# Patient Record
Sex: MALE | Race: BLACK OR AFRICAN AMERICAN | NOT HISPANIC OR LATINO | Employment: OTHER | URBAN - METROPOLITAN AREA
[De-identification: names, ages, dates, MRNs, and addresses within clinical notes are randomized per-mention and may not be internally consistent; named-entity substitution may affect disease eponyms.]

---

## 2017-01-13 DIAGNOSIS — I10 ESSENTIAL HYPERTENSION: ICD-10-CM

## 2017-01-13 RX ORDER — AMLODIPINE, VALSARTAN AND HYDROCHLOROTHIAZIDE 10; 160; 25 MG/1; MG/1; MG/1
TABLET ORAL
Qty: 90 TABLET | Refills: 0 | Status: SHIPPED | OUTPATIENT
Start: 2017-01-13 | End: 2017-04-10 | Stop reason: SDUPTHER

## 2017-01-17 ENCOUNTER — OFFICE VISIT (OUTPATIENT)
Dept: FAMILY MEDICINE | Facility: CLINIC | Age: 77
End: 2017-01-17
Payer: MEDICARE

## 2017-01-17 VITALS
TEMPERATURE: 98 F | RESPIRATION RATE: 17 BRPM | WEIGHT: 272.06 LBS | HEIGHT: 78 IN | OXYGEN SATURATION: 97 % | HEART RATE: 58 BPM | DIASTOLIC BLOOD PRESSURE: 72 MMHG | BODY MASS INDEX: 31.48 KG/M2 | SYSTOLIC BLOOD PRESSURE: 166 MMHG

## 2017-01-17 DIAGNOSIS — N52.1 ERECTILE DYSFUNCTION ASSOCIATED WITH TYPE 2 DIABETES MELLITUS: ICD-10-CM

## 2017-01-17 DIAGNOSIS — E11.69 ERECTILE DYSFUNCTION ASSOCIATED WITH TYPE 2 DIABETES MELLITUS: ICD-10-CM

## 2017-01-17 DIAGNOSIS — I10 ESSENTIAL HYPERTENSION: ICD-10-CM

## 2017-01-17 DIAGNOSIS — E11.22 TYPE 2 DIABETES MELLITUS WITH STAGE 3 CHRONIC KIDNEY DISEASE, WITHOUT LONG-TERM CURRENT USE OF INSULIN: Primary | ICD-10-CM

## 2017-01-17 DIAGNOSIS — N18.30 TYPE 2 DIABETES MELLITUS WITH STAGE 3 CHRONIC KIDNEY DISEASE, WITHOUT LONG-TERM CURRENT USE OF INSULIN: Primary | ICD-10-CM

## 2017-01-17 PROCEDURE — 99213 OFFICE O/P EST LOW 20 MIN: CPT | Mod: PBBFAC,PO | Performed by: FAMILY MEDICINE

## 2017-01-17 PROCEDURE — 99214 OFFICE O/P EST MOD 30 MIN: CPT | Mod: S$PBB,,, | Performed by: FAMILY MEDICINE

## 2017-01-17 PROCEDURE — 99999 PR PBB SHADOW E&M-EST. PATIENT-LVL III: CPT | Mod: PBBFAC,,, | Performed by: FAMILY MEDICINE

## 2017-01-17 RX ORDER — CHOLECALCIFEROL (VITAMIN D3) 25 MCG
2000 TABLET ORAL DAILY
COMMUNITY
End: 2020-11-02

## 2017-01-17 RX ORDER — CLONIDINE 0.1 MG/24H
1 PATCH, EXTENDED RELEASE TRANSDERMAL
Qty: 4 PATCH | Refills: 5 | Status: SHIPPED | OUTPATIENT
Start: 2017-01-17 | End: 2017-04-03 | Stop reason: SDUPTHER

## 2017-01-17 RX ORDER — CLONIDINE HYDROCHLORIDE 0.1 MG/1
TABLET ORAL
COMMUNITY
Start: 2016-10-08 | End: 2017-03-09 | Stop reason: SDUPTHER

## 2017-01-17 RX ORDER — DOCUSATE SODIUM 250 MG
250 CAPSULE ORAL DAILY
COMMUNITY
End: 2020-11-02

## 2017-01-17 RX ORDER — TADALAFIL 20 MG/1
20 TABLET ORAL
COMMUNITY
End: 2017-01-17

## 2017-01-17 RX ORDER — VARDENAFIL HYDROCHLORIDE 20 MG/1
20 TABLET ORAL DAILY PRN
Qty: 10 TABLET | Refills: 2 | Status: SHIPPED | OUTPATIENT
Start: 2017-01-17 | End: 2020-01-13

## 2017-01-17 NOTE — PROGRESS NOTES
Chief Complaint   Patient presents with    Hypertension     3 months follow up        Luther Irwin is a 76 y.o. male who presents per the Chief Complaint.  Pt is known to me and was last seen by me on 10/19/2016.  All known chronic medical issues have been documented.       Hypertension   This is a chronic problem. The current episode started more than 1 year ago. The problem is unchanged. The problem is uncontrolled. Pertinent negatives include no anxiety, blurred vision, chest pain, headaches, neck pain, orthopnea, palpitations, peripheral edema, PND, shortness of breath or sweats. There are no associated agents to hypertension. Risk factors for coronary artery disease include male gender and diabetes mellitus. Past treatments include angiotensin blockers, diuretics and calcium channel blockers. The current treatment provides moderate improvement. There are no compliance problems.  There is no history of angina, kidney disease, CAD/MI, CVA, heart failure, left ventricular hypertrophy, PVD, renovascular disease or retinopathy. There is no history of chronic renal disease, coarctation of the aorta or hyperparathyroidism.   Diabetes   He presents for his follow-up diabetic visit. He has type 2 diabetes mellitus. His disease course has been stable. Pertinent negatives for hypoglycemia include no confusion, dizziness, headaches, hunger, mood changes, nervousness/anxiousness, pallor, seizures, sleepiness, speech difficulty, sweats or tremors. Associated symptoms include foot paresthesias. Pertinent negatives for diabetes include no blurred vision, no chest pain, no fatigue, no foot ulcerations, no polydipsia, no polyphagia, no polyuria, no visual change, no weakness and no weight loss. There are no hypoglycemic complications. Symptoms are stable. Diabetic complications include peripheral neuropathy. Pertinent negatives for diabetic complications include no autonomic neuropathy, CVA, heart disease, impotence, nephropathy,  PVD or retinopathy. Risk factors for coronary artery disease include diabetes mellitus, hypertension and male sex. Current diabetic treatment includes oral agent (dual therapy). He is compliant with treatment most of the time. His weight is stable. He is following a generally healthy diet. When asked about meal planning, he reported none. He has not had a previous visit with a dietitian. He participates in exercise intermittently. There is no change in his home blood glucose trend. An ACE inhibitor/angiotensin II receptor blocker is being taken. He sees a podiatrist.Eye exam is current.        ROS  Review of Systems   Constitutional: Negative.  Negative for activity change, appetite change, chills, diaphoresis, fatigue, fever, unexpected weight change and weight loss.   HENT: Negative.  Negative for congestion, ear pain, hearing loss, nosebleeds, postnasal drip, rhinorrhea, sinus pressure, sneezing, sore throat and trouble swallowing.    Eyes: Negative for blurred vision, pain and visual disturbance.   Respiratory: Negative for cough, choking and shortness of breath.    Cardiovascular: Negative for chest pain, palpitations, orthopnea, leg swelling and PND.   Gastrointestinal: Negative for abdominal pain, constipation, diarrhea, nausea and vomiting.   Endocrine: Negative for polydipsia, polyphagia and polyuria.   Genitourinary: Negative for difficulty urinating, dysuria, frequency, impotence and urgency.   Musculoskeletal: Negative.  Negative for arthralgias, back pain, gait problem, joint swelling, myalgias and neck pain.   Skin: Negative.  Negative for pallor.   Allergic/Immunologic: Negative for environmental allergies and food allergies.   Neurological: Negative.  Negative for dizziness, tremors, seizures, syncope, speech difficulty, weakness, light-headedness and headaches.   Psychiatric/Behavioral: Negative.  Negative for confusion, decreased concentration, dysphoric mood and sleep disturbance. The patient is  "not nervous/anxious.        Physical Exam  Vitals:    01/17/17 1501   BP: (!) 166/72   Pulse: (!) 58   Resp: 17   Temp: 98.3 °F (36.8 °C)    Body mass index is 30.65 kg/(m^2).  Weight: 123.4 kg (272 lb 0.8 oz)   Height: 6' 7" (200.7 cm)     Physical Exam   Constitutional: He is oriented to person, place, and time. He appears well-developed and well-nourished. He is active and cooperative.  Non-toxic appearance. He does not have a sickly appearance. He does not appear ill. No distress.   HENT:   Head: Normocephalic and atraumatic.   Right Ear: Hearing, external ear and ear canal normal. No decreased hearing is noted.   Left Ear: Hearing and external ear normal. No decreased hearing is noted.   Nose: Nose normal. No rhinorrhea or nasal deformity.   Mouth/Throat: Uvula is midline and oropharynx is clear and moist. He does not have dentures. Normal dentition.   Eyes: Conjunctivae, EOM and lids are normal. Pupils are equal, round, and reactive to light. Right eye exhibits no chemosis, no discharge and no exudate. No foreign body present in the right eye. Left eye exhibits no chemosis, no discharge and no exudate. No foreign body present in the left eye. No scleral icterus.   Neck: Normal range of motion and full passive range of motion without pain. Neck supple. No thyromegaly present.   Cardiovascular: Normal rate, regular rhythm, S1 normal, S2 normal and normal heart sounds.  Exam reveals no gallop and no friction rub.    No murmur heard.  Pulmonary/Chest: Effort normal and breath sounds normal. No accessory muscle usage. No respiratory distress. He has no decreased breath sounds. He has no wheezes. He has no rhonchi. He has no rales.   Abdominal: Soft. Normal appearance. He exhibits no distension and no mass. There is no hepatosplenomegaly. There is no tenderness. There is no rigidity, no rebound and no guarding.   Musculoskeletal: Normal range of motion.   Neurological: He is alert and oriented to person, place, and " time. He has normal strength. No cranial nerve deficit or sensory deficit. He exhibits normal muscle tone. He displays no seizure activity. Coordination and gait normal.   Skin: Skin is warm, dry and intact. No rash noted. He is not diaphoretic. No pallor.   Psychiatric: He has a normal mood and affect. His speech is normal and behavior is normal. Judgment and thought content normal. Cognition and memory are normal. He is attentive.       Assessment & Plan    1. Type 2 diabetes mellitus with stage 3 chronic kidney disease, without long-term current use of insulin  Patient is encouraged to continue a diet low in carbohydrates and simple sugars.  Advised to begin or continue a weight loss program which focuses on good food choices and increased physical activity and encouraged to adhere to medication regimen and check glucose as recommended daily and report any changes in usual trends.  Screening blood test is due at this time.   - Hemoglobin A1c; Future  - Basic metabolic panel; Future    2. Essential hypertension  Patient was counseled and encouraged to maintain a low sodium diet, as well as increasing physical activity.  Recommend random BP checks at home on a regular basis.  Will continue medication at this time, and follow up as recommended, or sooner if blood pressure is not well controlled.   - cloNIDine 0.1 mg/24 hr td ptwk (CATAPRES) 0.1 mg/24 hr; Place 1 patch onto the skin every 7 days.  Dispense: 4 patch; Refill: 5    3. Erectile dysfunction associated with type 2 diabetes mellitus  Discussed different causes of ED, including anatomical disorder or nerve damage, vascular disorders, or psychological issues.  We agreed to try medication, and patient was given a prescription and advised to use with caution.  He was reminded that if he has an erection that last longer than 4 hours, he should report to the ER immediately.    - vardenafil (LEVITRA) 20 MG tablet; Take 1 tablet (20 mg total) by mouth daily as  needed for Erectile Dysfunction.  Dispense: 10 tablet; Refill: 2      Follow up documented    ACTIVE MEDICAL ISSUES:  Documented in Problem List    PAST MEDICAL HISTORY  Documented    PAST SURGICAL HISTORY:  Documented    SOCIAL HISTORY:  Documented    FAMILY HISTORY:  Documented    ALLERGIES AND MEDICATIONS: updated and reviewed.  Documented    Health Maintenance       Date Due Completion Date    TETANUS VACCINE 6/3/1958 ---    Pneumococcal (65+) (2 of 2 - PCV13) 4/23/2016 4/23/2015    Eye Exam 6/10/2016 6/10/2015 (Declined)    Override on 6/10/2015: Declined (had an appoint last week )    Hemoglobin A1c 4/19/2017 10/19/2016    Foot Exam 7/20/2017 7/20/2016    Override on 6/10/2015: Declined (hadan appoint with podiatry 05/21/15 Dr.Meka roth)    Lipid Panel 10/19/2017 10/19/2016

## 2017-01-18 ENCOUNTER — LAB VISIT (OUTPATIENT)
Dept: LAB | Facility: HOSPITAL | Age: 77
End: 2017-01-18
Attending: FAMILY MEDICINE
Payer: MEDICARE

## 2017-01-18 DIAGNOSIS — N18.30 TYPE 2 DIABETES MELLITUS WITH STAGE 3 CHRONIC KIDNEY DISEASE, WITHOUT LONG-TERM CURRENT USE OF INSULIN: ICD-10-CM

## 2017-01-18 DIAGNOSIS — E11.22 TYPE 2 DIABETES MELLITUS WITH STAGE 3 CHRONIC KIDNEY DISEASE, WITHOUT LONG-TERM CURRENT USE OF INSULIN: ICD-10-CM

## 2017-01-18 LAB
ANION GAP SERPL CALC-SCNC: 7 MMOL/L
BUN SERPL-MCNC: 25 MG/DL
CALCIUM SERPL-MCNC: 9 MG/DL
CHLORIDE SERPL-SCNC: 103 MMOL/L
CO2 SERPL-SCNC: 29 MMOL/L
CREAT SERPL-MCNC: 1.6 MG/DL
EST. GFR  (AFRICAN AMERICAN): 47.7 ML/MIN/1.73 M^2
EST. GFR  (NON AFRICAN AMERICAN): 41.2 ML/MIN/1.73 M^2
GLUCOSE SERPL-MCNC: 135 MG/DL
POTASSIUM SERPL-SCNC: 3.9 MMOL/L
SODIUM SERPL-SCNC: 139 MMOL/L

## 2017-01-18 PROCEDURE — 36415 COLL VENOUS BLD VENIPUNCTURE: CPT | Mod: PO

## 2017-01-18 PROCEDURE — 83036 HEMOGLOBIN GLYCOSYLATED A1C: CPT

## 2017-01-18 PROCEDURE — 80048 BASIC METABOLIC PNL TOTAL CA: CPT

## 2017-01-19 LAB
ESTIMATED AVG GLUCOSE: 140 MG/DL
HBA1C MFR BLD HPLC: 6.5 %

## 2017-01-23 ENCOUNTER — TELEPHONE (OUTPATIENT)
Dept: FAMILY MEDICINE | Facility: CLINIC | Age: 77
End: 2017-01-23

## 2017-01-23 NOTE — TELEPHONE ENCOUNTER
----- Message from Azikiwe K. Lombard, MD sent at 1/22/2017  9:22 AM CST -----  Please notify patient of improved A1c results by phone, and schedule follow up  within 3 months..

## 2017-02-03 RX ORDER — METOPROLOL SUCCINATE 50 MG/1
TABLET, EXTENDED RELEASE ORAL
Qty: 90 TABLET | Refills: 0 | Status: SHIPPED | OUTPATIENT
Start: 2017-02-03 | End: 2017-05-21 | Stop reason: SDUPTHER

## 2017-02-07 ENCOUNTER — OFFICE VISIT (OUTPATIENT)
Dept: FAMILY MEDICINE | Facility: CLINIC | Age: 77
End: 2017-02-07
Payer: MEDICARE

## 2017-02-07 VITALS
OXYGEN SATURATION: 97 % | RESPIRATION RATE: 16 BRPM | TEMPERATURE: 98 F | DIASTOLIC BLOOD PRESSURE: 80 MMHG | SYSTOLIC BLOOD PRESSURE: 144 MMHG | HEART RATE: 52 BPM | BODY MASS INDEX: 31.33 KG/M2 | WEIGHT: 270.75 LBS | HEIGHT: 78 IN

## 2017-02-07 DIAGNOSIS — N18.30 TYPE 2 DIABETES MELLITUS WITH STAGE 3 CHRONIC KIDNEY DISEASE, WITHOUT LONG-TERM CURRENT USE OF INSULIN: Primary | ICD-10-CM

## 2017-02-07 DIAGNOSIS — E11.22 TYPE 2 DIABETES MELLITUS WITH STAGE 3 CHRONIC KIDNEY DISEASE, WITHOUT LONG-TERM CURRENT USE OF INSULIN: Primary | ICD-10-CM

## 2017-02-07 DIAGNOSIS — I10 ESSENTIAL HYPERTENSION: ICD-10-CM

## 2017-02-07 PROCEDURE — 99213 OFFICE O/P EST LOW 20 MIN: CPT | Mod: PBBFAC,PO | Performed by: FAMILY MEDICINE

## 2017-02-07 PROCEDURE — 99214 OFFICE O/P EST MOD 30 MIN: CPT | Mod: S$PBB,,, | Performed by: FAMILY MEDICINE

## 2017-02-07 PROCEDURE — 99999 PR PBB SHADOW E&M-EST. PATIENT-LVL III: CPT | Mod: PBBFAC,,, | Performed by: FAMILY MEDICINE

## 2017-02-07 NOTE — PROGRESS NOTES
Chief Complaint   Patient presents with    Hypertension     need to follow up , Dr Poe opthalmology do have an appoint 03/07/17       Luther Irwin Jr. is a 76 y.o. male who presents per the Chief Complaint.  Pt is known to me and was last seen by me on 1/17/2017.  All known chronic medical issues have been documented.       Hypertension   This is a chronic problem. The current episode started more than 1 year ago. The problem is unchanged. The problem is uncontrolled. Pertinent negatives include no anxiety, blurred vision, chest pain, headaches, neck pain, orthopnea, palpitations, peripheral edema, PND, shortness of breath or sweats. There are no associated agents to hypertension. Risk factors for coronary artery disease include male gender and diabetes mellitus. Past treatments include angiotensin blockers, diuretics, calcium channel blockers and direct vasodilators. The current treatment provides moderate improvement. There are no compliance problems.  There is no history of angina, kidney disease, CAD/MI, CVA, heart failure, left ventricular hypertrophy, PVD, renovascular disease, retinopathy or a thyroid problem. There is no history of chronic renal disease, coarctation of the aorta, hyperaldosteronism, hypercortisolism, hyperparathyroidism, a hypertension causing med, pheochromocytoma or sleep apnea.   Diabetes   He presents for his follow-up diabetic visit. He has type 2 diabetes mellitus. His disease course has been stable. Pertinent negatives for hypoglycemia include no confusion, dizziness, headaches, hunger, mood changes, nervousness/anxiousness, pallor, seizures, sleepiness, speech difficulty, sweats or tremors. Associated symptoms include foot paresthesias. Pertinent negatives for diabetes include no blurred vision, no chest pain, no fatigue, no foot ulcerations, no polydipsia, no polyphagia, no polyuria, no visual change, no weakness and no weight loss. There are no hypoglycemic complications.  "Symptoms are stable. Diabetic complications include peripheral neuropathy. Pertinent negatives for diabetic complications include no autonomic neuropathy, CVA, heart disease, impotence, nephropathy, PVD or retinopathy. Risk factors for coronary artery disease include diabetes mellitus, hypertension and male sex. Current diabetic treatment includes oral agent (dual therapy). He is compliant with treatment most of the time. His weight is stable. He is following a generally healthy diet. When asked about meal planning, he reported none. He has not had a previous visit with a dietitian. He participates in exercise intermittently. There is no change in his home blood glucose trend. An ACE inhibitor/angiotensin II receptor blocker is being taken. He sees a podiatrist.Eye exam is current.        ROS  Review of Systems   Constitutional: Negative for fatigue and weight loss.   Eyes: Negative for blurred vision.   Respiratory: Negative for shortness of breath.    Cardiovascular: Negative for chest pain, palpitations, orthopnea and PND.   Endocrine: Negative for polydipsia, polyphagia and polyuria.   Genitourinary: Negative for impotence.   Musculoskeletal: Negative for neck pain.   Skin: Negative for pallor.   Neurological: Negative for dizziness, tremors, seizures, speech difficulty, weakness and headaches.   Psychiatric/Behavioral: Negative for confusion. The patient is not nervous/anxious.        Physical Exam  Vitals:    02/07/17 1407   BP: (!) 144/80   Pulse: (!) 52   Resp: 16   Temp: 98 °F (36.7 °C)    Body mass index is 30.5 kg/(m^2).  Weight: 122.8 kg (270 lb 11.6 oz)   Height: 6' 7" (200.7 cm)     Physical Exam   Constitutional: He is oriented to person, place, and time. He appears well-developed and well-nourished. He is active and cooperative.  Non-toxic appearance. He does not have a sickly appearance. He does not appear ill. No distress.   HENT:   Head: Normocephalic and atraumatic.   Right Ear: Hearing, external " ear and ear canal normal. No decreased hearing is noted.   Left Ear: Hearing and external ear normal. No decreased hearing is noted.   Nose: Nose normal. No rhinorrhea or nasal deformity.   Mouth/Throat: Uvula is midline and oropharynx is clear and moist. He does not have dentures. Normal dentition.   Eyes: Conjunctivae, EOM and lids are normal. Pupils are equal, round, and reactive to light. Right eye exhibits no chemosis, no discharge and no exudate. No foreign body present in the right eye. Left eye exhibits no chemosis, no discharge and no exudate. No foreign body present in the left eye. No scleral icterus.   Neck: Normal range of motion and full passive range of motion without pain. Neck supple. No thyromegaly present.   Cardiovascular: Normal rate, regular rhythm, S1 normal, S2 normal and normal heart sounds.  Exam reveals no gallop and no friction rub.    No murmur heard.  Pulmonary/Chest: Effort normal and breath sounds normal. No accessory muscle usage. No respiratory distress. He has no decreased breath sounds. He has no wheezes. He has no rhonchi. He has no rales.   Abdominal: Soft. Normal appearance. He exhibits no distension and no mass. There is no hepatosplenomegaly. There is no tenderness. There is no rigidity, no rebound and no guarding.   Musculoskeletal: Normal range of motion.   Neurological: He is alert and oriented to person, place, and time. He has normal strength. No cranial nerve deficit or sensory deficit. He exhibits normal muscle tone. He displays no seizure activity. Coordination and gait normal.   Skin: Skin is warm, dry and intact. No rash noted. He is not diaphoretic. No pallor.   Psychiatric: He has a normal mood and affect. His speech is normal and behavior is normal. Judgment and thought content normal. Cognition and memory are normal. He is attentive.       Assessment & Plan    1. Type 2 diabetes mellitus with stage 3 chronic kidney disease, without long-term current use of  insulin  Patient is encouraged to continue a diet low in carbohydrates and simple sugars.  Advised to begin or continue a weight loss program which focuses on good food choices and increased physical activity and encouraged to adhere to medication regimen and check glucose as recommended daily and report any changes in usual trends.  Screening blood test is not due at this time.  Slightly improved A1c; encouraged continued diet and lifestyle modifications.      2. Essential hypertension  Patient was counseled and encouraged to maintain a low sodium diet, as well as increasing physical activity.  Recommend random BP checks at home on a regular basis.  Will continue medication at this time, and follow up as recommended, or sooner if blood pressure is not well controlled.  Will start clonidine patches once he finishes current pills; follow up in 3 months.    Follow up documented    ACTIVE MEDICAL ISSUES:  Documented in Problem List    PAST MEDICAL HISTORY  Documented    PAST SURGICAL HISTORY:  Documented    SOCIAL HISTORY:  Documented    FAMILY HISTORY:  Documented    ALLERGIES AND MEDICATIONS: updated and reviewed.  Documented    Health Maintenance       Date Due Completion Date    TETANUS VACCINE 6/3/1958 ---    Pneumococcal (65+) (2 of 2 - PCV13) 4/23/2016 4/23/2015    Eye Exam 6/10/2016 6/10/2015 (Declined)    Override on 6/10/2015: Declined (had an appoint last week )    Hemoglobin A1c 7/18/2017 1/18/2017    Foot Exam 7/20/2017 7/20/2016    Override on 6/10/2015: Declined (hadan appoint with podiatry 05/21/15 Dr.Meka roth)    Lipid Panel 10/19/2017 10/19/2016

## 2017-03-09 ENCOUNTER — OFFICE VISIT (OUTPATIENT)
Dept: PODIATRY | Facility: CLINIC | Age: 77
End: 2017-03-09
Payer: MEDICARE

## 2017-03-09 VITALS
BODY MASS INDEX: 31.24 KG/M2 | SYSTOLIC BLOOD PRESSURE: 152 MMHG | HEIGHT: 78 IN | DIASTOLIC BLOOD PRESSURE: 70 MMHG | WEIGHT: 270 LBS

## 2017-03-09 DIAGNOSIS — M20.42 HAMMER TOES OF BOTH FEET: ICD-10-CM

## 2017-03-09 DIAGNOSIS — M20.41 HAMMER TOES OF BOTH FEET: ICD-10-CM

## 2017-03-09 DIAGNOSIS — B35.1 ONYCHOMYCOSIS DUE TO DERMATOPHYTE: ICD-10-CM

## 2017-03-09 DIAGNOSIS — E11.49 TYPE II DIABETES MELLITUS WITH NEUROLOGICAL MANIFESTATIONS: Primary | ICD-10-CM

## 2017-03-09 DIAGNOSIS — M20.10 HALLUX ABDUCTO VALGUS, UNSPECIFIED LATERALITY: ICD-10-CM

## 2017-03-09 PROCEDURE — 11721 DEBRIDE NAIL 6 OR MORE: CPT | Mod: Q9,PBBFAC,PO | Performed by: PODIATRIST

## 2017-03-09 PROCEDURE — 99213 OFFICE O/P EST LOW 20 MIN: CPT | Mod: PBBFAC,PO,25 | Performed by: PODIATRIST

## 2017-03-09 PROCEDURE — 99999 PR PBB SHADOW E&M-EST. PATIENT-LVL III: CPT | Mod: PBBFAC,,, | Performed by: PODIATRIST

## 2017-03-09 PROCEDURE — 99499 UNLISTED E&M SERVICE: CPT | Mod: S$PBB,,, | Performed by: PODIATRIST

## 2017-03-09 PROCEDURE — 11721 DEBRIDE NAIL 6 OR MORE: CPT | Mod: Q9,S$PBB,, | Performed by: PODIATRIST

## 2017-03-09 NOTE — PATIENT INSTRUCTIONS
Recommend lotions: eucerin, aquaphor, A&D ointment, gold bond for diabetics      Shoe recommendations: (try 6pm.com, zappos.com , [email or web address].[email or web address], or shoes.com for discounted prices) you can visit [employer] shoes in [place] as well    Asics (GT 1000 or gel foundations), new balance, saucony (stabil c3),  Narayan (transcend), vionic, propet (tennis shoe)    soft brand, clarks, crocs, aerosoles, naturalizers, SAS, ecco, harsh, kamran cuevas, rockports (dress shoes)    Vionic, volitiles, burkenstocks, fitflops, propet (sandals)    Nike comfort thong sandals, crocs (house shoes)    Nail Home remedy:  Vicks Vapor rub to cuticles  Listerine and apple cider vinegar in a spray bottle to nails       occasional soaks for 15-20 mins in luke warm water with 1 cup of listerine and 1 cup of apple cider vinegar is ok          Diabetes: Inspecting Your Feet    Diabetes increases your chances of developing foot problems. So inspect your feet every day. This helps you find small skin irritations before they become serious ulcers or infections. If you have trouble seeing the bottoms of your feet, use a mirror or ask a family member or friend to help.  How to check your feet  Below are tips to help you look for foot problems. Try to check your feet at the same time each day, such as when you get out of bed in the morning:  · Check the top of each foot. The tops of toes, back of the heel, and outer edge of the foot can get a lot of rubbing from poor-fitting shoes.  · Check the bottom of each foot. Daily wear and tear often leads to problems at pressure spots.  · Check the toes and nails. Fungal infections often occur between toes. Toenail problems can also be a sign of fungal infections or lead to breaks in the skin.  · Check your shoes, too. Loose objects inside a shoe can injure the foot. Use your hand to feel inside your shoes for things like zackery, loose stitching, or rough areas that could irritate your skin.  Warning signs  Look for any  color changes in the foot. Redness with streaks can signal a severe infection, which needs immediate medical attention. Tell your healthcare provider right away if you have any of these problems:  · Swelling, sometimes with color changes, may be a sign of poor blood flow or infection. Symptoms include tenderness and an increase in the size of your foot.  · Warm or hot areas on your feet may be signs of infection. A foot that is cold may not be getting enough blood.  · Sensations such as burning, tingling, or pins and needles can be signs of a problem. Also check for areas that may be numb.  · Hot spots are caused by friction or pressure. Look for hot spots in areas that get a lot of rubbing. Hot spots can turn into blisters, calluses, or sores.  · Cracks and sores are caused by dry or irritated skin. They are a sign that the skin is breaking down, which can lead to infection.  · Toenail problems to watch for include nails growing into the skin (ingrown toenail) and causing redness or pain. Thick, yellow, or discolored nails can signal a fungal infection.  · Drainage and odor can develop from untreated sores and ulcers. Call your healthcare provider right away if you notice white or yellow drainage, bleeding, or unpleasant odor.   Date Last Reviewed: 6/1/2016 © 2000-2016 [employer]. [address] [place] [address], [place] [address]. All rights reserved. This information is not intended as a substitute for professional medical care. Always follow your healthcare professional's instructions.

## 2017-03-09 NOTE — MR AVS SNAPSHOT
[facility]  [address]  [place] [address]  Phone: [phone]                  Luther Irwin Jr.   3/9/2017 3:45 PM   Office Visit    Description:  Male : 1940   Provider:  Darcy Mckeon DPM   Department:  [facility]           Reason for Visit     Diabetes Mellitus     Diabetic Foot Exam     Nail Care                To Do List           Goals (5 Years of Data)     None      [facility]     [facility] On Call Nurse Care Line -  Assistance  Registered nurses in the [facility] On Call Center provide clinical advisement, health education, appointment booking, and other advisory services.  Call for this free service at [phone].             Medications           Message regarding Medications     Verify the changes and/or additions to your medication regime listed below are the same as discussed with your clinician today.  If any of these changes or additions are incorrect, please notify your healthcare provider.        STOP taking these medications     cloNIDine (CATAPRES) 0.1 MG tablet TAKE 1 TABLET BY MOUTH EVERY MORNING AND 2 EVERY EVENING    cloNIDine (CATAPRES) 0.1 MG tablet TAKE 1 TABLET BY MOUTH EVERY MORNING AND 2 EVERY EVENING           Verify that the below list of medications is an accurate representation of the medications you are currently taking.  If none reported, the list may be blank. If incorrect, please contact your healthcare provider. Carry this list with you in case of emergency.           Current Medications     amlodipine-valsartan-hcthiazid -25 mg Tab TAKE 1 TABLET EVERY DAY    aspirin 81 MG Chew Take 81 mg by mouth once daily.    blood sugar diagnostic Strp Use 1 Free Style Lite test strip to test blood sugar 2 times daily.    blood sugar diagnostic Strp Use 1 Free Style Lite test strip to test blood sugar 2 times daily.    cloNIDine 0.1 mg/24 hr td ptwk (CATAPRES) 0.1 mg/24 hr Place 1 patch onto the skin every 7 days.    cyanocobalamin (VITAMIN  "B-12) 500 MCG tablet Take 500 mcg by mouth once daily.    CYANOCOBALAMIN, VITAMIN B-12, ORAL Take by mouth.    docusate sodium (COL-RITE) 250 MG capsule Take 250 mg by mouth.    glipiZIDE (GLUCOTROL) 5 MG TR24 Take 1 tablet (5 mg total) by mouth every morning.    metformin (GLUCOPHAGE) 500 MG tablet Take 1 tablet (500 mg total) by mouth 2 (two) times daily with meals.    metoprolol succinate (TOPROL-XL) 50 MG 24 hr tablet TAKE 1 TABLET (50 MG TOTAL) BY MOUTH ONCE DAILY.    tadalafil (CIALIS) 20 MG Tab Take 1 tablet (20 mg total) by mouth every 72 hours as needed.    tamsulosin (FLOMAX) 0.4 mg Cp24 TAKE 1 CAPSULE BY MOUTH ONCE DAILY.    vardenafil (LEVITRA) 20 MG tablet Take 1 tablet (20 mg total) by mouth daily as needed for Erectile Dysfunction.    vitamin D 1000 units Tab Take 2,000 mg by mouth once daily.     blood-glucose meter kit One Touch Ultra II Kit, use as directed to check blood sugar 2 times daily           Clinical Reference Information           Your Vitals Were     BP Height Weight BMI       152/70 6' 7" (2.007 m) 122.5 kg (270 lb) 30.42 kg/m2       Blood Pressure          Most Recent Value    BP  (!)  152/70      Allergies as of 3/9/2017     No Known Allergies      Immunizations Administered on Date of Encounter - 3/9/2017     None      [facility] Sign-Up     Activating your [facility] account is as easy as 1-2-3!     1) Visit [email or web address], select Sign Up Now, enter this activation code and your date of birth, then select Next.  [email or web address]  Expires: 4/23/2017  4:31 PM      2) Create a username and password to use when you visit [facility] in the future and select a security question in case you lose your password and select Next.    3) Enter your e-mail address and click Sign Up!    Additional Information  If you have questions, please e-mail [email or web address] or call [phone] to talk to our [facility] staff. Remember, [facility] is NOT to be used for urgent needs. For medical " emergencies, dial 911.         Instructions    Recommend lotions: eucerin, aquaphor, A&D ointment, gold bond for diabetics      Shoe recommendations: (try 6pm.com, zappos.com , nordstromrack.com, or shoes.com for discounted prices) you can visit DSW shoes in [place] as well    Asics (GT 1000 or gel foundations), new balance, saucony (stabil c3),  Narayan (transcend), vionic, propet (tennis shoe)    soft brand, clarks, crocs, aerosoles, naturalizers, SAS, ecco, harsh, kamran cuevas, rockports (dress shoes)    Vionic, volitiles, burkenstocks, fitflops, propet (sandals)    Nike comfort thong sandals, crocs (house shoes)    Nail Home remedy:  Vicks Vapor rub to cuticles  Listerine and apple cider vinegar in a spray bottle to nails       occasional soaks for 15-20 mins in luke warm water with 1 cup of listerine and 1 cup of apple cider vinegar is ok          Diabetes: Inspecting Your Feet    Diabetes increases your chances of developing foot problems. So inspect your feet every day. This helps you find small skin irritations before they become serious ulcers or infections. If you have trouble seeing the bottoms of your feet, use a mirror or ask a family member or friend to help.  How to check your feet  Below are tips to help you look for foot problems. Try to check your feet at the same time each day, such as when you get out of bed in the morning:  · Check the top of each foot. The tops of toes, back of the heel, and outer edge of the foot can get a lot of rubbing from poor-fitting shoes.  · Check the bottom of each foot. Daily wear and tear often leads to problems at pressure spots.  · Check the toes and nails. Fungal infections often occur between toes. Toenail problems can also be a sign of fungal infections or lead to breaks in the skin.  · Check your shoes, too. Loose objects inside a shoe can injure the foot. Use your hand to feel inside your shoes for things like zackery, loose stitching, or rough areas that could  irritate your skin.  Warning signs  Look for any color changes in the foot. Redness with streaks can signal a severe infection, which needs immediate medical attention. Tell your healthcare provider right away if you have any of these problems:  · Swelling, sometimes with color changes, may be a sign of poor blood flow or infection. Symptoms include tenderness and an increase in the size of your foot.  · Warm or hot areas on your feet may be signs of infection. A foot that is cold may not be getting enough blood.  · Sensations such as burning, tingling, or pins and needles can be signs of a problem. Also check for areas that may be numb.  · Hot spots are caused by friction or pressure. Look for hot spots in areas that get a lot of rubbing. Hot spots can turn into blisters, calluses, or sores.  · Cracks and sores are caused by dry or irritated skin. They are a sign that the skin is breaking down, which can lead to infection.  · Toenail problems to watch for include nails growing into the skin (ingrown toenail) and causing redness or pain. Thick, yellow, or discolored nails can signal a fungal infection.  · Drainage and odor can develop from untreated sores and ulcers. Call your healthcare provider right away if you notice white or yellow drainage, bleeding, or unpleasant odor.   Date Last Reviewed: 6/1/2016 © 2000-2016 [employer]. [address] [place] [address]. All rights reserved. This information is not intended as a substitute for professional medical care. Always follow your healthcare professional's instructions.             Language Assistance Services     ATTENTION: Language assistance services are available, free of charge. Please call [phone].      ATENCIÓN: Si habla elyssa, tiene a cuba disposición servicios gratuitos de asistencia lingüística. Llame al [phone].     RESHMA Ý: N?u b?n nói Ti?ng Vi?t, có các d?ch v? h? tr? ngôn ng? mi?n phí dành cho b?n. G?i s?  [phone].         [facility] complies with applicable Federal civil rights laws and does not discriminate on the basis of race, color, national origin, age, disability, or sex.

## 2017-03-10 NOTE — PROGRESS NOTES
Subjective:      Patient ID: Luther Irwin Jr. is a 76 y.o. male.    Chief Complaint: Diabetes Mellitus (pcp Dr. Lombard 2-7-17); Diabetic Foot Exam; and Nail Care    Luther is a 76 y.o. male who presents to the clinic for evaluation and treatment of high risk feet. Luther has a past medical history of Cancer; Diabetes mellitus, type 2; and Hypertension. The patient's chief complaint is long, thick toenails. This patient has documented high risk feet requiring routine maintenance secondary to diabetes mellitis and those secondary complications of diabetes, as mentioned..    PCP: Azikiwe K Lombard, MD    Date Last Seen by PCP:   Chief Complaint   Patient presents with    Diabetes Mellitus     pcp Dr. Lombard 2-7-17    Diabetic Foot Exam    Nail Care     Current shoe gear:  casual shoes     Hemoglobin A1C   Date Value Ref Range Status   01/18/2017 6.5 (H) 4.5 - 6.2 % Final     Comment:     According to ADA guidelines, hemoglobin A1C <7.0% represents  optimal control in non-pregnant diabetic patients.  Different  metrics may apply to specific populations.   Standards of Medical Care in Diabetes - 2016.  For the purpose of screening for the presence of diabetes:  <5.7%     Consistent with the absence of diabetes  5.7-6.4%  Consistent with increasing risk for diabetes   (prediabetes)  >or=6.5%  Consistent with diabetes  Currently no consensus exists for use of hemoglobin A1C  for diagnosis of diabetes for children.     10/19/2016 6.6 (H) 4.5 - 6.2 % Final     Comment:     According to ADA guidelines, hemoglobin A1C <7.0% represents  optimal control in non-pregnant diabetic patients.  Different  metrics may apply to specific populations.   Standards of Medical Care in Diabetes - 2016.  For the purpose of screening for the presence of diabetes:  <5.7%     Consistent with the absence of diabetes  5.7-6.4%  Consistent with increasing risk for diabetes   (prediabetes)  >or=6.5%  Consistent with diabetes  Currently no  consensus exists for use of hemoglobin A1C  for diagnosis of diabetes for children.     06/10/2016 6.6 (H) 4.5 - 6.2 % Final     Patient Active Problem List   Diagnosis    Essential hypertension    BPH (benign prostatic hyperplasia)    Type 2 diabetes mellitus with stage 3 chronic kidney disease, without long-term current use of insulin    Diabetes with proteinuria    Hyperchylomicronemia     Current Outpatient Prescriptions on File Prior to Visit   Medication Sig Dispense Refill    amlodipine-valsartan-hcthiazid -25 mg Tab TAKE 1 TABLET EVERY DAY 90 tablet 0    aspirin 81 MG Chew Take 81 mg by mouth once daily.      blood sugar diagnostic Strp Use 1 Free Style Lite test strip to test blood sugar 2 times daily. 100 strip 11    blood sugar diagnostic Strp Use 1 Free Style Lite test strip to test blood sugar 2 times daily.      cloNIDine 0.1 mg/24 hr td ptwk (CATAPRES) 0.1 mg/24 hr Place 1 patch onto the skin every 7 days. 4 patch 5    cyanocobalamin (VITAMIN B-12) 500 MCG tablet Take 500 mcg by mouth once daily.      CYANOCOBALAMIN, VITAMIN B-12, ORAL Take by mouth.      docusate sodium (COL-RITE) 250 MG capsule Take 250 mg by mouth.      glipiZIDE (GLUCOTROL) 5 MG TR24 Take 1 tablet (5 mg total) by mouth every morning. 90 tablet 1    metformin (GLUCOPHAGE) 500 MG tablet Take 1 tablet (500 mg total) by mouth 2 (two) times daily with meals. 180 tablet 0    metoprolol succinate (TOPROL-XL) 50 MG 24 hr tablet TAKE 1 TABLET (50 MG TOTAL) BY MOUTH ONCE DAILY. 90 tablet 0    tadalafil (CIALIS) 20 MG Tab Take 1 tablet (20 mg total) by mouth every 72 hours as needed. 10 tablet 5    tamsulosin (FLOMAX) 0.4 mg Cp24 TAKE 1 CAPSULE BY MOUTH ONCE DAILY. 90 capsule 2    vardenafil (LEVITRA) 20 MG tablet Take 1 tablet (20 mg total) by mouth daily as needed for Erectile Dysfunction. 10 tablet 2    vitamin D 1000 units Tab Take 2,000 mg by mouth once daily.       blood-glucose meter kit One Touch Ultra II  "Kit, use as directed to check blood sugar 2 times daily 1 each 0    [DISCONTINUED] cloNIDine (CATAPRES) 0.1 MG tablet TAKE 1 TABLET BY MOUTH EVERY MORNING AND 2 EVERY EVENING 270 tablet 1    [DISCONTINUED] cloNIDine (CATAPRES) 0.1 MG tablet TAKE 1 TABLET BY MOUTH EVERY MORNING AND 2 EVERY EVENING       No current facility-administered medications on file prior to visit.      Review of patient's allergies indicates:  No Known Allergies  Past Surgical History:   Procedure Laterality Date    FINGER SURGERY  3/2014    HERNIA REPAIR  07/1967    KNEE SURGERY      SPINE SURGERY  10/2007    VASECTOMY  1986     Family History   Problem Relation Age of Onset    Hypertension Mother     Hyperlipidemia Mother     Hypertension Father     Hyperlipidemia Father      Social History     Social History    Marital status: Single     Spouse name: N/A    Number of children: N/A    Years of education: N/A     Occupational History    Not on file.     Social History Main Topics    Smoking status: Former Smoker    Smokeless tobacco: Never Used    Alcohol use Yes      Comment: special occacion    Drug use: No    Sexual activity: Not on file     Other Topics Concern    Not on file     Social History Narrative       Review of Systems   Constitution: Negative for chills, fever and weakness.   Cardiovascular: Negative for chest pain, claudication and leg swelling.   Respiratory: Negative for cough and shortness of breath.    Skin: Positive for dry skin and nail changes. Negative for itching and rash.   Musculoskeletal: Negative for arthritis, falls, joint pain, joint swelling and muscle weakness.   Gastrointestinal: Negative for diarrhea, nausea and vomiting.   Neurological: Positive for paresthesias. Negative for numbness and tremors.   Psychiatric/Behavioral: Negative for altered mental status and hallucinations.         Objective:       Vitals:    03/09/17 1534   BP: (!) 152/70   Weight: 122.5 kg (270 lb)   Height: 6' 7" " (2.007 m)   PainSc: 0-No pain       Physical Exam   Constitutional:   General: Pt. is well-developed, well-nourished, appears stated age, in no acute distress, alert and oriented x 3. No evidence of depression, anxiety, or agitation. Calm, cooperative, and communicative. Appropriate interactions and affect.       Cardiovascular:   Pulses:       Dorsalis pedis pulses are 2+ on the right side, and 2+ on the left side.        Posterior tibial pulses are 1+ on the right side, and 1+ on the left side.   There is decreased digital hair. Skin is atrophic, hyperpigmented, and mildly edematous       Musculoskeletal:        Right ankle: He exhibits normal range of motion and no swelling. No tenderness. Achilles tendon exhibits no pain and no defect.        Left ankle: He exhibits normal range of motion and no swelling. No tenderness. Achilles tendon exhibits no pain and no defect.        Right foot: There is normal range of motion and no tenderness.        Left foot: There is normal range of motion and no tenderness.   Adequate joint range of motion without pain, limitation, nor crepitation Bilateral feet and ankle joints. Muscle strength is 5/5 in all groups bilaterally.    Decreased stride, station of gait.  apropulsive toe off.  Increased angle and base of gait.    Patient has hammertoes of digits 2-5 bilateral partially reducible without symptom today.    Visible and palpable bunion without pain at dorsomedial 1st metatarsal head right and left.  Hallux abducted right and left partially reducible, tracks laterally without being track bound.  No ecchymosis, erythema, edema, or cardinal signs infection or signs of trauma same foot.    Visible and palpable tailors bunion without pain at dorsolateral 5th metatarsal head right and left.  5th digit is varus right and left partially reducible. No ecchymosis, erythema, edema, or cardinal signs infection.       Neurological: A sensory deficit is present.   Decreased/absent  vibratory sensation bilateral feet to 128Hz tuning fork.    Waddell-Deanne 5.07 monofilamant testing is diminished laterally Javad feet. Sharp/dull sensation diminished Bilaterally. Light touch diminished Bilaterally.       Skin: Skin is warm, dry and intact. No abrasion, no ecchymosis, no lesion and no rash noted. He is not diaphoretic. No cyanosis or erythema. No pallor. Nails show no clubbing.   Toenails 1-5 bilaterally are elongated by 2-3 mm, thickened by 2-3 mm, discolored/yellowed, dystrophic, brittle with subungual debris.     Xerosis to calcaneal rim, bilaterally     Interdigital Spaces clean, dry and without evidence of break in skin integrity   Psychiatric: He has a normal mood and affect. His speech is normal.   Nursing note and vitals reviewed.        Assessment:       Encounter Diagnoses   Name Primary?    Type II diabetes mellitus with neurological manifestations Yes    Hallux abducto valgus, unspecified laterality     Hammer toes of both feet     Onychomycosis due to dermatophyte          Plan:       Luther was seen today for diabetes mellitus, diabetic foot exam and nail care.    Diagnoses and all orders for this visit:    Type II diabetes mellitus with neurological manifestations    Hallux abducto valgus, unspecified laterality    Hammer toes of both feet    Onychomycosis due to dermatophyte    I counseled the patient on his conditions, their implications and medical management.    - Shoe inspection. Diabetic Foot Education. Patient reminded of the importance of good nutrition and blood sugar control to help prevent podiatric complications of diabetes. Patient instructed on proper foot hygeine. We discussed wearing proper shoe gear, daily foot inspections, never walking without protective shoe gear.     - With patient's permission, nails were aggressively reduced and debrided x 10 to their soft tissue attachment mechanically and with electric , removing all offending nail and debris.  Patient relates relief following the procedure.  He will continue to monitor the areas daily, inspect his feet, wear protective shoe gear when ambulatory, moisturizer to maintain skin integrity and follow in this office in approximately 6-9 months, sooner p.r.n.

## 2017-03-28 ENCOUNTER — OFFICE VISIT (OUTPATIENT)
Dept: FAMILY MEDICINE | Facility: CLINIC | Age: 77
End: 2017-03-28
Payer: MEDICARE

## 2017-03-28 VITALS
BODY MASS INDEX: 30.31 KG/M2 | TEMPERATURE: 98 F | RESPIRATION RATE: 17 BRPM | DIASTOLIC BLOOD PRESSURE: 66 MMHG | WEIGHT: 261.94 LBS | HEART RATE: 60 BPM | SYSTOLIC BLOOD PRESSURE: 140 MMHG | OXYGEN SATURATION: 98 % | HEIGHT: 78 IN

## 2017-03-28 DIAGNOSIS — I10 ESSENTIAL HYPERTENSION: ICD-10-CM

## 2017-03-28 DIAGNOSIS — E11.22 TYPE 2 DIABETES MELLITUS WITH STAGE 3 CHRONIC KIDNEY DISEASE, WITHOUT LONG-TERM CURRENT USE OF INSULIN: Primary | ICD-10-CM

## 2017-03-28 DIAGNOSIS — N18.30 TYPE 2 DIABETES MELLITUS WITH STAGE 3 CHRONIC KIDNEY DISEASE, WITHOUT LONG-TERM CURRENT USE OF INSULIN: Primary | ICD-10-CM

## 2017-03-28 DIAGNOSIS — I95.1 ORTHOSTATIC HYPOTENSION: ICD-10-CM

## 2017-03-28 DIAGNOSIS — Z23 NEED FOR PNEUMOCOCCAL VACCINATION: ICD-10-CM

## 2017-03-28 PROCEDURE — 99999 PR PBB SHADOW E&M-EST. PATIENT-LVL III: CPT | Mod: PBBFAC,,, | Performed by: FAMILY MEDICINE

## 2017-03-28 PROCEDURE — 99214 OFFICE O/P EST MOD 30 MIN: CPT | Mod: S$PBB,,, | Performed by: FAMILY MEDICINE

## 2017-03-28 PROCEDURE — 99213 OFFICE O/P EST LOW 20 MIN: CPT | Mod: PBBFAC,PO | Performed by: FAMILY MEDICINE

## 2017-03-28 NOTE — PROGRESS NOTES
Chief Complaint   Patient presents with    dizzy spells     went to urgent care 1 week ago [facility] , Dr Escobar eye doctor saw the Dr. past 3 weeks ago     light headaches       Luther Irwin Jr. is a 76 y.o. male who presents per the Chief Complaint.  Pt is known to me and was last seen by me on 2/7/2017.  All known chronic medical issues have been documented.  He mentions recent onset of dizziness with standing that began last week.  He stopped clonidine patches because they would not stay on so he restarted     HPI     ROS  Review of Systems   Constitutional: Negative.  Negative for activity change, appetite change, chills, diaphoresis, fatigue, fever and unexpected weight change.   HENT: Negative.  Negative for congestion, ear pain, hearing loss, nosebleeds, postnasal drip, rhinorrhea, sinus pressure, sneezing, sore throat and trouble swallowing.    Eyes: Negative for pain and visual disturbance.   Respiratory: Negative for cough, choking and shortness of breath.    Cardiovascular: Negative for chest pain, palpitations and leg swelling.   Gastrointestinal: Negative for abdominal pain, constipation, diarrhea, nausea and vomiting.   Endocrine: Negative for polydipsia, polyphagia and polyuria.   Genitourinary: Negative for difficulty urinating, dysuria, frequency and urgency.   Musculoskeletal: Negative.  Negative for arthralgias, back pain, gait problem, joint swelling, myalgias and neck pain.   Skin: Negative.  Negative for pallor.   Allergic/Immunologic: Negative for environmental allergies and food allergies.   Neurological: Positive for dizziness. Negative for tremors, seizures, syncope, speech difficulty, weakness, light-headedness and headaches.   Psychiatric/Behavioral: Negative.  Negative for confusion, decreased concentration, dysphoric mood and sleep disturbance. The patient is not nervous/anxious.        Physical Exam  Vitals:    03/28/17 1045   BP: (!) 140/66   Pulse: 60   Resp: 17   Temp: 97.9 °F  "(36.6 °C)    Body mass index is 29.5 kg/(m^2).  Weight: 118.8 kg (261 lb 14.5 oz)   Height: 6' 7" (200.7 cm)     Physical Exam   Constitutional: He is oriented to person, place, and time. He appears well-developed and well-nourished. He is active and cooperative.  Non-toxic appearance. He does not have a sickly appearance. He does not appear ill. No distress.   HENT:   Head: Normocephalic and atraumatic.   Right Ear: Hearing and external ear normal. No decreased hearing is noted.   Left Ear: Hearing and external ear normal. No decreased hearing is noted.   Nose: Nose normal. No rhinorrhea or nasal deformity.   Mouth/Throat: Uvula is midline and oropharynx is clear and moist. He does not have dentures. Normal dentition.   Eyes: Conjunctivae, EOM and lids are normal. Pupils are equal, round, and reactive to light. Right eye exhibits no chemosis, no discharge and no exudate. No foreign body present in the right eye. Left eye exhibits no chemosis, no discharge and no exudate. No foreign body present in the left eye. No scleral icterus.   Neck: Normal range of motion and full passive range of motion without pain. Neck supple.   Cardiovascular: Normal rate, regular rhythm, S1 normal, S2 normal and normal heart sounds.  Exam reveals no gallop and no friction rub.    No murmur heard.  Pulmonary/Chest: Effort normal and breath sounds normal. No accessory muscle usage. No respiratory distress. He has no decreased breath sounds. He has no wheezes. He has no rhonchi. He has no rales.   Abdominal: Soft. Normal appearance. He exhibits no distension. There is no hepatosplenomegaly. There is no tenderness. There is no rigidity, no rebound and no guarding.   Musculoskeletal: Normal range of motion.   Neurological: He is alert and oriented to person, place, and time. He has normal strength. No cranial nerve deficit or sensory deficit. He exhibits normal muscle tone. He displays no seizure activity. Coordination and gait normal. "   Skin: Skin is warm, dry and intact. No rash noted. He is not diaphoretic.   Psychiatric: He has a normal mood and affect. His speech is normal and behavior is normal. Judgment and thought content normal. Cognition and memory are normal. He is attentive.       Assessment & Plan    1. Type 2 diabetes mellitus with stage 3 chronic kidney disease, without long-term current use of insulin  Patient is encouraged to continue a diet low in carbohydrates and simple sugars.  Advised to begin or continue a weight loss program which focuses on good food choices and increased physical activity and encouraged to adhere to medication regimen and check glucose as recommended daily and report any changes in usual trends.  Screening blood test is not due at this time.     2. Essential hypertension  Patient was counseled and encouraged to maintain a low sodium diet, as well as increasing physical activity.  Recommend random BP checks at home on a regular basis.  Will continue medication at this time, and follow up as recommended, or sooner if blood pressure is not well controlled.  Recommended restarting topical patch as symptoms began once he started oral therapy.  Advised he apply OTC tegaderm patch or similar over patch to ensure adhesion.    3. Need for pneumococcal vaccination  Refused at this visit    4. Orthostatic hypotension  Discussed possible orthostatic hypotension related to rapid onset of clonidine tablets; advised he stand with caution and wait a few seconds before moving to avoid fall or injury.      Follow up documented    ACTIVE MEDICAL ISSUES:  Documented in Problem List    PAST MEDICAL HISTORY  Documented    PAST SURGICAL HISTORY:  Documented    SOCIAL HISTORY:  Documented    FAMILY HISTORY:  Documented    ALLERGIES AND MEDICATIONS: updated and reviewed.  Documented    Health Maintenance       Date Due Completion Date    TETANUS VACCINE 6/3/1958 ---    Pneumococcal (65+) (2 of 2 - PCV13) 4/23/2016 4/23/2015     Eye Exam 6/10/2016 6/10/2015 (Declined)    Override on 6/10/2015: Declined (had an appoint last week )    Hemoglobin A1c 7/18/2017 1/18/2017    Foot Exam 7/20/2017 7/20/2016    Override on 6/10/2015: Declined (hadan appoint with podiatry 05/21/15 Dr.Meka roth)    Lipid Panel 10/19/2017 10/19/2016

## 2017-04-03 DIAGNOSIS — I10 ESSENTIAL HYPERTENSION: ICD-10-CM

## 2017-04-03 RX ORDER — CLONIDINE 0.1 MG/24H
1 PATCH, EXTENDED RELEASE TRANSDERMAL
Qty: 12 PATCH | Refills: 3 | Status: SHIPPED | OUTPATIENT
Start: 2017-04-03 | End: 2017-09-15

## 2017-04-03 NOTE — TELEPHONE ENCOUNTER
Received request from [facility] pharmacy for 90 days supplies , last refill was 01/17/2017 plus 5 refill , last OV was 03/28/2017.

## 2017-04-10 DIAGNOSIS — I10 ESSENTIAL HYPERTENSION: ICD-10-CM

## 2017-04-11 RX ORDER — AMLODIPINE, VALSARTAN AND HYDROCHLOROTHIAZIDE 10; 160; 25 MG/1; MG/1; MG/1
TABLET ORAL
Qty: 90 TABLET | Refills: 0 | Status: SHIPPED | OUTPATIENT
Start: 2017-04-11 | End: 2017-09-21 | Stop reason: SDUPTHER

## 2017-04-23 DIAGNOSIS — N40.0 BPH (BENIGN PROSTATIC HYPERPLASIA): ICD-10-CM

## 2017-04-25 RX ORDER — TAMSULOSIN HYDROCHLORIDE 0.4 MG/1
CAPSULE ORAL
Qty: 90 CAPSULE | Refills: 2 | Status: SHIPPED | OUTPATIENT
Start: 2017-04-25 | End: 2018-01-14 | Stop reason: SDUPTHER

## 2017-05-22 RX ORDER — METOPROLOL SUCCINATE 50 MG/1
TABLET, EXTENDED RELEASE ORAL
Qty: 90 TABLET | Refills: 0 | Status: SHIPPED | OUTPATIENT
Start: 2017-05-22 | End: 2017-08-16 | Stop reason: SDUPTHER

## 2017-06-20 RX ORDER — METFORMIN HYDROCHLORIDE 500 MG/1
TABLET ORAL
Qty: 270 TABLET | Refills: 1 | Status: SHIPPED | OUTPATIENT
Start: 2017-06-20 | End: 2017-12-12 | Stop reason: SDUPTHER

## 2017-07-06 RX ORDER — CLONIDINE HYDROCHLORIDE 0.1 MG/1
TABLET ORAL
Qty: 270 TABLET | Refills: 1 | OUTPATIENT
Start: 2017-07-06

## 2017-07-13 ENCOUNTER — OFFICE VISIT (OUTPATIENT)
Dept: PODIATRY | Facility: CLINIC | Age: 77
End: 2017-07-13
Payer: MEDICARE

## 2017-07-13 VITALS
HEIGHT: 78 IN | BODY MASS INDEX: 30.31 KG/M2 | WEIGHT: 261.94 LBS | SYSTOLIC BLOOD PRESSURE: 152 MMHG | DIASTOLIC BLOOD PRESSURE: 61 MMHG | HEART RATE: 59 BPM

## 2017-07-13 DIAGNOSIS — M20.10 HALLUX ABDUCTO VALGUS, UNSPECIFIED LATERALITY: ICD-10-CM

## 2017-07-13 DIAGNOSIS — L84 CORN OR CALLUS: ICD-10-CM

## 2017-07-13 DIAGNOSIS — B35.1 ONYCHOMYCOSIS DUE TO DERMATOPHYTE: ICD-10-CM

## 2017-07-13 DIAGNOSIS — M20.42 HAMMER TOES OF BOTH FEET: ICD-10-CM

## 2017-07-13 DIAGNOSIS — M20.41 HAMMER TOES OF BOTH FEET: ICD-10-CM

## 2017-07-13 DIAGNOSIS — E11.49 TYPE II DIABETES MELLITUS WITH NEUROLOGICAL MANIFESTATIONS: Primary | ICD-10-CM

## 2017-07-13 DIAGNOSIS — M21.629 TAILOR'S BUNION: ICD-10-CM

## 2017-07-13 PROCEDURE — 99999 PR PBB SHADOW E&M-EST. PATIENT-LVL III: CPT | Mod: PBBFAC,,, | Performed by: PODIATRIST

## 2017-07-13 PROCEDURE — 99499 UNLISTED E&M SERVICE: CPT | Mod: S$PBB,,, | Performed by: PODIATRIST

## 2017-07-13 PROCEDURE — 11721 DEBRIDE NAIL 6 OR MORE: CPT | Mod: 59,Q9,S$PBB, | Performed by: PODIATRIST

## 2017-07-13 PROCEDURE — 11056 PARNG/CUTG B9 HYPRKR LES 2-4: CPT | Mod: Q9,PBBFAC,PO | Performed by: PODIATRIST

## 2017-07-13 PROCEDURE — 11721 DEBRIDE NAIL 6 OR MORE: CPT | Mod: Q9,PBBFAC,PO,59 | Performed by: PODIATRIST

## 2017-07-13 PROCEDURE — 99213 OFFICE O/P EST LOW 20 MIN: CPT | Mod: PBBFAC,PO | Performed by: PODIATRIST

## 2017-07-13 PROCEDURE — 11056 PARNG/CUTG B9 HYPRKR LES 2-4: CPT | Mod: Q9,S$PBB,, | Performed by: PODIATRIST

## 2017-07-13 NOTE — PATIENT INSTRUCTIONS
Recommend lotions: eucerin, aquaphor, A&D ointment, gold bond for diabetics, sween    Shoe recommendations: (try 6pm.com, zappos.com , nordstromrack.com, or shoes.com for discounted prices) you can visit DSW shoes in [place] as well    Asics (GT 1000 or gel foundations), new balance, saucony (stabil c3),  Narayan (transcend), vionic, propet (tennis shoe)    soft brand, clarks, crocs, aerosoles, naturalizers, SAS, ecco, harsh, kamran cuevas, rockports (dress shoes)    Vionic, volitiles, burkenstocks, fitflops, propet (sandals)    Nike comfort thong sandals, crocs (house shoes)    Nail Home remedy:  Vicks Vapor rub OR Listerine and apple cider vinegar in a spray bottle to nails    Occasional soaks for 15-20 mins in luke warm water with 1 cup of listerine and 1 cup of apple cider vinegar are ok You may add several drops of oil of oregano or tea tree oil as well      Diabetes: Inspecting Your Feet  Diabetes increases your chances of developing foot problems. So inspect your feet every day. This helps you find small skin irritations before they become serious infections. If you have trouble seeing the bottoms of your feet, use a mirror or ask a family member or friend to help.     Pressure spots on the bottom of the foot are common areas where problems develop.   How to check your feet  Below are tips to help you look for foot problems. Try to check your feet at the same time each day, such as when you get out of bed in the morning:  · Check the top of each foot. The tops of toes, back of the heel, and outer edge of the foot can get a lot of rubbing from poor-fitting shoes.  · Check the bottom of each foot. Daily wear and tear often leads to problems at pressure spots.  · Check the toes and nails. Fungal infections often occur between toes. Toenail problems can also be a sign of fungal infections or lead to breaks in the skin.  · Check your shoes, too. Loose objects inside a shoe can injure the foot. Use your hand to feel  inside your shoes for things like zackery, loose stitching, or rough areas that could irritate your skin.  Warning signs  Look for any color changes in the foot. Redness with streaks can signal a severe infection, which needs immediate medical attention. Tell your doctor right away if you have any of these problems:  · Swelling, sometimes with color changes, may be a sign of poor blood flow or infection. Symptoms include tenderness and an increase in the size of your foot.  · Warm or hot areas on your feet may be signs of infection. A foot that is cold may not be getting enough blood.  · Sensations such as burning, tingling, or pins and needles can be signs of a problem. Also check for areas that may be numb.  · Hot spots are caused by friction or pressure. Look for hot spots in areas that get a lot of rubbing. Hot spots can turn into blisters, calluses, or sores.  · Cracks and sores are caused by dry or irritated skin. They are a sign that the skin is breaking down, which can lead to infection.  · Toenail problems to watch for include nails growing into the skin (ingrown toenail) and causing redness or pain. Thick, yellow, or discolored nails can signal a fungal infection.  · Drainage and odor can develop from untreated sores and ulcers. Call your doctor right away if you notice white or yellow drainage, bleeding, or unpleasant odor.   © 2417-1119 [employer]. [address] [place] [id number]. All rights reserved. This information is not intended as a substitute for professional medical care. Always follow your healthcare professional's instructions.          Diabetes: Inspecting Your Feet    Diabetes increases your chances of developing foot problems. So inspect your feet every day. This helps you find small skin irritations before they become serious ulcers or infections. If you have trouble seeing the bottoms of your feet, use a mirror or ask a family member or friend to help.  How to  check your feet  Below are tips to help you look for foot problems. Try to check your feet at the same time each day, such as when you get out of bed in the morning:  · Check the top of each foot. The tops of toes, back of the heel, and outer edge of the foot can get a lot of rubbing from poor-fitting shoes.  · Check the bottom of each foot. Daily wear and tear often leads to problems at pressure spots.  · Check the toes and nails. Fungal infections often occur between toes. Toenail problems can also be a sign of fungal infections or lead to breaks in the skin.  · Check your shoes, too. Loose objects inside a shoe can injure the foot. Use your hand to feel inside your shoes for things like zackery, loose stitching, or rough areas that could irritate your skin.  Warning signs  Look for any color changes in the foot. Redness with streaks can signal a severe infection, which needs immediate medical attention. Tell your healthcare provider right away if you have any of these problems:  · Swelling, sometimes with color changes, may be a sign of poor blood flow or infection. Symptoms include tenderness and an increase in the size of your foot.  · Warm or hot areas on your feet may be signs of infection. A foot that is cold may not be getting enough blood.  · Sensations such as burning, tingling, or pins and needles can be signs of a problem. Also check for areas that may be numb.  · Hot spots are caused by friction or pressure. Look for hot spots in areas that get a lot of rubbing. Hot spots can turn into blisters, calluses, or sores.  · Cracks and sores are caused by dry or irritated skin. They are a sign that the skin is breaking down, which can lead to infection.  · Toenail problems to watch for include nails growing into the skin (ingrown toenail) and causing redness or pain. Thick, yellow, or discolored nails can signal a fungal infection.  · Drainage and odor can develop from untreated sores and ulcers. Call your  healthcare provider right away if you notice white or yellow drainage, bleeding, or unpleasant odor.   Date Last Reviewed: 6/1/2016  © 8577-0728 [employer]. [address] [place] [address], [place] [address]. All rights reserved. This information is not intended as a substitute for professional medical care. Always follow your healthcare professional's instructions.

## 2017-07-13 NOTE — PROGRESS NOTES
Subjective:      Patient ID: Luther Irwin Jr. is a 77 y.o. male.    Chief Complaint: Diabetes Mellitus (pcp [place] 3-); Diabetic Foot Exam; and Nail Care    Luther is a 77 y.o. male who presents to the clinic for evaluation and treatment of high risk feet. Luther has a past medical history of Cancer; Diabetes mellitus, type 2; and Hypertension. The patient's chief complaint is long, thick toenails. This patient has documented high risk feet requiring routine maintenance secondary to diabetes mellitis and those secondary complications of diabetes, as mentioned..    PCP: Azikiwe K [place], MD    Date Last Seen by PCP:   Chief Complaint   Patient presents with    Diabetes Mellitus     pcp [place] 3-    Diabetic Foot Exam    Nail Care     Current shoe gear:  casual shoes     Hemoglobin A1C   Date Value Ref Range Status   01/18/2017 6.5 (H) 4.5 - 6.2 % Final     Comment:     According to ADA guidelines, hemoglobin A1C <7.0% represents  optimal control in non-pregnant diabetic patients.  Different  metrics may apply to specific populations.   Standards of Medical Care in Diabetes - 2016.  For the purpose of screening for the presence of diabetes:  <5.7%     Consistent with the absence of diabetes  5.7-6.4%  Consistent with increasing risk for diabetes   (prediabetes)  >or=6.5%  Consistent with diabetes  Currently no consensus exists for use of hemoglobin A1C  for diagnosis of diabetes for children.     10/19/2016 6.6 (H) 4.5 - 6.2 % Final     Comment:     According to ADA guidelines, hemoglobin A1C <7.0% represents  optimal control in non-pregnant diabetic patients.  Different  metrics may apply to specific populations.   Standards of Medical Care in Diabetes - 2016.  For the purpose of screening for the presence of diabetes:  <5.7%     Consistent with the absence of diabetes  5.7-6.4%  Consistent with increasing risk for diabetes   (prediabetes)  >or=6.5%  Consistent with diabetes  Currently no consensus  exists for use of hemoglobin A1C  for diagnosis of diabetes for children.     06/10/2016 6.6 (H) 4.5 - 6.2 % Final     Patient Active Problem List   Diagnosis    Essential hypertension    BPH (benign prostatic hyperplasia)    Type 2 diabetes mellitus with stage 3 chronic kidney disease, without long-term current use of insulin    Diabetes with proteinuria    Hyperchylomicronemia     Current Outpatient Prescriptions on File Prior to Visit   Medication Sig Dispense Refill    amlodipine-valsartan-hcthiazid -25 mg Tab TAKE 1 TABLET EVERY DAY 90 tablet 0    aspirin 81 MG Chew Take 81 mg by mouth once daily.      blood sugar diagnostic Strp Use 1 Free Style Lite test strip to test blood sugar 2 times daily. 100 strip 11    blood sugar diagnostic Strp Use 1 Free Style Lite test strip to test blood sugar 2 times daily.      blood-glucose meter kit One Touch Ultra II Kit, use as directed to check blood sugar 2 times daily 1 each 0    cloNIDine 0.1 mg/24 hr td ptwk (CATAPRES) 0.1 mg/24 hr Place 1 patch onto the skin every 7 days. 12 patch 3    cyanocobalamin (VITAMIN B-12) 500 MCG tablet Take 500 mcg by mouth once daily.      CYANOCOBALAMIN, VITAMIN B-12, ORAL Take by mouth.      docusate sodium (COL-RITE) 250 MG capsule Take 250 mg by mouth.      glipiZIDE (GLUCOTROL) 5 MG TR24 Take 1 tablet (5 mg total) by mouth every morning. 90 tablet 1    metformin (GLUCOPHAGE) 500 MG tablet Take 1 tablet (500 mg total) by mouth 2 (two) times daily with meals. 180 tablet 0    metformin (GLUCOPHAGE) 500 MG tablet TAKE 1 TABLET BY MOUTH 3 TIMES A  tablet 1    metoprolol succinate (TOPROL-XL) 50 MG 24 hr tablet TAKE 1 TABLET BY MOUTH ONCE DAILY 90 tablet 0    tadalafil (CIALIS) 20 MG Tab Take 1 tablet (20 mg total) by mouth every 72 hours as needed. 10 tablet 5    tamsulosin (FLOMAX) 0.4 mg Cp24 TAKE 1 CAPSULE BY MOUTH ONCE DAILY. 90 capsule 2    vardenafil (LEVITRA) 20 MG tablet Take 1 tablet (20 mg total)  "by mouth daily as needed for Erectile Dysfunction. 10 tablet 2    vitamin D 1000 units Tab Take 2,000 mg by mouth once daily.        No current facility-administered medications on file prior to visit.      Review of patient's allergies indicates:  No Known Allergies  Past Surgical History:   Procedure Laterality Date    FINGER SURGERY  3/2014    HERNIA REPAIR  07/1967    KNEE SURGERY      SPINE SURGERY  10/2007    VASECTOMY  1986     Family History   Problem Relation Age of Onset    Hypertension Mother     Hyperlipidemia Mother     Hypertension Father     Hyperlipidemia Father      Social History     Social History    Marital status: Single     Spouse name: N/A    Number of children: N/A    Years of education: N/A     Occupational History    Not on file.     Social History Main Topics    Smoking status: Former Smoker    Smokeless tobacco: Never Used    Alcohol use Yes      Comment: special occacion    Drug use: No    Sexual activity: Not on file     Other Topics Concern    Not on file     Social History Narrative    No narrative on file       Review of Systems   Constitution: Negative for chills, fever and weakness.   Cardiovascular: Negative for chest pain, claudication and leg swelling.   Respiratory: Negative for cough and shortness of breath.    Skin: Positive for dry skin and nail changes. Negative for itching and rash.   Musculoskeletal: Negative for arthritis, falls, joint pain, joint swelling and muscle weakness.   Gastrointestinal: Negative for diarrhea, nausea and vomiting.   Neurological: Positive for paresthesias. Negative for numbness and tremors.   Psychiatric/Behavioral: Negative for altered mental status and hallucinations.         Objective:       Vitals:    07/13/17 1534   BP: (!) 152/61   Pulse: (!) 59   Weight: 118.8 kg (261 lb 14.5 oz)   Height: 6' 7" (2.007 m)   PainSc: 0-No pain       Physical Exam   Constitutional:   General: Pt. is well-developed, well-nourished, " appears stated age, in no acute distress, alert and oriented x 3. No evidence of depression, anxiety, or agitation. Calm, cooperative, and communicative. Appropriate interactions and affect.       Cardiovascular:   Pulses:       Dorsalis pedis pulses are 2+ on the right side, and 2+ on the left side.        Posterior tibial pulses are 1+ on the right side, and 1+ on the left side.   There is decreased digital hair. Skin is atrophic, hyperpigmented, and mildly edematous       Musculoskeletal:        Right ankle: He exhibits normal range of motion and no swelling. No tenderness. Achilles tendon exhibits no pain and no defect.        Left ankle: He exhibits normal range of motion and no swelling. No tenderness. Achilles tendon exhibits no pain and no defect.        Right foot: There is normal range of motion and no tenderness.        Left foot: There is normal range of motion and no tenderness.   Adequate joint range of motion without pain, limitation, nor crepitation Bilateral feet and ankle joints. Muscle strength is 5/5 in all groups bilaterally.    Decreased stride, station of gait.  apropulsive toe off.  Increased angle and base of gait.    Patient has hammertoes of digits 2-5 bilateral partially reducible without symptom today.    Visible and palpable bunion without pain at dorsomedial 1st metatarsal head right and left.  Hallux abducted right and left partially reducible, tracks laterally without being track bound.  No ecchymosis, erythema, edema, or cardinal signs infection or signs of trauma same foot.    Visible and palpable tailors bunion without pain at dorsolateral 5th metatarsal head right and left.  5th digit is varus right and left partially reducible. No ecchymosis, erythema, edema, or cardinal signs infection.       Neurological: A sensory deficit is present.   Decreased/absent vibratory sensation bilateral feet to 128Hz tuning fork.    Polebridge-Deanne 5.07 monofilamant testing is diminished  laterally Javad feet. Sharp/dull sensation diminished Bilaterally. Light touch diminished Bilaterally.       Skin: Skin is warm, dry and intact. No abrasion, no ecchymosis, no lesion and no rash noted. He is not diaphoretic. No cyanosis or erythema. No pallor. Nails show no clubbing.   Toenails 1-5 bilaterally are elongated by 2-3 mm, thickened by 2-3 mm, discolored/yellowed, dystrophic, brittle with subungual debris.     Focal hyperkeratotic lesion consisting entirely of hyperkeratotic tissue without open skin, drainage, pus, fluctuance, malodor, or signs of infection calcaneal rim javad     Interdigital Spaces clean, dry and without evidence of break in skin integrity   Psychiatric: He has a normal mood and affect. His speech is normal.   Nursing note and vitals reviewed.        Assessment:       Encounter Diagnoses   Name Primary?    Type II diabetes mellitus with neurological manifestations Yes    Hallux abducto valgus, unspecified laterality     Hammer toes of both feet     Tailor's bunion     Onychomycosis due to dermatophyte     Corn or callus          Plan:       Luther was seen today for diabetes mellitus, diabetic foot exam and nail care.    Diagnoses and all orders for this visit:    Type II diabetes mellitus with neurological manifestations  -     DIABETIC SHOES FOR HOME USE    Hallux abducto valgus, unspecified laterality  -     DIABETIC SHOES FOR HOME USE    Hammer toes of both feet  -     DIABETIC SHOES FOR HOME USE    Tailor's bunion  -     DIABETIC SHOES FOR HOME USE    Onychomycosis due to dermatophyte    Corn or callus  -     DIABETIC SHOES FOR HOME USE      I counseled the patient on his conditions, their implications and medical management.    - Shoe inspection. Diabetic Foot Education. Patient reminded of the importance of good nutrition and blood sugar control to help prevent podiatric complications of diabetes. Patient instructed on proper foot hygeine. We discussed wearing proper shoe  gear, daily foot inspections, never walking without protective shoe gear.     Rx diabetic shoes for protection and support    - With patient's permission, nails were aggressively reduced and debrided x 10 to their soft tissue attachment mechanically and with electric , removing all offending nail and debris. Patient relates relief following the procedure. After cleansing the  area w/ alcohol prep pad the above mentioned hyperkeratosis was trimmed utilizing No 15 scapel, to a smooth base with out incident. Patient tolerated this  well and reported comfort to the area of calcaneal rim justus.  He will continue to monitor the areas daily, inspect his feet, wear protective shoe gear when ambulatory, moisturizer to maintain skin integrity and follow in this office in approximately 6-9 months, sooner p.r.n.

## 2017-07-26 DIAGNOSIS — N18.30 TYPE 2 DIABETES MELLITUS WITH STAGE 3 CHRONIC KIDNEY DISEASE, WITHOUT LONG-TERM CURRENT USE OF INSULIN: ICD-10-CM

## 2017-07-26 DIAGNOSIS — E11.22 TYPE 2 DIABETES MELLITUS WITH STAGE 3 CHRONIC KIDNEY DISEASE, WITHOUT LONG-TERM CURRENT USE OF INSULIN: ICD-10-CM

## 2017-07-27 RX ORDER — GLIPIZIDE 5 MG/1
5 TABLET, FILM COATED, EXTENDED RELEASE ORAL EVERY MORNING
Qty: 90 TABLET | Refills: 0 | Status: SHIPPED | OUTPATIENT
Start: 2017-07-27 | End: 2017-09-21 | Stop reason: SDUPTHER

## 2017-07-31 DIAGNOSIS — E11.9 TYPE 2 DIABETES MELLITUS WITHOUT COMPLICATION: ICD-10-CM

## 2017-08-17 RX ORDER — METOPROLOL SUCCINATE 50 MG/1
TABLET, EXTENDED RELEASE ORAL
Qty: 90 TABLET | Refills: 0 | Status: SHIPPED | OUTPATIENT
Start: 2017-08-17 | End: 2017-11-13 | Stop reason: SDUPTHER

## 2017-09-15 RX ORDER — CLONIDINE HYDROCHLORIDE 0.1 MG/1
TABLET ORAL
Qty: 270 TABLET | Refills: 0 | Status: SHIPPED | OUTPATIENT
Start: 2017-09-15 | End: 2017-12-12 | Stop reason: SDUPTHER

## 2017-09-15 NOTE — LETTER
September 15, 2017    Lutherburak Irwin Jr.  [address] [facility] [place] [address]             [facility]  [address] [place] [address]  Phone: [phone]  Fax: [phone] Dear Mr. Irwin:    Greetings, our records indicate that you are due for an office visit. Please call [phone] to schedule.       If you have any questions or concerns, please don't hesitate to call.    Sincerely,        Zahida Constantino LPN

## 2017-09-21 ENCOUNTER — OFFICE VISIT (OUTPATIENT)
Dept: FAMILY MEDICINE | Facility: CLINIC | Age: 77
End: 2017-09-21
Payer: MEDICARE

## 2017-09-21 ENCOUNTER — LAB VISIT (OUTPATIENT)
Dept: LAB | Facility: HOSPITAL | Age: 77
End: 2017-09-21
Attending: FAMILY MEDICINE
Payer: MEDICARE

## 2017-09-21 VITALS
DIASTOLIC BLOOD PRESSURE: 72 MMHG | SYSTOLIC BLOOD PRESSURE: 138 MMHG | TEMPERATURE: 98 F | BODY MASS INDEX: 30.23 KG/M2 | WEIGHT: 261.25 LBS | RESPIRATION RATE: 17 BRPM | OXYGEN SATURATION: 97 % | HEART RATE: 66 BPM | HEIGHT: 78 IN

## 2017-09-21 DIAGNOSIS — E78.3 HYPERCHYLOMICRONEMIA: ICD-10-CM

## 2017-09-21 DIAGNOSIS — N40.0 BENIGN PROSTATIC HYPERPLASIA WITHOUT LOWER URINARY TRACT SYMPTOMS: ICD-10-CM

## 2017-09-21 DIAGNOSIS — N18.30 TYPE 2 DIABETES MELLITUS WITH STAGE 3 CHRONIC KIDNEY DISEASE, WITHOUT LONG-TERM CURRENT USE OF INSULIN: Primary | ICD-10-CM

## 2017-09-21 DIAGNOSIS — E11.22 TYPE 2 DIABETES MELLITUS WITH STAGE 3 CHRONIC KIDNEY DISEASE, WITHOUT LONG-TERM CURRENT USE OF INSULIN: ICD-10-CM

## 2017-09-21 DIAGNOSIS — Z23 NEED FOR PNEUMOCOCCAL VACCINATION: ICD-10-CM

## 2017-09-21 DIAGNOSIS — I10 ESSENTIAL HYPERTENSION: ICD-10-CM

## 2017-09-21 DIAGNOSIS — E11.22 TYPE 2 DIABETES MELLITUS WITH STAGE 3 CHRONIC KIDNEY DISEASE, WITHOUT LONG-TERM CURRENT USE OF INSULIN: Primary | ICD-10-CM

## 2017-09-21 DIAGNOSIS — N18.30 TYPE 2 DIABETES MELLITUS WITH STAGE 3 CHRONIC KIDNEY DISEASE, WITHOUT LONG-TERM CURRENT USE OF INSULIN: ICD-10-CM

## 2017-09-21 DIAGNOSIS — Z13.5 SCREENING FOR EYE CONDITION: ICD-10-CM

## 2017-09-21 LAB
ALBUMIN SERPL BCP-MCNC: 3.8 G/DL
ALP SERPL-CCNC: 117 U/L
ALT SERPL W/O P-5'-P-CCNC: 12 U/L
ANION GAP SERPL CALC-SCNC: 10 MMOL/L
AST SERPL-CCNC: 18 U/L
BASOPHILS # BLD AUTO: 0.05 K/UL
BASOPHILS NFR BLD: 0.5 %
BILIRUB SERPL-MCNC: 0.4 MG/DL
BUN SERPL-MCNC: 26 MG/DL
CALCIUM SERPL-MCNC: 9.4 MG/DL
CHLORIDE SERPL-SCNC: 104 MMOL/L
CHOLEST SERPL-MCNC: 159 MG/DL
CHOLEST/HDLC SERPL: 3.9 {RATIO}
CO2 SERPL-SCNC: 26 MMOL/L
CREAT SERPL-MCNC: 1.9 MG/DL
DIFFERENTIAL METHOD: ABNORMAL
EOSINOPHIL # BLD AUTO: 0.2 K/UL
EOSINOPHIL NFR BLD: 1.9 %
ERYTHROCYTE [DISTWIDTH] IN BLOOD BY AUTOMATED COUNT: 13 %
EST. GFR  (AFRICAN AMERICAN): 38.5 ML/MIN/1.73 M^2
EST. GFR  (NON AFRICAN AMERICAN): 33.3 ML/MIN/1.73 M^2
GLUCOSE SERPL-MCNC: 82 MG/DL
HCT VFR BLD AUTO: 36.3 %
HDLC SERPL-MCNC: 41 MG/DL
HDLC SERPL: 25.8 %
HGB BLD-MCNC: 12.6 G/DL
LDLC SERPL CALC-MCNC: 95.4 MG/DL
LYMPHOCYTES # BLD AUTO: 2.6 K/UL
LYMPHOCYTES NFR BLD: 28.4 %
MCH RBC QN AUTO: 27.4 PG
MCHC RBC AUTO-ENTMCNC: 34.7 G/DL
MCV RBC AUTO: 79 FL
MONOCYTES # BLD AUTO: 0.7 K/UL
MONOCYTES NFR BLD: 7.9 %
NEUTROPHILS # BLD AUTO: 5.6 K/UL
NEUTROPHILS NFR BLD: 61.1 %
NONHDLC SERPL-MCNC: 118 MG/DL
PLATELET # BLD AUTO: 230 K/UL
PMV BLD AUTO: 12.6 FL
POTASSIUM SERPL-SCNC: 4.2 MMOL/L
PROT SERPL-MCNC: 7.5 G/DL
RBC # BLD AUTO: 4.6 M/UL
SODIUM SERPL-SCNC: 140 MMOL/L
TRIGL SERPL-MCNC: 113 MG/DL
WBC # BLD AUTO: 9.14 K/UL

## 2017-09-21 PROCEDURE — 99999 PR PBB SHADOW E&M-EST. PATIENT-LVL III: CPT | Mod: PBBFAC,,, | Performed by: FAMILY MEDICINE

## 2017-09-21 PROCEDURE — 83036 HEMOGLOBIN GLYCOSYLATED A1C: CPT

## 2017-09-21 PROCEDURE — 1126F AMNT PAIN NOTED NONE PRSNT: CPT | Mod: ,,, | Performed by: FAMILY MEDICINE

## 2017-09-21 PROCEDURE — 80053 COMPREHEN METABOLIC PANEL: CPT

## 2017-09-21 PROCEDURE — 36415 COLL VENOUS BLD VENIPUNCTURE: CPT | Mod: PO

## 2017-09-21 PROCEDURE — G0009 ADMIN PNEUMOCOCCAL VACCINE: HCPCS | Mod: PBBFAC,PO

## 2017-09-21 PROCEDURE — 3075F SYST BP GE 130 - 139MM HG: CPT | Mod: ,,, | Performed by: FAMILY MEDICINE

## 2017-09-21 PROCEDURE — 99214 OFFICE O/P EST MOD 30 MIN: CPT | Mod: S$PBB,,, | Performed by: FAMILY MEDICINE

## 2017-09-21 PROCEDURE — 1159F MED LIST DOCD IN RCRD: CPT | Mod: ,,, | Performed by: FAMILY MEDICINE

## 2017-09-21 PROCEDURE — 99213 OFFICE O/P EST LOW 20 MIN: CPT | Mod: PBBFAC,PO | Performed by: FAMILY MEDICINE

## 2017-09-21 PROCEDURE — 85025 COMPLETE CBC W/AUTO DIFF WBC: CPT

## 2017-09-21 PROCEDURE — 80061 LIPID PANEL: CPT

## 2017-09-21 PROCEDURE — 3078F DIAST BP <80 MM HG: CPT | Mod: ,,, | Performed by: FAMILY MEDICINE

## 2017-09-21 PROCEDURE — 84153 ASSAY OF PSA TOTAL: CPT

## 2017-09-21 PROCEDURE — 90670 PCV13 VACCINE IM: CPT | Mod: PBBFAC,PO

## 2017-09-21 RX ORDER — GLIPIZIDE 5 MG/1
5 TABLET, FILM COATED, EXTENDED RELEASE ORAL EVERY MORNING
Qty: 90 TABLET | Refills: 1 | Status: SHIPPED | OUTPATIENT
Start: 2017-09-21 | End: 2018-04-29 | Stop reason: SDUPTHER

## 2017-09-21 RX ORDER — AMLODIPINE, VALSARTAN AND HYDROCHLOROTHIAZIDE 10; 160; 25 MG/1; MG/1; MG/1
1 TABLET ORAL DAILY
Qty: 90 TABLET | Refills: 1 | Status: SHIPPED | OUTPATIENT
Start: 2017-09-21 | End: 2018-04-14 | Stop reason: SDUPTHER

## 2017-09-21 NOTE — PROGRESS NOTES
Chief Complaint   Patient presents with    Hypertension       Luther Irwin Jr. is a 77 y.o. male who presents per the Chief Complaint.  Pt is known to me and was last seen by me on 3/28/2017.  All known chronic medical issues have been documented.       Hypertension   This is a chronic problem. The current episode started more than 1 year ago. The problem is unchanged. The problem is uncontrolled. Pertinent negatives include no anxiety, blurred vision, chest pain, headaches, neck pain, orthopnea, palpitations, peripheral edema, PND, shortness of breath or sweats. There are no associated agents to hypertension. Risk factors for coronary artery disease include male gender and diabetes mellitus. Past treatments include angiotensin blockers, diuretics and calcium channel blockers. The current treatment provides moderate improvement. There are no compliance problems.  There is no history of angina, kidney disease, CAD/MI, CVA, heart failure, left ventricular hypertrophy, PVD, renovascular disease or retinopathy. There is no history of chronic renal disease, coarctation of the aorta or hyperparathyroidism.   Diabetes   He presents for his follow-up diabetic visit. He has type 2 diabetes mellitus. His disease course has been stable. Pertinent negatives for hypoglycemia include no confusion, dizziness, headaches, hunger, mood changes, nervousness/anxiousness, pallor, seizures, sleepiness, speech difficulty, sweats or tremors. Associated symptoms include foot paresthesias. Pertinent negatives for diabetes include no blurred vision, no chest pain, no fatigue, no foot ulcerations, no polydipsia, no polyphagia, no polyuria, no visual change, no weakness and no weight loss. There are no hypoglycemic complications. Symptoms are stable. Diabetic complications include peripheral neuropathy. Pertinent negatives for diabetic complications include no autonomic neuropathy, CVA, heart disease, impotence, nephropathy, PVD or retinopathy.  Risk factors for coronary artery disease include diabetes mellitus, hypertension and male sex. Current diabetic treatment includes oral agent (dual therapy). He is compliant with treatment most of the time. His weight is stable. He is following a generally healthy diet. When asked about meal planning, he reported none. He has not had a previous visit with a dietitian. He participates in exercise intermittently. There is no change in his home blood glucose trend. An ACE inhibitor/angiotensin II receptor blocker is being taken. He sees a podiatrist.Eye exam is current.        ROS  Review of Systems   Constitutional: Negative for activity change, appetite change, chills, fatigue, fever and weight loss.   HENT: Negative for congestion, ear pain, postnasal drip, rhinorrhea, sinus pressure, sore throat and trouble swallowing.    Eyes: Negative for blurred vision, pain and visual disturbance.   Respiratory: Negative for cough, chest tightness and shortness of breath.    Cardiovascular: Negative for chest pain, palpitations, orthopnea and PND.   Gastrointestinal: Negative for abdominal pain, constipation, diarrhea, nausea and vomiting.   Endocrine: Negative for polydipsia, polyphagia and polyuria.   Genitourinary: Negative for difficulty urinating, dysuria, flank pain, frequency, impotence, penile pain, penile swelling, scrotal swelling, testicular pain and urgency.   Musculoskeletal: Negative.  Negative for arthralgias, back pain, joint swelling, myalgias, neck pain and neck stiffness.   Skin: Negative.  Negative for pallor.   Allergic/Immunologic: Negative for environmental allergies, food allergies and immunocompromised state.   Neurological: Negative for dizziness, tremors, seizures, speech difficulty, weakness, light-headedness and headaches.   Psychiatric/Behavioral: Negative.  Negative for confusion. The patient is not nervous/anxious.        Physical Exam  Vitals:    09/21/17 1344   BP: 138/72   Pulse: 66   Resp: 17  "  Temp: 98.3 °F (36.8 °C)    Body mass index is 29.43 kg/m².  Weight: 118.5 kg (261 lb 3.9 oz)   Height: 6' 7" (200.7 cm)     Physical Exam   Constitutional: He is oriented to person, place, and time. He appears well-developed and well-nourished. He is active and cooperative.  Non-toxic appearance. He does not have a sickly appearance. He does not appear ill. No distress.   HENT:   Head: Normocephalic and atraumatic.   Right Ear: Hearing and external ear normal. No decreased hearing is noted.   Left Ear: Hearing and external ear normal. No decreased hearing is noted.   Nose: Nose normal. No rhinorrhea or nasal deformity.   Mouth/Throat: Uvula is midline and oropharynx is clear and moist. He does not have dentures. Normal dentition.   Eyes: Conjunctivae, EOM and lids are normal. Pupils are equal, round, and reactive to light. Right eye exhibits no chemosis, no discharge and no exudate. No foreign body present in the right eye. Left eye exhibits no chemosis, no discharge and no exudate. No foreign body present in the left eye. No scleral icterus.   Neck: Normal range of motion and full passive range of motion without pain. Neck supple.   Cardiovascular: Normal rate, regular rhythm, S1 normal, S2 normal and normal heart sounds.  Exam reveals no gallop and no friction rub.    No murmur heard.  Pulmonary/Chest: Effort normal and breath sounds normal. No accessory muscle usage. No respiratory distress. He has no decreased breath sounds. He has no wheezes. He has no rhonchi. He has no rales.   Abdominal: Soft. Normal appearance. He exhibits no distension. There is no hepatosplenomegaly. There is no tenderness. There is no rigidity, no rebound and no guarding.   Musculoskeletal: Normal range of motion.   Neurological: He is alert and oriented to person, place, and time. He has normal strength. No cranial nerve deficit or sensory deficit. He exhibits normal muscle tone. He displays no seizure activity. Coordination and gait " normal.   Skin: Skin is warm, dry and intact. No rash noted. He is not diaphoretic.   Psychiatric: He has a normal mood and affect. His speech is normal and behavior is normal. Judgment and thought content normal. Cognition and memory are normal. He is attentive.       Assessment & Plan    1. Type 2 diabetes mellitus with stage 3 chronic kidney disease, without long-term current use of insulin  Patient is encouraged to continue a diet low in carbohydrates and simple sugars.  Advised to begin or continue a weight loss program which focuses on good food choices and increased physical activity and encouraged to adhere to medication regimen and check glucose as recommended daily and report any changes in usual trends.  Screening blood test is due at this time.  Foot exam was not done at this visit.   - glipiZIDE (GLUCOTROL) 5 MG TR24; Take 1 tablet (5 mg total) by mouth every morning.  Dispense: 90 tablet; Refill: 1  - Hemoglobin A1c; Future    2. Essential hypertension  Patient was counseled and encouraged to maintain a low sodium diet, as well as increasing physical activity.  Recommend random BP checks at home on a regular basis.  Repeat BP at end of visit was not necessary. Will continue medication at this time, and follow up in 3 months, or sooner if blood pressure is not well controlled.     - amlodipine-valsartan-hcthiazid -25 mg Tab; Take 1 tablet by mouth once daily.  Dispense: 90 tablet; Refill: 1  - CBC auto differential; Future  - Comprehensive metabolic panel; Future    3. Hyperchylomicronemia  We discussed ways to manage cholesterol levels, including increasing whole grain foods and decreasing fried and fatty foods.  I also recommended OTC Omega 3 and Omega 6 supplements to improve overall cholesterol levels.  Will continue current therapy at this visit; patient is due for screening blood test at this time.   - Lipid panel; Future    4. Need for pneumococcal vaccination  Patient due for pneumonia  vaccine; Patient agreed and vaccine was administered in clinic today without complications.   - Pneumococcal Conjugate Vaccine (13 Valent) (IM)    5. Screening for eye condition  Refer for evaluation and management.     6. Benign prostatic hyperplasia without lower urinary tract symptoms  Screening test will be ordered and once results available patient will be notified of results and managed accordingly.    - PSA, total and free; Future      Follow up documented    ACTIVE MEDICAL ISSUES:  Documented in Problem List    PAST MEDICAL HISTORY  Documented    PAST SURGICAL HISTORY:  Documented    SOCIAL HISTORY:  Documented    FAMILY HISTORY:  Documented    ALLERGIES AND MEDICATIONS: updated and reviewed.  Documented    Health Maintenance       Date Due Completion Date    TETANUS VACCINE 06/03/1958 ---    Pneumococcal (65+) (2 of 2 - PCV13) 04/23/2016 4/23/2015    Eye Exam 11/30/2016 11/30/2015    Override on 6/10/2015: Declined (had an appoint last week )    Hemoglobin A1c 07/18/2017 1/18/2017    Influenza Vaccine 08/01/2017 10/19/2016    Override on 10/29/2015: Done (today)    Lipid Panel 10/19/2017 10/19/2016    Foot Exam 07/13/2018 7/13/2017    Override on 6/10/2015: Declined (hadan appoint with podiatry 05/21/15 Dr.Meka roth)

## 2017-09-22 LAB
ESTIMATED AVG GLUCOSE: 126 MG/DL
HBA1C MFR BLD HPLC: 6 %
PROSTATE SPECIFIC ANTIGEN, TOTAL: 2.7 NG/ML
PSA FREE MFR SERPL: 42.22 %
PSA FREE SERPL-MCNC: 1.14 NG/ML

## 2017-11-14 RX ORDER — METOPROLOL SUCCINATE 50 MG/1
TABLET, EXTENDED RELEASE ORAL
Qty: 90 TABLET | Refills: 0 | Status: SHIPPED | OUTPATIENT
Start: 2017-11-14 | End: 2018-10-01

## 2017-12-12 RX ORDER — CLONIDINE HYDROCHLORIDE 0.1 MG/1
TABLET ORAL
Qty: 270 TABLET | Refills: 0 | Status: SHIPPED | OUTPATIENT
Start: 2017-12-12 | End: 2018-03-09 | Stop reason: SDUPTHER

## 2017-12-12 RX ORDER — METFORMIN HYDROCHLORIDE 500 MG/1
TABLET ORAL
Qty: 270 TABLET | Refills: 1 | Status: SHIPPED | OUTPATIENT
Start: 2017-12-12 | End: 2018-06-20 | Stop reason: SDUPTHER

## 2018-01-14 DIAGNOSIS — N40.0 BPH (BENIGN PROSTATIC HYPERPLASIA): ICD-10-CM

## 2018-01-15 RX ORDER — TAMSULOSIN HYDROCHLORIDE 0.4 MG/1
CAPSULE ORAL
Qty: 90 CAPSULE | Refills: 2 | Status: SHIPPED | OUTPATIENT
Start: 2018-01-15 | End: 2018-02-01

## 2018-02-01 DIAGNOSIS — N40.0 BPH (BENIGN PROSTATIC HYPERPLASIA): ICD-10-CM

## 2018-02-01 RX ORDER — TAMSULOSIN HYDROCHLORIDE 0.4 MG/1
CAPSULE ORAL
Qty: 90 CAPSULE | Refills: 2 | Status: SHIPPED | OUTPATIENT
Start: 2018-02-01 | End: 2018-10-01 | Stop reason: SDUPTHER

## 2018-02-12 RX ORDER — METOPROLOL SUCCINATE 50 MG/1
TABLET, EXTENDED RELEASE ORAL
Qty: 90 TABLET | Refills: 1 | Status: SHIPPED | OUTPATIENT
Start: 2018-02-12 | End: 2018-08-08 | Stop reason: SDUPTHER

## 2018-03-12 RX ORDER — CLONIDINE HYDROCHLORIDE 0.1 MG/1
TABLET ORAL
Qty: 270 TABLET | Refills: 0 | Status: SHIPPED | OUTPATIENT
Start: 2018-03-12 | End: 2018-06-22 | Stop reason: SDUPTHER

## 2018-04-14 DIAGNOSIS — I10 ESSENTIAL HYPERTENSION: ICD-10-CM

## 2018-04-17 RX ORDER — AMLODIPINE, VALSARTAN AND HYDROCHLOROTHIAZIDE 10; 160; 25 MG/1; MG/1; MG/1
1 TABLET ORAL DAILY
Qty: 90 TABLET | Refills: 1 | Status: SHIPPED | OUTPATIENT
Start: 2018-04-17 | End: 2018-10-01 | Stop reason: SDUPTHER

## 2018-04-29 DIAGNOSIS — E11.22 TYPE 2 DIABETES MELLITUS WITH STAGE 3 CHRONIC KIDNEY DISEASE, WITHOUT LONG-TERM CURRENT USE OF INSULIN: ICD-10-CM

## 2018-04-29 DIAGNOSIS — N18.30 TYPE 2 DIABETES MELLITUS WITH STAGE 3 CHRONIC KIDNEY DISEASE, WITHOUT LONG-TERM CURRENT USE OF INSULIN: ICD-10-CM

## 2018-04-30 RX ORDER — GLIPIZIDE 5 MG/1
5 TABLET, FILM COATED, EXTENDED RELEASE ORAL EVERY MORNING
Qty: 90 TABLET | Refills: 1 | Status: SHIPPED | OUTPATIENT
Start: 2018-04-30 | End: 2018-05-14 | Stop reason: SDUPTHER

## 2018-05-10 DIAGNOSIS — E11.22 TYPE 2 DIABETES MELLITUS WITH STAGE 3 CHRONIC KIDNEY DISEASE, WITHOUT LONG-TERM CURRENT USE OF INSULIN: ICD-10-CM

## 2018-05-10 DIAGNOSIS — N18.30 TYPE 2 DIABETES MELLITUS WITH STAGE 3 CHRONIC KIDNEY DISEASE, WITHOUT LONG-TERM CURRENT USE OF INSULIN: ICD-10-CM

## 2018-05-14 DIAGNOSIS — E11.22 TYPE 2 DIABETES MELLITUS WITH STAGE 3 CHRONIC KIDNEY DISEASE, WITHOUT LONG-TERM CURRENT USE OF INSULIN: ICD-10-CM

## 2018-05-14 DIAGNOSIS — N18.30 TYPE 2 DIABETES MELLITUS WITH STAGE 3 CHRONIC KIDNEY DISEASE, WITHOUT LONG-TERM CURRENT USE OF INSULIN: ICD-10-CM

## 2018-05-14 RX ORDER — GLIPIZIDE 5 MG/1
5 TABLET, FILM COATED, EXTENDED RELEASE ORAL EVERY MORNING
Qty: 90 TABLET | Refills: 0 | Status: SHIPPED | OUTPATIENT
Start: 2018-05-14 | End: 2018-08-07 | Stop reason: SDUPTHER

## 2018-05-15 NOTE — TELEPHONE ENCOUNTER
Informed pt of refill below. Pt verbally understood. Pt scheduled lab appointment for 5/16/2018 @ 1:30pm. Pt stated that he got eye exam done with .

## 2018-05-16 ENCOUNTER — LAB VISIT (OUTPATIENT)
Dept: LAB | Facility: HOSPITAL | Age: 78
End: 2018-05-16
Attending: FAMILY MEDICINE
Payer: MEDICARE

## 2018-05-16 DIAGNOSIS — N18.30 TYPE 2 DIABETES MELLITUS WITH STAGE 3 CHRONIC KIDNEY DISEASE, WITHOUT LONG-TERM CURRENT USE OF INSULIN: ICD-10-CM

## 2018-05-16 DIAGNOSIS — E11.22 TYPE 2 DIABETES MELLITUS WITH STAGE 3 CHRONIC KIDNEY DISEASE, WITHOUT LONG-TERM CURRENT USE OF INSULIN: ICD-10-CM

## 2018-05-16 LAB
ESTIMATED AVG GLUCOSE: 126 MG/DL
HBA1C MFR BLD HPLC: 6 %

## 2018-05-16 PROCEDURE — 36415 COLL VENOUS BLD VENIPUNCTURE: CPT | Mod: PO

## 2018-05-16 PROCEDURE — 83036 HEMOGLOBIN GLYCOSYLATED A1C: CPT

## 2018-05-17 RX ORDER — GLIPIZIDE 5 MG/1
5 TABLET, FILM COATED, EXTENDED RELEASE ORAL EVERY MORNING
Qty: 90 TABLET | Refills: 1 | OUTPATIENT
Start: 2018-05-17

## 2018-06-20 RX ORDER — METFORMIN HYDROCHLORIDE 500 MG/1
TABLET ORAL
Qty: 270 TABLET | Refills: 1 | Status: SHIPPED | OUTPATIENT
Start: 2018-06-20 | End: 2018-10-01

## 2018-06-22 RX ORDER — CLONIDINE HYDROCHLORIDE 0.1 MG/1
TABLET ORAL
Qty: 270 TABLET | Refills: 0 | Status: SHIPPED | OUTPATIENT
Start: 2018-06-22 | End: 2018-09-24 | Stop reason: SDUPTHER

## 2018-08-07 DIAGNOSIS — N18.30 TYPE 2 DIABETES MELLITUS WITH STAGE 3 CHRONIC KIDNEY DISEASE, WITHOUT LONG-TERM CURRENT USE OF INSULIN: ICD-10-CM

## 2018-08-07 DIAGNOSIS — E11.22 TYPE 2 DIABETES MELLITUS WITH STAGE 3 CHRONIC KIDNEY DISEASE, WITHOUT LONG-TERM CURRENT USE OF INSULIN: ICD-10-CM

## 2018-08-07 RX ORDER — GLIPIZIDE 5 MG/1
5 TABLET, FILM COATED, EXTENDED RELEASE ORAL EVERY MORNING
Qty: 90 TABLET | Refills: 0 | Status: SHIPPED | OUTPATIENT
Start: 2018-08-07 | End: 2018-10-01 | Stop reason: SDUPTHER

## 2018-08-08 RX ORDER — METOPROLOL SUCCINATE 50 MG/1
TABLET, EXTENDED RELEASE ORAL
Qty: 90 TABLET | Refills: 0 | Status: SHIPPED | OUTPATIENT
Start: 2018-08-08 | End: 2018-10-01

## 2018-08-17 ENCOUNTER — TELEPHONE (OUTPATIENT)
Dept: FAMILY MEDICINE | Facility: CLINIC | Age: 78
End: 2018-08-17

## 2018-08-17 NOTE — TELEPHONE ENCOUNTER
----- Message from Vee Rodrigues sent at 8/17/2018 11:24 AM CDT -----  Contact: Self   Pt returning call. [phone]

## 2018-09-21 DIAGNOSIS — N18.9 CHRONIC KIDNEY DISEASE, UNSPECIFIED CKD STAGE: ICD-10-CM

## 2018-09-24 RX ORDER — CLONIDINE HYDROCHLORIDE 0.1 MG/1
TABLET ORAL
Qty: 270 TABLET | Refills: 0 | OUTPATIENT
Start: 2018-09-24

## 2018-09-24 NOTE — TELEPHONE ENCOUNTER
----- Message from Tracey Perkins sent at 9/24/2018 10:38 AM CDT -----  Contact: Self   Patient called to check the status of his refill request. Please call patient at [phone].    cloNIDine (CATAPRES) 0.1 MG tablet      [facility]/PHARMACY #[id number] - [facility] [address]

## 2018-09-24 NOTE — TELEPHONE ENCOUNTER
LOV 09/21/2017. Patient advised that it has been a year since his last OV. Appt schedule 10/01/2018. Patient is requesting a week refill until appt. Please advise.

## 2018-09-26 RX ORDER — CLONIDINE HYDROCHLORIDE 0.1 MG/1
TABLET ORAL
Qty: 270 TABLET | Refills: 0 | Status: SHIPPED | OUTPATIENT
Start: 2018-09-26 | End: 2018-10-01 | Stop reason: SDUPTHER

## 2018-09-26 NOTE — TELEPHONE ENCOUNTER
Returned patient call was told that medication still pending as soon provider approve medication we will let patient know , patient verbalized understand .

## 2018-09-26 NOTE — TELEPHONE ENCOUNTER
----- Message from Thelma Navarro sent at 9/26/2018  2:20 PM CDT -----  Contact: self - [phone]  Pt calling to follow up on supply of medication to hold him over until appt on Monday. Pt states he is completley out and declined to see another provider. Please contact back at earliest convenience.

## 2018-10-01 ENCOUNTER — OFFICE VISIT (OUTPATIENT)
Dept: FAMILY MEDICINE | Facility: CLINIC | Age: 78
End: 2018-10-01
Payer: MEDICARE

## 2018-10-01 ENCOUNTER — LAB VISIT (OUTPATIENT)
Dept: LAB | Facility: HOSPITAL | Age: 78
End: 2018-10-01
Attending: FAMILY MEDICINE
Payer: MEDICARE

## 2018-10-01 VITALS
BODY MASS INDEX: 31.71 KG/M2 | TEMPERATURE: 98 F | WEIGHT: 274.06 LBS | DIASTOLIC BLOOD PRESSURE: 78 MMHG | OXYGEN SATURATION: 97 % | RESPIRATION RATE: 17 BRPM | HEART RATE: 56 BPM | HEIGHT: 78 IN | SYSTOLIC BLOOD PRESSURE: 138 MMHG

## 2018-10-01 DIAGNOSIS — E11.22 TYPE 2 DIABETES MELLITUS WITH STAGE 3 CHRONIC KIDNEY DISEASE, WITHOUT LONG-TERM CURRENT USE OF INSULIN: ICD-10-CM

## 2018-10-01 DIAGNOSIS — N18.30 TYPE 2 DIABETES MELLITUS WITH STAGE 3 CHRONIC KIDNEY DISEASE, WITHOUT LONG-TERM CURRENT USE OF INSULIN: ICD-10-CM

## 2018-10-01 DIAGNOSIS — N18.30 TYPE 2 DIABETES MELLITUS WITH STAGE 3 CHRONIC KIDNEY DISEASE, WITHOUT LONG-TERM CURRENT USE OF INSULIN: Primary | ICD-10-CM

## 2018-10-01 DIAGNOSIS — Z23 NEED FOR INFLUENZA VACCINATION: ICD-10-CM

## 2018-10-01 DIAGNOSIS — E78.3 HYPERCHYLOMICRONEMIA: ICD-10-CM

## 2018-10-01 DIAGNOSIS — E11.22 TYPE 2 DIABETES MELLITUS WITH STAGE 3 CHRONIC KIDNEY DISEASE, WITHOUT LONG-TERM CURRENT USE OF INSULIN: Primary | ICD-10-CM

## 2018-10-01 DIAGNOSIS — N40.0 BENIGN PROSTATIC HYPERPLASIA WITHOUT LOWER URINARY TRACT SYMPTOMS: ICD-10-CM

## 2018-10-01 DIAGNOSIS — I10 ESSENTIAL HYPERTENSION: ICD-10-CM

## 2018-10-01 DIAGNOSIS — N40.0 BPH (BENIGN PROSTATIC HYPERPLASIA): ICD-10-CM

## 2018-10-01 LAB
ALBUMIN SERPL BCP-MCNC: 3.9 G/DL
ALBUMIN/CREAT UR: 250.8 UG/MG
ALP SERPL-CCNC: 110 U/L
ALT SERPL W/O P-5'-P-CCNC: 18 U/L
ANION GAP SERPL CALC-SCNC: 10 MMOL/L
AST SERPL-CCNC: 21 U/L
BASOPHILS # BLD AUTO: 0.12 K/UL
BASOPHILS NFR BLD: 1.4 %
BILIRUB SERPL-MCNC: 0.5 MG/DL
BUN SERPL-MCNC: 21 MG/DL
CALCIUM SERPL-MCNC: 9.6 MG/DL
CHLORIDE SERPL-SCNC: 103 MMOL/L
CHOLEST SERPL-MCNC: 162 MG/DL
CHOLEST/HDLC SERPL: 3.6 {RATIO}
CO2 SERPL-SCNC: 27 MMOL/L
CREAT SERPL-MCNC: 1.6 MG/DL
CREAT UR-MCNC: 181 MG/DL
DIFFERENTIAL METHOD: ABNORMAL
EOSINOPHIL # BLD AUTO: 0.4 K/UL
EOSINOPHIL NFR BLD: 4.8 %
ERYTHROCYTE [DISTWIDTH] IN BLOOD BY AUTOMATED COUNT: 13.1 %
EST. GFR  (AFRICAN AMERICAN): 47 ML/MIN/1.73 M^2
EST. GFR  (NON AFRICAN AMERICAN): 40.7 ML/MIN/1.73 M^2
ESTIMATED AVG GLUCOSE: 137 MG/DL
GLUCOSE SERPL-MCNC: 125 MG/DL
HBA1C MFR BLD HPLC: 6.4 %
HCT VFR BLD AUTO: 38.6 %
HDLC SERPL-MCNC: 45 MG/DL
HDLC SERPL: 27.8 %
HGB BLD-MCNC: 12.7 G/DL
IMM GRANULOCYTES # BLD AUTO: 0.03 K/UL
IMM GRANULOCYTES NFR BLD AUTO: 0.4 %
LDLC SERPL CALC-MCNC: 98.4 MG/DL
LYMPHOCYTES # BLD AUTO: 2.1 K/UL
LYMPHOCYTES NFR BLD: 25.4 %
MCH RBC QN AUTO: 27.6 PG
MCHC RBC AUTO-ENTMCNC: 32.9 G/DL
MCV RBC AUTO: 84 FL
MICROALBUMIN UR DL<=1MG/L-MCNC: 454 UG/ML
MONOCYTES # BLD AUTO: 0.8 K/UL
MONOCYTES NFR BLD: 9.1 %
NEUTROPHILS # BLD AUTO: 4.9 K/UL
NEUTROPHILS NFR BLD: 58.9 %
NONHDLC SERPL-MCNC: 117 MG/DL
NRBC BLD-RTO: 0 /100 WBC
PLATELET # BLD AUTO: 272 K/UL
PMV BLD AUTO: 12.6 FL
POTASSIUM SERPL-SCNC: 4.1 MMOL/L
PROSTATE SPECIFIC ANTIGEN, TOTAL: 1.7 NG/ML
PROT SERPL-MCNC: 7.3 G/DL
PSA FREE MFR SERPL: 50.59 %
PSA FREE SERPL-MCNC: 0.86 NG/ML
RBC # BLD AUTO: 4.6 M/UL
SODIUM SERPL-SCNC: 140 MMOL/L
TRIGL SERPL-MCNC: 93 MG/DL
WBC # BLD AUTO: 8.28 K/UL

## 2018-10-01 PROCEDURE — 80061 LIPID PANEL: CPT

## 2018-10-01 PROCEDURE — 99215 OFFICE O/P EST HI 40 MIN: CPT | Mod: S$PBB,,, | Performed by: FAMILY MEDICINE

## 2018-10-01 PROCEDURE — 80053 COMPREHEN METABOLIC PANEL: CPT

## 2018-10-01 PROCEDURE — 90662 IIV NO PRSV INCREASED AG IM: CPT | Mod: PBBFAC,PO

## 2018-10-01 PROCEDURE — 82043 UR ALBUMIN QUANTITATIVE: CPT

## 2018-10-01 PROCEDURE — 99999 PR PBB SHADOW E&M-EST. PATIENT-LVL IV: CPT | Mod: PBBFAC,,, | Performed by: FAMILY MEDICINE

## 2018-10-01 PROCEDURE — 99214 OFFICE O/P EST MOD 30 MIN: CPT | Mod: PBBFAC,PO,25 | Performed by: FAMILY MEDICINE

## 2018-10-01 PROCEDURE — 85025 COMPLETE CBC W/AUTO DIFF WBC: CPT

## 2018-10-01 PROCEDURE — 36415 COLL VENOUS BLD VENIPUNCTURE: CPT | Mod: PO

## 2018-10-01 PROCEDURE — 83036 HEMOGLOBIN GLYCOSYLATED A1C: CPT

## 2018-10-01 PROCEDURE — 84154 ASSAY OF PSA FREE: CPT

## 2018-10-01 RX ORDER — GLIPIZIDE 5 MG/1
5 TABLET, FILM COATED, EXTENDED RELEASE ORAL
COMMUNITY
Start: 2016-10-19 | End: 2018-10-01

## 2018-10-01 RX ORDER — TAMSULOSIN HYDROCHLORIDE 0.4 MG/1
0.4 CAPSULE ORAL
COMMUNITY
End: 2018-10-01

## 2018-10-01 RX ORDER — AMLODIPINE, VALSARTAN AND HYDROCHLOROTHIAZIDE 10; 160; 25 MG/1; MG/1; MG/1
1 TABLET ORAL DAILY
Qty: 90 TABLET | Refills: 1 | Status: SHIPPED | OUTPATIENT
Start: 2018-10-01 | End: 2019-01-02 | Stop reason: SDUPTHER

## 2018-10-01 RX ORDER — TAMSULOSIN HYDROCHLORIDE 0.4 MG/1
1 CAPSULE ORAL DAILY
Qty: 90 CAPSULE | Refills: 1 | Status: SHIPPED | OUTPATIENT
Start: 2018-10-01 | End: 2019-04-26 | Stop reason: SDUPTHER

## 2018-10-01 RX ORDER — GLIPIZIDE 10 MG/1
10 TABLET, FILM COATED, EXTENDED RELEASE ORAL EVERY MORNING
Qty: 90 TABLET | Refills: 0 | Status: SHIPPED | OUTPATIENT
Start: 2018-10-01 | End: 2018-10-16 | Stop reason: SDUPTHER

## 2018-10-01 RX ORDER — TADALAFIL 20 MG/1
20 TABLET ORAL
COMMUNITY
End: 2018-10-24 | Stop reason: SDUPTHER

## 2018-10-01 RX ORDER — CHOLECALCIFEROL (VITAMIN D3) 25 MCG
185 TABLET ORAL
COMMUNITY
End: 2018-10-01

## 2018-10-01 RX ORDER — METFORMIN HYDROCHLORIDE 500 MG/1
500 TABLET ORAL
COMMUNITY
Start: 2016-10-19 | End: 2018-10-01

## 2018-10-01 RX ORDER — NAPROXEN SODIUM 220 MG/1
81 TABLET, FILM COATED ORAL
COMMUNITY
End: 2018-10-01

## 2018-10-01 RX ORDER — CLONIDINE HYDROCHLORIDE 0.1 MG/1
TABLET ORAL
Qty: 270 TABLET | Refills: 1 | Status: SHIPPED | OUTPATIENT
Start: 2018-10-01 | End: 2019-06-06 | Stop reason: SDUPTHER

## 2018-10-01 NOTE — PROGRESS NOTES
Chief Complaint   Patient presents with    Annual Exam       Luther Irwin Jr. is a 78 y.o. male who presents per the Chief Complaint.  Pt is known to me and was last seen by me on 9/21/2017.  All known chronic medical issues have been documented.       Hypertension   This is a chronic problem. The current episode started more than 1 year ago. The problem is unchanged. The problem is uncontrolled. Pertinent negatives include no anxiety, blurred vision, chest pain, headaches, neck pain, orthopnea, palpitations, peripheral edema, PND, shortness of breath or sweats. There are no associated agents to hypertension. Risk factors for coronary artery disease include male gender and diabetes mellitus. Past treatments include angiotensin blockers, diuretics and calcium channel blockers. The current treatment provides moderate improvement. There are no compliance problems.  There is no history of angina, kidney disease, CAD/MI, CVA, heart failure, left ventricular hypertrophy, PVD or retinopathy. There is no history of chronic renal disease, coarctation of the aorta, hyperparathyroidism or renovascular disease.   Diabetes   He presents for his follow-up diabetic visit. He has type 2 diabetes mellitus. His disease course has been stable. Pertinent negatives for hypoglycemia include no confusion, dizziness, headaches, hunger, mood changes, nervousness/anxiousness, pallor, seizures, sleepiness, speech difficulty, sweats or tremors. Associated symptoms include foot paresthesias. Pertinent negatives for diabetes include no blurred vision, no chest pain, no fatigue, no foot ulcerations, no polydipsia, no polyphagia, no polyuria, no visual change, no weakness and no weight loss. There are no hypoglycemic complications. Symptoms are stable. Diabetic complications include peripheral neuropathy. Pertinent negatives for diabetic complications include no autonomic neuropathy, CVA, heart disease, impotence, nephropathy, PVD or retinopathy.  Risk factors for coronary artery disease include diabetes mellitus, hypertension and male sex. Current diabetic treatment includes oral agent (dual therapy). He is compliant with treatment most of the time. His weight is stable. He is following a generally healthy diet. When asked about meal planning, he reported none. He has not had a previous visit with a dietitian. He participates in exercise intermittently. There is no change in his home blood glucose trend. An ACE inhibitor/angiotensin II receptor blocker is being taken. He sees a podiatrist.Eye exam is current.        ROS  Review of Systems   Constitutional: Negative for activity change, appetite change, chills, fatigue, fever and weight loss.   HENT: Negative for congestion, ear pain, postnasal drip, rhinorrhea, sinus pressure, sore throat and trouble swallowing.    Eyes: Negative for blurred vision, pain and visual disturbance.   Respiratory: Negative for cough, chest tightness and shortness of breath.    Cardiovascular: Negative for chest pain, palpitations, orthopnea and PND.   Gastrointestinal: Negative for abdominal pain, constipation, diarrhea, nausea and vomiting.   Endocrine: Negative for polydipsia, polyphagia and polyuria.   Genitourinary: Negative for difficulty urinating, dysuria, flank pain, frequency, impotence, penile pain, penile swelling, scrotal swelling, testicular pain and urgency.   Musculoskeletal: Negative.  Negative for arthralgias, back pain, joint swelling, myalgias, neck pain and neck stiffness.   Skin: Negative.  Negative for pallor.   Allergic/Immunologic: Negative for environmental allergies, food allergies and immunocompromised state.   Neurological: Negative for dizziness, tremors, seizures, speech difficulty, weakness, light-headedness and headaches.   Psychiatric/Behavioral: Negative.  Negative for confusion. The patient is not nervous/anxious.        Physical Exam  Vitals:    10/01/18 0946   BP: 138/78   Pulse:    Resp:   "  Temp:     Body mass index is 30.87 kg/m².  Weight: 124.3 kg (274 lb 0.5 oz)   Height: 6' 7" (200.7 cm)     Physical Exam   Constitutional: He is oriented to person, place, and time. He appears well-developed and well-nourished. He is active and cooperative.  Non-toxic appearance. He does not have a sickly appearance. He does not appear ill. No distress.   HENT:   Head: Normocephalic and atraumatic.   Right Ear: Hearing, tympanic membrane, external ear and ear canal normal. No tenderness. No foreign bodies. No mastoid tenderness. Tympanic membrane is not injected, not scarred, not perforated, not erythematous, not retracted and not bulging. No hemotympanum. No decreased hearing is noted.   Left Ear: Hearing, tympanic membrane, external ear and ear canal normal. No tenderness. No foreign bodies. No mastoid tenderness. Tympanic membrane is not injected, not scarred, not perforated, not erythematous, not retracted and not bulging. No hemotympanum. No decreased hearing is noted.   Nose: Nose normal. No sinus tenderness, nasal deformity or septal deviation. No epistaxis.  No foreign bodies.   Mouth/Throat: Uvula is midline, oropharynx is clear and moist and mucous membranes are normal. He does not have dentures. Normal dentition. No uvula swelling or dental caries. No oropharyngeal exudate, posterior oropharyngeal edema, posterior oropharyngeal erythema or tonsillar abscesses.   Eyes: Conjunctivae, EOM and lids are normal. Pupils are equal, round, and reactive to light. Right eye exhibits no chemosis, no discharge, no exudate and no hordeolum. No foreign body present in the right eye. Left eye exhibits no chemosis, no discharge, no exudate and no hordeolum. No foreign body present in the left eye. No scleral icterus.   Neck: Normal range of motion and full passive range of motion without pain. Neck supple. No thyroid mass and no thyromegaly present.   Cardiovascular: Normal rate, regular rhythm, S1 normal, S2 normal and " normal heart sounds. Exam reveals no gallop and no friction rub.   No murmur heard.  Pulmonary/Chest: Effort normal and breath sounds normal. He has no decreased breath sounds. He has no wheezes. He has no rhonchi. He has no rales.   Abdominal: Soft. Normal appearance and bowel sounds are normal. He exhibits no distension, no ascites and no mass. There is no hepatosplenomegaly. There is no tenderness. There is no rigidity, no rebound and no guarding. No hernia.   Musculoskeletal: Normal range of motion.   Lymphadenopathy:        Head (right side): No submental, no submandibular, no preauricular and no posterior auricular adenopathy present.        Head (left side): No submental, no submandibular, no preauricular and no posterior auricular adenopathy present.     He has no cervical adenopathy.   Neurological: He is alert and oriented to person, place, and time. He has normal strength. He displays no atrophy and no tremor. No cranial nerve deficit or sensory deficit. He exhibits normal muscle tone. He displays no seizure activity.   Skin: Skin is warm, dry and intact. No rash noted. He is not diaphoretic. No pallor.   Psychiatric: He has a normal mood and affect. His speech is normal and behavior is normal. Judgment and thought content normal. Cognition and memory are normal. He is attentive.   Vitals reviewed.      Assessment & Plan    1. Type 2 diabetes mellitus with stage 3 chronic kidney disease, without long-term current use of insulin  Patient is encouraged to follow a diet low in carbohydrates and simple sugars.  Discussed simple vs. complex carbohydrates as well as eating times of certain meals. Advised to focus on good food choices and increased physical activity and encouraged to adhere to medication regimen and lifestyle adjustments, and to check glucose level as recommended.  Contact office if glucose levels are not improving over time.  Screening blood test is due at this time.  Will stop metformin due to  CKD and increase glipizide dosage.   - Microalbumin/creatinine urine ratio  - glipiZIDE (GLUCOTROL) 10 MG TR24; Take 1 tablet (10 mg total) by mouth every morning.  Dispense: 90 tablet; Refill: 0  - CBC auto differential; Future  - Hemoglobin A1c; Future  - Comprehensive metabolic panel; Future    2. Essential hypertension  Patient was counseled and encouraged to maintain a low sodium diet, as well as increasing physical activity.  Recommend random BP checks at home on a regular basis.  Repeat BP at end of visit was improved. Will continue medication at this time, and follow up in 4-6 weeks, or sooner if blood pressure does not improve over time.  Patient advised to return for nurse BP check in one week.  Will stop BB   - amLODIPine-valsartan-hcthiazid -25 mg Tab; Take 1 tablet by mouth once daily.  Dispense: 90 tablet; Refill: 1  - cloNIDine (CATAPRES) 0.1 MG tablet; TAKE 1 TABLET BY MOUTH EVERY MORNING AND TAKE 2 TABLETS BY MOUTH EVERY EVENING  Dispense: 270 tablet; Refill: 1    3. Hyperchylomicronemia  Screening test will be ordered and once results available patient will be notified of results and managed accordingly.    - Lipid panel; Future    4. BPH (benign prostatic hyperplasia)  The current medical regimen is effective at this time and no changes to present plan or medications will be made at this visit.  Screening test will be ordered and once results available patient will be notified of results and managed accordingly.    - tamsulosin (FLOMAX) 0.4 mg Cap; Take 1 capsule (0.4 mg total) by mouth once daily.  Dispense: 90 capsule; Refill: 1  - PSA, total and free; Future    5. Need for influenza vaccination  Patient requested Flu vaccine, and was consented and vaccine given in office without complication.   - Influenza - High Dose (65+) (PF) (IM)      Follow up documented    ACTIVE MEDICAL ISSUES:  Documented in Problem List    PAST MEDICAL HISTORY  Documented    PAST SURGICAL  HISTORY:  Documented    SOCIAL HISTORY:  Documented    FAMILY HISTORY:  Documented    ALLERGIES AND MEDICATIONS: updated and reviewed.  Documented    Health Maintenance       Date Due Completion Date    TETANUS VACCINE 06/[birth year]/1958 ---    Urine Microalbumin 04/23/2016 4/23/2015    Foot Exam 07/13/2018 7/13/2017    Override on 6/10/2015: Declined (hadan appoint with podiatry 05/21/15 Dr.Meka roth)    Influenza Vaccine 08/01/2018 10/19/2016    Override on 10/29/2015: Done (today)    Lipid Panel 09/21/2018 9/21/2017    Hemoglobin A1c 11/16/2018 5/16/2018    Eye Exam 06/21/2019 6/21/2018    Override on 6/10/2015: Declined (had an appoint last week )

## 2018-10-16 DIAGNOSIS — N18.30 TYPE 2 DIABETES MELLITUS WITH STAGE 3 CHRONIC KIDNEY DISEASE, WITHOUT LONG-TERM CURRENT USE OF INSULIN: ICD-10-CM

## 2018-10-16 DIAGNOSIS — E11.22 TYPE 2 DIABETES MELLITUS WITH STAGE 3 CHRONIC KIDNEY DISEASE, WITHOUT LONG-TERM CURRENT USE OF INSULIN: ICD-10-CM

## 2018-10-16 RX ORDER — GLIPIZIDE 10 MG/1
10 TABLET, FILM COATED, EXTENDED RELEASE ORAL EVERY MORNING
Qty: 90 TABLET | Refills: 0 | Status: SHIPPED | OUTPATIENT
Start: 2018-10-16 | End: 2018-11-14 | Stop reason: SDUPTHER

## 2018-10-24 ENCOUNTER — TELEPHONE (OUTPATIENT)
Dept: FAMILY MEDICINE | Facility: CLINIC | Age: 78
End: 2018-10-24

## 2018-10-24 RX ORDER — TADALAFIL 20 MG/1
20 TABLET ORAL DAILY
Qty: 10 TABLET | Refills: 0 | Status: SHIPPED | OUTPATIENT
Start: 2018-10-24 | End: 2020-12-15 | Stop reason: SDUPTHER

## 2018-10-24 NOTE — TELEPHONE ENCOUNTER
----- Message from Thelma Ramon sent at 10/24/2018 11:20 AM CDT -----  Contact: self - [phone]  Pt is asking for a partial refill on Viagra:    ..  [facility]/pharmacy-  [address] - [address]   [phone]

## 2018-10-24 NOTE — TELEPHONE ENCOUNTER
----- Message from Thelma Ramon sent at 10/24/2018 11:20 AM CDT -----  Contact: self - [phone]  Pt is asking for a partial refill on Viagra:    ..  [facility]/pharmacy-  [address] - [address]   [phone]

## 2018-11-04 DIAGNOSIS — E11.22 TYPE 2 DIABETES MELLITUS WITH STAGE 3 CHRONIC KIDNEY DISEASE, WITHOUT LONG-TERM CURRENT USE OF INSULIN: ICD-10-CM

## 2018-11-04 DIAGNOSIS — N18.30 TYPE 2 DIABETES MELLITUS WITH STAGE 3 CHRONIC KIDNEY DISEASE, WITHOUT LONG-TERM CURRENT USE OF INSULIN: ICD-10-CM

## 2018-11-05 RX ORDER — METOPROLOL SUCCINATE 50 MG/1
TABLET, EXTENDED RELEASE ORAL
Qty: 90 TABLET | Refills: 0 | Status: SHIPPED | OUTPATIENT
Start: 2018-11-05 | End: 2018-12-10

## 2018-11-05 RX ORDER — GLIPIZIDE 5 MG/1
5 TABLET, FILM COATED, EXTENDED RELEASE ORAL EVERY MORNING
Qty: 90 TABLET | Refills: 1 | OUTPATIENT
Start: 2018-11-05

## 2018-11-14 DIAGNOSIS — E11.22 TYPE 2 DIABETES MELLITUS WITH STAGE 3 CHRONIC KIDNEY DISEASE, WITHOUT LONG-TERM CURRENT USE OF INSULIN: ICD-10-CM

## 2018-11-14 DIAGNOSIS — N18.30 TYPE 2 DIABETES MELLITUS WITH STAGE 3 CHRONIC KIDNEY DISEASE, WITHOUT LONG-TERM CURRENT USE OF INSULIN: ICD-10-CM

## 2018-11-14 RX ORDER — GLIPIZIDE 10 MG/1
10 TABLET, FILM COATED, EXTENDED RELEASE ORAL EVERY MORNING
Qty: 90 TABLET | Refills: 0 | Status: SHIPPED | OUTPATIENT
Start: 2018-11-14 | End: 2018-11-20 | Stop reason: SDUPTHER

## 2018-11-19 ENCOUNTER — TELEPHONE (OUTPATIENT)
Dept: FAMILY MEDICINE | Facility: CLINIC | Age: 78
End: 2018-11-19

## 2018-11-19 DIAGNOSIS — N18.30 TYPE 2 DIABETES MELLITUS WITH STAGE 3 CHRONIC KIDNEY DISEASE, WITHOUT LONG-TERM CURRENT USE OF INSULIN: ICD-10-CM

## 2018-11-19 DIAGNOSIS — E11.22 TYPE 2 DIABETES MELLITUS WITH STAGE 3 CHRONIC KIDNEY DISEASE, WITHOUT LONG-TERM CURRENT USE OF INSULIN: ICD-10-CM

## 2018-11-19 NOTE — TELEPHONE ENCOUNTER
----- Message from Kimberly Fernández sent at 11/19/2018 12:45 PM CST -----  Contact: pt  Pt came in to speak to nurse, please call [phone]

## 2018-11-19 NOTE — TELEPHONE ENCOUNTER
Patient notified medication approved and sent to pharmacy 11/14/18. Patient verbalized understanding

## 2018-11-19 NOTE — TELEPHONE ENCOUNTER
----- Message from Mariana Saunders sent at 11/19/2018 10:23 AM CST -----  Contact: Self/[phone]  Refill:  glipiZIDE (GLUCOTROL) 10 MG TR24  [facility]/PHARMACY #[id number] - [place] - [address] [facility]  Thank you

## 2018-11-19 NOTE — TELEPHONE ENCOUNTER
Spoke to patient. States that he was told by Dr.Lombard to take 2 Glipizide 5 mg to equal 10 mg. He requested a refill on Glipizide 10 mg and he states the pharmacy has put the medication on hold. They explained to patient that he was given enough for 3 months on 10/01/2018. I inquired patient how many pills does he take a day. Patient states he has been taking 2 pills a day. Asked if dosage was 5 mg or 10 mg. Patient checked the bottle and did not realize the dosage was changed to 10 mg. He has been taking 20 mg of Glipizide a day instead of 10 mg. Explained to patient that is the reason the pharmacy put medication on hold. Verbalized understanding. Please advise what should patient do at this time.

## 2018-11-20 RX ORDER — GLIPIZIDE 5 MG/1
10 TABLET, FILM COATED, EXTENDED RELEASE ORAL EVERY MORNING
Qty: 60 TABLET | Refills: 0 | Status: SHIPPED | OUTPATIENT
Start: 2018-11-20 | End: 2018-12-10 | Stop reason: SDUPTHER

## 2018-11-20 NOTE — TELEPHONE ENCOUNTER
Advise patient that a 30 day supply of 5 mg glipizide was ordered and to take 2 pills twice a day.  Will reorder 10 mg tablets BID once completed.

## 2018-12-10 ENCOUNTER — OFFICE VISIT (OUTPATIENT)
Dept: FAMILY MEDICINE | Facility: CLINIC | Age: 78
End: 2018-12-10
Payer: MEDICARE

## 2018-12-10 VITALS
SYSTOLIC BLOOD PRESSURE: 146 MMHG | BODY MASS INDEX: 31.86 KG/M2 | TEMPERATURE: 98 F | HEART RATE: 88 BPM | RESPIRATION RATE: 17 BRPM | HEIGHT: 78 IN | OXYGEN SATURATION: 98 % | WEIGHT: 275.38 LBS | DIASTOLIC BLOOD PRESSURE: 88 MMHG

## 2018-12-10 DIAGNOSIS — N18.30 TYPE 2 DIABETES MELLITUS WITH STAGE 3 CHRONIC KIDNEY DISEASE, WITHOUT LONG-TERM CURRENT USE OF INSULIN: Primary | ICD-10-CM

## 2018-12-10 DIAGNOSIS — I10 ESSENTIAL HYPERTENSION: ICD-10-CM

## 2018-12-10 DIAGNOSIS — E11.22 TYPE 2 DIABETES MELLITUS WITH STAGE 3 CHRONIC KIDNEY DISEASE, WITHOUT LONG-TERM CURRENT USE OF INSULIN: Primary | ICD-10-CM

## 2018-12-10 PROCEDURE — 99214 OFFICE O/P EST MOD 30 MIN: CPT | Mod: S$PBB,,, | Performed by: FAMILY MEDICINE

## 2018-12-10 PROCEDURE — 99213 OFFICE O/P EST LOW 20 MIN: CPT | Mod: PBBFAC,PO | Performed by: FAMILY MEDICINE

## 2018-12-10 PROCEDURE — 99999 PR PBB SHADOW E&M-EST. PATIENT-LVL III: CPT | Mod: PBBFAC,,, | Performed by: FAMILY MEDICINE

## 2018-12-10 RX ORDER — GLIPIZIDE 10 MG/1
10 TABLET, FILM COATED, EXTENDED RELEASE ORAL EVERY MORNING
Qty: 90 TABLET | Refills: 1 | Status: SHIPPED | OUTPATIENT
Start: 2018-12-10 | End: 2019-03-12 | Stop reason: SDUPTHER

## 2018-12-10 NOTE — PROGRESS NOTES
Chief Complaint   Patient presents with    Hypertension       Luther Irwin Jr. is a 78 y.o. male who presents per the Chief Complaint.  Pt is known to me and was last seen by me on 10/1/2018.  All known chronic medical issues have been documented.       Hypertension   This is a chronic problem. The current episode started more than 1 year ago. The problem is unchanged. The problem is uncontrolled. Pertinent negatives include no anxiety, blurred vision, chest pain, headaches, neck pain, orthopnea, palpitations, peripheral edema, PND, shortness of breath or sweats. There are no associated agents to hypertension. Risk factors for coronary artery disease include male gender and diabetes mellitus. Past treatments include angiotensin blockers, diuretics and calcium channel blockers. The current treatment provides moderate improvement. There are no compliance problems.  There is no history of angina, kidney disease, CAD/MI, CVA, heart failure, left ventricular hypertrophy, PVD or retinopathy. There is no history of chronic renal disease, coarctation of the aorta, hyperparathyroidism or renovascular disease.   Diabetes   He presents for his follow-up diabetic visit. He has type 2 diabetes mellitus. His disease course has been stable. Pertinent negatives for hypoglycemia include no confusion, dizziness, headaches, hunger, mood changes, nervousness/anxiousness, pallor, seizures, sleepiness, speech difficulty, sweats or tremors. Associated symptoms include foot paresthesias. Pertinent negatives for diabetes include no blurred vision, no chest pain, no fatigue, no foot ulcerations, no polydipsia, no polyphagia, no polyuria, no visual change, no weakness and no weight loss. There are no hypoglycemic complications. Symptoms are stable. Diabetic complications include peripheral neuropathy. Pertinent negatives for diabetic complications include no autonomic neuropathy, CVA, heart disease, impotence, nephropathy, PVD or retinopathy.  Risk factors for coronary artery disease include diabetes mellitus, hypertension and male sex. Current diabetic treatment includes oral agent (dual therapy). He is compliant with treatment most of the time. His weight is stable. He is following a generally healthy diet. When asked about meal planning, he reported none. He has not had a previous visit with a dietitian. He participates in exercise intermittently. There is no change in his home blood glucose trend. An ACE inhibitor/angiotensin II receptor blocker is being taken. He sees a podiatrist.Eye exam is current.        ROS  Review of Systems   Constitutional: Negative.  Negative for activity change, appetite change, chills, diaphoresis, fatigue, fever, unexpected weight change and weight loss.   HENT: Negative.  Negative for congestion, ear pain, hearing loss, nosebleeds, postnasal drip, rhinorrhea, sinus pressure, sneezing, sore throat and trouble swallowing.    Eyes: Negative for blurred vision, pain and visual disturbance.   Respiratory: Negative for cough, choking and shortness of breath.    Cardiovascular: Negative for chest pain, palpitations, orthopnea, leg swelling and PND.   Gastrointestinal: Negative for abdominal pain, constipation, diarrhea, nausea and vomiting.   Endocrine: Negative for polydipsia, polyphagia and polyuria.   Genitourinary: Negative for difficulty urinating, dysuria, frequency, impotence and urgency.   Musculoskeletal: Negative.  Negative for arthralgias, back pain, gait problem, joint swelling, myalgias and neck pain.   Skin: Negative.  Negative for pallor.   Allergic/Immunologic: Negative for environmental allergies and food allergies.   Neurological: Negative.  Negative for dizziness, tremors, seizures, syncope, speech difficulty, weakness, light-headedness and headaches.   Psychiatric/Behavioral: Negative.  Negative for confusion, decreased concentration, dysphoric mood and sleep disturbance. The patient is not nervous/anxious.   "      Physical Exam  Vitals:    12/10/18 1007   BP: (!) 146/88   Pulse: 88   Resp: 17   Temp: 98 °F (36.7 °C)    Body mass index is 31.02 kg/m².  Weight: 124.9 kg (275 lb 5.7 oz)   Height: 6' 7" (200.7 cm)     Physical Exam   Constitutional: He is oriented to person, place, and time. He appears well-developed and well-nourished. He is active and cooperative.  Non-toxic appearance. He does not have a sickly appearance. He does not appear ill. No distress.   HENT:   Head: Normocephalic and atraumatic.   Right Ear: Hearing and external ear normal. No decreased hearing is noted.   Left Ear: Hearing and external ear normal. No decreased hearing is noted.   Nose: Nose normal. No rhinorrhea or nasal deformity.   Mouth/Throat: Uvula is midline and oropharynx is clear and moist. He does not have dentures. Normal dentition.   Eyes: Conjunctivae, EOM and lids are normal. Pupils are equal, round, and reactive to light. Right eye exhibits no chemosis, no discharge and no exudate. No foreign body present in the right eye. Left eye exhibits no chemosis, no discharge and no exudate. No foreign body present in the left eye. No scleral icterus.   Neck: Normal range of motion and full passive range of motion without pain. Neck supple.   Cardiovascular: Normal rate, regular rhythm, S1 normal, S2 normal and normal heart sounds. Exam reveals no gallop and no friction rub.   No murmur heard.  Pulmonary/Chest: Effort normal and breath sounds normal. No accessory muscle usage. No respiratory distress. He has no decreased breath sounds. He has no wheezes. He has no rhonchi. He has no rales.   Abdominal: Soft. Normal appearance. He exhibits no distension. There is no hepatosplenomegaly. There is no tenderness. There is no rigidity, no rebound and no guarding.   Musculoskeletal: Normal range of motion.   Neurological: He is alert and oriented to person, place, and time. He has normal strength. No cranial nerve deficit or sensory deficit. He " exhibits normal muscle tone. He displays no seizure activity. Coordination and gait normal.   Skin: Skin is warm, dry and intact. No rash noted. He is not diaphoretic.   Psychiatric: He has a normal mood and affect. His speech is normal and behavior is normal. Judgment and thought content normal. Cognition and memory are normal. He is attentive.       Assessment & Plan    1. Type 2 diabetes mellitus with stage 3 chronic kidney disease, without long-term current use of insulin  Patient is encouraged to follow a diet low in carbohydrates and simple sugars.  Discussed simple vs. complex carbohydrates as well as eating times of certain meals. Advised to focus on good food choices and increased physical activity and encouraged to adhere to medication regimen and/or lifestyle adjustments, and to check glucose level as recommended.  Contact office if glucose levels are not improving over time.  Screening blood test is due in 1 month.     - glipiZIDE (GLUCOTROL) 10 MG TR24; Take 1 tablet (10 mg total) by mouth every morning.  Dispense: 90 tablet; Refill: 1  - Hemoglobin A1c; Future    2. Essential hypertension  Patient was counseled and encouraged to maintain a low sodium diet, as well as increasing physical activity.  Recommend random BP checks at home on a regular basis.  Repeat BP at end of visit was not necessary. Will continue medication at this time, and follow up in 3-6 months, or sooner if blood pressure begins to increase.  Patient with higher readings at home; advised to test machine with other BP cuffs.      Follow up documented    ACTIVE MEDICAL ISSUES:  Documented in Problem List    PAST MEDICAL HISTORY  Documented    PAST SURGICAL HISTORY:  Documented    SOCIAL HISTORY:  Documented    FAMILY HISTORY:  Documented    ALLERGIES AND MEDICATIONS: updated and reviewed.  Documented    Health Maintenance       Date Due Completion Date    TETANUS VACCINE 06/03/1958 ---    Foot Exam 07/13/2018 7/13/2017    Override on  6/10/2015: Declined (hadan appoint with podiatry 05/21/15 Dr.Meka roth)    Hemoglobin A1c 04/01/2019 10/1/2018    Eye Exam 06/21/2019 6/21/2018    Override on 6/10/2015: Declined (had an appoint last week )    Lipid Panel 10/01/2019 10/1/2018    Urine Microalbumin 10/01/2019 10/1/2018

## 2018-12-14 RX ORDER — METFORMIN HYDROCHLORIDE 500 MG/1
TABLET ORAL
Qty: 270 TABLET | Refills: 1 | Status: SHIPPED | OUTPATIENT
Start: 2018-12-14 | End: 2020-01-13

## 2018-12-18 DIAGNOSIS — E11.22 TYPE 2 DIABETES MELLITUS WITH STAGE 3 CHRONIC KIDNEY DISEASE, WITHOUT LONG-TERM CURRENT USE OF INSULIN: ICD-10-CM

## 2018-12-18 DIAGNOSIS — N18.30 TYPE 2 DIABETES MELLITUS WITH STAGE 3 CHRONIC KIDNEY DISEASE, WITHOUT LONG-TERM CURRENT USE OF INSULIN: ICD-10-CM

## 2018-12-20 RX ORDER — GLIPIZIDE 5 MG/1
10 TABLET, FILM COATED, EXTENDED RELEASE ORAL EVERY MORNING
Qty: 60 TABLET | Refills: 4 | OUTPATIENT
Start: 2018-12-20

## 2019-01-02 ENCOUNTER — OFFICE VISIT (OUTPATIENT)
Dept: FAMILY MEDICINE | Facility: CLINIC | Age: 79
End: 2019-01-02
Payer: MEDICARE

## 2019-01-02 ENCOUNTER — LAB VISIT (OUTPATIENT)
Dept: LAB | Facility: HOSPITAL | Age: 79
End: 2019-01-02
Attending: FAMILY MEDICINE
Payer: MEDICARE

## 2019-01-02 VITALS
SYSTOLIC BLOOD PRESSURE: 138 MMHG | HEART RATE: 88 BPM | DIASTOLIC BLOOD PRESSURE: 78 MMHG | RESPIRATION RATE: 17 BRPM | OXYGEN SATURATION: 97 % | BODY MASS INDEX: 31.38 KG/M2 | HEIGHT: 78 IN | WEIGHT: 271.19 LBS | TEMPERATURE: 98 F

## 2019-01-02 DIAGNOSIS — Z23 NEED FOR TETANUS BOOSTER: ICD-10-CM

## 2019-01-02 DIAGNOSIS — E11.22 TYPE 2 DIABETES MELLITUS WITH STAGE 3 CHRONIC KIDNEY DISEASE, WITHOUT LONG-TERM CURRENT USE OF INSULIN: ICD-10-CM

## 2019-01-02 DIAGNOSIS — N18.30 TYPE 2 DIABETES MELLITUS WITH STAGE 3 CHRONIC KIDNEY DISEASE, WITHOUT LONG-TERM CURRENT USE OF INSULIN: ICD-10-CM

## 2019-01-02 DIAGNOSIS — I10 ESSENTIAL HYPERTENSION: Primary | ICD-10-CM

## 2019-01-02 LAB
ANION GAP SERPL CALC-SCNC: 10 MMOL/L
BUN SERPL-MCNC: 24 MG/DL
CALCIUM SERPL-MCNC: 9.6 MG/DL
CHLORIDE SERPL-SCNC: 101 MMOL/L
CO2 SERPL-SCNC: 28 MMOL/L
CREAT SERPL-MCNC: 1.9 MG/DL
EST. GFR  (AFRICAN AMERICAN): 38.2 ML/MIN/1.73 M^2
EST. GFR  (NON AFRICAN AMERICAN): 33 ML/MIN/1.73 M^2
ESTIMATED AVG GLUCOSE: 217 MG/DL
GLUCOSE SERPL-MCNC: 182 MG/DL
HBA1C MFR BLD HPLC: 9.2 %
POTASSIUM SERPL-SCNC: 4.4 MMOL/L
SODIUM SERPL-SCNC: 139 MMOL/L

## 2019-01-02 PROCEDURE — 99213 OFFICE O/P EST LOW 20 MIN: CPT | Mod: PBBFAC,PO,25 | Performed by: FAMILY MEDICINE

## 2019-01-02 PROCEDURE — 99999 PR PBB SHADOW E&M-EST. PATIENT-LVL III: ICD-10-PCS | Mod: PBBFAC,,, | Performed by: FAMILY MEDICINE

## 2019-01-02 PROCEDURE — 36415 COLL VENOUS BLD VENIPUNCTURE: CPT | Mod: PO

## 2019-01-02 PROCEDURE — 80048 BASIC METABOLIC PNL TOTAL CA: CPT

## 2019-01-02 PROCEDURE — 99214 PR OFFICE/OUTPT VISIT, EST, LEVL IV, 30-39 MIN: ICD-10-PCS | Mod: S$PBB,,, | Performed by: FAMILY MEDICINE

## 2019-01-02 PROCEDURE — 83036 HEMOGLOBIN GLYCOSYLATED A1C: CPT

## 2019-01-02 PROCEDURE — 90714 TD VACC NO PRESV 7 YRS+ IM: CPT | Mod: PBBFAC,PO

## 2019-01-02 PROCEDURE — 99214 OFFICE O/P EST MOD 30 MIN: CPT | Mod: S$PBB,,, | Performed by: FAMILY MEDICINE

## 2019-01-02 PROCEDURE — 99999 PR PBB SHADOW E&M-EST. PATIENT-LVL III: CPT | Mod: PBBFAC,,, | Performed by: FAMILY MEDICINE

## 2019-01-02 RX ORDER — AMLODIPINE, VALSARTAN AND HYDROCHLOROTHIAZIDE 10; 160; 25 MG/1; MG/1; MG/1
1 TABLET ORAL DAILY
Qty: 90 TABLET | Refills: 0 | Status: SHIPPED | OUTPATIENT
Start: 2019-01-02 | End: 2019-06-02 | Stop reason: SDUPTHER

## 2019-01-02 NOTE — PROGRESS NOTES
Td vaccine administered IM to right deltoid.VIS form given. Tolerated well. No adverse reaction noted.

## 2019-01-02 NOTE — PROGRESS NOTES
Chief Complaint   Patient presents with    Allergies       Luther Irwin Jr. is a 78 y.o. male who presents per the Chief Complaint.  Pt is known to me and was last seen by me on 12/10/2018.  All known chronic medical issues have been documented.       Hypertension   This is a chronic problem. The current episode started more than 1 year ago. The problem is unchanged. The problem is uncontrolled. Pertinent negatives include no anxiety, blurred vision, chest pain, headaches, neck pain, orthopnea, palpitations, peripheral edema, PND, shortness of breath or sweats. There are no associated agents to hypertension. Risk factors for coronary artery disease include male gender and diabetes mellitus. Past treatments include angiotensin blockers, diuretics and calcium channel blockers. The current treatment provides moderate improvement. There are no compliance problems.  There is no history of angina, kidney disease, CAD/MI, CVA, heart failure, left ventricular hypertrophy, PVD or retinopathy. There is no history of chronic renal disease, coarctation of the aorta, hyperparathyroidism or renovascular disease.   Diabetes   He presents for his follow-up diabetic visit. He has type 2 diabetes mellitus. His disease course has been stable. Pertinent negatives for hypoglycemia include no confusion, dizziness, headaches, hunger, mood changes, nervousness/anxiousness, pallor, seizures, sleepiness, speech difficulty, sweats or tremors. Associated symptoms include foot paresthesias. Pertinent negatives for diabetes include no blurred vision, no chest pain, no fatigue, no foot ulcerations, no polydipsia, no polyphagia, no polyuria, no visual change, no weakness and no weight loss. There are no hypoglycemic complications. Symptoms are stable. Diabetic complications include peripheral neuropathy. Pertinent negatives for diabetic complications include no autonomic neuropathy, CVA, heart disease, impotence, nephropathy, PVD or retinopathy.  Risk factors for coronary artery disease include diabetes mellitus, hypertension and male sex. Current diabetic treatment includes oral agent (dual therapy). He is compliant with treatment most of the time. His weight is stable. He is following a generally healthy diet. When asked about meal planning, he reported none. He has not had a previous visit with a dietitian. He participates in exercise intermittently. There is no change in his home blood glucose trend. An ACE inhibitor/angiotensin II receptor blocker is being taken. He sees a podiatrist.Eye exam is current.        ROS  Review of Systems   Constitutional: Negative.  Negative for activity change, appetite change, chills, diaphoresis, fatigue, fever, unexpected weight change and weight loss.   HENT: Negative.  Negative for congestion, ear pain, hearing loss, nosebleeds, postnasal drip, rhinorrhea, sinus pressure, sneezing, sore throat and trouble swallowing.    Eyes: Positive for discharge. Negative for blurred vision, pain and visual disturbance.   Respiratory: Negative for cough, choking and shortness of breath.    Cardiovascular: Negative for chest pain, palpitations, orthopnea, leg swelling and PND.   Gastrointestinal: Negative for abdominal pain, constipation, diarrhea, nausea and vomiting.   Endocrine: Negative for polydipsia, polyphagia and polyuria.   Genitourinary: Negative for difficulty urinating, dysuria, frequency, impotence and urgency.   Musculoskeletal: Negative.  Negative for arthralgias, back pain, gait problem, joint swelling, myalgias and neck pain.   Skin: Negative.  Negative for pallor.   Allergic/Immunologic: Negative for environmental allergies and food allergies.   Neurological: Negative.  Negative for dizziness, tremors, seizures, syncope, speech difficulty, weakness, light-headedness and headaches.   Psychiatric/Behavioral: Negative.  Negative for confusion, decreased concentration, dysphoric mood and sleep disturbance. The patient  "is not nervous/anxious.        Physical Exam  Vitals:    01/02/19 0938   BP: 138/78   Pulse:    Resp:    Temp:     Body mass index is 30.55 kg/m².  Weight: 123 kg (271 lb 2.7 oz)   Height: 6' 7" (200.7 cm)     Physical Exam   Constitutional: He is oriented to person, place, and time. He appears well-developed and well-nourished. He is active and cooperative.  Non-toxic appearance. He does not have a sickly appearance. He does not appear ill. No distress.   HENT:   Head: Normocephalic and atraumatic.   Right Ear: Hearing and external ear normal. No decreased hearing is noted.   Left Ear: Hearing and external ear normal. No decreased hearing is noted.   Nose: Nose normal. No rhinorrhea or nasal deformity.   Mouth/Throat: Uvula is midline and oropharynx is clear and moist. He does not have dentures. Normal dentition.   Eyes: Conjunctivae, EOM and lids are normal. Pupils are equal, round, and reactive to light. Right eye exhibits no chemosis, no discharge and no exudate. No foreign body present in the right eye. Left eye exhibits no chemosis, no discharge and no exudate. No foreign body present in the left eye. No scleral icterus.   Neck: Normal range of motion and full passive range of motion without pain. Neck supple.   Cardiovascular: Normal rate, regular rhythm, S1 normal, S2 normal and normal heart sounds. Exam reveals no gallop and no friction rub.   No murmur heard.  Pulmonary/Chest: Effort normal and breath sounds normal. No accessory muscle usage. No respiratory distress. He has no decreased breath sounds. He has no wheezes. He has no rhonchi. He has no rales.   Abdominal: Soft. Normal appearance. He exhibits no distension. There is no hepatosplenomegaly. There is no tenderness. There is no rigidity, no rebound and no guarding.   Musculoskeletal: Normal range of motion.   Neurological: He is alert and oriented to person, place, and time. He has normal strength. No cranial nerve deficit or sensory deficit. He " exhibits normal muscle tone. He displays no seizure activity. Coordination and gait normal.   Skin: Skin is warm, dry and intact. No rash noted. He is not diaphoretic.   Psychiatric: He has a normal mood and affect. His speech is normal and behavior is normal. Judgment and thought content normal. Cognition and memory are normal. He is attentive.       Assessment & Plan    1. Essential hypertension  Patient was counseled and encouraged to maintain a low sodium diet, as well as increasing physical activity.  Recommend random BP checks at home on a regular basis.  Repeat BP at end of visit was not necessary. Will continue medication at this time, and follow up in 3-6 months, or sooner if blood pressure begins to increase.     - amLODIPine-valsartan-hcthiazid -25 mg Tab; Take 1 tablet by mouth once daily.  Dispense: 90 tablet; Refill: 0    2. Type 2 diabetes mellitus with stage 3 chronic kidney disease, without long-term current use of insulin  Patient is encouraged to follow a diet low in carbohydrates and simple sugars.  Discussed simple vs. complex carbohydrates as well as eating times of certain meals. Advised to focus on good food choices and increased physical activity and encouraged to adhere to medication regimen and/or lifestyle adjustments, and to check glucose level as recommended.  Contact office if glucose levels are not improving over time.  Screening blood test is due now.    - Basic metabolic panel; Future    3. Need for tetanus booster  Recommended vaccines were given to boost immunity and prevent spread of communicable diseases.   - (In Office Administered) Td Vaccine - Preservative Free      Follow up documented    ACTIVE MEDICAL ISSUES:  Documented in Problem List    PAST MEDICAL HISTORY  Documented    PAST SURGICAL HISTORY:  Documented    SOCIAL HISTORY:  Documented    FAMILY HISTORY:  Documented    ALLERGIES AND MEDICATIONS: updated and reviewed.  Documented    Health Maintenance       Date  Due Completion Date    TETANUS VACCINE 06/03/1958 ---    Foot Exam 07/13/2018 7/13/2017    Override on 6/10/2015: Declined (hadan appoint with podiatry 05/21/15 Dr.Meka roth)    Hemoglobin A1c 04/01/2019 10/1/2018    Eye Exam 06/21/2019 6/21/2018    Override on 6/10/2015: Declined (had an appoint last week )    Lipid Panel 10/01/2019 10/1/2018    Urine Microalbumin 10/01/2019 10/1/2018

## 2019-02-07 ENCOUNTER — TELEPHONE (OUTPATIENT)
Dept: FAMILY MEDICINE | Facility: CLINIC | Age: 79
End: 2019-02-07

## 2019-02-07 DIAGNOSIS — E11.22 TYPE 2 DIABETES MELLITUS WITH STAGE 3 CHRONIC KIDNEY DISEASE, WITHOUT LONG-TERM CURRENT USE OF INSULIN: Primary | ICD-10-CM

## 2019-02-07 DIAGNOSIS — N18.30 TYPE 2 DIABETES MELLITUS WITH STAGE 3 CHRONIC KIDNEY DISEASE, WITHOUT LONG-TERM CURRENT USE OF INSULIN: Primary | ICD-10-CM

## 2019-02-07 NOTE — TELEPHONE ENCOUNTER
----- Message from Azikiwe K. Lombard, MD sent at 2/7/2019 10:22 AM CST -----  Please notify patient of moderately elevated HbA1c results by phone, and schedule follow up blood test within 1 month before

## 2019-02-07 NOTE — PROGRESS NOTES
Please notify patient of moderately elevated HbA1c results by phone, and schedule follow up blood test within 1 month before

## 2019-03-07 ENCOUNTER — LAB VISIT (OUTPATIENT)
Dept: LAB | Facility: HOSPITAL | Age: 79
End: 2019-03-07
Attending: FAMILY MEDICINE
Payer: MEDICARE

## 2019-03-07 DIAGNOSIS — N18.30 TYPE 2 DIABETES MELLITUS WITH STAGE 3 CHRONIC KIDNEY DISEASE, WITHOUT LONG-TERM CURRENT USE OF INSULIN: ICD-10-CM

## 2019-03-07 DIAGNOSIS — E11.22 TYPE 2 DIABETES MELLITUS WITH STAGE 3 CHRONIC KIDNEY DISEASE, WITHOUT LONG-TERM CURRENT USE OF INSULIN: ICD-10-CM

## 2019-03-07 LAB
ANION GAP SERPL CALC-SCNC: 9 MMOL/L
BUN SERPL-MCNC: 22 MG/DL
CALCIUM SERPL-MCNC: 9.6 MG/DL
CHLORIDE SERPL-SCNC: 100 MMOL/L
CO2 SERPL-SCNC: 28 MMOL/L
CREAT SERPL-MCNC: 1.7 MG/DL
EST. GFR  (AFRICAN AMERICAN): 43.7 ML/MIN/1.73 M^2
EST. GFR  (NON AFRICAN AMERICAN): 37.8 ML/MIN/1.73 M^2
ESTIMATED AVG GLUCOSE: 206 MG/DL
GLUCOSE SERPL-MCNC: 179 MG/DL
HBA1C MFR BLD HPLC: 8.8 %
POTASSIUM SERPL-SCNC: 3.9 MMOL/L
SODIUM SERPL-SCNC: 137 MMOL/L

## 2019-03-07 PROCEDURE — 36415 COLL VENOUS BLD VENIPUNCTURE: CPT | Mod: PO

## 2019-03-07 PROCEDURE — 83036 HEMOGLOBIN GLYCOSYLATED A1C: CPT

## 2019-03-07 PROCEDURE — 80048 BASIC METABOLIC PNL TOTAL CA: CPT

## 2019-03-12 DIAGNOSIS — E11.22 TYPE 2 DIABETES MELLITUS WITH STAGE 3 CHRONIC KIDNEY DISEASE, WITHOUT LONG-TERM CURRENT USE OF INSULIN: ICD-10-CM

## 2019-03-12 DIAGNOSIS — N18.30 TYPE 2 DIABETES MELLITUS WITH STAGE 3 CHRONIC KIDNEY DISEASE, WITHOUT LONG-TERM CURRENT USE OF INSULIN: ICD-10-CM

## 2019-03-12 RX ORDER — GLIPIZIDE 10 MG/1
10 TABLET, FILM COATED, EXTENDED RELEASE ORAL EVERY MORNING
Qty: 90 TABLET | Refills: 1 | Status: SHIPPED | OUTPATIENT
Start: 2019-03-12 | End: 2019-08-16 | Stop reason: SDUPTHER

## 2019-03-12 NOTE — TELEPHONE ENCOUNTER
----- Message from Heather Rebollar sent at 3/12/2019  4:28 PM CDT -----  Contact: Self: [phone]  Medication Refill: glipiZIDE (GLUCOTROL) 10 MG TR24      [facility]/pharm[facility] #[id number] - [place], [place] - [address] [place] [id number] [facility] [address]  Phone: [phone] Fax: [phone]    Please call once medication has been sent,  Thank you

## 2019-03-31 ENCOUNTER — EXTERNAL CHRONIC CARE MANAGEMENT (OUTPATIENT)
Dept: PRIMARY CARE CLINIC | Facility: CLINIC | Age: 79
End: 2019-03-31
Payer: MEDICARE

## 2019-03-31 PROCEDURE — 99490 PR CHRONIC CARE MGMT, 1ST 20 MIN: ICD-10-PCS | Mod: S$PBB,,, | Performed by: FAMILY MEDICINE

## 2019-03-31 PROCEDURE — 99490 CHRNC CARE MGMT STAFF 1ST 20: CPT | Mod: PBBFAC,PO | Performed by: FAMILY MEDICINE

## 2019-03-31 PROCEDURE — 99490 CHRNC CARE MGMT STAFF 1ST 20: CPT | Mod: S$PBB,,, | Performed by: FAMILY MEDICINE

## 2019-04-26 DIAGNOSIS — N40.0 BENIGN PROSTATIC HYPERPLASIA WITHOUT LOWER URINARY TRACT SYMPTOMS: ICD-10-CM

## 2019-04-30 ENCOUNTER — EXTERNAL CHRONIC CARE MANAGEMENT (OUTPATIENT)
Dept: PRIMARY CARE CLINIC | Facility: CLINIC | Age: 79
End: 2019-04-30
Payer: MEDICARE

## 2019-04-30 PROCEDURE — 99490 CHRNC CARE MGMT STAFF 1ST 20: CPT | Mod: PBBFAC,PO | Performed by: FAMILY MEDICINE

## 2019-04-30 PROCEDURE — 99490 CHRNC CARE MGMT STAFF 1ST 20: CPT | Mod: S$PBB,,, | Performed by: FAMILY MEDICINE

## 2019-04-30 PROCEDURE — 99490 PR CHRONIC CARE MGMT, 1ST 20 MIN: ICD-10-PCS | Mod: S$PBB,,, | Performed by: FAMILY MEDICINE

## 2019-05-02 RX ORDER — TAMSULOSIN HYDROCHLORIDE 0.4 MG/1
CAPSULE ORAL
Qty: 90 CAPSULE | Refills: 1 | Status: SHIPPED | OUTPATIENT
Start: 2019-05-02 | End: 2019-10-25 | Stop reason: SDUPTHER

## 2019-05-31 ENCOUNTER — EXTERNAL CHRONIC CARE MANAGEMENT (OUTPATIENT)
Dept: PRIMARY CARE CLINIC | Facility: CLINIC | Age: 79
End: 2019-05-31
Payer: MEDICARE

## 2019-05-31 PROCEDURE — 99490 CHRNC CARE MGMT STAFF 1ST 20: CPT | Mod: PBBFAC,PO | Performed by: FAMILY MEDICINE

## 2019-05-31 PROCEDURE — 99490 CHRNC CARE MGMT STAFF 1ST 20: CPT | Mod: S$PBB,,, | Performed by: FAMILY MEDICINE

## 2019-05-31 PROCEDURE — 99490 PR CHRONIC CARE MGMT, 1ST 20 MIN: ICD-10-PCS | Mod: S$PBB,,, | Performed by: FAMILY MEDICINE

## 2019-06-02 DIAGNOSIS — I10 ESSENTIAL HYPERTENSION: ICD-10-CM

## 2019-06-03 RX ORDER — AMLODIPINE, VALSARTAN AND HYDROCHLOROTHIAZIDE 10; 160; 25 MG/1; MG/1; MG/1
TABLET ORAL
Qty: 90 TABLET | Refills: 0 | Status: SHIPPED | OUTPATIENT
Start: 2019-06-03 | End: 2019-09-23

## 2019-06-06 DIAGNOSIS — I10 ESSENTIAL HYPERTENSION: ICD-10-CM

## 2019-06-11 RX ORDER — CLONIDINE HYDROCHLORIDE 0.1 MG/1
TABLET ORAL
Qty: 270 TABLET | Refills: 1 | Status: SHIPPED | OUTPATIENT
Start: 2019-06-11 | End: 2019-11-30 | Stop reason: SDUPTHER

## 2019-06-30 ENCOUNTER — EXTERNAL CHRONIC CARE MANAGEMENT (OUTPATIENT)
Dept: PRIMARY CARE CLINIC | Facility: CLINIC | Age: 79
End: 2019-06-30
Payer: MEDICARE

## 2019-06-30 PROCEDURE — 99490 CHRNC CARE MGMT STAFF 1ST 20: CPT | Mod: PBBFAC,PO | Performed by: FAMILY MEDICINE

## 2019-06-30 PROCEDURE — 99490 PR CHRONIC CARE MGMT, 1ST 20 MIN: ICD-10-PCS | Mod: S$PBB,,, | Performed by: FAMILY MEDICINE

## 2019-06-30 PROCEDURE — 99490 CHRNC CARE MGMT STAFF 1ST 20: CPT | Mod: S$PBB,,, | Performed by: FAMILY MEDICINE

## 2019-08-08 ENCOUNTER — TELEPHONE (OUTPATIENT)
Dept: FAMILY MEDICINE | Facility: CLINIC | Age: 79
End: 2019-08-08

## 2019-08-08 NOTE — TELEPHONE ENCOUNTER
According to our records patient due for shingles vaccine, we do not have vaccine at the [place] [facility] patient can go to a local pharmacy or to [facility] to have completed  Lm for patient noting the same

## 2019-08-08 NOTE — TELEPHONE ENCOUNTER
----- Message from Tracey Perkins sent at 8/8/2019  3:10 PM CDT -----  Contact: Self   Type: Patient Call Back    Who called: Self     What is the request in detail:patient would like to know if he is up to date on all his immunizations     Can the clinic reply by [facility]? No     Would the patient rather a call back or a response via [facility]? Call     Best call back number:[phone]

## 2019-08-08 NOTE — TELEPHONE ENCOUNTER
Spoke with patient was told to patient that only vaccine is missing is shingles ,also he will go to local pharmacy ,  patient verbalized understand .

## 2019-08-08 NOTE — TELEPHONE ENCOUNTER
----- Message from Rolanda Leach sent at 8/8/2019  4:43 PM CDT -----  Contact: Self [phone]  Type:  Patient Returning Call    Who Called: Self    Who Left Message for Patient: Zahida Constantino    Does the patient know what this is regarding?:vaccines    Would the patient rather a call back or a response via [facility]? Call back    Best Call Back Number:[phone]

## 2019-08-09 ENCOUNTER — TELEPHONE (OUTPATIENT)
Dept: FAMILY MEDICINE | Facility: CLINIC | Age: 79
End: 2019-08-09

## 2019-08-09 NOTE — TELEPHONE ENCOUNTER
----- Message from Angela Perkins sent at 8/9/2019  4:27 PM CDT -----  Contact: Self  Type: Patient Call Back    Who called:Self    What is the request in detail:He states he needs to be scheduled for the shingles vaccination. [place] leaving the country [place]01[place]    Can the clinic reply by [facility]?No    Would the patient rather a call back or a response via [facility]? Callback    Best call back number:[phone]

## 2019-08-09 NOTE — TELEPHONE ENCOUNTER
Spoke with patient. Informed that we do not supply the shingles vaccine at this clinic. Verbalized understanding.Appointment scheduled to have completed at [facility].

## 2019-08-12 ENCOUNTER — CLINICAL SUPPORT (OUTPATIENT)
Dept: FAMILY MEDICINE | Facility: CLINIC | Age: 79
End: 2019-08-12
Payer: MEDICARE

## 2019-08-12 DIAGNOSIS — Z23 NEED FOR ZOSTER VACCINE: Primary | ICD-10-CM

## 2019-08-12 PROCEDURE — 90750 HZV VACC RECOMBINANT IM: CPT | Mod: PBBFAC,PO

## 2019-08-12 PROCEDURE — 99499 UNLISTED E&M SERVICE: CPT | Mod: S$PBB,,, | Performed by: FAMILY MEDICINE

## 2019-08-12 PROCEDURE — 99499 NO LOS: ICD-10-PCS | Mod: S$PBB,,, | Performed by: FAMILY MEDICINE

## 2019-08-16 ENCOUNTER — OFFICE VISIT (OUTPATIENT)
Dept: FAMILY MEDICINE | Facility: CLINIC | Age: 79
End: 2019-08-16
Payer: MEDICARE

## 2019-08-16 ENCOUNTER — LAB VISIT (OUTPATIENT)
Dept: LAB | Facility: HOSPITAL | Age: 79
End: 2019-08-16
Payer: MEDICARE

## 2019-08-16 VITALS
TEMPERATURE: 99 F | HEART RATE: 68 BPM | WEIGHT: 275.81 LBS | HEIGHT: 78 IN | SYSTOLIC BLOOD PRESSURE: 158 MMHG | BODY MASS INDEX: 31.91 KG/M2 | DIASTOLIC BLOOD PRESSURE: 72 MMHG | RESPIRATION RATE: 18 BRPM | OXYGEN SATURATION: 97 %

## 2019-08-16 DIAGNOSIS — E11.22 TYPE 2 DIABETES MELLITUS WITH STAGE 3 CHRONIC KIDNEY DISEASE, WITHOUT LONG-TERM CURRENT USE OF INSULIN: ICD-10-CM

## 2019-08-16 DIAGNOSIS — N18.30 TYPE 2 DIABETES MELLITUS WITH STAGE 3 CHRONIC KIDNEY DISEASE, WITHOUT LONG-TERM CURRENT USE OF INSULIN: ICD-10-CM

## 2019-08-16 DIAGNOSIS — I10 ESSENTIAL HYPERTENSION: Primary | ICD-10-CM

## 2019-08-16 DIAGNOSIS — N40.0 BENIGN PROSTATIC HYPERPLASIA WITHOUT LOWER URINARY TRACT SYMPTOMS: ICD-10-CM

## 2019-08-16 DIAGNOSIS — N52.1 ERECTILE DYSFUNCTION DUE TO DISEASES CLASSIFIED ELSEWHERE: ICD-10-CM

## 2019-08-16 LAB
ESTIMATED AVG GLUCOSE: 217 MG/DL (ref 68–131)
HBA1C MFR BLD HPLC: 9.2 % (ref 4–5.6)

## 2019-08-16 PROCEDURE — 36415 COLL VENOUS BLD VENIPUNCTURE: CPT | Mod: PO

## 2019-08-16 PROCEDURE — 99214 PR OFFICE/OUTPT VISIT, EST, LEVL IV, 30-39 MIN: ICD-10-PCS | Mod: S$PBB,,, | Performed by: FAMILY MEDICINE

## 2019-08-16 PROCEDURE — 83036 HEMOGLOBIN GLYCOSYLATED A1C: CPT

## 2019-08-16 PROCEDURE — 99214 OFFICE O/P EST MOD 30 MIN: CPT | Mod: S$PBB,,, | Performed by: FAMILY MEDICINE

## 2019-08-16 PROCEDURE — 99999 PR PBB SHADOW E&M-EST. PATIENT-LVL III: ICD-10-PCS | Mod: PBBFAC,,, | Performed by: FAMILY MEDICINE

## 2019-08-16 PROCEDURE — 99999 PR PBB SHADOW E&M-EST. PATIENT-LVL III: CPT | Mod: PBBFAC,,, | Performed by: FAMILY MEDICINE

## 2019-08-16 PROCEDURE — 99213 OFFICE O/P EST LOW 20 MIN: CPT | Mod: PBBFAC,PO | Performed by: FAMILY MEDICINE

## 2019-08-16 RX ORDER — AMLODIPINE AND VALSARTAN 10; 160 MG/1; MG/1
1 TABLET ORAL DAILY
Qty: 30 TABLET | Refills: 0 | Status: SHIPPED | OUTPATIENT
Start: 2019-08-16 | End: 2019-09-07 | Stop reason: SDUPTHER

## 2019-08-16 RX ORDER — HYDROCHLOROTHIAZIDE 25 MG/1
25 TABLET ORAL DAILY
Qty: 30 TABLET | Refills: 0 | Status: SHIPPED | OUTPATIENT
Start: 2019-08-16 | End: 2019-09-07 | Stop reason: SDUPTHER

## 2019-08-16 RX ORDER — GLIPIZIDE 10 MG/1
10 TABLET, FILM COATED, EXTENDED RELEASE ORAL EVERY MORNING
Qty: 90 TABLET | Refills: 1 | Status: SHIPPED | OUTPATIENT
Start: 2019-08-16 | End: 2020-01-13 | Stop reason: SDUPTHER

## 2019-08-16 RX ORDER — SILDENAFIL 100 MG/1
100 TABLET, FILM COATED ORAL DAILY PRN
Qty: 6 TABLET | Refills: 5 | Status: SHIPPED | OUTPATIENT
Start: 2019-08-16 | End: 2020-04-20

## 2019-08-16 NOTE — PROGRESS NOTES
Patient staid that Dr. Baker is his eye doctor all ready had an ov this year , release provide to patient .

## 2019-08-16 NOTE — PROGRESS NOTES
No chief complaint on file.      Luther Irwin Jr. is a 79 y.o. male who presents per the Chief Complaint.  Pt is known to me and was last seen by me on 1/2/2019.  All known chronic medical issues have been documented.       Hypertension   This is a chronic problem. The current episode started more than 1 year ago. The problem is unchanged. The problem is uncontrolled. Pertinent negatives include no anxiety, blurred vision, chest pain, headaches, neck pain, orthopnea, palpitations, peripheral edema, PND, shortness of breath or sweats. There are no associated agents to hypertension. Risk factors for coronary artery disease include male gender and diabetes mellitus. Past treatments include angiotensin blockers, diuretics and calcium channel blockers. The current treatment provides moderate improvement. There are no compliance problems.  There is no history of angina, kidney disease, CAD/MI, CVA, heart failure, left ventricular hypertrophy, PVD or retinopathy. There is no history of chronic renal disease, coarctation of the aorta, hyperaldosteronism, hypercortisolism, hyperparathyroidism, a hypertension causing med, pheochromocytoma, renovascular disease, sleep apnea or a thyroid problem.   Diabetes   He presents for his follow-up diabetic visit. He has type 2 diabetes mellitus. His disease course has been stable. Pertinent negatives for hypoglycemia include no confusion, dizziness, headaches, hunger, mood changes, nervousness/anxiousness, pallor, seizures, sleepiness, speech difficulty, sweats or tremors. Associated symptoms include foot paresthesias. Pertinent negatives for diabetes include no blurred vision, no chest pain, no fatigue, no foot ulcerations, no polydipsia, no polyphagia, no polyuria, no visual change, no weakness and no weight loss. There are no hypoglycemic complications. Symptoms are stable. Diabetic complications include peripheral neuropathy. Pertinent negatives for diabetic complications include no  autonomic neuropathy, CVA, heart disease, impotence, nephropathy, PVD or retinopathy. Risk factors for coronary artery disease include diabetes mellitus, hypertension and male sex. Current diabetic treatment includes oral agent (dual therapy). He is compliant with treatment most of the time. His weight is stable. He is following a generally healthy diet. When asked about meal planning, he reported none. He has not had a previous visit with a dietitian. He participates in exercise intermittently. There is no change in his home blood glucose trend. An ACE inhibitor/angiotensin II receptor blocker is being taken. He sees a podiatrist.Eye exam is current.        ROS  Review of Systems   Constitutional: Negative.  Negative for activity change, appetite change, chills, diaphoresis, fatigue, fever, unexpected weight change and weight loss.   HENT: Negative.  Negative for congestion, ear pain, hearing loss, nosebleeds, postnasal drip, rhinorrhea, sinus pressure, sneezing, sore throat and trouble swallowing.    Eyes: Negative for blurred vision, pain and visual disturbance.   Respiratory: Negative for cough, choking and shortness of breath.    Cardiovascular: Negative for chest pain, palpitations, orthopnea, leg swelling and PND.   Gastrointestinal: Negative for abdominal pain, constipation, diarrhea, nausea and vomiting.   Endocrine: Negative for polydipsia, polyphagia and polyuria.   Genitourinary: Positive for difficulty urinating, enuresis, frequency and urgency. Negative for decreased urine volume, dysuria, impotence, penile swelling, scrotal swelling and testicular pain.   Musculoskeletal: Negative.  Negative for arthralgias, back pain, gait problem, joint swelling, myalgias and neck pain.   Skin: Negative.  Negative for pallor.   Allergic/Immunologic: Negative for environmental allergies and food allergies.   Neurological: Negative.  Negative for dizziness, tremors, seizures, syncope, speech difficulty, weakness,  "light-headedness and headaches.   Psychiatric/Behavioral: Negative.  Negative for confusion, decreased concentration, dysphoric mood and sleep disturbance. The patient is not nervous/anxious.        Physical Exam  Vitals:    08/16/19 1019   BP: (!) 158/72   Pulse: 68   Resp: 18   Temp: 98.5 °F (36.9 °C)    Body mass index is 31.07 kg/m².  Weight: 125.1 kg (275 lb 12.7 oz)   Height: 6' 7" (200.7 cm)     Physical Exam   Constitutional: He is oriented to person, place, and time. He appears well-developed and well-nourished. He is active and cooperative.  Non-toxic appearance. He does not have a sickly appearance. He does not appear ill. No distress.   HENT:   Head: Normocephalic and atraumatic.   Right Ear: Hearing and external ear normal. No decreased hearing is noted.   Left Ear: Hearing and external ear normal. No decreased hearing is noted.   Nose: Nose normal. No rhinorrhea or nasal deformity.   Mouth/Throat: Uvula is midline and oropharynx is clear and moist. He does not have dentures. Normal dentition.   Eyes: Pupils are equal, round, and reactive to light. Conjunctivae, EOM and lids are normal. Right eye exhibits no chemosis, no discharge and no exudate. No foreign body present in the right eye. Left eye exhibits no chemosis, no discharge and no exudate. No foreign body present in the left eye. No scleral icterus.   Neck: Normal range of motion and full passive range of motion without pain. Neck supple.   Cardiovascular: Normal rate, regular rhythm, S1 normal, S2 normal and normal heart sounds. Exam reveals no gallop and no friction rub.   No murmur heard.  Pulmonary/Chest: Effort normal and breath sounds normal. No accessory muscle usage. No respiratory distress. He has no decreased breath sounds. He has no wheezes. He has no rhonchi. He has no rales.   Abdominal: Soft. Normal appearance. He exhibits no distension. There is no hepatosplenomegaly. There is no tenderness. There is no rigidity, no rebound and no " guarding.   Musculoskeletal: Normal range of motion.   Neurological: He is alert and oriented to person, place, and time. He has normal strength. No cranial nerve deficit or sensory deficit. He exhibits normal muscle tone. He displays no seizure activity. Coordination and gait normal.   Skin: Skin is warm, dry and intact. No rash noted. He is not diaphoretic.   Psychiatric: He has a normal mood and affect. His speech is normal and behavior is normal. Judgment and thought content normal. Cognition and memory are normal. He is attentive.       Assessment & Plan    Discussion of plan of care including treatment options regarding health and wellness were reviewed and discussed with patient.  Any changes to medication or treatment plan, as well as any screening blood test, imaging, or referrals to specialist, are documented.  Follow up as indicated.     1. Essential hypertension  Patient was counseled and encouraged to maintain a low sodium diet, as well as increasing physical activity.  Recommend random BP checks at home on a regular basis.  Repeat BP at end of visit was not necessary. Will continue medication at this time, and follow up in 3-6 months, or sooner if blood pressure begins to increase.     - amlodipine-valsartan (EXFORGE)  mg per tablet; Take 1 tablet by mouth once daily.  Dispense: 30 tablet; Refill: 0  - hydroCHLOROthiazide (HYDRODIURIL) 25 MG tablet; Take 1 tablet (25 mg total) by mouth once daily.  Dispense: 30 tablet; Refill: 0    2. Type 2 diabetes mellitus with stage 3 chronic kidney disease, without long-term current use of insulin  Patient is encouraged to follow a diet low in carbohydrates and simple sugars.  Discussed simple vs. complex carbohydrates as well as eating times of certain meals. Advised to focus on good food choices and increased physical activity and encouraged to adhere to medication regimen and/or lifestyle adjustments, and to check glucose level as recommended.  Contact  office if glucose levels are not improving over time.  Screening blood test is due at this time.    - Hemoglobin A1c; Future  - glipiZIDE (GLUCOTROL) 10 MG TR24; Take 1 tablet (10 mg total) by mouth every morning.  Dispense: 90 tablet; Refill: 1    3. Benign prostatic hyperplasia without lower urinary tract symptoms  Stable; no therapeutic changes at this time.      4. Erectile dysfunction due to diseases classified elsewhere  Discussed different causes of ED, including anatomical disorder or nerve damage, vascular disorders, or psychological issues.  We agreed to try medication, and patient was given a prescription and advised to use with caution.  He was reminded that if he has an erection that last longer than 4 hours, he should report to the ER immediately.    - sildenafil (VIAGRA) 100 MG tablet; Take 1 tablet (100 mg total) by mouth daily as needed for Erectile Dysfunction.  Dispense: 6 tablet; Refill: 5       Follow up in about 3 months (around 11/16/2019), or if symptoms worsen or fail to improve.        ACTIVE MEDICAL ISSUES:  Documented in Problem List    PAST MEDICAL HISTORY  Documented    PAST SURGICAL HISTORY:  Documented    SOCIAL HISTORY:  Documented    FAMILY HISTORY:  Documented    ALLERGIES AND MEDICATIONS: updated and reviewed.  Documented    Health Maintenance       Date Due Completion Date    Hemoglobin A1c 06/07/2019 3/7/2019    Eye Exam 06/21/2019 6/21/2018    Override on 6/10/2015: Declined (had an appoint last week )    Influenza Vaccine (1) 08/01/2019 10/1/2018    Override on 10/29/2015: Done (today)    Urine Microalbumin 10/01/2019 10/1/2018    Lipid Panel 10/01/2019 10/1/2018    Shingles Vaccine (2 of 2) 10/07/2019 8/12/2019    TETANUS VACCINE 01/02/2029 1/2/2019

## 2019-08-18 NOTE — PROGRESS NOTES
Please notify patient of moderately elevated HbA1c results by phone, and schedule follow up  within 3 months.

## 2019-08-19 ENCOUNTER — TELEPHONE (OUTPATIENT)
Dept: FAMILY MEDICINE | Facility: CLINIC | Age: 79
End: 2019-08-19

## 2019-08-19 NOTE — TELEPHONE ENCOUNTER
----- Message from Azikiwe K. Lombard, MD sent at 8/18/2019 12:35 PM CDT -----  Please notify patient of moderately elevated HbA1c results by phone, and schedule follow up  within 3 months.

## 2019-08-30 DIAGNOSIS — I10 ESSENTIAL HYPERTENSION: ICD-10-CM

## 2019-09-06 RX ORDER — AMLODIPINE, VALSARTAN AND HYDROCHLOROTHIAZIDE 10; 160; 25 MG/1; MG/1; MG/1
TABLET ORAL
Qty: 90 TABLET | Refills: 0 | OUTPATIENT
Start: 2019-09-06

## 2019-09-07 DIAGNOSIS — I10 ESSENTIAL HYPERTENSION: ICD-10-CM

## 2019-09-23 RX ORDER — HYDROCHLOROTHIAZIDE 25 MG/1
TABLET ORAL
Qty: 30 TABLET | Refills: 0 | Status: SHIPPED | OUTPATIENT
Start: 2019-09-23 | End: 2019-10-16 | Stop reason: SDUPTHER

## 2019-09-23 RX ORDER — AMLODIPINE AND VALSARTAN 10; 160 MG/1; MG/1
TABLET ORAL
Qty: 30 TABLET | Refills: 0 | Status: SHIPPED | OUTPATIENT
Start: 2019-09-23 | End: 2019-10-16 | Stop reason: SDUPTHER

## 2019-10-14 ENCOUNTER — CLINICAL SUPPORT (OUTPATIENT)
Dept: FAMILY MEDICINE | Facility: CLINIC | Age: 79
End: 2019-10-14
Payer: MEDICARE

## 2019-10-14 DIAGNOSIS — Z23 NEED FOR ZOSTER VACCINE: ICD-10-CM

## 2019-10-14 DIAGNOSIS — Z23 NEED FOR INFLUENZA VACCINATION: Primary | ICD-10-CM

## 2019-10-14 PROCEDURE — 90750 HZV VACC RECOMBINANT IM: CPT | Mod: PBBFAC,PO

## 2019-10-14 PROCEDURE — 99499 NO LOS: ICD-10-PCS | Mod: S$PBB,,, | Performed by: FAMILY MEDICINE

## 2019-10-14 PROCEDURE — 90662 IIV NO PRSV INCREASED AG IM: CPT | Mod: PBBFAC,PO

## 2019-10-14 PROCEDURE — 99499 UNLISTED E&M SERVICE: CPT | Mod: S$PBB,,, | Performed by: FAMILY MEDICINE

## 2019-10-14 PROCEDURE — 90472 IMMUNIZATION ADMIN EACH ADD: CPT | Mod: PBBFAC,PO

## 2019-10-14 NOTE — PROGRESS NOTES
FLU VACCINE GIVEN  IM IN LEFT DELTOID,pt. Tolerated well.,instructed to wait 15 mins.VIS form was given.

## 2019-10-16 DIAGNOSIS — I10 ESSENTIAL HYPERTENSION: ICD-10-CM

## 2019-10-16 RX ORDER — AMLODIPINE AND VALSARTAN 10; 160 MG/1; MG/1
TABLET ORAL
Qty: 30 TABLET | Refills: 0 | Status: SHIPPED | OUTPATIENT
Start: 2019-10-16 | End: 2019-11-07 | Stop reason: SDUPTHER

## 2019-10-16 RX ORDER — HYDROCHLOROTHIAZIDE 25 MG/1
TABLET ORAL
Qty: 30 TABLET | Refills: 0 | Status: SHIPPED | OUTPATIENT
Start: 2019-10-16 | End: 2019-11-08 | Stop reason: SDUPTHER

## 2019-10-24 LAB
LEFT EYE DM RETINOPATHY: NEGATIVE
RIGHT EYE DM RETINOPATHY: NEGATIVE

## 2019-10-25 DIAGNOSIS — N40.0 BENIGN PROSTATIC HYPERPLASIA WITHOUT LOWER URINARY TRACT SYMPTOMS: ICD-10-CM

## 2019-10-25 RX ORDER — TAMSULOSIN HYDROCHLORIDE 0.4 MG/1
CAPSULE ORAL
Qty: 90 CAPSULE | Refills: 1 | Status: SHIPPED | OUTPATIENT
Start: 2019-10-25 | End: 2020-01-13 | Stop reason: SDUPTHER

## 2019-10-31 ENCOUNTER — EXTERNAL CHRONIC CARE MANAGEMENT (OUTPATIENT)
Dept: PRIMARY CARE CLINIC | Facility: CLINIC | Age: 79
End: 2019-10-31
Payer: MEDICARE

## 2019-10-31 PROCEDURE — 99490 CHRNC CARE MGMT STAFF 1ST 20: CPT | Mod: PBBFAC,PO | Performed by: FAMILY MEDICINE

## 2019-10-31 PROCEDURE — 99490 PR CHRONIC CARE MGMT, 1ST 20 MIN: ICD-10-PCS | Mod: S$PBB,,, | Performed by: FAMILY MEDICINE

## 2019-10-31 PROCEDURE — 99490 CHRNC CARE MGMT STAFF 1ST 20: CPT | Mod: S$PBB,,, | Performed by: FAMILY MEDICINE

## 2019-11-05 DIAGNOSIS — E11.9 DIABETES MELLITUS WITHOUT COMPLICATION: Primary | ICD-10-CM

## 2019-11-07 DIAGNOSIS — I10 ESSENTIAL HYPERTENSION: ICD-10-CM

## 2019-11-07 RX ORDER — AMLODIPINE AND VALSARTAN 10; 160 MG/1; MG/1
1 TABLET ORAL DAILY
Qty: 30 TABLET | Refills: 0 | Status: SHIPPED | OUTPATIENT
Start: 2019-11-07 | End: 2020-01-13 | Stop reason: SDUPTHER

## 2019-11-08 DIAGNOSIS — I10 ESSENTIAL HYPERTENSION: ICD-10-CM

## 2019-11-08 RX ORDER — HYDROCHLOROTHIAZIDE 25 MG/1
TABLET ORAL
Qty: 30 TABLET | Refills: 0 | Status: SHIPPED | OUTPATIENT
Start: 2019-11-08 | End: 2020-01-13 | Stop reason: SDUPTHER

## 2019-11-14 ENCOUNTER — PATIENT OUTREACH (OUTPATIENT)
Dept: ADMINISTRATIVE | Facility: HOSPITAL | Age: 79
End: 2019-11-14

## 2019-11-14 ENCOUNTER — TELEPHONE (OUTPATIENT)
Dept: ADMINISTRATIVE | Facility: HOSPITAL | Age: 79
End: 2019-11-14

## 2019-11-30 ENCOUNTER — EXTERNAL CHRONIC CARE MANAGEMENT (OUTPATIENT)
Dept: PRIMARY CARE CLINIC | Facility: CLINIC | Age: 79
End: 2019-11-30
Payer: MEDICARE

## 2019-11-30 DIAGNOSIS — I10 ESSENTIAL HYPERTENSION: ICD-10-CM

## 2019-11-30 PROCEDURE — 99490 CHRNC CARE MGMT STAFF 1ST 20: CPT | Mod: S$PBB,,, | Performed by: FAMILY MEDICINE

## 2019-11-30 PROCEDURE — 99490 PR CHRONIC CARE MGMT, 1ST 20 MIN: ICD-10-PCS | Mod: S$PBB,,, | Performed by: FAMILY MEDICINE

## 2019-11-30 PROCEDURE — 99490 CHRNC CARE MGMT STAFF 1ST 20: CPT | Mod: PBBFAC,PO | Performed by: FAMILY MEDICINE

## 2019-12-02 RX ORDER — CLONIDINE HYDROCHLORIDE 0.1 MG/1
TABLET ORAL
Qty: 270 TABLET | Refills: 1 | Status: SHIPPED | OUTPATIENT
Start: 2019-12-02 | End: 2020-05-27 | Stop reason: SDUPTHER

## 2019-12-31 ENCOUNTER — EXTERNAL CHRONIC CARE MANAGEMENT (OUTPATIENT)
Dept: PRIMARY CARE CLINIC | Facility: CLINIC | Age: 79
End: 2019-12-31
Payer: MEDICARE

## 2019-12-31 PROCEDURE — 99490 PR CHRONIC CARE MGMT, 1ST 20 MIN: ICD-10-PCS | Mod: S$PBB,,, | Performed by: FAMILY MEDICINE

## 2019-12-31 PROCEDURE — 99490 CHRNC CARE MGMT STAFF 1ST 20: CPT | Mod: S$PBB,,, | Performed by: FAMILY MEDICINE

## 2019-12-31 PROCEDURE — 99490 CHRNC CARE MGMT STAFF 1ST 20: CPT | Mod: PBBFAC,PO | Performed by: FAMILY MEDICINE

## 2020-01-13 ENCOUNTER — LAB VISIT (OUTPATIENT)
Dept: LAB | Facility: HOSPITAL | Age: 80
End: 2020-01-13
Payer: MEDICARE

## 2020-01-13 ENCOUNTER — OFFICE VISIT (OUTPATIENT)
Dept: FAMILY MEDICINE | Facility: CLINIC | Age: 80
End: 2020-01-13
Payer: MEDICARE

## 2020-01-13 VITALS
BODY MASS INDEX: 31.45 KG/M2 | OXYGEN SATURATION: 97 % | RESPIRATION RATE: 20 BRPM | TEMPERATURE: 98 F | DIASTOLIC BLOOD PRESSURE: 86 MMHG | WEIGHT: 271.81 LBS | SYSTOLIC BLOOD PRESSURE: 160 MMHG | HEIGHT: 78 IN | HEART RATE: 61 BPM

## 2020-01-13 DIAGNOSIS — E11.9 DIABETES MELLITUS WITHOUT COMPLICATION: ICD-10-CM

## 2020-01-13 DIAGNOSIS — E11.22 TYPE 2 DIABETES MELLITUS WITH STAGE 3 CHRONIC KIDNEY DISEASE, WITHOUT LONG-TERM CURRENT USE OF INSULIN: ICD-10-CM

## 2020-01-13 DIAGNOSIS — I10 ESSENTIAL HYPERTENSION: Primary | ICD-10-CM

## 2020-01-13 DIAGNOSIS — N40.0 BENIGN PROSTATIC HYPERPLASIA WITHOUT LOWER URINARY TRACT SYMPTOMS: ICD-10-CM

## 2020-01-13 DIAGNOSIS — N18.30 TYPE 2 DIABETES MELLITUS WITH STAGE 3 CHRONIC KIDNEY DISEASE, WITHOUT LONG-TERM CURRENT USE OF INSULIN: ICD-10-CM

## 2020-01-13 LAB
CHOLEST SERPL-MCNC: 150 MG/DL (ref 120–199)
CHOLEST/HDLC SERPL: 3.8 {RATIO} (ref 2–5)
ESTIMATED AVG GLUCOSE: 197 MG/DL (ref 68–131)
HBA1C MFR BLD HPLC: 8.5 % (ref 4–5.6)
HDLC SERPL-MCNC: 40 MG/DL (ref 40–75)
HDLC SERPL: 26.7 % (ref 20–50)
LDLC SERPL CALC-MCNC: 93.6 MG/DL (ref 63–159)
NONHDLC SERPL-MCNC: 110 MG/DL
TRIGL SERPL-MCNC: 82 MG/DL (ref 30–150)

## 2020-01-13 PROCEDURE — 1126F PR PAIN SEVERITY QUANTIFIED, NO PAIN PRESENT: ICD-10-PCS | Mod: ,,, | Performed by: FAMILY MEDICINE

## 2020-01-13 PROCEDURE — 1159F PR MEDICATION LIST DOCUMENTED IN MEDICAL RECORD: ICD-10-PCS | Mod: ,,, | Performed by: FAMILY MEDICINE

## 2020-01-13 PROCEDURE — 1159F MED LIST DOCD IN RCRD: CPT | Mod: ,,, | Performed by: FAMILY MEDICINE

## 2020-01-13 PROCEDURE — 36415 COLL VENOUS BLD VENIPUNCTURE: CPT | Mod: PO

## 2020-01-13 PROCEDURE — 99213 OFFICE O/P EST LOW 20 MIN: CPT | Mod: PBBFAC,PO | Performed by: FAMILY MEDICINE

## 2020-01-13 PROCEDURE — 1126F AMNT PAIN NOTED NONE PRSNT: CPT | Mod: ,,, | Performed by: FAMILY MEDICINE

## 2020-01-13 PROCEDURE — 99999 PR PBB SHADOW E&M-EST. PATIENT-LVL III: ICD-10-PCS | Mod: PBBFAC,,, | Performed by: FAMILY MEDICINE

## 2020-01-13 PROCEDURE — 80061 LIPID PANEL: CPT

## 2020-01-13 PROCEDURE — 99214 OFFICE O/P EST MOD 30 MIN: CPT | Mod: S$PBB,,, | Performed by: FAMILY MEDICINE

## 2020-01-13 PROCEDURE — 99214 PR OFFICE/OUTPT VISIT, EST, LEVL IV, 30-39 MIN: ICD-10-PCS | Mod: S$PBB,,, | Performed by: FAMILY MEDICINE

## 2020-01-13 PROCEDURE — 83036 HEMOGLOBIN GLYCOSYLATED A1C: CPT

## 2020-01-13 PROCEDURE — 99999 PR PBB SHADOW E&M-EST. PATIENT-LVL III: CPT | Mod: PBBFAC,,, | Performed by: FAMILY MEDICINE

## 2020-01-13 RX ORDER — TAMSULOSIN HYDROCHLORIDE 0.4 MG/1
1 CAPSULE ORAL DAILY
Qty: 90 CAPSULE | Refills: 1 | Status: SHIPPED | OUTPATIENT
Start: 2020-01-13 | End: 2020-09-24

## 2020-01-13 RX ORDER — HYDROCHLOROTHIAZIDE 25 MG/1
25 TABLET ORAL DAILY
Qty: 90 TABLET | Refills: 1 | Status: SHIPPED | OUTPATIENT
Start: 2020-01-13 | End: 2020-07-06

## 2020-01-13 RX ORDER — GLIPIZIDE 10 MG/1
10 TABLET, FILM COATED, EXTENDED RELEASE ORAL EVERY MORNING
Qty: 90 TABLET | Refills: 1 | Status: SHIPPED | OUTPATIENT
Start: 2020-01-13 | End: 2020-02-16

## 2020-01-13 RX ORDER — AMLODIPINE, VALSARTAN AND HYDROCHLOROTHIAZIDE 10; 160; 25 MG/1; MG/1; MG/1
TABLET ORAL
COMMUNITY
Start: 2016-10-19 | End: 2020-01-13

## 2020-01-13 RX ORDER — AMLODIPINE AND VALSARTAN 10; 160 MG/1; MG/1
1 TABLET ORAL DAILY
Qty: 90 TABLET | Refills: 1 | Status: SHIPPED | OUTPATIENT
Start: 2020-01-13 | End: 2020-07-06

## 2020-01-13 NOTE — PROGRESS NOTES
Chief Complaint   Patient presents with    Hypertension     3 mo follow up     Diabetes     3 mo follow up     Back Pain       Luther Irwin Jr. is a 79 y.o. male who presents per the Chief Complaint.  Pt is known to me and was last seen by me on 8/16/2019.  All known chronic medical issues have been documented.       Hypertension   This is a chronic problem. The current episode started more than 1 year ago. The problem is unchanged. The problem is uncontrolled. Pertinent negatives include no anxiety, blurred vision, chest pain, headaches, neck pain, orthopnea, palpitations, peripheral edema, PND, shortness of breath or sweats. There are no associated agents to hypertension. Risk factors for coronary artery disease include male gender and diabetes mellitus. Past treatments include angiotensin blockers, diuretics and calcium channel blockers. The current treatment provides moderate improvement. There are no compliance problems.  There is no history of angina, kidney disease, CAD/MI, CVA, heart failure, left ventricular hypertrophy, PVD or retinopathy. There is no history of chronic renal disease, coarctation of the aorta, hyperaldosteronism, hypercortisolism, hyperparathyroidism, a hypertension causing med, pheochromocytoma, renovascular disease, sleep apnea or a thyroid problem.   Diabetes   He presents for his follow-up diabetic visit. He has type 2 diabetes mellitus. His disease course has been stable. Pertinent negatives for hypoglycemia include no confusion, dizziness, headaches, hunger, mood changes, nervousness/anxiousness, pallor, seizures, sleepiness, speech difficulty, sweats or tremors. Associated symptoms include foot paresthesias. Pertinent negatives for diabetes include no blurred vision, no chest pain, no fatigue, no foot ulcerations, no polydipsia, no polyphagia, no polyuria, no visual change, no weakness and no weight loss. There are no hypoglycemic complications. Symptoms are stable. Diabetic  complications include peripheral neuropathy. Pertinent negatives for diabetic complications include no autonomic neuropathy, CVA, heart disease, impotence, nephropathy, PVD or retinopathy. Risk factors for coronary artery disease include diabetes mellitus, hypertension and male sex. Current diabetic treatment includes oral agent (dual therapy). He is compliant with treatment most of the time. His weight is stable. He is following a generally healthy diet. When asked about meal planning, he reported none. He has not had a previous visit with a dietitian. He participates in exercise intermittently. There is no change in his home blood glucose trend. An ACE inhibitor/angiotensin II receptor blocker is being taken. He sees a podiatrist.Eye exam is current.   Back Pain   Pertinent negatives include no abdominal pain, chest pain, dysuria, fever, headaches, weakness or weight loss.        ROS  Review of Systems   Constitutional: Negative.  Negative for activity change, appetite change, chills, diaphoresis, fatigue, fever, unexpected weight change and weight loss.   HENT: Negative.  Negative for congestion, ear pain, hearing loss, nosebleeds, postnasal drip, rhinorrhea, sinus pressure, sneezing, sore throat and trouble swallowing.    Eyes: Negative for blurred vision, pain and visual disturbance.   Respiratory: Negative for cough, choking and shortness of breath.    Cardiovascular: Negative for chest pain, palpitations, orthopnea, leg swelling and PND.   Gastrointestinal: Negative for abdominal pain, constipation, diarrhea, nausea and vomiting.   Endocrine: Negative for polydipsia, polyphagia and polyuria.   Genitourinary: Positive for difficulty urinating, enuresis, frequency and urgency. Negative for decreased urine volume, dysuria, impotence, penile swelling, scrotal swelling and testicular pain.   Musculoskeletal: Positive for back pain. Negative for arthralgias, gait problem, joint swelling, myalgias and neck pain.  "  Skin: Negative.  Negative for pallor.   Allergic/Immunologic: Negative for environmental allergies and food allergies.   Neurological: Negative.  Negative for dizziness, tremors, seizures, syncope, speech difficulty, weakness, light-headedness and headaches.   Psychiatric/Behavioral: Negative.  Negative for confusion, decreased concentration, dysphoric mood and sleep disturbance. The patient is not nervous/anxious.        Physical Exam  Vitals:    01/13/20 1429   BP: (!) 160/86   Pulse:    Resp:    Temp:     Body mass index is 30.62 kg/m².  Weight: 123.3 kg (271 lb 13.2 oz)   Height: 6' 7" (200.7 cm)     Physical Exam   Constitutional: He is oriented to person, place, and time. He appears well-developed and well-nourished. He is active and cooperative.  Non-toxic appearance. He does not have a sickly appearance. He does not appear ill. No distress.   HENT:   Head: Normocephalic and atraumatic.   Right Ear: Hearing and external ear normal. No decreased hearing is noted.   Left Ear: Hearing and external ear normal. No decreased hearing is noted.   Nose: Nose normal. No rhinorrhea or nasal deformity.   Mouth/Throat: Uvula is midline and oropharynx is clear and moist. He does not have dentures. Normal dentition.   Eyes: Pupils are equal, round, and reactive to light. Conjunctivae, EOM and lids are normal. Right eye exhibits no chemosis, no discharge and no exudate. No foreign body present in the right eye. Left eye exhibits no chemosis, no discharge and no exudate. No foreign body present in the left eye. No scleral icterus.   Neck: Normal range of motion and full passive range of motion without pain. Neck supple.   Cardiovascular: Normal rate, regular rhythm, S1 normal, S2 normal and normal heart sounds. Exam reveals no gallop and no friction rub.   No murmur heard.  Pulmonary/Chest: Effort normal and breath sounds normal. No accessory muscle usage. No respiratory distress. He has no decreased breath sounds. He has " no wheezes. He has no rhonchi. He has no rales.   Abdominal: Soft. Normal appearance. He exhibits no distension. There is no hepatosplenomegaly. There is no tenderness. There is no rigidity, no rebound and no guarding.   Musculoskeletal: Normal range of motion.   Neurological: He is alert and oriented to person, place, and time. He has normal strength. No cranial nerve deficit or sensory deficit. He exhibits normal muscle tone. He displays no seizure activity. Coordination and gait normal.   Skin: Skin is warm, dry and intact. No rash noted. He is not diaphoretic.   Psychiatric: He has a normal mood and affect. His speech is normal and behavior is normal. Judgment and thought content normal. Cognition and memory are normal. He is attentive.       Assessment & Plan    Discussion of plan of care including treatment options regarding health and wellness were reviewed and discussed with patient.  Any changes to medication or treatment plan, as well as any screening blood test, imaging, or referrals to specialist, are documented.  Follow up as indicated.     1. Essential hypertension  Patient was counseled and encouraged to maintain a low sodium diet, as well as increasing physical activity.  Recommend random BP checks at home on a regular basis.  Repeat BP at end of visit was not necessary. Will continue medication at this time, and follow up in 3-6 months, or sooner if blood pressure begins to increase.     - amlodipine-valsartan (EXFORGE)  mg per tablet; Take 1 tablet by mouth once daily.  Dispense: 90 tablet; Refill: 1  - hydroCHLOROthiazide (HYDRODIURIL) 25 MG tablet; Take 1 tablet (25 mg total) by mouth once daily.  Dispense: 90 tablet; Refill: 1    2. Type 2 diabetes mellitus with stage 3 chronic kidney disease, without long-term current use of insulin  Patient is encouraged to follow a diet low in carbohydrates and simple sugars.  Discussed simple vs. complex carbohydrates as well as eating times of certain  meals. Advised to focus on good food choices and increased physical activity and encouraged to adhere to medication regimen and/or lifestyle adjustments, and to check glucose level as recommended.  Contact office if glucose levels are not improving over time.  Screening blood test is due in 3 months.     - glipiZIDE (GLUCOTROL) 10 MG TR24; Take 1 tablet (10 mg total) by mouth every morning.  Dispense: 90 tablet; Refill: 1    3. Benign prostatic hyperplasia without lower urinary tract symptoms  The current medical regimen will be continued at this time as discussed.    - tamsulosin (FLOMAX) 0.4 mg Cap; Take 1 capsule (0.4 mg total) by mouth once daily.  Dispense: 90 capsule; Refill: 1       No follow-ups on file.        ACTIVE MEDICAL ISSUES:  Documented in Problem List    PAST MEDICAL HISTORY  Documented    PAST SURGICAL HISTORY:  Documented    SOCIAL HISTORY:  Documented    FAMILY HISTORY:  Documented    ALLERGIES AND MEDICATIONS: updated and reviewed.  Documented    Health Maintenance       Date Due Completion Date    Lipid Panel 10/01/2019 10/1/2018    Hemoglobin A1c 11/16/2019 8/16/2019    Eye Exam 10/24/2020 10/24/2019    Override on 6/10/2015: Declined (had an appoint last week )    TETANUS VACCINE 01/02/2029 1/2/2019

## 2020-01-31 ENCOUNTER — EXTERNAL CHRONIC CARE MANAGEMENT (OUTPATIENT)
Dept: PRIMARY CARE CLINIC | Facility: CLINIC | Age: 80
End: 2020-01-31
Payer: MEDICARE

## 2020-01-31 PROCEDURE — 99490 CHRNC CARE MGMT STAFF 1ST 20: CPT | Mod: PBBFAC,PO | Performed by: FAMILY MEDICINE

## 2020-01-31 PROCEDURE — 99490 CHRNC CARE MGMT STAFF 1ST 20: CPT | Mod: S$PBB,,, | Performed by: FAMILY MEDICINE

## 2020-01-31 PROCEDURE — 99490 PR CHRONIC CARE MGMT, 1ST 20 MIN: ICD-10-PCS | Mod: S$PBB,,, | Performed by: FAMILY MEDICINE

## 2020-02-16 DIAGNOSIS — N18.30 TYPE 2 DIABETES MELLITUS WITH STAGE 3 CHRONIC KIDNEY DISEASE, WITHOUT LONG-TERM CURRENT USE OF INSULIN: ICD-10-CM

## 2020-02-16 DIAGNOSIS — E11.22 TYPE 2 DIABETES MELLITUS WITH STAGE 3 CHRONIC KIDNEY DISEASE, WITHOUT LONG-TERM CURRENT USE OF INSULIN: ICD-10-CM

## 2020-02-16 RX ORDER — GLIPIZIDE 10 MG/1
TABLET, FILM COATED, EXTENDED RELEASE ORAL
Qty: 90 TABLET | Refills: 0 | Status: SHIPPED | OUTPATIENT
Start: 2020-02-16 | End: 2020-08-17 | Stop reason: SDUPTHER

## 2020-02-29 ENCOUNTER — EXTERNAL CHRONIC CARE MANAGEMENT (OUTPATIENT)
Dept: PRIMARY CARE CLINIC | Facility: CLINIC | Age: 80
End: 2020-02-29
Payer: MEDICARE

## 2020-02-29 PROCEDURE — 99490 CHRNC CARE MGMT STAFF 1ST 20: CPT | Mod: S$PBB,,, | Performed by: FAMILY MEDICINE

## 2020-02-29 PROCEDURE — 99490 CHRNC CARE MGMT STAFF 1ST 20: CPT | Mod: PBBFAC,PO | Performed by: FAMILY MEDICINE

## 2020-02-29 PROCEDURE — 99490 PR CHRONIC CARE MGMT, 1ST 20 MIN: ICD-10-PCS | Mod: S$PBB,,, | Performed by: FAMILY MEDICINE

## 2020-03-31 ENCOUNTER — EXTERNAL CHRONIC CARE MANAGEMENT (OUTPATIENT)
Dept: PRIMARY CARE CLINIC | Facility: CLINIC | Age: 80
End: 2020-03-31
Payer: MEDICARE

## 2020-03-31 PROCEDURE — 99490 PR CHRONIC CARE MGMT, 1ST 20 MIN: ICD-10-PCS | Mod: S$PBB,,, | Performed by: FAMILY MEDICINE

## 2020-03-31 PROCEDURE — 99490 CHRNC CARE MGMT STAFF 1ST 20: CPT | Mod: S$PBB,,, | Performed by: FAMILY MEDICINE

## 2020-03-31 PROCEDURE — 99490 CHRNC CARE MGMT STAFF 1ST 20: CPT | Mod: PBBFAC,PO | Performed by: FAMILY MEDICINE

## 2020-04-16 ENCOUNTER — TELEPHONE (OUTPATIENT)
Dept: FAMILY MEDICINE | Facility: CLINIC | Age: 80
End: 2020-04-16

## 2020-04-16 DIAGNOSIS — K04.7 DENTAL ABSCESS: Primary | ICD-10-CM

## 2020-04-16 RX ORDER — CLINDAMYCIN HYDROCHLORIDE 300 MG/1
300 CAPSULE ORAL 3 TIMES DAILY
Qty: 30 CAPSULE | Refills: 0 | Status: SHIPPED | OUTPATIENT
Start: 2020-04-16 | End: 2020-04-26

## 2020-04-16 NOTE — TELEPHONE ENCOUNTER
Notified of Rx sent. Note of sx worsen with fever and tempt to notified dentist. Pt dentist will call back if earlier slot are open  Re;  Camri Johnson P Lombard Azikiwe K Staff             Patient stated that he has a bad tooth and has the earliest available appointment scheduled with the dentist but it is isn't until next month. Patient believes that he is getting an abscess and wanted to know if you could prescribe ABT's to hold him over until able to see the dentist. Please advise. Thank you! Care Neisha GRUBBS, LPN.

## 2020-04-18 DIAGNOSIS — N52.1 ERECTILE DYSFUNCTION DUE TO DISEASES CLASSIFIED ELSEWHERE: ICD-10-CM

## 2020-04-20 RX ORDER — SILDENAFIL 100 MG/1
TABLET, FILM COATED ORAL
Qty: 6 TABLET | Refills: 5 | Status: SHIPPED | OUTPATIENT
Start: 2020-04-20 | End: 2020-12-15 | Stop reason: SDUPTHER

## 2020-04-30 ENCOUNTER — EXTERNAL CHRONIC CARE MANAGEMENT (OUTPATIENT)
Dept: PRIMARY CARE CLINIC | Facility: CLINIC | Age: 80
End: 2020-04-30
Payer: MEDICARE

## 2020-04-30 PROCEDURE — 99490 CHRNC CARE MGMT STAFF 1ST 20: CPT | Mod: S$PBB,,, | Performed by: FAMILY MEDICINE

## 2020-04-30 PROCEDURE — 99490 PR CHRONIC CARE MGMT, 1ST 20 MIN: ICD-10-PCS | Mod: S$PBB,,, | Performed by: FAMILY MEDICINE

## 2020-04-30 PROCEDURE — 99490 CHRNC CARE MGMT STAFF 1ST 20: CPT | Mod: PBBFAC,PO | Performed by: FAMILY MEDICINE

## 2020-05-27 DIAGNOSIS — I10 ESSENTIAL HYPERTENSION: ICD-10-CM

## 2020-05-27 RX ORDER — CLONIDINE HYDROCHLORIDE 0.1 MG/1
TABLET ORAL
Qty: 270 TABLET | Refills: 1 | Status: SHIPPED | OUTPATIENT
Start: 2020-05-27 | End: 2020-11-16

## 2020-05-27 NOTE — TELEPHONE ENCOUNTER
Last Office Visit Info:   The patient's last visit with Azikiwe K Lombard, MD was on 1/13/2020.    The patient's last visit in current department was on 1/13/2020.        Last CBC Results:   Lab Results   Component Value Date    WBC 8.28 10/01/2018    HGB 12.7 (L) 10/01/2018    HCT 38.6 (L) 10/01/2018     10/01/2018       Last CMP Results  Lab Results   Component Value Date     03/07/2019    K 3.9 03/07/2019     03/07/2019    CO2 28 03/07/2019    BUN 22 03/07/2019    CREATININE 1.7 (H) 03/07/2019    CALCIUM 9.6 03/07/2019    ALBUMIN 3.9 10/01/2018    AST 21 10/01/2018    ALT 18 10/01/2018       Last Lipids  Lab Results   Component Value Date    CHOL 150 01/13/2020    TRIG 82 01/13/2020    HDL 40 01/13/2020    LDLCALC 93.6 01/13/2020       Last A1C  Lab Results   Component Value Date    HGBA1C 8.5 (H) 01/13/2020       Last TSH  No results found for: TSH      Current Med Refills  Medication List with Changes/Refills   Current Medications    AMLODIPINE-VALSARTAN (EXFORGE)  MG PER TABLET    Take 1 tablet by mouth once daily.       Start Date: 1/13/2020 End Date: --    ASPIRIN 81 MG CHEW    Take 81 mg by mouth once daily.       Start Date: --        End Date: --    BLOOD SUGAR DIAGNOSTIC STRP    Use 1 Free Style Lite test strip to test blood sugar 2 times daily.       Start Date: 4/22/2016 End Date: --    BLOOD SUGAR DIAGNOSTIC STRP    Use 1 Free Style Lite test strip to test blood sugar 2 times daily.       Start Date: 4/22/2016 End Date: --    BLOOD-GLUCOSE METER KIT    One Touch Ultra II Kit, use as directed to check blood sugar 2 times daily       Start Date: 12/31/2015End Date: 1/13/2020    CLONIDINE (CATAPRES) 0.1 MG TABLET    TAKE 1 TABLET BY MOUTH EVERY MORNING AND TAKE 2 TABLETS BY MOUTH EVERY EVENING       Start Date: 12/2/2019 End Date: --    CYANOCOBALAMIN (VITAMIN B-12) 500 MCG TABLET    Take 500 mcg by mouth once daily.       Start Date: --        End Date: --    DOCUSATE SODIUM  (COL-RITE) 250 MG CAPSULE    Take 250 mg by mouth once daily.        Start Date: --        End Date: --    GLIPIZIDE (GLUCOTROL) 10 MG TR24    TAKE 1 TABLET BY MOUTH EVERY DAY IN THE MORNING       Start Date: 2/16/2020 End Date: --    HYDROCHLOROTHIAZIDE (HYDRODIURIL) 25 MG TABLET    Take 1 tablet (25 mg total) by mouth once daily.       Start Date: 1/13/2020 End Date: --    SILDENAFIL (VIAGRA) 100 MG TABLET    TAKE 1 TABLET BY MOUTH EVERY DAY AS NEEDED FOR ERECTILE DYSFUNCTION       Start Date: 4/20/2020 End Date: --    TADALAFIL (CIALIS) 20 MG TAB    Take 1 tablet (20 mg total) by mouth once daily.       Start Date: 10/24/2018End Date: --    TAMSULOSIN (FLOMAX) 0.4 MG CAP    Take 1 capsule (0.4 mg total) by mouth once daily.       Start Date: 1/13/2020 End Date: --    VITAMIN B COMPLEX (B COMPLEX-VITAMIN B12 ORAL)    Take by mouth once daily.       Start Date: --        End Date: --    VITAMIN D 1000 UNITS TAB    Take 2,000 mg by mouth once daily.        Start Date: --        End Date: --       Order(s) placed per written order guidelines:     Please advise.

## 2020-05-31 ENCOUNTER — EXTERNAL CHRONIC CARE MANAGEMENT (OUTPATIENT)
Dept: PRIMARY CARE CLINIC | Facility: CLINIC | Age: 80
End: 2020-05-31
Payer: MEDICARE

## 2020-05-31 PROCEDURE — 99490 PR CHRONIC CARE MGMT, 1ST 20 MIN: ICD-10-PCS | Mod: S$PBB,,, | Performed by: FAMILY MEDICINE

## 2020-05-31 PROCEDURE — 99490 CHRNC CARE MGMT STAFF 1ST 20: CPT | Mod: PBBFAC,PO | Performed by: FAMILY MEDICINE

## 2020-05-31 PROCEDURE — 99490 CHRNC CARE MGMT STAFF 1ST 20: CPT | Mod: S$PBB,,, | Performed by: FAMILY MEDICINE

## 2020-06-23 LAB
LEFT EYE DM RETINOPATHY: POSITIVE
RIGHT EYE DM RETINOPATHY: POSITIVE

## 2020-06-30 ENCOUNTER — EXTERNAL CHRONIC CARE MANAGEMENT (OUTPATIENT)
Dept: PRIMARY CARE CLINIC | Facility: CLINIC | Age: 80
End: 2020-06-30
Payer: MEDICARE

## 2020-06-30 PROCEDURE — 99490 CHRNC CARE MGMT STAFF 1ST 20: CPT | Mod: PBBFAC,PO | Performed by: FAMILY MEDICINE

## 2020-06-30 PROCEDURE — 99490 PR CHRONIC CARE MGMT, 1ST 20 MIN: ICD-10-PCS | Mod: S$PBB,,, | Performed by: FAMILY MEDICINE

## 2020-06-30 PROCEDURE — 99490 CHRNC CARE MGMT STAFF 1ST 20: CPT | Mod: S$PBB,,, | Performed by: FAMILY MEDICINE

## 2020-07-16 ENCOUNTER — PATIENT OUTREACH (OUTPATIENT)
Dept: ADMINISTRATIVE | Facility: HOSPITAL | Age: 80
End: 2020-07-16

## 2020-07-31 ENCOUNTER — EXTERNAL CHRONIC CARE MANAGEMENT (OUTPATIENT)
Dept: PRIMARY CARE CLINIC | Facility: CLINIC | Age: 80
End: 2020-07-31
Payer: MEDICARE

## 2020-07-31 PROCEDURE — 99490 CHRNC CARE MGMT STAFF 1ST 20: CPT | Mod: PBBFAC,PO | Performed by: FAMILY MEDICINE

## 2020-07-31 PROCEDURE — 99490 CHRNC CARE MGMT STAFF 1ST 20: CPT | Mod: S$PBB,,, | Performed by: FAMILY MEDICINE

## 2020-07-31 PROCEDURE — 99490 PR CHRONIC CARE MGMT, 1ST 20 MIN: ICD-10-PCS | Mod: S$PBB,,, | Performed by: FAMILY MEDICINE

## 2020-08-04 ENCOUNTER — TELEPHONE (OUTPATIENT)
Dept: FAMILY MEDICINE | Facility: CLINIC | Age: 80
End: 2020-08-04

## 2020-08-04 NOTE — TELEPHONE ENCOUNTER
Spoke with patient staid that he will have Cataract surgery 08/28/20 need pre-op deana , appoint schedule at these time for 08/17/20 @ 2:00 pm , patient verbalized understand

## 2020-08-04 NOTE — TELEPHONE ENCOUNTER
----- Message from Madisyn Francisco sent at 8/4/2020  2:43 PM CDT -----  Type:  Patient Returning Call    Who Called: Self     Who Left Message for Patient: Pastora     Does the patient know what this is regarding?: yes     Would the patient rather a call back or a response via [facility]? Callback     Best Call Back Number: [phone]

## 2020-08-17 ENCOUNTER — LAB VISIT (OUTPATIENT)
Dept: LAB | Facility: HOSPITAL | Age: 80
End: 2020-08-17
Payer: MEDICARE

## 2020-08-17 ENCOUNTER — OFFICE VISIT (OUTPATIENT)
Dept: FAMILY MEDICINE | Facility: CLINIC | Age: 80
End: 2020-08-17
Payer: MEDICARE

## 2020-08-17 VITALS
DIASTOLIC BLOOD PRESSURE: 78 MMHG | WEIGHT: 268.94 LBS | TEMPERATURE: 99 F | SYSTOLIC BLOOD PRESSURE: 138 MMHG | OXYGEN SATURATION: 97 % | HEART RATE: 60 BPM | HEIGHT: 78 IN | RESPIRATION RATE: 17 BRPM | BODY MASS INDEX: 31.12 KG/M2

## 2020-08-17 DIAGNOSIS — E11.22 TYPE 2 DIABETES MELLITUS WITH STAGE 3 CHRONIC KIDNEY DISEASE, WITHOUT LONG-TERM CURRENT USE OF INSULIN: ICD-10-CM

## 2020-08-17 DIAGNOSIS — N18.30 TYPE 2 DIABETES MELLITUS WITH STAGE 3 CHRONIC KIDNEY DISEASE, WITHOUT LONG-TERM CURRENT USE OF INSULIN: ICD-10-CM

## 2020-08-17 DIAGNOSIS — E55.9 VITAMIN D INSUFFICIENCY: ICD-10-CM

## 2020-08-17 DIAGNOSIS — E11.22 TYPE 2 DIABETES MELLITUS WITH STAGE 3 CHRONIC KIDNEY DISEASE, WITHOUT LONG-TERM CURRENT USE OF INSULIN: Primary | ICD-10-CM

## 2020-08-17 DIAGNOSIS — I10 ESSENTIAL HYPERTENSION: ICD-10-CM

## 2020-08-17 DIAGNOSIS — N18.30 TYPE 2 DIABETES MELLITUS WITH STAGE 3 CHRONIC KIDNEY DISEASE, WITHOUT LONG-TERM CURRENT USE OF INSULIN: Primary | ICD-10-CM

## 2020-08-17 PROCEDURE — 36415 COLL VENOUS BLD VENIPUNCTURE: CPT | Mod: PO

## 2020-08-17 PROCEDURE — 80053 COMPREHEN METABOLIC PANEL: CPT

## 2020-08-17 PROCEDURE — 99214 OFFICE O/P EST MOD 30 MIN: CPT | Mod: S$PBB,,, | Performed by: FAMILY MEDICINE

## 2020-08-17 PROCEDURE — 80061 LIPID PANEL: CPT

## 2020-08-17 PROCEDURE — 99214 PR OFFICE/OUTPT VISIT, EST, LEVL IV, 30-39 MIN: ICD-10-PCS | Mod: S$PBB,,, | Performed by: FAMILY MEDICINE

## 2020-08-17 PROCEDURE — 82306 VITAMIN D 25 HYDROXY: CPT

## 2020-08-17 PROCEDURE — 99999 PR PBB SHADOW E&M-EST. PATIENT-LVL III: ICD-10-PCS | Mod: PBBFAC,,, | Performed by: FAMILY MEDICINE

## 2020-08-17 PROCEDURE — 99999 PR PBB SHADOW E&M-EST. PATIENT-LVL III: CPT | Mod: PBBFAC,,, | Performed by: FAMILY MEDICINE

## 2020-08-17 PROCEDURE — 83036 HEMOGLOBIN GLYCOSYLATED A1C: CPT

## 2020-08-17 PROCEDURE — 99213 OFFICE O/P EST LOW 20 MIN: CPT | Mod: PBBFAC,PO | Performed by: FAMILY MEDICINE

## 2020-08-17 RX ORDER — BROMFENAC SODIUM 0.7 MG/ML
SOLUTION/ DROPS OPHTHALMIC
COMMUNITY
Start: 2020-08-06 | End: 2020-11-02

## 2020-08-17 RX ORDER — OFLOXACIN 3 MG/ML
SOLUTION/ DROPS OPHTHALMIC
COMMUNITY
Start: 2020-08-06 | End: 2020-11-02

## 2020-08-17 RX ORDER — GLIPIZIDE 10 MG/1
10 TABLET, FILM COATED, EXTENDED RELEASE ORAL DAILY
Qty: 90 TABLET | Refills: 1 | Status: SHIPPED | OUTPATIENT
Start: 2020-08-17 | End: 2020-12-15 | Stop reason: SDUPTHER

## 2020-08-17 NOTE — PROGRESS NOTES
Chief Complaint   Patient presents with    Pre-op Exam     Lt Eye cataract [facility]ier 08/28/20        Luther Irwin Jr. is a 80 y.o. male who presents per the Chief Complaint.  Pt is known to me and was last seen by me on 1/13/2020.  All known chronic medical issues have been documented.    Hypertension  This is a chronic problem. The current episode started more than 1 year ago. The problem is unchanged. The problem is uncontrolled. Pertinent negatives include no anxiety, blurred vision, chest pain, headaches, neck pain, orthopnea, palpitations, peripheral edema, PND, shortness of breath or sweats. There are no associated agents to hypertension. Risk factors for coronary artery disease include male gender and diabetes mellitus. Past treatments include angiotensin blockers, diuretics and calcium channel blockers. The current treatment provides moderate improvement. There are no compliance problems.  There is no history of angina, kidney disease, CAD/MI, CVA, heart failure, left ventricular hypertrophy, PVD or retinopathy. There is no history of chronic renal disease, coarctation of the aorta, hyperaldosteronism, hypercortisolism, hyperparathyroidism, a hypertension causing med, pheochromocytoma, renovascular disease, sleep apnea or a thyroid problem.   Diabetes  He presents for his follow-up diabetic visit. He has type 2 diabetes mellitus. His disease course has been stable. Pertinent negatives for hypoglycemia include no confusion, dizziness, headaches, hunger, mood changes, nervousness/anxiousness, pallor, seizures, sleepiness, speech difficulty, sweats or tremors. Associated symptoms include foot paresthesias. Pertinent negatives for diabetes include no blurred vision, no chest pain, no fatigue, no foot ulcerations, no polydipsia, no polyphagia, no polyuria, no visual change, no weakness and no weight loss. There are no hypoglycemic complications. Symptoms are stable. Diabetic complications include peripheral  neuropathy. Pertinent negatives for diabetic complications include no autonomic neuropathy, CVA, heart disease, impotence, nephropathy, PVD or retinopathy. Risk factors for coronary artery disease include diabetes mellitus, hypertension and male sex. Current diabetic treatment includes oral agent (dual therapy). He is compliant with treatment most of the time. His weight is stable. He is following a generally healthy diet. When asked about meal planning, he reported none. He has not had a previous visit with a dietitian. He participates in exercise intermittently. There is no change in his home blood glucose trend. An ACE inhibitor/angiotensin II receptor blocker is being taken. He sees a podiatrist.Eye exam is current.   Back Pain  Pertinent negatives include no abdominal pain, chest pain, dysuria, fever, headaches, weakness or weight loss.       ROS  Review of Systems   Constitutional: Negative.  Negative for activity change, appetite change, chills, diaphoresis, fatigue, fever, unexpected weight change and weight loss.   HENT: Negative.  Negative for congestion, ear pain, hearing loss, nosebleeds, postnasal drip, rhinorrhea, sinus pressure, sneezing, sore throat and trouble swallowing.    Eyes: Negative for blurred vision, pain and visual disturbance.   Respiratory: Negative for cough, choking and shortness of breath.    Cardiovascular: Negative for chest pain, palpitations, orthopnea, leg swelling and PND.   Gastrointestinal: Negative for abdominal pain, constipation, diarrhea, nausea and vomiting.   Endocrine: Negative for polydipsia, polyphagia and polyuria.   Genitourinary: Negative for difficulty urinating, dysuria, frequency, impotence and urgency.   Musculoskeletal: Positive for back pain. Negative for arthralgias, gait problem, joint swelling, myalgias and neck pain.   Skin: Negative.  Negative for pallor.   Allergic/Immunologic: Negative for environmental allergies and food allergies.   Neurological:  "Negative.  Negative for dizziness, tremors, seizures, syncope, speech difficulty, weakness, light-headedness and headaches.   Psychiatric/Behavioral: Negative.  Negative for confusion, decreased concentration, dysphoric mood and sleep disturbance. The patient is not nervous/anxious.        Physical Exam  Vitals:    08/17/20 1351   BP: 138/78   Pulse: 60   Resp: 17   Temp: 98.5 °F (36.9 °C)    Body mass index is 30.3 kg/m².  Weight: 122 kg (268 lb 15.4 oz)   Height: 6' 7" (200.7 cm)     Physical Exam  Constitutional:       General: He is not in acute distress.     Appearance: Normal appearance. He is well-developed. He is not ill-appearing, toxic-appearing or diaphoretic.   HENT:      Head: Normocephalic and atraumatic.      Right Ear: Hearing and external ear normal. No decreased hearing noted.      Left Ear: Hearing and external ear normal. No decreased hearing noted.      Nose: Nose normal. No nasal deformity or rhinorrhea.      Mouth/Throat:      Dentition: Normal dentition. Does not have dentures.      Pharynx: Uvula midline.   Eyes:      General: Lids are normal. No scleral icterus.        Right eye: No foreign body or discharge.         Left eye: No foreign body or discharge.      Conjunctiva/sclera: Conjunctivae normal.      Right eye: No chemosis or exudate.     Left eye: No chemosis or exudate.     Pupils: Pupils are equal, round, and reactive to light.   Neck:      Musculoskeletal: Full passive range of motion without pain, normal range of motion and neck supple.   Cardiovascular:      Rate and Rhythm: Normal rate and regular rhythm.      Heart sounds: Normal heart sounds, S1 normal and S2 normal. No murmur. No friction rub. No gallop.    Pulmonary:      Effort: Pulmonary effort is normal. No accessory muscle usage or respiratory distress.      Breath sounds: Normal breath sounds. No decreased breath sounds, wheezing, rhonchi or rales.   Abdominal:      General: There is no distension.      Palpations: " Abdomen is soft. Abdomen is not rigid.      Tenderness: There is no abdominal tenderness. There is no guarding or rebound.   Musculoskeletal: Normal range of motion.   Skin:     General: Skin is warm and dry.      Findings: No rash.   Neurological:      Mental Status: He is alert and oriented to person, place, and time.      Cranial Nerves: No cranial nerve deficit.      Sensory: No sensory deficit.      Motor: No abnormal muscle tone or seizure activity.      Coordination: Coordination normal.      Gait: Gait normal.   Psychiatric:         Attention and Perception: He is attentive.         Speech: Speech normal.         Behavior: Behavior normal. Behavior is cooperative.         Thought Content: Thought content normal.         Judgment: Judgment normal.         Assessment & Plan    Discussion of plan of care including treatment options regarding health and wellness were reviewed and discussed with patient.  Any changes to medication or treatment plan, as well as any screening blood test, imaging, or referrals to specialist, are documented.  Follow up as indicated.     1. Type 2 diabetes mellitus with stage 3 chronic kidney disease, without long-term current use of insulin  Patient is encouraged to follow a diet low in carbohydrates and simple sugars.  Discussed simple vs. complex carbohydrates as well as eating times of certain meals. Advised to focus on good food choices and increased physical activity and encouraged to adhere to medication regimen and/or lifestyle adjustments, and to check glucose level as recommended.  Contact office if glucose levels are not improving over time.  Will monitor HbA1c appropriately.  Advised that planned cataract surgery may be postponed if HbA1c is above 8.0.  - Hemoglobin A1c; Future  - glipiZIDE (GLUCOTROL) 10 MG TR24; Take 1 tablet (10 mg total) by mouth once daily.  Dispense: 90 tablet; Refill: 1  - Lipid Panel; Future    2. Essential hypertension  Patient was counseled and  encouraged to maintain a low sodium diet, as well as increasing physical activity.  Recommend random BP checks at home on a regular basis.  Repeat BP at end of visit was not necessary. Will continue medication at this time, and follow up in 3-6 months, or sooner if blood pressure begins to increase.     - Comprehensive metabolic panel; Future    3. Vitamin D insufficiency  Screening test will be ordered and once results available patient will be notified of results and managed accordingly.   - Vitamin D; Future       Follow up in about 3 months (around 11/17/2020), or if symptoms worsen or fail to improve.      ACTIVE MEDICAL ISSUES:  Documented in Problem List    PAST MEDICAL HISTORY  Documented  .  PAST SURGICAL HISTORY:  Documented    SOCIAL HISTORY:  Documented    FAMILY HISTORY:  Documented    ALLERGIES AND MEDICATIONS: updated and reviewed.  Documented    Health Maintenance       Date Due Completion Date    Hemoglobin A1c 04/13/2020 1/13/2020    Influenza Vaccine (1) 09/01/2020 10/14/2019    Lipid Panel 01/13/2021 1/13/2020    Eye Exam 06/23/2021 6/23/2020    Override on 6/10/2015: Declined (had an appoint last week )    TETANUS VACCINE 01/02/2029 1/2/2019

## 2020-08-18 LAB
25(OH)D3+25(OH)D2 SERPL-MCNC: 41 NG/ML (ref 30–96)
ALBUMIN SERPL BCP-MCNC: 4 G/DL (ref 3.5–5.2)
ALP SERPL-CCNC: 146 U/L (ref 55–135)
ALT SERPL W/O P-5'-P-CCNC: 28 U/L (ref 10–44)
ANION GAP SERPL CALC-SCNC: 14 MMOL/L (ref 8–16)
AST SERPL-CCNC: 23 U/L (ref 10–40)
BILIRUB SERPL-MCNC: 0.3 MG/DL (ref 0.1–1)
BUN SERPL-MCNC: 23 MG/DL (ref 8–23)
CALCIUM SERPL-MCNC: 9.2 MG/DL (ref 8.7–10.5)
CHLORIDE SERPL-SCNC: 100 MMOL/L (ref 95–110)
CHOLEST SERPL-MCNC: 189 MG/DL (ref 120–199)
CHOLEST/HDLC SERPL: 5.6 {RATIO} (ref 2–5)
CO2 SERPL-SCNC: 25 MMOL/L (ref 23–29)
CREAT SERPL-MCNC: 1.9 MG/DL (ref 0.5–1.4)
EST. GFR  (AFRICAN AMERICAN): 37.7 ML/MIN/1.73 M^2
EST. GFR  (NON AFRICAN AMERICAN): 32.6 ML/MIN/1.73 M^2
ESTIMATED AVG GLUCOSE: 220 MG/DL (ref 68–131)
GLUCOSE SERPL-MCNC: 198 MG/DL (ref 70–110)
HBA1C MFR BLD HPLC: 9.3 % (ref 4–5.6)
HDLC SERPL-MCNC: 34 MG/DL (ref 40–75)
HDLC SERPL: 18 % (ref 20–50)
LDLC SERPL CALC-MCNC: 99.8 MG/DL (ref 63–159)
NONHDLC SERPL-MCNC: 155 MG/DL
POTASSIUM SERPL-SCNC: 3.9 MMOL/L (ref 3.5–5.1)
PROT SERPL-MCNC: 7.7 G/DL (ref 6–8.4)
SODIUM SERPL-SCNC: 139 MMOL/L (ref 136–145)
TRIGL SERPL-MCNC: 276 MG/DL (ref 30–150)

## 2020-08-31 ENCOUNTER — EXTERNAL CHRONIC CARE MANAGEMENT (OUTPATIENT)
Dept: PRIMARY CARE CLINIC | Facility: CLINIC | Age: 80
End: 2020-08-31
Payer: MEDICARE

## 2020-08-31 PROCEDURE — 99490 CHRNC CARE MGMT STAFF 1ST 20: CPT | Mod: PBBFAC,PO | Performed by: FAMILY MEDICINE

## 2020-08-31 PROCEDURE — 99490 CHRNC CARE MGMT STAFF 1ST 20: CPT | Mod: S$PBB,,, | Performed by: FAMILY MEDICINE

## 2020-08-31 PROCEDURE — 99490 PR CHRONIC CARE MGMT, 1ST 20 MIN: ICD-10-PCS | Mod: S$PBB,,, | Performed by: FAMILY MEDICINE

## 2020-09-10 ENCOUNTER — TELEPHONE (OUTPATIENT)
Dept: FAMILY MEDICINE | Facility: CLINIC | Age: 80
End: 2020-09-10

## 2020-09-10 NOTE — TELEPHONE ENCOUNTER
----- Message from Azikiwe K. Lombard, MD sent at 9/8/2020  9:35 PM CDT -----  Advise patient of elevated HbA1c and decreased kidney function.  Also advise that triglycerides are elevated.  Continue current medication, but schedule follow up in 1-2 months to discuss treatment for cholesterol and higher blood sugars.

## 2020-09-22 LAB
LEFT EYE DM RETINOPATHY: NEGATIVE
RIGHT EYE DM RETINOPATHY: NEGATIVE

## 2020-09-30 ENCOUNTER — EXTERNAL CHRONIC CARE MANAGEMENT (OUTPATIENT)
Dept: PRIMARY CARE CLINIC | Facility: CLINIC | Age: 80
End: 2020-09-30
Payer: MEDICARE

## 2020-09-30 PROCEDURE — 99489 PR COMPLX CHRON CARE MGMT, EA ADDTL 30 MIN, PER MONTH: ICD-10-PCS | Mod: S$PBB,,, | Performed by: FAMILY MEDICINE

## 2020-09-30 PROCEDURE — 99487 CPLX CHRNC CARE 1ST 60 MIN: CPT | Mod: PBBFAC,PO | Performed by: FAMILY MEDICINE

## 2020-09-30 PROCEDURE — 99489 CPLX CHRNC CARE EA ADDL 30: CPT | Mod: PBBFAC,PO | Performed by: FAMILY MEDICINE

## 2020-09-30 PROCEDURE — 99487 PR COMPLX CHRON CARE MGMT, 1ST HR, PER MONTH: ICD-10-PCS | Mod: S$PBB,,, | Performed by: FAMILY MEDICINE

## 2020-09-30 PROCEDURE — 99487 CPLX CHRNC CARE 1ST 60 MIN: CPT | Mod: S$PBB,,, | Performed by: FAMILY MEDICINE

## 2020-09-30 PROCEDURE — 99489 CPLX CHRNC CARE EA ADDL 30: CPT | Mod: S$PBB,,, | Performed by: FAMILY MEDICINE

## 2020-10-15 ENCOUNTER — PATIENT OUTREACH (OUTPATIENT)
Dept: ADMINISTRATIVE | Facility: HOSPITAL | Age: 80
End: 2020-10-15

## 2020-10-31 ENCOUNTER — EXTERNAL CHRONIC CARE MANAGEMENT (OUTPATIENT)
Dept: PRIMARY CARE CLINIC | Facility: CLINIC | Age: 80
End: 2020-10-31
Payer: MEDICARE

## 2020-10-31 PROCEDURE — 99490 CHRNC CARE MGMT STAFF 1ST 20: CPT | Mod: PBBFAC,PO | Performed by: FAMILY MEDICINE

## 2020-10-31 PROCEDURE — 99490 CHRNC CARE MGMT STAFF 1ST 20: CPT | Mod: S$PBB,,, | Performed by: FAMILY MEDICINE

## 2020-10-31 PROCEDURE — 99490 PR CHRONIC CARE MGMT, 1ST 20 MIN: ICD-10-PCS | Mod: S$PBB,,, | Performed by: FAMILY MEDICINE

## 2020-11-02 ENCOUNTER — OFFICE VISIT (OUTPATIENT)
Dept: FAMILY MEDICINE | Facility: CLINIC | Age: 80
End: 2020-11-02
Payer: MEDICARE

## 2020-11-02 VITALS
TEMPERATURE: 98 F | HEART RATE: 65 BPM | OXYGEN SATURATION: 95 % | WEIGHT: 266.13 LBS | RESPIRATION RATE: 16 BRPM | SYSTOLIC BLOOD PRESSURE: 150 MMHG | DIASTOLIC BLOOD PRESSURE: 80 MMHG | BODY MASS INDEX: 30.79 KG/M2 | HEIGHT: 78 IN

## 2020-11-02 DIAGNOSIS — I10 ESSENTIAL HYPERTENSION: ICD-10-CM

## 2020-11-02 DIAGNOSIS — Z23 FLU VACCINE NEED: ICD-10-CM

## 2020-11-02 DIAGNOSIS — N18.32 STAGE 3B CHRONIC KIDNEY DISEASE: ICD-10-CM

## 2020-11-02 DIAGNOSIS — E11.9 TYPE 2 DIABETES MELLITUS WITHOUT COMPLICATION, WITH LONG-TERM CURRENT USE OF INSULIN: ICD-10-CM

## 2020-11-02 DIAGNOSIS — Z79.4 TYPE 2 DIABETES MELLITUS WITHOUT COMPLICATION, WITH LONG-TERM CURRENT USE OF INSULIN: ICD-10-CM

## 2020-11-02 DIAGNOSIS — E78.1 HYPERTRIGLYCERIDEMIA: ICD-10-CM

## 2020-11-02 PROCEDURE — 99214 OFFICE O/P EST MOD 30 MIN: CPT | Mod: PBBFAC,PO | Performed by: INTERNAL MEDICINE

## 2020-11-02 PROCEDURE — 99999 PR PBB SHADOW E&M-EST. PATIENT-LVL IV: ICD-10-PCS | Mod: PBBFAC,,, | Performed by: INTERNAL MEDICINE

## 2020-11-02 PROCEDURE — 99214 OFFICE O/P EST MOD 30 MIN: CPT | Mod: S$PBB,,, | Performed by: INTERNAL MEDICINE

## 2020-11-02 PROCEDURE — 99999 PR PBB SHADOW E&M-EST. PATIENT-LVL IV: CPT | Mod: PBBFAC,,, | Performed by: INTERNAL MEDICINE

## 2020-11-02 PROCEDURE — G0008 ADMIN INFLUENZA VIRUS VAC: HCPCS | Mod: PBBFAC,PO

## 2020-11-02 PROCEDURE — 99214 PR OFFICE/OUTPT VISIT, EST, LEVL IV, 30-39 MIN: ICD-10-PCS | Mod: S$PBB,,, | Performed by: INTERNAL MEDICINE

## 2020-11-02 PROCEDURE — 90694 VACC AIIV4 NO PRSRV 0.5ML IM: CPT | Mod: PBBFAC,PO

## 2020-11-02 NOTE — PROGRESS NOTES
Subjective:       Patient ID: Luther Irwin Jr. is a pleasant 80 y.o. Black or  male patient    Chief Complaint: Results (recent labs )      Patient is a new pt to me but established patient of Dr. Lombard. He saw him last on the 31st of August.     HPI     He comes today to discuss results of the blood work he did for Dr. Lombard in August.  He checks his blood sugar not more than once a day, always in the morning.  They were usually as per his report around 110 in the a.m. and now are more in the 180-190s.  He states metformin was stopped months ago for reason X.   His diet is rather poor, he eats sausage and eggs in the morning, a salad or a sandwich for lunch, and for example red beans with ham or wang inside in the evening.  He may snack with salted peanuts (one handful) or other type of nuts.  However he does not snack with chips.  He does not drink sodas.  He exercised before COVID, going to the gym, but now is not as active as previously. He cannot walk too much as knee problems.  He started to do some pushups last week.      Patient Active Problem List   Diagnosis    Essential hypertension    Benign prostatic hyperplasia without lower urinary tract symptoms    Type 2 diabetes mellitus with stage 3 chronic kidney disease, without long-term current use of insulin    Diabetes with proteinuria    Hyperchylomicronemia          ACTIVE MEDICAL ISSUES:  Documented in Problem List     PAST MEDICAL HISTORY  Documented     PAST SURGICAL HISTORY:  Documented     SOCIAL HISTORY:  Documented     FAMILY HISTORY:  Documented     ALLERGIES AND MEDICATIONS: updated and reviewed.  Documented    Review of Systems   Constitutional: Negative.    HENT: Negative.    Respiratory: Negative.    Cardiovascular: Negative.    Gastrointestinal: Negative.    All other systems reviewed and are negative.      Objective:      Physical Exam  Vitals signs and nursing note reviewed.   Constitutional:       Appearance: Normal  "appearance.   HENT:      Right Ear: Tympanic membrane normal.      Left Ear: Tympanic membrane normal.   Cardiovascular:      Rate and Rhythm: Normal rate and regular rhythm.      Pulses: Normal pulses.      Heart sounds: Normal heart sounds.   Pulmonary:      Effort: Pulmonary effort is normal.      Breath sounds: Normal breath sounds.   Abdominal:      General: Abdomen is flat. Bowel sounds are normal.      Palpations: Abdomen is soft.   Skin:     General: Skin is warm and dry.   Neurological:      Mental Status: He is alert.         Vitals:    11/02/20 0745   BP: (!) 150/80   BP Location: Right arm   Patient Position: Sitting   BP Method: Large (Manual)   Pulse: 65   Resp: 16   Temp: 98.1 °F (36.7 °C)   TempSrc: Oral   SpO2: 95%   Weight: 120.7 kg (266 lb 1.5 oz)   Height: 6' 7" (2.007 m)     Body mass index is 29.98 kg/m².    RESULTS: Reviewed labs from last 12  months    Assessment:       1. Essential hypertension    2. Type 2 diabetes mellitus without complication, with long-term current use of insulin    3. Hypertriglyceridemia    4. Stage 3b chronic kidney disease    5. Flu vaccine need        Plan:   Luther was seen today for results.    Diagnoses and all orders for this visit:    Essential hypertension    BP above goal. Education provided regarding lifestyle.  Patient has a diet which she is too rich in salt, processed foods, and animal fat (pork).  Reviewed his present diet and gave him some recommendations re: healthier options, the need to eat enough lean proteins, and to increase his consumption of fruits and vegetables.    He also need to exercise wih cardiovascular and resistance training.  Same treatment for now.  He will repeat his blood work in 2 weeks and see Dr. Lombard afterwards.    Type 2 diabetes mellitus without complication, with long-term current use of insulin  -     Hemoglobin A1C; Future    DM with A1c consistently above goal.  He is on glipizide only, metformin on hold due to CKD?.  " Based on his kidney function, could benefit of low dose of above/ Not sure if injections such as Trulicity could be a good option for this pt.  Advised to bring his meter when sees his regular PCP next.    Hypertriglyceridemia  -     Lipid Panel; Future    No statin.  Will be reassessed by regular PCP.  Issue of CKD.    Stage 3b chronic kidney disease  -     Comprehensive Metabolic Panel; Future    CKD fluctuating with BL 1.7-1.9.  Does not take any NSAIDs.    Flu vaccine need  -     Influenza (FLUAD) - Quadrivalent (Adjuvanted) *Preferred* (65+) (PF)    Administered today.    I have spent 25 minutes in the care of this patient with >50% in counseling and coordination of care regarding importance to work on his lifestyle, extensive review of his present diet and advice provided, the need to check his blood sugar more often, various moments of the day, and to bring his meter when comes and sees his regular PCP. The need to be careful regarding possible hypoglycemia.    Follow up in about 7 weeks (around 12/21/2020) for f-up after blood work.    This note was created by combination of typed  and M-Modal dictation.  Transcription errors may be present.  If there are any questions, please contact me.

## 2020-11-17 ENCOUNTER — LAB VISIT (OUTPATIENT)
Dept: LAB | Facility: HOSPITAL | Age: 80
End: 2020-11-17
Attending: INTERNAL MEDICINE
Payer: MEDICARE

## 2020-11-17 DIAGNOSIS — N18.32 STAGE 3B CHRONIC KIDNEY DISEASE: ICD-10-CM

## 2020-11-17 DIAGNOSIS — Z79.4 TYPE 2 DIABETES MELLITUS WITHOUT COMPLICATION, WITH LONG-TERM CURRENT USE OF INSULIN: ICD-10-CM

## 2020-11-17 DIAGNOSIS — E78.1 HYPERTRIGLYCERIDEMIA: ICD-10-CM

## 2020-11-17 DIAGNOSIS — E11.9 TYPE 2 DIABETES MELLITUS WITHOUT COMPLICATION, WITH LONG-TERM CURRENT USE OF INSULIN: ICD-10-CM

## 2020-11-17 LAB
ALBUMIN SERPL BCP-MCNC: 3.8 G/DL (ref 3.5–5.2)
ALP SERPL-CCNC: 163 U/L (ref 55–135)
ALT SERPL W/O P-5'-P-CCNC: 26 U/L (ref 10–44)
ANION GAP SERPL CALC-SCNC: 10 MMOL/L (ref 8–16)
AST SERPL-CCNC: 20 U/L (ref 10–40)
BILIRUB SERPL-MCNC: 0.5 MG/DL (ref 0.1–1)
BUN SERPL-MCNC: 24 MG/DL (ref 8–23)
CALCIUM SERPL-MCNC: 9 MG/DL (ref 8.7–10.5)
CHLORIDE SERPL-SCNC: 99 MMOL/L (ref 95–110)
CHOLEST SERPL-MCNC: 172 MG/DL (ref 120–199)
CHOLEST/HDLC SERPL: 4.3 {RATIO} (ref 2–5)
CO2 SERPL-SCNC: 29 MMOL/L (ref 23–29)
CREAT SERPL-MCNC: 1.9 MG/DL (ref 0.5–1.4)
EST. GFR  (AFRICAN AMERICAN): 37.7 ML/MIN/1.73 M^2
EST. GFR  (NON AFRICAN AMERICAN): 32.6 ML/MIN/1.73 M^2
ESTIMATED AVG GLUCOSE: 212 MG/DL (ref 68–131)
GLUCOSE SERPL-MCNC: 223 MG/DL (ref 70–110)
HBA1C MFR BLD HPLC: 9 % (ref 4–5.6)
HDLC SERPL-MCNC: 40 MG/DL (ref 40–75)
HDLC SERPL: 23.3 % (ref 20–50)
LDLC SERPL CALC-MCNC: 113 MG/DL (ref 63–159)
NONHDLC SERPL-MCNC: 132 MG/DL
POTASSIUM SERPL-SCNC: 3.6 MMOL/L (ref 3.5–5.1)
PROT SERPL-MCNC: 7.5 G/DL (ref 6–8.4)
SODIUM SERPL-SCNC: 138 MMOL/L (ref 136–145)
TRIGL SERPL-MCNC: 95 MG/DL (ref 30–150)

## 2020-11-17 PROCEDURE — 80061 LIPID PANEL: CPT

## 2020-11-17 PROCEDURE — 36415 COLL VENOUS BLD VENIPUNCTURE: CPT | Mod: PO

## 2020-11-17 PROCEDURE — 80053 COMPREHEN METABOLIC PANEL: CPT

## 2020-11-17 PROCEDURE — 83036 HEMOGLOBIN GLYCOSYLATED A1C: CPT

## 2020-11-30 ENCOUNTER — EXTERNAL CHRONIC CARE MANAGEMENT (OUTPATIENT)
Dept: PRIMARY CARE CLINIC | Facility: CLINIC | Age: 80
End: 2020-11-30
Payer: MEDICARE

## 2020-11-30 PROCEDURE — 99490 PR CHRONIC CARE MGMT, 1ST 20 MIN: ICD-10-PCS | Mod: S$PBB,,, | Performed by: FAMILY MEDICINE

## 2020-11-30 PROCEDURE — 99490 CHRNC CARE MGMT STAFF 1ST 20: CPT | Mod: S$PBB,,, | Performed by: FAMILY MEDICINE

## 2020-11-30 PROCEDURE — 99490 CHRNC CARE MGMT STAFF 1ST 20: CPT | Mod: PBBFAC,PO | Performed by: FAMILY MEDICINE

## 2020-12-02 ENCOUNTER — PATIENT OUTREACH (OUTPATIENT)
Dept: ADMINISTRATIVE | Facility: HOSPITAL | Age: 80
End: 2020-12-02

## 2020-12-15 ENCOUNTER — OFFICE VISIT (OUTPATIENT)
Dept: FAMILY MEDICINE | Facility: CLINIC | Age: 80
End: 2020-12-15
Payer: MEDICARE

## 2020-12-15 VITALS
BODY MASS INDEX: 30.96 KG/M2 | WEIGHT: 267.63 LBS | SYSTOLIC BLOOD PRESSURE: 150 MMHG | OXYGEN SATURATION: 97 % | HEART RATE: 54 BPM | DIASTOLIC BLOOD PRESSURE: 90 MMHG | HEIGHT: 78 IN | TEMPERATURE: 98 F | RESPIRATION RATE: 16 BRPM

## 2020-12-15 DIAGNOSIS — N40.0 BENIGN PROSTATIC HYPERPLASIA WITHOUT LOWER URINARY TRACT SYMPTOMS: ICD-10-CM

## 2020-12-15 DIAGNOSIS — I10 ESSENTIAL HYPERTENSION: Primary | ICD-10-CM

## 2020-12-15 DIAGNOSIS — N52.1 ERECTILE DYSFUNCTION DUE TO DISEASES CLASSIFIED ELSEWHERE: ICD-10-CM

## 2020-12-15 DIAGNOSIS — N18.32 TYPE 2 DIABETES MELLITUS WITH STAGE 3B CHRONIC KIDNEY DISEASE, WITHOUT LONG-TERM CURRENT USE OF INSULIN: ICD-10-CM

## 2020-12-15 DIAGNOSIS — E11.22 TYPE 2 DIABETES MELLITUS WITH STAGE 3B CHRONIC KIDNEY DISEASE, WITHOUT LONG-TERM CURRENT USE OF INSULIN: ICD-10-CM

## 2020-12-15 PROCEDURE — 99999 PR PBB SHADOW E&M-EST. PATIENT-LVL III: ICD-10-PCS | Mod: PBBFAC,,, | Performed by: FAMILY MEDICINE

## 2020-12-15 PROCEDURE — 99213 OFFICE O/P EST LOW 20 MIN: CPT | Mod: PBBFAC,PO | Performed by: FAMILY MEDICINE

## 2020-12-15 PROCEDURE — 99214 PR OFFICE/OUTPT VISIT, EST, LEVL IV, 30-39 MIN: ICD-10-PCS | Mod: S$PBB,,, | Performed by: FAMILY MEDICINE

## 2020-12-15 PROCEDURE — 99214 OFFICE O/P EST MOD 30 MIN: CPT | Mod: S$PBB,,, | Performed by: FAMILY MEDICINE

## 2020-12-15 PROCEDURE — 99999 PR PBB SHADOW E&M-EST. PATIENT-LVL III: CPT | Mod: PBBFAC,,, | Performed by: FAMILY MEDICINE

## 2020-12-15 RX ORDER — HYDROCHLOROTHIAZIDE 25 MG/1
25 TABLET ORAL DAILY
Qty: 90 TABLET | Refills: 1 | Status: SHIPPED | OUTPATIENT
Start: 2020-12-15 | End: 2021-03-15 | Stop reason: SDUPTHER

## 2020-12-15 RX ORDER — TADALAFIL 20 MG/1
20 TABLET ORAL DAILY
Qty: 30 TABLET | Refills: 5 | Status: SHIPPED | OUTPATIENT
Start: 2020-12-15 | End: 2022-03-02 | Stop reason: SDUPTHER

## 2020-12-15 RX ORDER — SILDENAFIL 100 MG/1
100 TABLET, FILM COATED ORAL DAILY PRN
Qty: 30 TABLET | Refills: 5 | Status: SHIPPED | OUTPATIENT
Start: 2020-12-15 | End: 2022-03-02 | Stop reason: SDUPTHER

## 2020-12-15 RX ORDER — GLIPIZIDE 10 MG/1
10 TABLET, FILM COATED, EXTENDED RELEASE ORAL 2 TIMES DAILY
Qty: 180 TABLET | Refills: 1 | Status: SHIPPED | OUTPATIENT
Start: 2020-12-15 | End: 2021-03-15 | Stop reason: SDUPTHER

## 2020-12-15 RX ORDER — AMLODIPINE AND VALSARTAN 10; 160 MG/1; MG/1
1 TABLET ORAL DAILY
Qty: 90 TABLET | Refills: 1 | Status: SHIPPED | OUTPATIENT
Start: 2020-12-15 | End: 2021-03-15 | Stop reason: SDUPTHER

## 2020-12-15 RX ORDER — TAMSULOSIN HYDROCHLORIDE 0.4 MG/1
1 CAPSULE ORAL DAILY
Qty: 90 CAPSULE | Refills: 1 | Status: SHIPPED | OUTPATIENT
Start: 2020-12-15 | End: 2021-03-15 | Stop reason: SDUPTHER

## 2020-12-15 NOTE — PROGRESS NOTES
Chief Complaint   Patient presents with    Hypertension     follow up        Luther Irwin Jr. is a 80 y.o. male who presents per the Chief Complaint.  Pt is known to me and was last seen by me on 8/17/2020.  All known chronic medical issues have been documented.    Hypertension  This is a chronic problem. The current episode started more than 1 year ago. The problem is unchanged. The problem is uncontrolled. Pertinent negatives include no anxiety, blurred vision, chest pain, headaches, neck pain, orthopnea, palpitations, peripheral edema, PND, shortness of breath or sweats. There are no associated agents to hypertension. Risk factors for coronary artery disease include male gender and diabetes mellitus. Past treatments include angiotensin blockers, diuretics and calcium channel blockers. The current treatment provides moderate improvement. There are no compliance problems.  There is no history of angina, kidney disease, CAD/MI, CVA, heart failure, left ventricular hypertrophy, PVD or retinopathy. There is no history of chronic renal disease, coarctation of the aorta, hyperaldosteronism, hypercortisolism, hyperparathyroidism, a hypertension causing med, pheochromocytoma, renovascular disease, sleep apnea or a thyroid problem.   Diabetes  He presents for his follow-up diabetic visit. He has type 2 diabetes mellitus. His disease course has been stable. Pertinent negatives for hypoglycemia include no confusion, dizziness, headaches, hunger, mood changes, nervousness/anxiousness, pallor, seizures, sleepiness, speech difficulty, sweats or tremors. Associated symptoms include foot paresthesias. Pertinent negatives for diabetes include no blurred vision, no chest pain, no fatigue, no foot ulcerations, no polydipsia, no polyphagia, no polyuria, no visual change, no weakness and no weight loss. There are no hypoglycemic complications. Symptoms are stable. Diabetic complications include peripheral neuropathy. Pertinent  negatives for diabetic complications include no autonomic neuropathy, CVA, heart disease, impotence, nephropathy, PVD or retinopathy. Risk factors for coronary artery disease include diabetes mellitus, hypertension and male sex. Current diabetic treatment includes oral agent (dual therapy). He is compliant with treatment most of the time. His weight is stable. He is following a generally healthy diet. When asked about meal planning, he reported none. He has not had a previous visit with a dietitian. He participates in exercise intermittently. There is no change in his home blood glucose trend. An ACE inhibitor/angiotensin II receptor blocker is being taken. He sees a podiatrist.Eye exam is current.   Back Pain  Pertinent negatives include no abdominal pain, chest pain, dysuria, fever, headaches, weakness or weight loss.     ROS  Review of Systems   Constitutional: Negative.  Negative for activity change, appetite change, chills, diaphoresis, fatigue, fever, unexpected weight change and weight loss.   HENT: Negative.  Negative for congestion, ear pain, hearing loss, nosebleeds, postnasal drip, rhinorrhea, sinus pressure, sneezing, sore throat and trouble swallowing.    Eyes: Negative for blurred vision, pain and visual disturbance.   Respiratory: Negative for cough, choking and shortness of breath.    Cardiovascular: Negative for chest pain, palpitations, orthopnea, leg swelling and PND.   Gastrointestinal: Negative for abdominal pain, constipation, diarrhea, nausea and vomiting.   Endocrine: Negative for polydipsia, polyphagia and polyuria.   Genitourinary: Negative for difficulty urinating, dysuria, frequency, impotence and urgency.   Musculoskeletal: Positive for back pain (improved since surgery). Negative for arthralgias, gait problem, joint swelling, myalgias and neck pain.   Skin: Negative.  Negative for pallor.   Allergic/Immunologic: Negative for environmental allergies and food allergies.   Neurological:  "Negative.  Negative for dizziness, tremors, seizures, syncope, speech difficulty, weakness, light-headedness and headaches.   Psychiatric/Behavioral: Negative.  Negative for confusion, decreased concentration, dysphoric mood and sleep disturbance. The patient is not nervous/anxious.        Physical Exam  Vitals:    12/15/20 0821   BP: (!) 150/90   Pulse: (!) 54   Resp: 16   Temp: 97.6 °F (36.4 °C)    Body mass index is 30.15 kg/m².  Weight: 121.4 kg (267 lb 10.2 oz)   Height: 6' 7" (200.7 cm)     Physical Exam  Constitutional:       General: He is not in acute distress.     Appearance: Normal appearance. He is well-developed. He is not ill-appearing, toxic-appearing or diaphoretic.   HENT:      Head: Normocephalic and atraumatic.      Right Ear: Hearing and external ear normal. No decreased hearing noted.      Left Ear: Hearing and external ear normal. No decreased hearing noted.      Nose: Nose normal. No nasal deformity or rhinorrhea.      Mouth/Throat:      Dentition: Normal dentition. Does not have dentures.      Pharynx: Uvula midline.   Eyes:      General: Lids are normal. No scleral icterus.        Right eye: No foreign body or discharge.         Left eye: No foreign body or discharge.      Conjunctiva/sclera: Conjunctivae normal.      Right eye: No chemosis or exudate.     Left eye: No chemosis or exudate.     Pupils: Pupils are equal, round, and reactive to light.   Neck:      Musculoskeletal: Full passive range of motion without pain, normal range of motion and neck supple.   Cardiovascular:      Rate and Rhythm: Normal rate and regular rhythm.      Heart sounds: Normal heart sounds, S1 normal and S2 normal. No murmur. No friction rub. No gallop.    Pulmonary:      Effort: Pulmonary effort is normal. No accessory muscle usage or respiratory distress.      Breath sounds: Normal breath sounds. No decreased breath sounds, wheezing, rhonchi or rales.   Abdominal:      General: There is no distension.      " Palpations: Abdomen is soft. Abdomen is not rigid.      Tenderness: There is no abdominal tenderness. There is no guarding or rebound.   Musculoskeletal: Normal range of motion.   Skin:     General: Skin is warm and dry.      Findings: No rash.   Neurological:      Mental Status: He is alert and oriented to person, place, and time.      Cranial Nerves: No cranial nerve deficit.      Sensory: No sensory deficit.      Motor: No abnormal muscle tone or seizure activity.      Coordination: Coordination normal.      Gait: Gait normal.   Psychiatric:         Attention and Perception: He is attentive.         Speech: Speech normal.         Behavior: Behavior normal. Behavior is cooperative.         Thought Content: Thought content normal.         Judgment: Judgment normal.       Assessment & Plan    Discussion of plan of care including treatment options regarding health and wellness were reviewed and discussed with patient.  Any changes to medication or treatment plan, as well as any screening blood test, imaging, or referrals to specialist, are documented.  Follow up as indicated.     1. Essential hypertension  Patient was counseled and encouraged to maintain a low sodium diet, as well as increasing physical activity.  Recommend random BP checks at home on a regular basis.  Repeat BP at end of visit was not necessary. Will continue medication at this time, and follow up in 3-6 months, or sooner if blood pressure begins to increase.  Age appropriate BP.  - amlodipine-valsartan (EXFORGE)  mg per tablet; Take 1 tablet by mouth once daily.  Dispense: 90 tablet; Refill: 1  - hydroCHLOROthiazide (HYDRODIURIL) 25 MG tablet; Take 1 tablet (25 mg total) by mouth once daily.  Dispense: 90 tablet; Refill: 1    2. Type 2 diabetes mellitus with stage 3b chronic kidney disease, without long-term current use of insulin  Patient is encouraged to follow a diet low in carbohydrates and simple sugars.  Discussed simple vs. complex  carbohydrates as well as eating times of certain meals. Advised to focus on good food choices and increased physical activity and encouraged to adhere to medication regimen and/or lifestyle adjustments, and to check glucose level as recommended.  Contact office if glucose levels are not improving over time.  Will monitor HbA1c appropriately.  Will start glipizide twice a day.  - blood sugar diagnostic Strp; Check glucose 2 times daily with insurance preferred strips to match glucometer.  Dispense: 300 strip; Refill: 3  - glipiZIDE (GLUCOTROL) 10 MG TR24; Take 1 tablet (10 mg total) by mouth 2 (two) times a day.  Dispense: 180 tablet; Refill: 1    3. Benign prostatic hyperplasia without lower urinary tract symptoms  Stable; no therapeutic changes at this time.   - tamsulosin (FLOMAX) 0.4 mg Cap; Take 1 capsule (0.4 mg total) by mouth once daily.  Dispense: 90 capsule; Refill: 1    4. Erectile dysfunction due to diseases classified elsewhere  Discussed different causes of ED, including anatomical disorder or nerve damage, vascular disorders, or psychological issues.  We agreed to try medication, and patient was given a prescription and advised to use with caution.  He was reminded that if he has an erection that last longer than 4 hours, he should report to the ER immediately.    - sildenafiL (VIAGRA) 100 MG tablet; Take 1 tablet (100 mg total) by mouth daily as needed.  Dispense: 30 tablet; Refill: 5  - tadalafiL (CIALIS) 20 MG Tab; Take 1 tablet (20 mg total) by mouth once daily.  Dispense: 30 tablet; Refill: 5       Follow up in about 3 months (around 3/15/2021) for Chronic Disease Management.      ACTIVE MEDICAL ISSUES:  Documented in Problem List    PAST MEDICAL HISTORY  Documented    PAST SURGICAL HISTORY:  Documented    SOCIAL HISTORY:  Documented    FAMILY HISTORY:  Documented    ALLERGIES AND MEDICATIONS: updated and reviewed.  Documented    Health Maintenance       Date Due Completion Date    Hemoglobin A1c  02/17/2021 11/17/2020    Eye Exam 09/22/2021 9/22/2020    Override on 6/10/2015: Declined (had an appoint last week )    Lipid Panel 11/17/2021 11/17/2020    TETANUS VACCINE 01/02/2029 1/2/2019

## 2020-12-21 DIAGNOSIS — E11.22 TYPE 2 DIABETES MELLITUS WITH STAGE 3B CHRONIC KIDNEY DISEASE, WITHOUT LONG-TERM CURRENT USE OF INSULIN: Primary | ICD-10-CM

## 2020-12-21 DIAGNOSIS — N18.32 TYPE 2 DIABETES MELLITUS WITH STAGE 3B CHRONIC KIDNEY DISEASE, WITHOUT LONG-TERM CURRENT USE OF INSULIN: Primary | ICD-10-CM

## 2020-12-21 RX ORDER — LANCETS
EACH MISCELLANEOUS
Qty: 200 EACH | Refills: 3 | Status: SHIPPED | OUTPATIENT
Start: 2020-12-21 | End: 2021-02-17 | Stop reason: SDUPTHER

## 2020-12-21 RX ORDER — INSULIN PUMP SYRINGE, 3 ML
EACH MISCELLANEOUS
Qty: 1 EACH | Refills: 0 | Status: SHIPPED | OUTPATIENT
Start: 2020-12-21 | End: 2021-01-07 | Stop reason: SDUPTHER

## 2020-12-21 NOTE — TELEPHONE ENCOUNTER
Re:   ----- Message -----   From: Sarah Gibson   Sent: 12/18/2020  11:46 AM CST   To: Clarita Greene LPN   Subject: Test strips needing pre authorization             MRN: [id number]   Pt: XENIA HEBERT    Phone: [phone]   Pharmacy: [facility]/pharmacy #[id number] [place] [address] [facility] [address] (Phone) [phone]   Lab: [facility],     Pt sees Dr. Lombard as his PCP, his last OV was on 12/15/20, pt stated Doctor was going to send order for test strips but unsure if they were sent. I followed up with [facility] pharmacy, no orders were sent, and pt test strips on file are no longer being covered by the insurance. [facility] pharmacist sent out authorization to office. Please follow up with patient at number provided, as he stated he no longer has any more test strips to check glucose levels.   I may be contacted per Epic messages or at the number listed below if any questions/ concerns.     Thank you,     Sarah Gibson LPN   Care Coordinator   [facility]   [phone]  ext [phone]

## 2020-12-31 ENCOUNTER — EXTERNAL CHRONIC CARE MANAGEMENT (OUTPATIENT)
Dept: PRIMARY CARE CLINIC | Facility: CLINIC | Age: 80
End: 2020-12-31
Payer: MEDICARE

## 2020-12-31 PROCEDURE — 99487 PR COMPLX CHRON CARE MGMT, 1ST HR, PER MONTH: ICD-10-PCS | Mod: S$PBB,,, | Performed by: FAMILY MEDICINE

## 2020-12-31 PROCEDURE — 99487 CPLX CHRNC CARE 1ST 60 MIN: CPT | Mod: S$PBB,,, | Performed by: FAMILY MEDICINE

## 2020-12-31 PROCEDURE — 99489 CPLX CHRNC CARE EA ADDL 30: CPT | Mod: PBBFAC,PO,25,27 | Performed by: FAMILY MEDICINE

## 2020-12-31 PROCEDURE — 99489 PR COMPLX CHRON CARE MGMT, EA ADDTL 30 MIN, PER MONTH: ICD-10-PCS | Mod: S$PBB,,, | Performed by: FAMILY MEDICINE

## 2020-12-31 PROCEDURE — 99487 CPLX CHRNC CARE 1ST 60 MIN: CPT | Mod: PBBFAC,PO | Performed by: FAMILY MEDICINE

## 2020-12-31 PROCEDURE — 99489 CPLX CHRNC CARE EA ADDL 30: CPT | Mod: S$PBB,,, | Performed by: FAMILY MEDICINE

## 2021-01-07 DIAGNOSIS — E11.22 TYPE 2 DIABETES MELLITUS WITH STAGE 3B CHRONIC KIDNEY DISEASE, WITHOUT LONG-TERM CURRENT USE OF INSULIN: ICD-10-CM

## 2021-01-07 DIAGNOSIS — N18.32 TYPE 2 DIABETES MELLITUS WITH STAGE 3B CHRONIC KIDNEY DISEASE, WITHOUT LONG-TERM CURRENT USE OF INSULIN: ICD-10-CM

## 2021-01-08 RX ORDER — INSULIN PUMP SYRINGE, 3 ML
EACH MISCELLANEOUS
Qty: 1 EACH | Refills: 0 | Status: SHIPPED | OUTPATIENT
Start: 2021-01-08 | End: 2021-02-17 | Stop reason: SDUPTHER

## 2021-01-14 ENCOUNTER — IMMUNIZATION (OUTPATIENT)
Dept: INTERNAL MEDICINE | Facility: CLINIC | Age: 81
End: 2021-01-14
Payer: MEDICARE

## 2021-01-14 DIAGNOSIS — Z23 NEED FOR VACCINATION: ICD-10-CM

## 2021-01-14 PROCEDURE — 91300 COVID-19, MRNA, LNP-S, PF, 30 MCG/0.3 ML DOSE VACCINE: CPT | Mod: PBBFAC | Performed by: INTERNAL MEDICINE

## 2021-01-31 ENCOUNTER — EXTERNAL CHRONIC CARE MANAGEMENT (OUTPATIENT)
Dept: PRIMARY CARE CLINIC | Facility: CLINIC | Age: 81
End: 2021-01-31
Payer: MEDICARE

## 2021-01-31 PROCEDURE — 99490 CHRNC CARE MGMT STAFF 1ST 20: CPT | Mod: PBBFAC,PO | Performed by: FAMILY MEDICINE

## 2021-01-31 PROCEDURE — 99490 PR CHRONIC CARE MGMT, 1ST 20 MIN: ICD-10-PCS | Mod: S$GLB,,, | Performed by: FAMILY MEDICINE

## 2021-01-31 PROCEDURE — 99439 CHRNC CARE MGMT STAF EA ADDL: CPT | Mod: PBBFAC,PO | Performed by: FAMILY MEDICINE

## 2021-01-31 PROCEDURE — 99490 CHRNC CARE MGMT STAFF 1ST 20: CPT | Mod: S$GLB,,, | Performed by: FAMILY MEDICINE

## 2021-01-31 PROCEDURE — 99439 PR CHRONIC CARE MGMT, EA ADDTL 20 MIN: ICD-10-PCS | Mod: S$GLB,,, | Performed by: FAMILY MEDICINE

## 2021-01-31 PROCEDURE — 99439 CHRNC CARE MGMT STAF EA ADDL: CPT | Mod: S$GLB,,, | Performed by: FAMILY MEDICINE

## 2021-02-03 ENCOUNTER — TELEPHONE (OUTPATIENT)
Dept: FAMILY MEDICINE | Facility: CLINIC | Age: 81
End: 2021-02-03

## 2021-02-03 DIAGNOSIS — N18.32 TYPE 2 DIABETES MELLITUS WITH STAGE 3B CHRONIC KIDNEY DISEASE, WITHOUT LONG-TERM CURRENT USE OF INSULIN: ICD-10-CM

## 2021-02-03 DIAGNOSIS — E11.22 TYPE 2 DIABETES MELLITUS WITH STAGE 3B CHRONIC KIDNEY DISEASE, WITHOUT LONG-TERM CURRENT USE OF INSULIN: ICD-10-CM

## 2021-02-03 RX ORDER — LANCETS
EACH MISCELLANEOUS
Qty: 200 EACH | Refills: 3 | Status: CANCELLED | OUTPATIENT
Start: 2021-02-03

## 2021-02-03 RX ORDER — INSULIN PUMP SYRINGE, 3 ML
EACH MISCELLANEOUS
Qty: 1 EACH | Refills: 0 | Status: CANCELLED | OUTPATIENT
Start: 2021-02-03 | End: 2022-02-02

## 2021-02-05 ENCOUNTER — IMMUNIZATION (OUTPATIENT)
Dept: INTERNAL MEDICINE | Facility: CLINIC | Age: 81
End: 2021-02-05
Payer: MEDICARE

## 2021-02-05 DIAGNOSIS — Z23 NEED FOR VACCINATION: Primary | ICD-10-CM

## 2021-02-05 PROCEDURE — 0002A COVID-19, MRNA, LNP-S, PF, 30 MCG/0.3 ML DOSE VACCINE: CPT | Mod: PBBFAC | Performed by: INTERNAL MEDICINE

## 2021-02-05 PROCEDURE — 91300 COVID-19, MRNA, LNP-S, PF, 30 MCG/0.3 ML DOSE VACCINE: CPT | Mod: PBBFAC | Performed by: INTERNAL MEDICINE

## 2021-02-17 DIAGNOSIS — E11.22 TYPE 2 DIABETES MELLITUS WITH STAGE 3B CHRONIC KIDNEY DISEASE, WITHOUT LONG-TERM CURRENT USE OF INSULIN: ICD-10-CM

## 2021-02-17 DIAGNOSIS — N18.32 TYPE 2 DIABETES MELLITUS WITH STAGE 3B CHRONIC KIDNEY DISEASE, WITHOUT LONG-TERM CURRENT USE OF INSULIN: ICD-10-CM

## 2021-02-17 RX ORDER — LANCETS
EACH MISCELLANEOUS
Qty: 200 EACH | Refills: 3 | Status: SHIPPED | OUTPATIENT
Start: 2021-02-17

## 2021-02-17 RX ORDER — INSULIN PUMP SYRINGE, 3 ML
EACH MISCELLANEOUS
Qty: 1 EACH | Refills: 0 | Status: SHIPPED | OUTPATIENT
Start: 2021-02-17 | End: 2024-02-28

## 2021-02-28 ENCOUNTER — EXTERNAL CHRONIC CARE MANAGEMENT (OUTPATIENT)
Dept: PRIMARY CARE CLINIC | Facility: CLINIC | Age: 81
End: 2021-02-28
Payer: MEDICARE

## 2021-02-28 PROCEDURE — 99490 PR CHRONIC CARE MGMT, 1ST 20 MIN: ICD-10-PCS | Mod: S$GLB,,, | Performed by: FAMILY MEDICINE

## 2021-02-28 PROCEDURE — 99490 CHRNC CARE MGMT STAFF 1ST 20: CPT | Mod: S$GLB,,, | Performed by: FAMILY MEDICINE

## 2021-02-28 PROCEDURE — 99490 CHRNC CARE MGMT STAFF 1ST 20: CPT | Mod: PBBFAC,PO | Performed by: FAMILY MEDICINE

## 2021-03-01 ENCOUNTER — PATIENT OUTREACH (OUTPATIENT)
Dept: ADMINISTRATIVE | Facility: HOSPITAL | Age: 81
End: 2021-03-01

## 2021-03-01 DIAGNOSIS — E11.22 TYPE 2 DIABETES MELLITUS WITH STAGE 3A CHRONIC KIDNEY DISEASE, WITHOUT LONG-TERM CURRENT USE OF INSULIN: Primary | ICD-10-CM

## 2021-03-01 DIAGNOSIS — N18.31 TYPE 2 DIABETES MELLITUS WITH STAGE 3A CHRONIC KIDNEY DISEASE, WITHOUT LONG-TERM CURRENT USE OF INSULIN: Primary | ICD-10-CM

## 2021-03-04 LAB
LEFT EYE DM RETINOPATHY: POSITIVE
RIGHT EYE DM RETINOPATHY: POSITIVE

## 2021-03-15 ENCOUNTER — LAB VISIT (OUTPATIENT)
Dept: LAB | Facility: HOSPITAL | Age: 81
End: 2021-03-15
Attending: FAMILY MEDICINE
Payer: MEDICARE

## 2021-03-15 ENCOUNTER — OFFICE VISIT (OUTPATIENT)
Dept: FAMILY MEDICINE | Facility: CLINIC | Age: 81
End: 2021-03-15
Payer: MEDICARE

## 2021-03-15 VITALS
HEART RATE: 58 BPM | TEMPERATURE: 99 F | WEIGHT: 267.63 LBS | DIASTOLIC BLOOD PRESSURE: 76 MMHG | SYSTOLIC BLOOD PRESSURE: 130 MMHG | RESPIRATION RATE: 16 BRPM | OXYGEN SATURATION: 97 % | HEIGHT: 78 IN | BODY MASS INDEX: 30.96 KG/M2

## 2021-03-15 DIAGNOSIS — E11.22 TYPE 2 DIABETES MELLITUS WITH STAGE 3B CHRONIC KIDNEY DISEASE, WITHOUT LONG-TERM CURRENT USE OF INSULIN: Primary | ICD-10-CM

## 2021-03-15 DIAGNOSIS — E11.22 TYPE 2 DIABETES MELLITUS WITH STAGE 3A CHRONIC KIDNEY DISEASE, WITHOUT LONG-TERM CURRENT USE OF INSULIN: ICD-10-CM

## 2021-03-15 DIAGNOSIS — N18.32 TYPE 2 DIABETES MELLITUS WITH STAGE 3B CHRONIC KIDNEY DISEASE, WITHOUT LONG-TERM CURRENT USE OF INSULIN: Primary | ICD-10-CM

## 2021-03-15 DIAGNOSIS — N18.31 TYPE 2 DIABETES MELLITUS WITH STAGE 3A CHRONIC KIDNEY DISEASE, WITHOUT LONG-TERM CURRENT USE OF INSULIN: ICD-10-CM

## 2021-03-15 DIAGNOSIS — I10 ESSENTIAL HYPERTENSION: ICD-10-CM

## 2021-03-15 DIAGNOSIS — N40.0 BENIGN PROSTATIC HYPERPLASIA WITHOUT LOWER URINARY TRACT SYMPTOMS: ICD-10-CM

## 2021-03-15 LAB
ESTIMATED AVG GLUCOSE: 174 MG/DL (ref 68–131)
HBA1C MFR BLD: 7.7 % (ref 4–5.6)

## 2021-03-15 PROCEDURE — 99999 PR PBB SHADOW E&M-EST. PATIENT-LVL III: CPT | Mod: PBBFAC,,, | Performed by: FAMILY MEDICINE

## 2021-03-15 PROCEDURE — 99499 UNLISTED E&M SERVICE: CPT | Mod: S$GLB,,, | Performed by: FAMILY MEDICINE

## 2021-03-15 PROCEDURE — 99999 PR PBB SHADOW E&M-EST. PATIENT-LVL III: ICD-10-PCS | Mod: PBBFAC,,, | Performed by: FAMILY MEDICINE

## 2021-03-15 PROCEDURE — 99214 PR OFFICE/OUTPT VISIT, EST, LEVL IV, 30-39 MIN: ICD-10-PCS | Mod: S$GLB,,, | Performed by: FAMILY MEDICINE

## 2021-03-15 PROCEDURE — 99499 RISK ADDL DX/OHS AUDIT: ICD-10-PCS | Mod: S$GLB,,, | Performed by: FAMILY MEDICINE

## 2021-03-15 PROCEDURE — 99214 OFFICE O/P EST MOD 30 MIN: CPT | Mod: S$GLB,,, | Performed by: FAMILY MEDICINE

## 2021-03-15 PROCEDURE — 36415 COLL VENOUS BLD VENIPUNCTURE: CPT | Mod: PO | Performed by: FAMILY MEDICINE

## 2021-03-15 PROCEDURE — 83036 HEMOGLOBIN GLYCOSYLATED A1C: CPT | Performed by: FAMILY MEDICINE

## 2021-03-15 RX ORDER — HYDROCHLOROTHIAZIDE 25 MG/1
25 TABLET ORAL DAILY
Qty: 90 TABLET | Refills: 0 | Status: SHIPPED | OUTPATIENT
Start: 2021-03-15 | End: 2021-09-02

## 2021-03-15 RX ORDER — AMLODIPINE AND VALSARTAN 10; 160 MG/1; MG/1
1 TABLET ORAL DAILY
Qty: 90 TABLET | Refills: 0 | Status: SHIPPED | OUTPATIENT
Start: 2021-03-15 | End: 2021-09-02

## 2021-03-15 RX ORDER — GLIPIZIDE 10 MG/1
10 TABLET, FILM COATED, EXTENDED RELEASE ORAL 2 TIMES DAILY
Qty: 180 TABLET | Refills: 0 | Status: SHIPPED | OUTPATIENT
Start: 2021-03-15 | End: 2021-08-11

## 2021-03-15 RX ORDER — TAMSULOSIN HYDROCHLORIDE 0.4 MG/1
1 CAPSULE ORAL DAILY
Qty: 90 CAPSULE | Refills: 0 | Status: SHIPPED | OUTPATIENT
Start: 2021-03-15 | End: 2021-06-09

## 2021-03-15 RX ORDER — CLONIDINE HYDROCHLORIDE 0.1 MG/1
0.1 TABLET ORAL 3 TIMES DAILY
Qty: 270 TABLET | Refills: 0 | Status: SHIPPED | OUTPATIENT
Start: 2021-03-15 | End: 2021-07-12

## 2021-03-16 ENCOUNTER — TELEPHONE (OUTPATIENT)
Dept: FAMILY MEDICINE | Facility: CLINIC | Age: 81
End: 2021-03-16

## 2021-03-31 ENCOUNTER — EXTERNAL CHRONIC CARE MANAGEMENT (OUTPATIENT)
Dept: PRIMARY CARE CLINIC | Facility: CLINIC | Age: 81
End: 2021-03-31
Payer: MEDICARE

## 2021-03-31 PROCEDURE — 99490 CHRNC CARE MGMT STAFF 1ST 20: CPT | Mod: S$GLB,,, | Performed by: FAMILY MEDICINE

## 2021-03-31 PROCEDURE — 99490 PR CHRONIC CARE MGMT, 1ST 20 MIN: ICD-10-PCS | Mod: S$GLB,,, | Performed by: FAMILY MEDICINE

## 2021-04-30 ENCOUNTER — EXTERNAL CHRONIC CARE MANAGEMENT (OUTPATIENT)
Dept: PRIMARY CARE CLINIC | Facility: CLINIC | Age: 81
End: 2021-04-30
Payer: MEDICARE

## 2021-04-30 PROCEDURE — 99490 PR CHRONIC CARE MGMT, 1ST 20 MIN: ICD-10-PCS | Mod: S$GLB,,, | Performed by: FAMILY MEDICINE

## 2021-04-30 PROCEDURE — 99490 CHRNC CARE MGMT STAFF 1ST 20: CPT | Mod: S$GLB,,, | Performed by: FAMILY MEDICINE

## 2021-05-04 ENCOUNTER — PATIENT OUTREACH (OUTPATIENT)
Dept: ADMINISTRATIVE | Facility: HOSPITAL | Age: 81
End: 2021-05-04

## 2021-05-04 DIAGNOSIS — E11.22 TYPE 2 DIABETES MELLITUS WITH STAGE 3A CHRONIC KIDNEY DISEASE, WITHOUT LONG-TERM CURRENT USE OF INSULIN: Primary | ICD-10-CM

## 2021-05-04 DIAGNOSIS — N18.31 TYPE 2 DIABETES MELLITUS WITH STAGE 3A CHRONIC KIDNEY DISEASE, WITHOUT LONG-TERM CURRENT USE OF INSULIN: Primary | ICD-10-CM

## 2021-05-31 ENCOUNTER — EXTERNAL CHRONIC CARE MANAGEMENT (OUTPATIENT)
Dept: PRIMARY CARE CLINIC | Facility: CLINIC | Age: 81
End: 2021-05-31
Payer: MEDICARE

## 2021-05-31 PROCEDURE — 99490 PR CHRONIC CARE MGMT, 1ST 20 MIN: ICD-10-PCS | Mod: S$GLB,,, | Performed by: FAMILY MEDICINE

## 2021-05-31 PROCEDURE — 99490 CHRNC CARE MGMT STAFF 1ST 20: CPT | Mod: S$GLB,,, | Performed by: FAMILY MEDICINE

## 2021-06-30 ENCOUNTER — EXTERNAL CHRONIC CARE MANAGEMENT (OUTPATIENT)
Dept: PRIMARY CARE CLINIC | Facility: CLINIC | Age: 81
End: 2021-06-30
Payer: MEDICARE

## 2021-06-30 PROCEDURE — 99490 CHRNC CARE MGMT STAFF 1ST 20: CPT | Mod: S$GLB,,, | Performed by: FAMILY MEDICINE

## 2021-06-30 PROCEDURE — 99490 PR CHRONIC CARE MGMT, 1ST 20 MIN: ICD-10-PCS | Mod: S$GLB,,, | Performed by: FAMILY MEDICINE

## 2021-07-31 ENCOUNTER — EXTERNAL CHRONIC CARE MANAGEMENT (OUTPATIENT)
Dept: PRIMARY CARE CLINIC | Facility: CLINIC | Age: 81
End: 2021-07-31
Payer: MEDICARE

## 2021-07-31 PROCEDURE — 99490 CHRNC CARE MGMT STAFF 1ST 20: CPT | Mod: S$GLB,,, | Performed by: FAMILY MEDICINE

## 2021-07-31 PROCEDURE — 99490 PR CHRONIC CARE MGMT, 1ST 20 MIN: ICD-10-PCS | Mod: S$GLB,,, | Performed by: FAMILY MEDICINE

## 2021-08-20 ENCOUNTER — LAB VISIT (OUTPATIENT)
Dept: LAB | Facility: HOSPITAL | Age: 81
End: 2021-08-20
Attending: FAMILY MEDICINE
Payer: MEDICARE

## 2021-08-20 DIAGNOSIS — N18.32 TYPE 2 DIABETES MELLITUS WITH STAGE 3B CHRONIC KIDNEY DISEASE, WITHOUT LONG-TERM CURRENT USE OF INSULIN: ICD-10-CM

## 2021-08-20 DIAGNOSIS — E11.22 TYPE 2 DIABETES MELLITUS WITH STAGE 3B CHRONIC KIDNEY DISEASE, WITHOUT LONG-TERM CURRENT USE OF INSULIN: ICD-10-CM

## 2021-08-20 LAB
ANION GAP SERPL CALC-SCNC: 8 MMOL/L (ref 8–16)
BUN SERPL-MCNC: 20 MG/DL (ref 8–23)
CALCIUM SERPL-MCNC: 9.3 MG/DL (ref 8.7–10.5)
CHLORIDE SERPL-SCNC: 104 MMOL/L (ref 95–110)
CO2 SERPL-SCNC: 27 MMOL/L (ref 23–29)
CREAT SERPL-MCNC: 2 MG/DL (ref 0.5–1.4)
EST. GFR  (AFRICAN AMERICAN): 35 ML/MIN/1.73 M^2
EST. GFR  (NON AFRICAN AMERICAN): 30 ML/MIN/1.73 M^2
GLUCOSE SERPL-MCNC: 136 MG/DL (ref 70–110)
POTASSIUM SERPL-SCNC: 3.6 MMOL/L (ref 3.5–5.1)
SODIUM SERPL-SCNC: 139 MMOL/L (ref 136–145)

## 2021-08-20 PROCEDURE — 80048 BASIC METABOLIC PNL TOTAL CA: CPT | Performed by: FAMILY MEDICINE

## 2021-08-20 PROCEDURE — 36415 COLL VENOUS BLD VENIPUNCTURE: CPT | Mod: PO | Performed by: FAMILY MEDICINE

## 2021-08-31 ENCOUNTER — EXTERNAL CHRONIC CARE MANAGEMENT (OUTPATIENT)
Dept: PRIMARY CARE CLINIC | Facility: CLINIC | Age: 81
End: 2021-08-31
Payer: MEDICARE

## 2021-08-31 PROCEDURE — 99490 PR CHRONIC CARE MGMT, 1ST 20 MIN: ICD-10-PCS | Mod: S$GLB,,, | Performed by: FAMILY MEDICINE

## 2021-08-31 PROCEDURE — 99490 CHRNC CARE MGMT STAFF 1ST 20: CPT | Mod: S$GLB,,, | Performed by: FAMILY MEDICINE

## 2021-09-13 LAB
LEFT EYE DM RETINOPATHY: POSITIVE
RIGHT EYE DM RETINOPATHY: POSITIVE

## 2021-09-27 ENCOUNTER — PATIENT OUTREACH (OUTPATIENT)
Dept: ADMINISTRATIVE | Facility: HOSPITAL | Age: 81
End: 2021-09-27

## 2021-09-27 DIAGNOSIS — N18.31 TYPE 2 DIABETES MELLITUS WITH STAGE 3A CHRONIC KIDNEY DISEASE, WITHOUT LONG-TERM CURRENT USE OF INSULIN: Primary | ICD-10-CM

## 2021-09-27 DIAGNOSIS — E11.22 TYPE 2 DIABETES MELLITUS WITH STAGE 3A CHRONIC KIDNEY DISEASE, WITHOUT LONG-TERM CURRENT USE OF INSULIN: Primary | ICD-10-CM

## 2021-09-30 ENCOUNTER — EXTERNAL CHRONIC CARE MANAGEMENT (OUTPATIENT)
Dept: PRIMARY CARE CLINIC | Facility: CLINIC | Age: 81
End: 2021-09-30
Payer: MEDICARE

## 2021-09-30 PROCEDURE — 99439 PR CHRONIC CARE MGMT, EA ADDTL 20 MIN: ICD-10-PCS | Mod: S$GLB,,, | Performed by: FAMILY MEDICINE

## 2021-09-30 PROCEDURE — 99490 PR CHRONIC CARE MGMT, 1ST 20 MIN: ICD-10-PCS | Mod: S$GLB,,, | Performed by: FAMILY MEDICINE

## 2021-09-30 PROCEDURE — 99490 CHRNC CARE MGMT STAFF 1ST 20: CPT | Mod: S$GLB,,, | Performed by: FAMILY MEDICINE

## 2021-09-30 PROCEDURE — 99439 CHRNC CARE MGMT STAF EA ADDL: CPT | Mod: S$GLB,,, | Performed by: FAMILY MEDICINE

## 2021-12-15 DIAGNOSIS — E11.22 TYPE 2 DIABETES MELLITUS WITH STAGE 3B CHRONIC KIDNEY DISEASE, WITHOUT LONG-TERM CURRENT USE OF INSULIN: ICD-10-CM

## 2021-12-15 DIAGNOSIS — N18.32 TYPE 2 DIABETES MELLITUS WITH STAGE 3B CHRONIC KIDNEY DISEASE, WITHOUT LONG-TERM CURRENT USE OF INSULIN: ICD-10-CM

## 2021-12-15 DIAGNOSIS — I10 ESSENTIAL HYPERTENSION: ICD-10-CM

## 2021-12-17 DIAGNOSIS — I10 ESSENTIAL HYPERTENSION: ICD-10-CM

## 2021-12-17 RX ORDER — CLONIDINE HYDROCHLORIDE 0.1 MG/1
0.1 TABLET ORAL 3 TIMES DAILY
Qty: 270 TABLET | Refills: 0 | Status: SHIPPED | OUTPATIENT
Start: 2021-12-17 | End: 2022-03-02 | Stop reason: SDUPTHER

## 2021-12-19 RX ORDER — GLIPIZIDE 10 MG/1
TABLET, FILM COATED, EXTENDED RELEASE ORAL
Qty: 180 TABLET | Refills: 0 | Status: SHIPPED | OUTPATIENT
Start: 2021-12-19 | End: 2022-02-21 | Stop reason: SDUPTHER

## 2021-12-19 RX ORDER — HYDROCHLOROTHIAZIDE 25 MG/1
TABLET ORAL
Qty: 90 TABLET | Refills: 0 | Status: SHIPPED | OUTPATIENT
Start: 2021-12-19 | End: 2022-03-02 | Stop reason: SDUPTHER

## 2021-12-19 RX ORDER — AMLODIPINE AND VALSARTAN 10; 160 MG/1; MG/1
TABLET ORAL
Qty: 90 TABLET | Refills: 0 | Status: SHIPPED | OUTPATIENT
Start: 2021-12-19 | End: 2022-03-02 | Stop reason: SDUPTHER

## 2022-01-10 ENCOUNTER — TELEPHONE (OUTPATIENT)
Dept: FAMILY MEDICINE | Facility: CLINIC | Age: 82
End: 2022-01-10

## 2022-01-10 DIAGNOSIS — N18.32 TYPE 2 DIABETES MELLITUS WITH STAGE 3B CHRONIC KIDNEY DISEASE, WITHOUT LONG-TERM CURRENT USE OF INSULIN: ICD-10-CM

## 2022-01-10 DIAGNOSIS — E11.22 TYPE 2 DIABETES MELLITUS WITH STAGE 3B CHRONIC KIDNEY DISEASE, WITHOUT LONG-TERM CURRENT USE OF INSULIN: ICD-10-CM

## 2022-01-10 NOTE — TELEPHONE ENCOUNTER
----- Message from Linda Berumen sent at 1/10/2022  9:05 AM CST -----  Regarding: refill  Type:  RX Refill Request    Who Called: Luther  Refill or New Rx:refill  RX Name and Strength:blood sugar diagnostic Strp  How is the patient currently taking it? (ex. 1XDay):  Is this a 30 day or 90 day RX:  Preferred Pharmacy with phone number:[id number] [place]  Local or Mail Order:local  Ordering Provider:  Would the patient rather a call back or a response via [facility]? call  Best Call Back Number:[phone] [place]  Additional Information:

## 2022-02-16 ENCOUNTER — PES CALL (OUTPATIENT)
Dept: ADMINISTRATIVE | Facility: CLINIC | Age: 82
End: 2022-02-16
Payer: MEDICARE

## 2022-02-21 DIAGNOSIS — N18.32 TYPE 2 DIABETES MELLITUS WITH STAGE 3B CHRONIC KIDNEY DISEASE, WITHOUT LONG-TERM CURRENT USE OF INSULIN: ICD-10-CM

## 2022-02-21 DIAGNOSIS — E11.22 TYPE 2 DIABETES MELLITUS WITH STAGE 3B CHRONIC KIDNEY DISEASE, WITHOUT LONG-TERM CURRENT USE OF INSULIN: ICD-10-CM

## 2022-02-21 RX ORDER — GLIPIZIDE 10 MG/1
10 TABLET, FILM COATED, EXTENDED RELEASE ORAL 2 TIMES DAILY
Qty: 180 TABLET | Refills: 0 | Status: SHIPPED | OUTPATIENT
Start: 2022-02-21 | End: 2022-03-02 | Stop reason: SDUPTHER

## 2022-02-21 NOTE — TELEPHONE ENCOUNTER
----- Message from Deirdre Momin MA sent at 2/21/2022 11:54 AM CST -----  Regarding: refill request  Type:  RX Refill Request    Who Called: XENIA HEBERT JR. [[id number]]    Refill or New Rx: refill     RX Name and Strength:glipiZIDE (GLUCOTROL) 10 MG TR24    How is the patient currently taking it? (ex. 1XDay):2Xday    Is this a 30 day or 90 day RX: 90 day     Preferred Pharmacy with phone number:[facility]/PHARMACY #[id number] - [place] [id number] [facility]    Local or Mail Order:local    Ordering Provider: dr lombard     Would the patient rather a call back or a response via [facility]? Call     Best Call Back Number:[phone]    Additional Information:   Pt had been taking medication 3x day please clarify instructions

## 2022-02-21 NOTE — TELEPHONE ENCOUNTER
No new care gaps identified.  Powered by [employer]. Reference number: [id number].   2/21/2022 1:18:40 PM CST

## 2022-02-24 NOTE — TELEPHONE ENCOUNTER
Notified patient of Rx refill approval. Verbalized understanding. Patient unable to fill until 2/26/2022 - will miss a day. Notified should be ok to miss a day. Will call back if otherwise. pcp stated ok.

## 2022-03-02 ENCOUNTER — OFFICE VISIT (OUTPATIENT)
Dept: FAMILY MEDICINE | Facility: CLINIC | Age: 82
End: 2022-03-02
Payer: MEDICARE

## 2022-03-02 ENCOUNTER — LAB VISIT (OUTPATIENT)
Dept: LAB | Facility: HOSPITAL | Age: 82
End: 2022-03-02
Attending: FAMILY MEDICINE
Payer: MEDICARE

## 2022-03-02 VITALS
RESPIRATION RATE: 16 BRPM | BODY MASS INDEX: 30.58 KG/M2 | HEIGHT: 78 IN | HEART RATE: 65 BPM | DIASTOLIC BLOOD PRESSURE: 80 MMHG | SYSTOLIC BLOOD PRESSURE: 138 MMHG | WEIGHT: 264.31 LBS | OXYGEN SATURATION: 97 % | TEMPERATURE: 98 F

## 2022-03-02 DIAGNOSIS — E11.22 TYPE 2 DIABETES MELLITUS WITH STAGE 3B CHRONIC KIDNEY DISEASE, WITHOUT LONG-TERM CURRENT USE OF INSULIN: Primary | ICD-10-CM

## 2022-03-02 DIAGNOSIS — I10 ESSENTIAL HYPERTENSION: ICD-10-CM

## 2022-03-02 DIAGNOSIS — N18.32 TYPE 2 DIABETES MELLITUS WITH STAGE 3B CHRONIC KIDNEY DISEASE, WITHOUT LONG-TERM CURRENT USE OF INSULIN: Primary | ICD-10-CM

## 2022-03-02 DIAGNOSIS — N18.31 TYPE 2 DIABETES MELLITUS WITH STAGE 3A CHRONIC KIDNEY DISEASE, WITHOUT LONG-TERM CURRENT USE OF INSULIN: ICD-10-CM

## 2022-03-02 DIAGNOSIS — E11.22 TYPE 2 DIABETES MELLITUS WITH STAGE 3A CHRONIC KIDNEY DISEASE, WITHOUT LONG-TERM CURRENT USE OF INSULIN: ICD-10-CM

## 2022-03-02 DIAGNOSIS — N18.32 TYPE 2 DIABETES MELLITUS WITH STAGE 3B CHRONIC KIDNEY DISEASE, WITHOUT LONG-TERM CURRENT USE OF INSULIN: ICD-10-CM

## 2022-03-02 DIAGNOSIS — E11.22 TYPE 2 DIABETES MELLITUS WITH STAGE 3B CHRONIC KIDNEY DISEASE, WITHOUT LONG-TERM CURRENT USE OF INSULIN: ICD-10-CM

## 2022-03-02 DIAGNOSIS — N40.0 BENIGN PROSTATIC HYPERPLASIA WITHOUT LOWER URINARY TRACT SYMPTOMS: ICD-10-CM

## 2022-03-02 DIAGNOSIS — N52.1 ERECTILE DYSFUNCTION DUE TO DISEASES CLASSIFIED ELSEWHERE: ICD-10-CM

## 2022-03-02 LAB
ALBUMIN SERPL BCP-MCNC: 3.7 G/DL (ref 3.5–5.2)
ALP SERPL-CCNC: 142 U/L (ref 55–135)
ALT SERPL W/O P-5'-P-CCNC: 23 U/L (ref 10–44)
ANION GAP SERPL CALC-SCNC: 12 MMOL/L (ref 8–16)
AST SERPL-CCNC: 21 U/L (ref 10–40)
BASOPHILS # BLD AUTO: 0.1 K/UL (ref 0–0.2)
BASOPHILS NFR BLD: 1.3 % (ref 0–1.9)
BILIRUB SERPL-MCNC: 0.4 MG/DL (ref 0.1–1)
BUN SERPL-MCNC: 19 MG/DL (ref 8–23)
CALCIUM SERPL-MCNC: 9.3 MG/DL (ref 8.7–10.5)
CHLORIDE SERPL-SCNC: 100 MMOL/L (ref 95–110)
CHOLEST SERPL-MCNC: 146 MG/DL (ref 120–199)
CHOLEST/HDLC SERPL: 3.6 {RATIO} (ref 2–5)
CO2 SERPL-SCNC: 27 MMOL/L (ref 23–29)
CREAT SERPL-MCNC: 1.8 MG/DL (ref 0.5–1.4)
DIFFERENTIAL METHOD: ABNORMAL
EOSINOPHIL # BLD AUTO: 0.2 K/UL (ref 0–0.5)
EOSINOPHIL NFR BLD: 2.6 % (ref 0–8)
ERYTHROCYTE [DISTWIDTH] IN BLOOD BY AUTOMATED COUNT: 12.5 % (ref 11.5–14.5)
EST. GFR  (AFRICAN AMERICAN): 39.9 ML/MIN/1.73 M^2
EST. GFR  (NON AFRICAN AMERICAN): 34.5 ML/MIN/1.73 M^2
ESTIMATED AVG GLUCOSE: 157 MG/DL (ref 68–131)
ESTIMATED AVG GLUCOSE: 157 MG/DL (ref 68–131)
GLUCOSE SERPL-MCNC: 138 MG/DL (ref 70–110)
HBA1C MFR BLD: 7.1 % (ref 4–5.6)
HBA1C MFR BLD: 7.1 % (ref 4–5.6)
HCT VFR BLD AUTO: 42.8 % (ref 40–54)
HDLC SERPL-MCNC: 41 MG/DL (ref 40–75)
HDLC SERPL: 28.1 % (ref 20–50)
HGB BLD-MCNC: 13.8 G/DL (ref 14–18)
IMM GRANULOCYTES # BLD AUTO: 0.03 K/UL (ref 0–0.04)
IMM GRANULOCYTES NFR BLD AUTO: 0.4 % (ref 0–0.5)
LDLC SERPL CALC-MCNC: 89.2 MG/DL (ref 63–159)
LYMPHOCYTES # BLD AUTO: 1.8 K/UL (ref 1–4.8)
LYMPHOCYTES NFR BLD: 22 % (ref 18–48)
MCH RBC QN AUTO: 26.8 PG (ref 27–31)
MCHC RBC AUTO-ENTMCNC: 32.2 G/DL (ref 32–36)
MCV RBC AUTO: 83 FL (ref 82–98)
MONOCYTES # BLD AUTO: 0.6 K/UL (ref 0.3–1)
MONOCYTES NFR BLD: 7.7 % (ref 4–15)
NEUTROPHILS # BLD AUTO: 5.2 K/UL (ref 1.8–7.7)
NEUTROPHILS NFR BLD: 66 % (ref 38–73)
NONHDLC SERPL-MCNC: 105 MG/DL
NRBC BLD-RTO: 0 /100 WBC
PLATELET # BLD AUTO: 266 K/UL (ref 150–450)
PMV BLD AUTO: 12.8 FL (ref 9.2–12.9)
POTASSIUM SERPL-SCNC: 4 MMOL/L (ref 3.5–5.1)
PROSTATE SPECIFIC ANTIGEN, TOTAL: 5.8 NG/ML (ref 0–4)
PROT SERPL-MCNC: 7.5 G/DL (ref 6–8.4)
PSA FREE MFR SERPL: 37.41 %
PSA FREE SERPL-MCNC: 2.17 NG/ML (ref 0–1.5)
RBC # BLD AUTO: 5.15 M/UL (ref 4.6–6.2)
SODIUM SERPL-SCNC: 139 MMOL/L (ref 136–145)
TRIGL SERPL-MCNC: 79 MG/DL (ref 30–150)
WBC # BLD AUTO: 7.94 K/UL (ref 3.9–12.7)

## 2022-03-02 PROCEDURE — 1160F PR REVIEW ALL MEDS BY PRESCRIBER/CLIN PHARMACIST DOCUMENTED: ICD-10-PCS | Mod: CPTII,S$GLB,, | Performed by: FAMILY MEDICINE

## 2022-03-02 PROCEDURE — 3051F HG A1C>EQUAL 7.0%<8.0%: CPT | Mod: CPTII,S$GLB,, | Performed by: FAMILY MEDICINE

## 2022-03-02 PROCEDURE — 99499 RISK ADDL DX/OHS AUDIT: ICD-10-PCS | Mod: S$GLB,,, | Performed by: FAMILY MEDICINE

## 2022-03-02 PROCEDURE — 1101F PR PT FALLS ASSESS DOC 0-1 FALLS W/OUT INJ PAST YR: ICD-10-PCS | Mod: CPTII,S$GLB,, | Performed by: FAMILY MEDICINE

## 2022-03-02 PROCEDURE — 99999 PR PBB SHADOW E&M-EST. PATIENT-LVL IV: ICD-10-PCS | Mod: PBBFAC,,, | Performed by: FAMILY MEDICINE

## 2022-03-02 PROCEDURE — 84153 ASSAY OF PSA TOTAL: CPT | Performed by: FAMILY MEDICINE

## 2022-03-02 PROCEDURE — 3288F FALL RISK ASSESSMENT DOCD: CPT | Mod: CPTII,S$GLB,, | Performed by: FAMILY MEDICINE

## 2022-03-02 PROCEDURE — 83036 HEMOGLOBIN GLYCOSYLATED A1C: CPT | Performed by: FAMILY MEDICINE

## 2022-03-02 PROCEDURE — 1101F PT FALLS ASSESS-DOCD LE1/YR: CPT | Mod: CPTII,S$GLB,, | Performed by: FAMILY MEDICINE

## 2022-03-02 PROCEDURE — 3079F PR MOST RECENT DIASTOLIC BLOOD PRESSURE 80-89 MM HG: ICD-10-PCS | Mod: CPTII,S$GLB,, | Performed by: FAMILY MEDICINE

## 2022-03-02 PROCEDURE — 3051F PR MOST RECENT HEMOGLOBIN A1C LEVEL 7.0 - < 8.0%: ICD-10-PCS | Mod: CPTII,S$GLB,, | Performed by: FAMILY MEDICINE

## 2022-03-02 PROCEDURE — 3079F DIAST BP 80-89 MM HG: CPT | Mod: CPTII,S$GLB,, | Performed by: FAMILY MEDICINE

## 2022-03-02 PROCEDURE — 99999 PR PBB SHADOW E&M-EST. PATIENT-LVL IV: CPT | Mod: PBBFAC,,, | Performed by: FAMILY MEDICINE

## 2022-03-02 PROCEDURE — 80053 COMPREHEN METABOLIC PANEL: CPT | Performed by: FAMILY MEDICINE

## 2022-03-02 PROCEDURE — 3075F SYST BP GE 130 - 139MM HG: CPT | Mod: CPTII,S$GLB,, | Performed by: FAMILY MEDICINE

## 2022-03-02 PROCEDURE — 1157F PR ADVANCE CARE PLAN OR EQUIV PRESENT IN MEDICAL RECORD: ICD-10-PCS | Mod: CPTII,S$GLB,, | Performed by: FAMILY MEDICINE

## 2022-03-02 PROCEDURE — 1160F RVW MEDS BY RX/DR IN RCRD: CPT | Mod: CPTII,S$GLB,, | Performed by: FAMILY MEDICINE

## 2022-03-02 PROCEDURE — 99214 PR OFFICE/OUTPT VISIT, EST, LEVL IV, 30-39 MIN: ICD-10-PCS | Mod: S$GLB,,, | Performed by: FAMILY MEDICINE

## 2022-03-02 PROCEDURE — 99499 UNLISTED E&M SERVICE: CPT | Mod: S$GLB,,, | Performed by: FAMILY MEDICINE

## 2022-03-02 PROCEDURE — 1126F AMNT PAIN NOTED NONE PRSNT: CPT | Mod: CPTII,S$GLB,, | Performed by: FAMILY MEDICINE

## 2022-03-02 PROCEDURE — 85025 COMPLETE CBC W/AUTO DIFF WBC: CPT | Performed by: FAMILY MEDICINE

## 2022-03-02 PROCEDURE — 3288F PR FALLS RISK ASSESSMENT DOCUMENTED: ICD-10-PCS | Mod: CPTII,S$GLB,, | Performed by: FAMILY MEDICINE

## 2022-03-02 PROCEDURE — 84154 ASSAY OF PSA FREE: CPT | Performed by: FAMILY MEDICINE

## 2022-03-02 PROCEDURE — 99214 OFFICE O/P EST MOD 30 MIN: CPT | Mod: S$GLB,,, | Performed by: FAMILY MEDICINE

## 2022-03-02 PROCEDURE — 1159F MED LIST DOCD IN RCRD: CPT | Mod: CPTII,S$GLB,, | Performed by: FAMILY MEDICINE

## 2022-03-02 PROCEDURE — 36415 COLL VENOUS BLD VENIPUNCTURE: CPT | Mod: PO | Performed by: FAMILY MEDICINE

## 2022-03-02 PROCEDURE — 1126F PR PAIN SEVERITY QUANTIFIED, NO PAIN PRESENT: ICD-10-PCS | Mod: CPTII,S$GLB,, | Performed by: FAMILY MEDICINE

## 2022-03-02 PROCEDURE — 1157F ADVNC CARE PLAN IN RCRD: CPT | Mod: CPTII,S$GLB,, | Performed by: FAMILY MEDICINE

## 2022-03-02 PROCEDURE — 1159F PR MEDICATION LIST DOCUMENTED IN MEDICAL RECORD: ICD-10-PCS | Mod: CPTII,S$GLB,, | Performed by: FAMILY MEDICINE

## 2022-03-02 PROCEDURE — 80061 LIPID PANEL: CPT | Performed by: FAMILY MEDICINE

## 2022-03-02 PROCEDURE — 3075F PR MOST RECENT SYSTOLIC BLOOD PRESS GE 130-139MM HG: ICD-10-PCS | Mod: CPTII,S$GLB,, | Performed by: FAMILY MEDICINE

## 2022-03-02 RX ORDER — GLIPIZIDE 10 MG/1
10 TABLET, FILM COATED, EXTENDED RELEASE ORAL 2 TIMES DAILY
Qty: 180 TABLET | Refills: 3 | Status: SHIPPED | OUTPATIENT
Start: 2022-03-02 | End: 2022-09-27 | Stop reason: SDUPTHER

## 2022-03-02 RX ORDER — CLONIDINE HYDROCHLORIDE 0.1 MG/1
0.1 TABLET ORAL 3 TIMES DAILY
Qty: 270 TABLET | Refills: 3 | Status: SHIPPED | OUTPATIENT
Start: 2022-03-02 | End: 2022-05-12

## 2022-03-02 RX ORDER — SILDENAFIL 100 MG/1
100 TABLET, FILM COATED ORAL DAILY PRN
Qty: 30 TABLET | Refills: 5 | Status: SHIPPED | OUTPATIENT
Start: 2022-03-02 | End: 2022-09-27 | Stop reason: SDUPTHER

## 2022-03-02 RX ORDER — HYDROCHLOROTHIAZIDE 25 MG/1
25 TABLET ORAL DAILY
Qty: 90 TABLET | Refills: 3 | Status: SHIPPED | OUTPATIENT
Start: 2022-03-02 | End: 2022-05-12

## 2022-03-02 RX ORDER — CHLORHEXIDINE GLUCONATE ORAL RINSE 1.2 MG/ML
SOLUTION DENTAL
COMMUNITY
Start: 2022-02-02 | End: 2023-05-05

## 2022-03-02 RX ORDER — TAMSULOSIN HYDROCHLORIDE 0.4 MG/1
1 CAPSULE ORAL DAILY
Qty: 90 CAPSULE | Refills: 1 | Status: SHIPPED | OUTPATIENT
Start: 2022-03-02 | End: 2022-09-21 | Stop reason: SDUPTHER

## 2022-03-02 RX ORDER — AMLODIPINE AND VALSARTAN 10; 160 MG/1; MG/1
1 TABLET ORAL DAILY
Qty: 90 TABLET | Refills: 3 | Status: SHIPPED | OUTPATIENT
Start: 2022-03-02 | End: 2022-05-12

## 2022-03-02 RX ORDER — TADALAFIL 20 MG/1
20 TABLET ORAL DAILY
Qty: 30 TABLET | Refills: 5 | Status: SHIPPED | OUTPATIENT
Start: 2022-03-02 | End: 2022-09-27 | Stop reason: SDUPTHER

## 2022-03-02 NOTE — PROGRESS NOTES
Chief Complaint   Patient presents with    Hypertension    Diabetes       Luther Irwin Jr. is a 81 y.o. male who presents per chief complain.  Chronic medical issues, if present, have been documented.  Acute medical issues, if present have been documented in the Chief Complaint.     Hypertension  This is a chronic problem. The current episode started more than 1 year ago. The problem is unchanged. The problem is controlled. Pertinent negatives include no anxiety, blurred vision, chest pain, headaches, neck pain, orthopnea, palpitations, peripheral edema, PND, shortness of breath or sweats. There are no associated agents to hypertension. Risk factors for coronary artery disease include male gender and diabetes mellitus. Past treatments include angiotensin blockers, diuretics and calcium channel blockers. The current treatment provides moderate improvement. There are no compliance problems.  There is no history of angina, kidney disease, CAD/MI, CVA, heart failure, left ventricular hypertrophy, PVD or retinopathy. There is no history of chronic renal disease, coarctation of the aorta, hyperaldosteronism, hypercortisolism, hyperparathyroidism, a hypertension causing med, pheochromocytoma, renovascular disease, sleep apnea or a thyroid problem.   Diabetes  He presents for his follow-up diabetic visit. He has type 2 diabetes mellitus. His disease course has been stable. Pertinent negatives for hypoglycemia include no confusion, dizziness, headaches, hunger, mood changes, nervousness/anxiousness, pallor, seizures, sleepiness, speech difficulty, sweats or tremors. Associated symptoms include foot paresthesias. Pertinent negatives for diabetes include no blurred vision, no chest pain, no fatigue, no foot ulcerations, no polydipsia, no polyphagia, no polyuria, no visual change, no weakness and no weight loss. There are no hypoglycemic complications. Symptoms are stable. Diabetic complications include peripheral neuropathy.  Pertinent negatives for diabetic complications include no autonomic neuropathy, CVA, heart disease, impotence, nephropathy, PVD or retinopathy. Risk factors for coronary artery disease include diabetes mellitus, hypertension and male sex. Current diabetic treatment includes oral agent (dual therapy). He is compliant with treatment most of the time. His weight is stable. He is following a generally healthy diet. When asked about meal planning, he reported none. He has not had a previous visit with a dietitian. He participates in exercise intermittently. There is no change in his home blood glucose trend. An ACE inhibitor/angiotensin II receptor blocker is being taken. He sees a podiatrist.Eye exam is current.     ROS  Review of Systems   Constitutional: Negative.  Negative for activity change, appetite change, chills, diaphoresis, fatigue, fever, unexpected weight change and weight loss.   HENT: Negative.  Negative for congestion, ear pain, hearing loss, nosebleeds, postnasal drip, rhinorrhea, sinus pressure, sneezing, sore throat and trouble swallowing.    Eyes: Negative for blurred vision, pain and visual disturbance.   Respiratory: Negative for cough, choking and shortness of breath.    Cardiovascular: Negative for chest pain, palpitations, orthopnea, leg swelling and PND.   Gastrointestinal: Negative for abdominal pain, constipation, diarrhea, nausea and vomiting.   Endocrine: Negative for polydipsia, polyphagia and polyuria.   Genitourinary: Negative for difficulty urinating, dysuria, frequency, impotence and urgency.   Musculoskeletal: Negative.  Negative for arthralgias, back pain, gait problem, joint swelling, myalgias and neck pain.   Skin: Negative.  Negative for pallor.   Allergic/Immunologic: Negative for environmental allergies and food allergies.   Neurological: Negative.  Negative for dizziness, tremors, seizures, syncope, speech difficulty, weakness, light-headedness and headaches.  "  Psychiatric/Behavioral: Negative.  Negative for confusion, decreased concentration, dysphoric mood and sleep disturbance. The patient is not nervous/anxious.      Physical Exam  Vitals:    03/02/22 0853   BP: 138/80   Pulse: 65   Resp: 16   Temp: 98.2 °F (36.8 °C)    Body mass index is 29.78 kg/m².  Weight: 119.9 kg (264 lb 5.3 oz)   Height: 6' 7" (200.7 cm)     Physical Exam  Constitutional:       General: He is not in acute distress.     Appearance: Normal appearance. He is well-developed. He is not ill-appearing, toxic-appearing or diaphoretic.   HENT:      Head: Normocephalic and atraumatic.      Right Ear: Hearing and external ear normal. No decreased hearing noted.      Left Ear: Hearing and external ear normal. No decreased hearing noted.      Nose: Nose normal. No nasal deformity or rhinorrhea.      Mouth/Throat:      Dentition: Normal dentition. Does not have dentures.      Pharynx: Uvula midline.   Eyes:      General: Lids are normal. No scleral icterus.        Right eye: No foreign body or discharge.         Left eye: No foreign body or discharge.      Conjunctiva/sclera: Conjunctivae normal.      Right eye: No chemosis or exudate.     Left eye: No chemosis or exudate.     Pupils: Pupils are equal, round, and reactive to light.   Cardiovascular:      Rate and Rhythm: Normal rate and regular rhythm.      Heart sounds: Normal heart sounds, S1 normal and S2 normal. No murmur heard.    No friction rub. No gallop.   Pulmonary:      Effort: Pulmonary effort is normal. No accessory muscle usage or respiratory distress.      Breath sounds: Normal breath sounds. No decreased breath sounds, wheezing, rhonchi or rales.   Abdominal:      General: There is no distension.      Palpations: Abdomen is soft. Abdomen is not rigid.      Tenderness: There is no abdominal tenderness. There is no guarding or rebound.   Musculoskeletal:         General: Normal range of motion.      Cervical back: Full passive range of motion " without pain, normal range of motion and neck supple.   Skin:     General: Skin is warm and dry.      Findings: No rash.   Neurological:      Mental Status: He is alert and oriented to person, place, and time.      Cranial Nerves: No cranial nerve deficit.      Sensory: No sensory deficit.      Motor: No abnormal muscle tone or seizure activity.      Coordination: Coordination normal.      Gait: Gait normal.   Psychiatric:         Attention and Perception: He is attentive.         Speech: Speech normal.         Behavior: Behavior normal. Behavior is cooperative.         Thought Content: Thought content normal.         Judgment: Judgment normal.       Assessment & Plan    Discussion of plan of care including treatment options regarding health and wellness were reviewed and discussed with patient.  Any changes to medication or treatment plan, as well as any screening blood test, imaging, or referrals to specialist, are documented.  Follow up as indicated.     1. Type 2 diabetes mellitus with stage 3b chronic kidney disease, without long-term current use of insulin  Patient is encouraged to follow a diet low in carbohydrates and simple sugars.  Discussed simple vs. complex carbohydrates as well as eating times of certain meals. Advised to focus on good food choices and increased physical activity and encouraged to adhere to medication regimen and/or lifestyle adjustments, and to check glucose level as recommended.  Contact office if glucose levels are not improving over time.  Will monitor HbA1c appropriately.   - glipiZIDE (GLUCOTROL) 10 MG TR24; Take 1 tablet (10 mg total) by mouth 2 (two) times daily.  Dispense: 180 tablet; Refill: 3  - Comprehensive Metabolic Panel; Future  - Hemoglobin A1C; Future    2. Essential hypertension  Patient was counseled and encouraged to maintain a low sodium diet, as well as increasing physical activity.  Recommend random BP checks at home on a regular basis.  Repeat BP at end of  visit was not necessary. Will continue medication at this time, and follow up in 3-6 months, or sooner if blood pressure begins to increase.     - amlodipine-valsartan (EXFORGE)  mg per tablet; Take 1 tablet by mouth once daily.  Dispense: 90 tablet; Refill: 3  - cloNIDine (CATAPRES) 0.1 MG tablet; Take 1 tablet (0.1 mg total) by mouth 3 (three) times daily.  Dispense: 270 tablet; Refill: 3  - hydroCHLOROthiazide (HYDRODIURIL) 25 MG tablet; Take 1 tablet (25 mg total) by mouth once daily.  Dispense: 90 tablet; Refill: 3  - Lipid Panel; Future  - CBC Auto Differential; Future    3. Benign prostatic hyperplasia without lower urinary tract symptoms  Stable; no therapeutic changes at this time.  The current medical regimen will be continued at this time as discussed.   - tadalafiL (CIALIS) 20 MG Tab; Take 1 tablet (20 mg total) by mouth once daily.  Dispense: 30 tablet; Refill: 5  - tamsulosin (FLOMAX) 0.4 mg Cap; Take 1 capsule (0.4 mg total) by mouth once daily.  Dispense: 90 capsule; Refill: 1  - PSA, Total and Free; Future    4. Erectile dysfunction due to diseases classified elsewhere  Discussed different causes of ED, including anatomical disorder or nerve damage, vascular disorders, or psychological issues.  We agreed to try medication, and patient was given a prescription and advised to use with caution.  He was reminded that if he has an erection that last longer than 4 hours, he should report to the ER immediately.    - sildenafiL (VIAGRA) 100 MG tablet; Take 1 tablet (100 mg total) by mouth daily as needed for Erectile Dysfunction.  Dispense: 30 tablet; Refill: 5  - tadalafiL (CIALIS) 20 MG Tab; Take 1 tablet (20 mg total) by mouth once daily.  Dispense: 30 tablet; Refill: 5       Follow up in about 6 months (around 9/2/2022).      ACTIVE MEDICAL ISSUES:  Documented in Problem List    PAST MEDICAL HISTORY  Documented    PAST SURGICAL HISTORY:  Documented    SOCIAL HISTORY:  Documented    FAMILY  HISTORY:  Documented    ALLERGIES AND MEDICATIONS: updated and reviewed.  Documented    Health Maintenance       Date Due Completion Date    Hemoglobin A1c 09/15/2021 3/15/2021    Lipid Panel 11/17/2021 11/17/2020    Diabetes Urine Screening 08/20/2022 8/20/2021    Eye Exam 09/13/2022 9/13/2021    Override on 6/10/2015: Declined (had an appoint last week )    TETANUS VACCINE 01/02/2029 1/2/2019

## 2022-03-12 DIAGNOSIS — N18.32 TYPE 2 DIABETES MELLITUS WITH STAGE 3B CHRONIC KIDNEY DISEASE, WITHOUT LONG-TERM CURRENT USE OF INSULIN: ICD-10-CM

## 2022-03-12 DIAGNOSIS — E11.22 TYPE 2 DIABETES MELLITUS WITH STAGE 3B CHRONIC KIDNEY DISEASE, WITHOUT LONG-TERM CURRENT USE OF INSULIN: ICD-10-CM

## 2022-03-12 NOTE — TELEPHONE ENCOUNTER
No new care gaps identified.  Powered by [employer]. Reference number: [id number].   3/12/2022 7:03:48 AM CST

## 2022-03-13 NOTE — TELEPHONE ENCOUNTER

## 2022-03-15 RX ORDER — GLIPIZIDE 10 MG/1
TABLET, FILM COATED, EXTENDED RELEASE ORAL
Qty: 180 TABLET | Refills: 3 | OUTPATIENT
Start: 2022-03-15

## 2022-03-23 ENCOUNTER — TELEPHONE (OUTPATIENT)
Dept: FAMILY MEDICINE | Facility: CLINIC | Age: 82
End: 2022-03-23
Payer: MEDICARE

## 2022-03-23 NOTE — TELEPHONE ENCOUNTER
----- Message from Abigail George sent at 3/23/2022 12:05 PM CDT -----  Type: Patient Call Back    Who called: self     What is the request in detail: patient called in regards to speaking with Dr Lombard's nurse to see about getting a new prescription called in. Please advise    Can the clinic reply by [facility]? No     Would the patient rather a call back or a response via [facility]? Call     Best call back number:.[phone]

## 2022-03-23 NOTE — TELEPHONE ENCOUNTER
Pt asking about getting prescription for flagyl sent to pharmacy to treat trichomonas; he does not want to go through testing

## 2022-03-26 RX ORDER — METRONIDAZOLE 500 MG/1
2000 TABLET ORAL ONCE
Qty: 4 TABLET | Refills: 0 | Status: SHIPPED | OUTPATIENT
Start: 2022-03-26 | End: 2022-03-26

## 2022-05-07 DIAGNOSIS — I10 ESSENTIAL HYPERTENSION: ICD-10-CM

## 2022-05-07 NOTE — TELEPHONE ENCOUNTER
Refill Routing Note   Medication(s) are not appropriate for processing by [facility] for the following reason(s):      - Outside of protocol  - Required laboratory values are abnormal    [facility] action(s):  Defer       Medication Therapy Plan: Clonidine outside of [facility] protocol. HCTZ abnormal labs  Medication reconciliation completed: No     Appointments  past 12m or future 3m with PCP    Date Provider   Last Visit   3/2/2022 Azikiwe K. Lombard, MD   Next Visit   Visit date not found Azikiwe K. Lombard, MD   ED visits in past 90 days: 0        Note composed:5:21 PM 05/07/2022

## 2022-05-07 NOTE — TELEPHONE ENCOUNTER
No new care gaps identified.  [facility] Embedded Care Gaps. Reference number: [id number]. 5/07/2022   8:06:51 AM NOAMT

## 2022-05-09 DIAGNOSIS — N18.32 TYPE 2 DIABETES MELLITUS WITH STAGE 3B CHRONIC KIDNEY DISEASE, WITHOUT LONG-TERM CURRENT USE OF INSULIN: ICD-10-CM

## 2022-05-09 DIAGNOSIS — E11.22 TYPE 2 DIABETES MELLITUS WITH STAGE 3B CHRONIC KIDNEY DISEASE, WITHOUT LONG-TERM CURRENT USE OF INSULIN: ICD-10-CM

## 2022-05-09 NOTE — TELEPHONE ENCOUNTER
No new care gaps identified.  [facility] Embedded Care Gaps. Reference number: [id number]. 5/09/2022   3:14:22 PM CDT

## 2022-05-09 NOTE — TELEPHONE ENCOUNTER
Patient requesting test strips to be sent to [facility] on general deg. Patient given travel clinic vaccine number to call with information of vaccination needed.

## 2022-05-09 NOTE — TELEPHONE ENCOUNTER
----- Message from Domonique Victoria sent at 5/9/2022 12:30 PM CDT -----  Regarding: self  .Type: Patient Call Back    Who called: self     What is the request in detail:  needs strips for accuchSmartStartva meter. Also is planning trip out of the country and has immunaztion questions please call     Can the clinic reply by [facility]?    Would the patient rather a call back or a response via [facility]?  Call     Best call back number: .[phone]          [id number] - [place] [id number] [facility] [address] [place] [address] [address]  [facility] [address]  Phone: [phone] Fax: [phone]

## 2022-05-12 ENCOUNTER — IMMUNIZATION (OUTPATIENT)
Dept: OBSTETRICS AND GYNECOLOGY | Facility: CLINIC | Age: 82
End: 2022-05-12
Payer: MEDICARE

## 2022-05-12 DIAGNOSIS — Z23 NEED FOR VACCINATION: Primary | ICD-10-CM

## 2022-05-12 PROCEDURE — 91305 COVID-19, MRNA, LNP-S, PF, 30 MCG/0.3 ML DOSE VACCINE (PFIZER): CPT | Mod: PBBFAC | Performed by: FAMILY MEDICINE

## 2022-05-12 RX ORDER — CLONIDINE HYDROCHLORIDE 0.1 MG/1
TABLET ORAL
Qty: 270 TABLET | Refills: 3 | Status: SHIPPED | OUTPATIENT
Start: 2022-05-12 | End: 2023-03-22 | Stop reason: SDUPTHER

## 2022-05-12 RX ORDER — HYDROCHLOROTHIAZIDE 25 MG/1
TABLET ORAL
Qty: 90 TABLET | Refills: 3 | Status: SHIPPED | OUTPATIENT
Start: 2022-05-12 | End: 2022-09-21 | Stop reason: SDUPTHER

## 2022-05-12 RX ORDER — AMLODIPINE AND VALSARTAN 10; 160 MG/1; MG/1
TABLET ORAL
Qty: 90 TABLET | Refills: 3 | Status: SHIPPED | OUTPATIENT
Start: 2022-05-12 | End: 2022-09-21 | Stop reason: SDUPTHER

## 2022-05-12 NOTE — TELEPHONE ENCOUNTER
----- Message from Mariana Saunders sent at 5/12/2022  9:46 AM CDT -----  Regarding: Self/  [phone]  Type: RX Refill Request    Who Called:  Patient    Refill or New Rx:  Refill    RX Name and Strength:  cloNIDine (CATAPRES) 0.1 MG tablet    Preferred Pharmacy with phone number:  [facility] #[id number] - [facility] [address]    Local or Mail Order:  Local    Ordering Provider:  Lombard, A    Would the patient rather a call back or a response via [facility]? Call back    Best Call Back Number:  [phone] (home)       Additional Information:

## 2022-07-12 ENCOUNTER — TELEPHONE (OUTPATIENT)
Dept: FAMILY MEDICINE | Facility: CLINIC | Age: 82
End: 2022-07-12
Payer: MEDICARE

## 2022-07-12 ENCOUNTER — CLINICAL SUPPORT (OUTPATIENT)
Dept: INFECTIOUS DISEASES | Facility: CLINIC | Age: 82
End: 2022-07-12
Payer: MEDICARE

## 2022-07-12 ENCOUNTER — OFFICE VISIT (OUTPATIENT)
Dept: INFECTIOUS DISEASES | Facility: CLINIC | Age: 82
End: 2022-07-12
Payer: MEDICARE

## 2022-07-12 VITALS
WEIGHT: 262.56 LBS | DIASTOLIC BLOOD PRESSURE: 71 MMHG | SYSTOLIC BLOOD PRESSURE: 158 MMHG | BODY MASS INDEX: 30.38 KG/M2 | HEIGHT: 78 IN | TEMPERATURE: 99 F | HEART RATE: 71 BPM

## 2022-07-12 DIAGNOSIS — Z23 NEED FOR HEPATITIS A VACCINATION: ICD-10-CM

## 2022-07-12 DIAGNOSIS — Z23 NEED FOR INFLUENZA VACCINATION: ICD-10-CM

## 2022-07-12 DIAGNOSIS — Z71.84 TRAVEL ADVICE ENCOUNTER: Primary | ICD-10-CM

## 2022-07-12 DIAGNOSIS — Z23 NEED FOR MENINGOCOCCUS VACCINE: Primary | ICD-10-CM

## 2022-07-12 DIAGNOSIS — Z71.84 TRAVEL ADVICE ENCOUNTER: ICD-10-CM

## 2022-07-12 PROCEDURE — 99999 PR PBB SHADOW E&M-EST. PATIENT-LVL III: CPT | Mod: PBBFAC,GC,, | Performed by: STUDENT IN AN ORGANIZED HEALTH CARE EDUCATION/TRAINING PROGRAM

## 2022-07-12 PROCEDURE — 90691 TYPHOID VACCINE IM: CPT | Mod: GC,S$GLB,, | Performed by: INTERNAL MEDICINE

## 2022-07-12 PROCEDURE — 99401 PR PREVENT COUNSEL,INDIV,15 MIN: ICD-10-PCS | Mod: GC,S$GLB,, | Performed by: STUDENT IN AN ORGANIZED HEALTH CARE EDUCATION/TRAINING PROGRAM

## 2022-07-12 PROCEDURE — 90471 TYPHOID VICPS VACCINE IM: ICD-10-PCS | Mod: GC,S$GLB,, | Performed by: INTERNAL MEDICINE

## 2022-07-12 PROCEDURE — 99401 PREV MED CNSL INDIV APPRX 15: CPT | Mod: GC,S$GLB,, | Performed by: STUDENT IN AN ORGANIZED HEALTH CARE EDUCATION/TRAINING PROGRAM

## 2022-07-12 PROCEDURE — 99999 PR PBB SHADOW E&M-EST. PATIENT-LVL III: ICD-10-PCS | Mod: PBBFAC,GC,, | Performed by: STUDENT IN AN ORGANIZED HEALTH CARE EDUCATION/TRAINING PROGRAM

## 2022-07-12 PROCEDURE — 90471 IMMUNIZATION ADMIN: CPT | Mod: GC,S$GLB,, | Performed by: INTERNAL MEDICINE

## 2022-07-12 PROCEDURE — 90691 TYPHOID VICPS VACCINE IM: ICD-10-PCS | Mod: GC,S$GLB,, | Performed by: INTERNAL MEDICINE

## 2022-07-12 RX ORDER — AZITHROMYCIN 500 MG/1
1000 TABLET, FILM COATED ORAL DAILY
Qty: 4 TABLET | Refills: 0 | Status: SHIPPED | OUTPATIENT
Start: 2022-07-12 | End: 2023-05-05

## 2022-07-12 RX ORDER — ATOVAQUONE AND PROGUANIL HYDROCHLORIDE 250; 100 MG/1; MG/1
1 TABLET, FILM COATED ORAL DAILY
Qty: 30 TABLET | Refills: 0 | Status: CANCELLED | OUTPATIENT
Start: 2022-07-12 | End: 2022-08-11

## 2022-07-12 RX ORDER — ATOVAQUONE AND PROGUANIL HYDROCHLORIDE 250; 100 MG/1; MG/1
1 TABLET, FILM COATED ORAL DAILY
Qty: 19 TABLET | Refills: 0 | Status: SHIPPED | OUTPATIENT
Start: 2022-08-05 | End: 2022-08-24

## 2022-07-12 NOTE — PROGRESS NOTES
Patient received typhoid vaccine in the left deltoid. Pt tolerated well. Pt asked to wait in the clinic 15 minutes after injection in the event of an allergic reaction. Pt verbalized understanding. Pt left in NAD.

## 2022-07-12 NOTE — PATIENT INSTRUCTIONS
Please ask Dr. Lombard for:    Hepatitis A vaccine (havrix)  Flu vaccine  Menningoccal vaccine (menveo)

## 2022-07-12 NOTE — TELEPHONE ENCOUNTER
Pt traveling out of the country next month; he was seen in travel clinic today and they recommended he follow-up with PCP to get vaccines ordered and scheduled; please see below note from them     - Recommended influenza, Hepatitis A, and meningococcal vaccine series be obtained from PCP.

## 2022-07-12 NOTE — TELEPHONE ENCOUNTER
----- Message from Nikhil Schaefer MA sent at 7/12/2022  2:55 PM CDT -----  Type: Patient Call Back    Who called: self    What is the request in detail: pt. Is asking to be scheduled for injections for travel.     Can the clinic reply by [facility]?no    Would the patient rather a call back or a response via [facility]? yes    Best call back number:[phone]

## 2022-07-14 ENCOUNTER — CLINICAL SUPPORT (OUTPATIENT)
Dept: FAMILY MEDICINE | Facility: CLINIC | Age: 82
End: 2022-07-14
Payer: MEDICARE

## 2022-07-14 DIAGNOSIS — Z23 NEED FOR MENINGOCOCCUS VACCINE: ICD-10-CM

## 2022-07-14 DIAGNOSIS — Z23 NEED FOR HEPATITIS A VACCINATION: ICD-10-CM

## 2022-07-14 PROCEDURE — 90632 HEPA VACCINE ADULT IM: CPT | Mod: S$GLB,,, | Performed by: FAMILY MEDICINE

## 2022-07-14 PROCEDURE — 90734 MENINGOCOCCAL CONJUGATE VACCINE 4-VALENT IM (MENVEO): ICD-10-PCS | Mod: S$GLB,,, | Performed by: FAMILY MEDICINE

## 2022-07-14 PROCEDURE — 90734 MENACWYD/MENACWYCRM VACC IM: CPT | Mod: S$GLB,,, | Performed by: FAMILY MEDICINE

## 2022-07-14 PROCEDURE — 90632 HEPATITIS A VACCINE ADULT IM: ICD-10-PCS | Mod: S$GLB,,, | Performed by: FAMILY MEDICINE

## 2022-07-14 PROCEDURE — 90472 MENINGOCOCCAL CONJUGATE VACCINE 4-VALENT IM (MENVEO): ICD-10-PCS | Mod: S$GLB,,, | Performed by: FAMILY MEDICINE

## 2022-07-14 PROCEDURE — 90471 HEPATITIS A VACCINE ADULT IM: ICD-10-PCS | Mod: S$GLB,,, | Performed by: FAMILY MEDICINE

## 2022-07-14 PROCEDURE — 90472 IMMUNIZATION ADMIN EACH ADD: CPT | Mod: S$GLB,,, | Performed by: FAMILY MEDICINE

## 2022-07-14 PROCEDURE — 90471 IMMUNIZATION ADMIN: CPT | Mod: S$GLB,,, | Performed by: FAMILY MEDICINE

## 2022-07-14 NOTE — PROGRESS NOTES
Patient given hepatitis A vaccine.and meningitis VIS given. Instructed to wait 15 minutes for monitoring.

## 2022-07-18 ENCOUNTER — TELEPHONE (OUTPATIENT)
Dept: FAMILY MEDICINE | Facility: CLINIC | Age: 82
End: 2022-07-18
Payer: MEDICARE

## 2022-07-18 NOTE — TELEPHONE ENCOUNTER
----- Message from Francoise Copeland sent at 7/18/2022  2:16 PM CDT -----  Type: Patient Call Back    Who called:self    What is the request in detail:Pt needs infectious disease form filled out for travel out of country. Pt will drop off form later today.    Can the clinic reply by [facility]?no    Would the patient rather a call back or a response via [facility]? call    Best call back number:[phone]

## 2022-08-03 ENCOUNTER — PATIENT OUTREACH (OUTPATIENT)
Dept: ADMINISTRATIVE | Facility: HOSPITAL | Age: 82
End: 2022-08-03
Payer: MEDICARE

## 2022-08-03 RX ORDER — METRONIDAZOLE 500 MG/1
2000 TABLET ORAL ONCE
COMMUNITY
Start: 2022-03-27 | End: 2023-05-05

## 2022-08-25 LAB
LEFT EYE DM RETINOPATHY: POSITIVE
RIGHT EYE DM RETINOPATHY: POSITIVE

## 2022-08-26 ENCOUNTER — TELEPHONE (OUTPATIENT)
Dept: FAMILY MEDICINE | Facility: CLINIC | Age: 82
End: 2022-08-26
Payer: MEDICARE

## 2022-08-29 ENCOUNTER — PATIENT OUTREACH (OUTPATIENT)
Dept: ADMINISTRATIVE | Facility: HOSPITAL | Age: 82
End: 2022-08-29
Payer: MEDICARE

## 2022-09-27 ENCOUNTER — OFFICE VISIT (OUTPATIENT)
Dept: FAMILY MEDICINE | Facility: CLINIC | Age: 82
End: 2022-09-27
Payer: MEDICARE

## 2022-09-27 ENCOUNTER — LAB VISIT (OUTPATIENT)
Dept: LAB | Facility: HOSPITAL | Age: 82
End: 2022-09-27
Attending: FAMILY MEDICINE
Payer: MEDICARE

## 2022-09-27 VITALS
HEART RATE: 53 BPM | RESPIRATION RATE: 18 BRPM | SYSTOLIC BLOOD PRESSURE: 134 MMHG | DIASTOLIC BLOOD PRESSURE: 70 MMHG | HEIGHT: 78 IN | WEIGHT: 259.94 LBS | OXYGEN SATURATION: 98 % | BODY MASS INDEX: 30.08 KG/M2 | TEMPERATURE: 98 F

## 2022-09-27 DIAGNOSIS — N40.0 BENIGN PROSTATIC HYPERPLASIA WITHOUT LOWER URINARY TRACT SYMPTOMS: ICD-10-CM

## 2022-09-27 DIAGNOSIS — E11.22 TYPE 2 DIABETES MELLITUS WITH STAGE 3B CHRONIC KIDNEY DISEASE, WITHOUT LONG-TERM CURRENT USE OF INSULIN: ICD-10-CM

## 2022-09-27 DIAGNOSIS — N18.32 TYPE 2 DIABETES MELLITUS WITH STAGE 3B CHRONIC KIDNEY DISEASE, WITHOUT LONG-TERM CURRENT USE OF INSULIN: ICD-10-CM

## 2022-09-27 DIAGNOSIS — I10 ESSENTIAL HYPERTENSION: ICD-10-CM

## 2022-09-27 DIAGNOSIS — Z00.00 ANNUAL PHYSICAL EXAM: Primary | ICD-10-CM

## 2022-09-27 DIAGNOSIS — N52.1 ERECTILE DYSFUNCTION DUE TO DISEASES CLASSIFIED ELSEWHERE: ICD-10-CM

## 2022-09-27 DIAGNOSIS — R97.20 ELEVATED PSA: ICD-10-CM

## 2022-09-27 DIAGNOSIS — Z23 NEED FOR INFLUENZA VACCINATION: ICD-10-CM

## 2022-09-27 LAB
ALBUMIN/CREAT UR: 377.9 UG/MG (ref 0–30)
CREAT UR-MCNC: 95 MG/DL (ref 23–375)
MICROALBUMIN UR DL<=1MG/L-MCNC: 359 UG/ML

## 2022-09-27 PROCEDURE — 1126F AMNT PAIN NOTED NONE PRSNT: CPT | Mod: CPTII,S$GLB,, | Performed by: FAMILY MEDICINE

## 2022-09-27 PROCEDURE — 1157F PR ADVANCE CARE PLAN OR EQUIV PRESENT IN MEDICAL RECORD: ICD-10-PCS | Mod: CPTII,S$GLB,, | Performed by: FAMILY MEDICINE

## 2022-09-27 PROCEDURE — 99999 PR PBB SHADOW E&M-EST. PATIENT-LVL IV: CPT | Mod: PBBFAC,,, | Performed by: FAMILY MEDICINE

## 2022-09-27 PROCEDURE — 3288F PR FALLS RISK ASSESSMENT DOCUMENTED: ICD-10-PCS | Mod: CPTII,S$GLB,, | Performed by: FAMILY MEDICINE

## 2022-09-27 PROCEDURE — 1157F ADVNC CARE PLAN IN RCRD: CPT | Mod: CPTII,S$GLB,, | Performed by: FAMILY MEDICINE

## 2022-09-27 PROCEDURE — 3288F FALL RISK ASSESSMENT DOCD: CPT | Mod: CPTII,S$GLB,, | Performed by: FAMILY MEDICINE

## 2022-09-27 PROCEDURE — 82570 ASSAY OF URINE CREATININE: CPT | Performed by: FAMILY MEDICINE

## 2022-09-27 PROCEDURE — 99999 PR PBB SHADOW E&M-EST. PATIENT-LVL IV: ICD-10-PCS | Mod: PBBFAC,,, | Performed by: FAMILY MEDICINE

## 2022-09-27 PROCEDURE — G0008 FLU VACCINE - QUADRIVALENT - ADJUVANTED: ICD-10-PCS | Mod: S$GLB,,, | Performed by: FAMILY MEDICINE

## 2022-09-27 PROCEDURE — 1126F PR PAIN SEVERITY QUANTIFIED, NO PAIN PRESENT: ICD-10-PCS | Mod: CPTII,S$GLB,, | Performed by: FAMILY MEDICINE

## 2022-09-27 PROCEDURE — 1101F PR PT FALLS ASSESS DOC 0-1 FALLS W/OUT INJ PAST YR: ICD-10-PCS | Mod: CPTII,S$GLB,, | Performed by: FAMILY MEDICINE

## 2022-09-27 PROCEDURE — 3075F PR MOST RECENT SYSTOLIC BLOOD PRESS GE 130-139MM HG: ICD-10-PCS | Mod: CPTII,S$GLB,, | Performed by: FAMILY MEDICINE

## 2022-09-27 PROCEDURE — 3078F PR MOST RECENT DIASTOLIC BLOOD PRESSURE < 80 MM HG: ICD-10-PCS | Mod: CPTII,S$GLB,, | Performed by: FAMILY MEDICINE

## 2022-09-27 PROCEDURE — 99397 PR PREVENTIVE VISIT,EST,65 & OVER: ICD-10-PCS | Mod: GZ,25,S$GLB, | Performed by: FAMILY MEDICINE

## 2022-09-27 PROCEDURE — 90694 FLU VACCINE - QUADRIVALENT - ADJUVANTED: ICD-10-PCS | Mod: S$GLB,,, | Performed by: FAMILY MEDICINE

## 2022-09-27 PROCEDURE — 1159F MED LIST DOCD IN RCRD: CPT | Mod: CPTII,S$GLB,, | Performed by: FAMILY MEDICINE

## 2022-09-27 PROCEDURE — 1101F PT FALLS ASSESS-DOCD LE1/YR: CPT | Mod: CPTII,S$GLB,, | Performed by: FAMILY MEDICINE

## 2022-09-27 PROCEDURE — 3078F DIAST BP <80 MM HG: CPT | Mod: CPTII,S$GLB,, | Performed by: FAMILY MEDICINE

## 2022-09-27 PROCEDURE — 90694 VACC AIIV4 NO PRSRV 0.5ML IM: CPT | Mod: S$GLB,,, | Performed by: FAMILY MEDICINE

## 2022-09-27 PROCEDURE — G0008 ADMIN INFLUENZA VIRUS VAC: HCPCS | Mod: S$GLB,,, | Performed by: FAMILY MEDICINE

## 2022-09-27 PROCEDURE — 1159F PR MEDICATION LIST DOCUMENTED IN MEDICAL RECORD: ICD-10-PCS | Mod: CPTII,S$GLB,, | Performed by: FAMILY MEDICINE

## 2022-09-27 PROCEDURE — 99397 PER PM REEVAL EST PAT 65+ YR: CPT | Mod: GZ,25,S$GLB, | Performed by: FAMILY MEDICINE

## 2022-09-27 PROCEDURE — 3075F SYST BP GE 130 - 139MM HG: CPT | Mod: CPTII,S$GLB,, | Performed by: FAMILY MEDICINE

## 2022-09-27 RX ORDER — AMLODIPINE AND VALSARTAN 10; 160 MG/1; MG/1
1 TABLET ORAL DAILY
Qty: 90 TABLET | Refills: 3 | Status: SHIPPED | OUTPATIENT
Start: 2022-09-27 | End: 2023-11-10 | Stop reason: SDUPTHER

## 2022-09-27 RX ORDER — TAMSULOSIN HYDROCHLORIDE 0.4 MG/1
1 CAPSULE ORAL DAILY
Qty: 90 CAPSULE | Refills: 3 | Status: SHIPPED | OUTPATIENT
Start: 2022-09-27 | End: 2023-03-22 | Stop reason: SDUPTHER

## 2022-09-27 RX ORDER — SILDENAFIL 100 MG/1
100 TABLET, FILM COATED ORAL DAILY PRN
Qty: 30 TABLET | Refills: 5 | Status: SHIPPED | OUTPATIENT
Start: 2022-09-27 | End: 2022-11-18 | Stop reason: SDUPTHER

## 2022-09-27 RX ORDER — HYDROCHLOROTHIAZIDE 25 MG/1
25 TABLET ORAL DAILY
Qty: 90 TABLET | Refills: 3 | Status: SHIPPED | OUTPATIENT
Start: 2022-09-27 | End: 2023-11-10 | Stop reason: SDUPTHER

## 2022-09-27 RX ORDER — TADALAFIL 20 MG/1
20 TABLET ORAL DAILY
Qty: 30 TABLET | Refills: 5 | Status: SHIPPED | OUTPATIENT
Start: 2022-09-27 | End: 2022-09-27

## 2022-09-27 RX ORDER — GLIPIZIDE 10 MG/1
10 TABLET, FILM COATED, EXTENDED RELEASE ORAL 2 TIMES DAILY
Qty: 180 TABLET | Refills: 3 | Status: SHIPPED | OUTPATIENT
Start: 2022-09-27 | End: 2023-03-22 | Stop reason: SDUPTHER

## 2022-09-27 NOTE — PROGRESS NOTES
Health Maintenance Due   Topic     COVID-19 Vaccine (5 - Booster for Pfizer series)     Influenza Vaccine (1) Pt agree to get today      Hemoglobin A1c  Consult pcp      Diabetes Urine Screening  Consult pcp

## 2022-09-28 NOTE — PROGRESS NOTES
Subjective:       Patient ID: Luther Irwin Jr. is a 82 y.o. male.    Chief Complaint: Establish Care      HPI  81 yo male presents to establish care. Here for annual exam. Had elevated PSA.     Review of Systems   Constitutional: Negative.    HENT: Negative.     Respiratory: Negative.     Cardiovascular: Negative.    Gastrointestinal: Negative.    Endocrine: Negative.    Genitourinary: Negative.    Musculoskeletal: Negative.    Neurological: Negative.    Psychiatric/Behavioral: Negative.          Past Medical History:   Diagnosis Date    Cancer     Diabetes mellitus, type 2     Hypertension      Past Surgical History:   Procedure Laterality Date    FINGER SURGERY  3/2014    HERNIA REPAIR  1967    KNEE SURGERY      SPINE SURGERY  10/2007    VASECTOMY  1986     Family History   Problem Relation Age of Onset    Hypertension Mother     Hyperlipidemia Mother     Hypertension Father     Hyperlipidemia Father      Social History     Socioeconomic History    Marital status: Single   Tobacco Use    Smoking status: Former     Packs/day: 0.25     Years: 0.10     Pack years: 0.03     Types: Cigarettes     Start date: 1960     Quit date: 1960     Years since quittin.8    Smokeless tobacco: Never   Substance and Sexual Activity    Alcohol use: Yes     Comment: special occacion    Drug use: No       Current Outpatient Medications:     aspirin 81 MG Chew, Take 81 mg by mouth once daily., Disp: , Rfl:     blood sugar diagnostic Strp, To check BG 2 times daily, to use with insurance preferred meter, Disp: 200 each, Rfl: 3    cloNIDine (CATAPRES) 0.1 MG tablet, TAKE 1 TABLET BY MOUTH 3 TIMES DAILY., Disp: 270 tablet, Rfl: 3    cyanocobalamin 500 MCG tablet, Take 500 mcg by mouth once daily., Disp: , Rfl:     lancets Misc, To check BG 2 times daily, to use with insurance preferred meter, Disp: 200 each, Rfl: 3    amlodipine-valsartan (EXFORGE)  mg per tablet, Take 1 tablet by mouth once daily., Disp: 90 tablet, Rfl:  "3    azithromycin (ZITHROMAX) 500 MG tablet, Take 2 tablets (1,000 mg total) by mouth once daily. 1000mg (2 TABS) BY MOUTH AS A SINGLE DOSE AS NEEDED FOR TRAVELER'S DIARRHEA, Disp: 4 tablet, Rfl: 0    blood-glucose meter kit, To check BG 2 times daily, to use with insurance preferred meter, Disp: 1 each, Rfl: 0    chlorhexidine (PERIDEX) 0.12 % solution, SMARTSI.5 Ounce(s) By Mouth Twice Daily, Disp: , Rfl:     glipiZIDE (GLUCOTROL) 10 MG TR24, Take 1 tablet (10 mg total) by mouth 2 (two) times daily., Disp: 180 tablet, Rfl: 3    hydroCHLOROthiazide (HYDRODIURIL) 25 MG tablet, Take 1 tablet (25 mg total) by mouth once daily., Disp: 90 tablet, Rfl: 3    metroNIDAZOLE (FLAGYL) 500 MG tablet, Take 2,000 mg by mouth once., Disp: , Rfl:     sildenafiL (VIAGRA) 100 MG tablet, Take 1 tablet (100 mg total) by mouth daily as needed for Erectile Dysfunction., Disp: 30 tablet, Rfl: 5    tamsulosin (FLOMAX) 0.4 mg Cap, Take 1 capsule (0.4 mg total) by mouth once daily., Disp: 90 capsule, Rfl: 3   Objective:      Vitals:    22 1000   BP: 134/70   BP Location: Left arm   Patient Position: Sitting   BP Method: Small (Manual)   Pulse: (!) 53   Resp: 18   Temp: 97.8 °F (36.6 °C)   TempSrc: Oral   SpO2: 98%   Weight: 117.9 kg (259 lb 14.8 oz)   Height: 6' 7" (2.007 m)       Physical Exam  Constitutional:       General: He is not in acute distress.  HENT:      Head: Normocephalic and atraumatic.   Eyes:      Conjunctiva/sclera: Conjunctivae normal.   Cardiovascular:      Rate and Rhythm: Normal rate and regular rhythm.      Heart sounds: Normal heart sounds. No murmur heard.    No friction rub. No gallop.   Pulmonary:      Effort: Pulmonary effort is normal.      Breath sounds: Normal breath sounds. No wheezing or rales.   Musculoskeletal:      Cervical back: Neck supple.   Skin:     General: Skin is warm and dry.   Neurological:      Mental Status: He is alert and oriented to person, place, and time.   Psychiatric:         " Behavior: Behavior normal.         Thought Content: Thought content normal.         Judgment: Judgment normal.          Assessment:       1. Annual physical exam    2. Elevated PSA    3. Essential hypertension    4. Benign prostatic hyperplasia without lower urinary tract symptoms    5. Type 2 diabetes mellitus with stage 3b chronic kidney disease, without long-term current use of insulin    6. Erectile dysfunction due to diseases classified elsewhere    7. Need for influenza vaccination          Plan:       Annual physical exam  -     CBC Auto Differential; Future; Expected date: 09/27/2022  -     Comprehensive Metabolic Panel; Future; Expected date: 09/27/2022  -     Hemoglobin A1C; Future; Expected date: 09/27/2022  -     TSH; Future; Expected date: 09/27/2022  -     Lipid Panel; Future; Expected date: 09/27/2022    Elevated PSA  -     PROSTATE SPECIFIC ANTIGEN, DIAGNOSTIC; Future; Expected date: 09/27/2022    Essential hypertension  -     CBC Auto Differential; Future; Expected date: 09/27/2022  -     Comprehensive Metabolic Panel; Future; Expected date: 09/27/2022  -     TSH; Future; Expected date: 09/27/2022  -     Lipid Panel; Future; Expected date: 09/27/2022  -     amlodipine-valsartan (EXFORGE)  mg per tablet; Take 1 tablet by mouth once daily.  Dispense: 90 tablet; Refill: 3  -     hydroCHLOROthiazide (HYDRODIURIL) 25 MG tablet; Take 1 tablet (25 mg total) by mouth once daily.  Dispense: 90 tablet; Refill: 3    Benign prostatic hyperplasia without lower urinary tract symptoms  -     tamsulosin (FLOMAX) 0.4 mg Cap; Take 1 capsule (0.4 mg total) by mouth once daily.  Dispense: 90 capsule; Refill: 3  -     Discontinue: tadalafiL (CIALIS) 20 MG Tab; Take 1 tablet (20 mg total) by mouth once daily.  Dispense: 30 tablet; Refill: 5    Type 2 diabetes mellitus with stage 3b chronic kidney disease, without long-term current use of insulin  -     CBC Auto Differential; Future; Expected date: 09/27/2022  -      Comprehensive Metabolic Panel; Future; Expected date: 09/27/2022  -     Hemoglobin A1C; Future; Expected date: 09/27/2022  -     TSH; Future; Expected date: 09/27/2022  -     Lipid Panel; Future; Expected date: 09/27/2022  -     glipiZIDE (GLUCOTROL) 10 MG TR24; Take 1 tablet (10 mg total) by mouth 2 (two) times daily.  Dispense: 180 tablet; Refill: 3  -     Microalbumin/Creatinine Ratio, Urine; Future; Expected date: 09/27/2022    Erectile dysfunction due to diseases classified elsewhere  -     Discontinue: tadalafiL (CIALIS) 20 MG Tab; Take 1 tablet (20 mg total) by mouth once daily.  Dispense: 30 tablet; Refill: 5  -     sildenafiL (VIAGRA) 100 MG tablet; Take 1 tablet (100 mg total) by mouth daily as needed for Erectile Dysfunction.  Dispense: 30 tablet; Refill: 5    Need for influenza vaccination  -     Influenza (FLUAD) - Quadrivalent (Adjuvanted) *Preferred* (65+) (PF)      Cont meds. F/u labs.        Future Appointments   Date Time Provider Department Center   3/28/2023  9:40 AM [facility] [place]       Patient note was created using [employer].  Any errors in syntax or even information may not have been identified and edited on initial review prior to signing this note.

## 2022-09-29 ENCOUNTER — TELEPHONE (OUTPATIENT)
Dept: INFECTIOUS DISEASES | Facility: CLINIC | Age: 82
End: 2022-09-29
Payer: MEDICARE

## 2022-09-29 NOTE — TELEPHONE ENCOUNTER
Called patient on number provided but did not get an answer. Left pt a vm informing him of message relayed to me per Dr. Ardon. Also ask patient to send us a message via [employer] or call us to further discuss situation.         ----- Message from Manpreet Ardon MD sent at 9/29/2022 12:01 PM CDT -----  I have tried to call the patient but he has not picked up.    I've gotten two requests from his [facility] pharmacy to renew his malarone.  This shouldn't be a chronic medication, and he wont answer so I don't know why there is a refill request.  If we can get a hold of him and asked that be great.  If he needs it for travel, he should come back for another travel clinic visit.    Thanks

## 2022-10-05 ENCOUNTER — PES CALL (OUTPATIENT)
Dept: ADMINISTRATIVE | Facility: CLINIC | Age: 82
End: 2022-10-05
Payer: MEDICARE

## 2022-10-18 ENCOUNTER — TELEPHONE (OUTPATIENT)
Dept: FAMILY MEDICINE | Facility: CLINIC | Age: 82
End: 2022-10-18
Payer: MEDICARE

## 2022-10-18 DIAGNOSIS — R35.0 URINARY FREQUENCY: Primary | ICD-10-CM

## 2022-10-18 DIAGNOSIS — N18.32 TYPE 2 DIABETES MELLITUS WITH STAGE 3B CHRONIC KIDNEY DISEASE, WITHOUT LONG-TERM CURRENT USE OF INSULIN: ICD-10-CM

## 2022-10-18 DIAGNOSIS — R97.20 ELEVATED PSA: ICD-10-CM

## 2022-10-18 DIAGNOSIS — E11.22 TYPE 2 DIABETES MELLITUS WITH STAGE 3B CHRONIC KIDNEY DISEASE, WITHOUT LONG-TERM CURRENT USE OF INSULIN: ICD-10-CM

## 2022-10-19 ENCOUNTER — PES CALL (OUTPATIENT)
Dept: ADMINISTRATIVE | Facility: CLINIC | Age: 82
End: 2022-10-19
Payer: MEDICARE

## 2022-10-19 NOTE — TELEPHONE ENCOUNTER
Pt's psa was checked previously and was elevated. Recheck lvls due to worsening urinary issues. Again its elevated though borderline for his age. On flomax as well. Placed referral to uro. Worsening a1c. Cont meds but work on diet and exercise. Kidneys are stable.

## 2022-10-25 ENCOUNTER — TELEPHONE (OUTPATIENT)
Dept: UROLOGY | Facility: CLINIC | Age: 82
End: 2022-10-25

## 2022-10-25 ENCOUNTER — OFFICE VISIT (OUTPATIENT)
Dept: UROLOGY | Facility: CLINIC | Age: 82
End: 2022-10-25
Payer: MEDICARE

## 2022-10-25 VITALS — BODY MASS INDEX: 29.86 KG/M2 | WEIGHT: 258.06 LBS | HEIGHT: 78 IN

## 2022-10-25 DIAGNOSIS — R35.0 URINARY FREQUENCY: Primary | ICD-10-CM

## 2022-10-25 DIAGNOSIS — R35.0 URINARY FREQUENCY: ICD-10-CM

## 2022-10-25 DIAGNOSIS — R97.20 ELEVATED PSA: Primary | ICD-10-CM

## 2022-10-25 DIAGNOSIS — N40.0 BENIGN PROSTATIC HYPERPLASIA WITHOUT LOWER URINARY TRACT SYMPTOMS: ICD-10-CM

## 2022-10-25 PROCEDURE — 1157F PR ADVANCE CARE PLAN OR EQUIV PRESENT IN MEDICAL RECORD: ICD-10-PCS | Mod: CPTII,S$GLB,, | Performed by: UROLOGY

## 2022-10-25 PROCEDURE — 1101F PR PT FALLS ASSESS DOC 0-1 FALLS W/OUT INJ PAST YR: ICD-10-PCS | Mod: CPTII,S$GLB,, | Performed by: UROLOGY

## 2022-10-25 PROCEDURE — 99204 PR OFFICE/OUTPT VISIT, NEW, LEVL IV, 45-59 MIN: ICD-10-PCS | Mod: S$GLB,,, | Performed by: UROLOGY

## 2022-10-25 PROCEDURE — 3288F FALL RISK ASSESSMENT DOCD: CPT | Mod: CPTII,S$GLB,, | Performed by: UROLOGY

## 2022-10-25 PROCEDURE — 99999 PR PBB SHADOW E&M-EST. PATIENT-LVL IV: CPT | Mod: PBBFAC,,, | Performed by: UROLOGY

## 2022-10-25 PROCEDURE — 1160F RVW MEDS BY RX/DR IN RCRD: CPT | Mod: CPTII,S$GLB,, | Performed by: UROLOGY

## 2022-10-25 PROCEDURE — 1159F PR MEDICATION LIST DOCUMENTED IN MEDICAL RECORD: ICD-10-PCS | Mod: CPTII,S$GLB,, | Performed by: UROLOGY

## 2022-10-25 PROCEDURE — 1101F PT FALLS ASSESS-DOCD LE1/YR: CPT | Mod: CPTII,S$GLB,, | Performed by: UROLOGY

## 2022-10-25 PROCEDURE — 1126F PR PAIN SEVERITY QUANTIFIED, NO PAIN PRESENT: ICD-10-PCS | Mod: CPTII,S$GLB,, | Performed by: UROLOGY

## 2022-10-25 PROCEDURE — 1160F PR REVIEW ALL MEDS BY PRESCRIBER/CLIN PHARMACIST DOCUMENTED: ICD-10-PCS | Mod: CPTII,S$GLB,, | Performed by: UROLOGY

## 2022-10-25 PROCEDURE — 1126F AMNT PAIN NOTED NONE PRSNT: CPT | Mod: CPTII,S$GLB,, | Performed by: UROLOGY

## 2022-10-25 PROCEDURE — 99204 OFFICE O/P NEW MOD 45 MIN: CPT | Mod: S$GLB,,, | Performed by: UROLOGY

## 2022-10-25 PROCEDURE — 1157F ADVNC CARE PLAN IN RCRD: CPT | Mod: CPTII,S$GLB,, | Performed by: UROLOGY

## 2022-10-25 PROCEDURE — 99999 PR PBB SHADOW E&M-EST. PATIENT-LVL IV: ICD-10-PCS | Mod: PBBFAC,,, | Performed by: UROLOGY

## 2022-10-25 PROCEDURE — 3288F PR FALLS RISK ASSESSMENT DOCUMENTED: ICD-10-PCS | Mod: CPTII,S$GLB,, | Performed by: UROLOGY

## 2022-10-25 PROCEDURE — 1159F MED LIST DOCD IN RCRD: CPT | Mod: CPTII,S$GLB,, | Performed by: UROLOGY

## 2022-10-25 RX ORDER — CIPROFLOXACIN 500 MG/1
500 TABLET ORAL 2 TIMES DAILY
Qty: 6 TABLET | Refills: 0 | Status: SHIPPED | OUTPATIENT
Start: 2022-10-25 | End: 2022-10-28

## 2022-10-25 NOTE — TELEPHONE ENCOUNTER
I lvm for pt letting him know Dr. Green placed an order for urine testing so the pt may drop orders off at the lab.

## 2022-10-25 NOTE — PROGRESS NOTES
Subjective:       Patient ID: Luther Irwin Jr. is a 82 y.o. male who was referred by Aramis Ng MD    Chief Complaint:   Chief Complaint   Patient presents with    Urinary Frequency    Elevated PSA       Elevated PSA  Patient is here with an elevated PSA. He has no personal history and no family history of prostate cancer. His AUA Symptom Score is 18/2, manifested as irritative symptoms including frequency, urgency, nocturia and obstructive symptoms including incomplete emptying, intermittency. He has no prior genitourinary history of hematuria.  Previous PSA values are :  See below    Benign Prostatic Hyperplasia  He patient reports frequency and nocturia three times a night. He denies weak stream. The patient states symptoms are of moderate severity. Onset of symptoms was several years ago and was gradual in onset.  He has no personal history and no family history of prostate cancer. He reports a history of no complicating symptoms. He denies flank pain, gross hematuria, kidney stones, and recurrent UTI.  He is currently taking Flomax.     ACTIVE MEDICAL ISSUES:  Patient Active Problem List   Diagnosis    Essential hypertension    Benign prostatic hyperplasia without lower urinary tract symptoms    Type 2 diabetes mellitus with stage 3b chronic kidney disease, without long-term current use of insulin    Diabetes with proteinuria    Hyperchylomicronemia       PAST MEDICAL HISTORY  Past Medical History:   Diagnosis Date    Cancer     Diabetes mellitus, type 2     Hypertension        PAST SURGICAL HISTORY:  Past Surgical History:   Procedure Laterality Date    FINGER SURGERY  3/2014    HERNIA REPAIR  1967    KNEE SURGERY      SPINE SURGERY  10/2007    VASECTOMY         SOCIAL HISTORY:  Social History     Tobacco Use    Smoking status: Former     Packs/day: 0.25     Years: 0.10     Pack years: 0.03     Types: Cigarettes     Start date: 1960     Quit date: 1960     Years since quittin.9     Smokeless tobacco: Never   Substance Use Topics    Alcohol use: Yes     Comment: special occacion    Drug use: No       FAMILY HISTORY:  Family History   Problem Relation Age of Onset    Hypertension Mother     Hyperlipidemia Mother     Hypertension Father     Hyperlipidemia Father        ALLERGIES AND MEDICATIONS: updated and reviewed.  Review of patient's allergies indicates:   Allergen Reactions    Grass pollen-afshin grass standard Itching     Eye irritation     Current Outpatient Medications   Medication Sig    amlodipine-valsartan (EXFORGE)  mg per tablet Take 1 tablet by mouth once daily.    aspirin 81 MG Chew Take 81 mg by mouth once daily.    azithromycin (ZITHROMAX) 500 MG tablet Take 2 tablets (1,000 mg total) by mouth once daily. 1000mg (2 TABS) BY MOUTH AS A SINGLE DOSE AS NEEDED FOR TRAVELER'S DIARRHEA    blood sugar diagnostic Strp To check BG 2 times daily, to use with insurance preferred meter    chlorhexidine (PERIDEX) 0.12 % solution SMARTSI.5 Ounce(s) By Mouth Twice Daily    cloNIDine (CATAPRES) 0.1 MG tablet TAKE 1 TABLET BY MOUTH 3 TIMES DAILY.    cyanocobalamin 500 MCG tablet Take 500 mcg by mouth once daily.    glipiZIDE (GLUCOTROL) 10 MG TR24 Take 1 tablet (10 mg total) by mouth 2 (two) times daily.    hydroCHLOROthiazide (HYDRODIURIL) 25 MG tablet Take 1 tablet (25 mg total) by mouth once daily.    lancets Misc To check BG 2 times daily, to use with insurance preferred meter    metroNIDAZOLE (FLAGYL) 500 MG tablet Take 2,000 mg by mouth once.    sildenafiL (VIAGRA) 100 MG tablet Take 1 tablet (100 mg total) by mouth daily as needed for Erectile Dysfunction.    tamsulosin (FLOMAX) 0.4 mg Cap Take 1 capsule (0.4 mg total) by mouth once daily.    blood-glucose meter kit To check BG 2 times daily, to use with insurance preferred meter    ciprofloxacin HCl (CIPRO) 500 MG tablet Take 1 tablet (500 mg total) by mouth 2 (two) times daily. for 6 doses     No current facility-administered  "medications for this visit.       Review of Systems   Constitutional:  Negative for activity change, fatigue, fever and unexpected weight change.   HENT:  Negative for congestion.    Eyes:  Negative for redness.   Respiratory:  Negative for chest tightness and shortness of breath.    Cardiovascular:  Negative for chest pain and leg swelling.   Gastrointestinal:  Negative for abdominal pain, constipation, diarrhea, nausea and vomiting.   Genitourinary:  Positive for frequency. Negative for dysuria, flank pain, hematuria, penile pain, penile swelling, scrotal swelling, testicular pain and urgency.   Musculoskeletal:  Negative for arthralgias and back pain.   Neurological:  Negative for dizziness and light-headedness.   Psychiatric/Behavioral:  Negative for behavioral problems and confusion. The patient is not nervous/anxious.    All other systems reviewed and are negative.    Objective:      Vitals:    10/25/22 1005   Weight: 117 kg (258 lb 0.8 oz)   Height: 6' 7" (2.007 m)     Physical Exam  Vitals and nursing note reviewed.   Constitutional:       Appearance: He is well-developed.   HENT:      Head: Normocephalic.   Eyes:      Conjunctiva/sclera: Conjunctivae normal.   Neck:      Thyroid: No thyromegaly.      Trachea: No tracheal deviation.   Cardiovascular:      Rate and Rhythm: Normal rate.      Heart sounds: Normal heart sounds.   Pulmonary:      Effort: Pulmonary effort is normal. No respiratory distress.      Breath sounds: Normal breath sounds. No wheezing.   Abdominal:      General: Bowel sounds are normal. There is no distension.      Palpations: Abdomen is soft. There is no mass.      Tenderness: There is no abdominal tenderness. There is no guarding or rebound.      Hernia: No hernia is present. There is no hernia in the right inguinal area or left inguinal area.   Genitourinary:     Penis: Normal.       Testes: Normal.         Right: Mass or tenderness not present.         Left: Mass or tenderness not " present.      Prostate: Enlarged. Not tender.      Rectum: Normal. No mass, tenderness or external hemorrhoid.      Comments: 40 gm smooth  Musculoskeletal:         General: No tenderness. Normal range of motion.      Cervical back: Normal range of motion and neck supple.   Lymphadenopathy:      Cervical: No cervical adenopathy.      Lower Body: No right inguinal adenopathy. No left inguinal adenopathy.   Skin:     General: Skin is warm and dry.      Findings: No erythema or rash.   Neurological:      Mental Status: He is alert and oriented to person, place, and time.   Psychiatric:         Behavior: Behavior normal.         Thought Content: Thought content normal.         Judgment: Judgment normal.           Component Ref Range & Units 4 wk ago    PSA Diagnostic 0.00 - 4.00 ng/mL 6.5 High     Comment: The testing method is a chemiluminescent microparticle immunoassay   manufactured by [employer] and performed on the [employer]   or   [facility] system. Values obtained with different assay manufacturers   for   methods may be different and cannot be used interchangeably.   PSA Expected levels:   Hormonal Therapy: <0.05 ng/ml   Prostatectomy: <0.01 ng/ml   Radiation Therapy: <1.00 ng/ml    Resulting Agency  [facility]              Specimen Collected: 09/27/22 10:38 Last Resulted: 09/27/22 17:54           Component Ref Range & Units 7 mo ago 4 yr ago 5 yr ago 6 yr ago   PSA Total 0.00 - 4.00 ng/mL 5.8 High   1.7 CM  2.7 CM  2.8 CM    Comment: PSA Expected levels:   Hormonal Therapy: <0.05 ng/ml   Prostatectomy: <0.01 ng/ml   Radiation Therapy: <1.00 ng/ml    PSA, Free 0.00 - 1.50 ng/mL 2.17 High   0.86 R  1.14 R  0.98 R    PSA, Free % Not established % 37.41  50.59  42.22  35.00    Resulting Agency  [facility]              Specimen Collected: 03/02/22 09:34 Last Resulted: 03/02/22              Assessment:       1. Elevated PSA    2. Urinary frequency    3. Benign prostatic hyperplasia without lower urinary  tract symptoms          Plan:       1. Urinary frequency    - Ambulatory referral/consult to Urology  - Urine culture    2. Elevated PSA  He understands that a prostate biopsy is indicated for definitive diagnosis of prostate cancer. Risks, benefits, and alternative of TRUS PBx were discussed thoroughly. Risks include, but are not limited to, pain, bleeding, infection, and sepsis. His pre-procedure regimen would require enema the morning of PBx and appropriate PO antibiotics for 3 days starting the day prior to procedure. He will also receive IM injection of antibiotics immediately before the procedure. He understands even after a prostate biopsy, prostate cancer can be missed and close follow up is necessary, with possible further imaging and/or repeat biopsy in the future.     - Ambulatory referral/consult to Urology  - ciprofloxacin HCl (CIPRO) 500 MG tablet; Take 1 tablet (500 mg total) by mouth 2 (two) times daily. for 6 doses  Dispense: 6 tablet; Refill: 0  - US Biopsy Prostate Singl Multi Specimens (xpd); Future    3. Benign prostatic hyperplasia without lower urinary tract symptoms  Stay on Flomax            Follow up for Prostate Biopsy, Follow up.

## 2022-10-25 NOTE — TELEPHONE ENCOUNTER
----- Message from Cristal Momin MA sent at 10/25/2022 11:23 AM CDT -----  Please place urine order so pt can drop specimen off at the lab

## 2022-10-26 ENCOUNTER — LAB VISIT (OUTPATIENT)
Dept: LAB | Facility: HOSPITAL | Age: 82
End: 2022-10-26
Attending: UROLOGY
Payer: MEDICARE

## 2022-10-26 DIAGNOSIS — R35.0 URINARY FREQUENCY: ICD-10-CM

## 2022-10-26 PROCEDURE — 87086 URINE CULTURE/COLONY COUNT: CPT | Performed by: UROLOGY

## 2022-10-28 LAB — BACTERIA UR CULT: NORMAL

## 2022-11-03 ENCOUNTER — PROCEDURE VISIT (OUTPATIENT)
Dept: UROLOGY | Facility: CLINIC | Age: 82
End: 2022-11-03
Attending: UROLOGY
Payer: MEDICARE

## 2022-11-03 VITALS — BODY MASS INDEX: 29.52 KG/M2 | WEIGHT: 262 LBS

## 2022-11-03 DIAGNOSIS — R97.20 ELEVATED PSA: Primary | ICD-10-CM

## 2022-11-03 PROCEDURE — 88342 CHG IMMUNOCYTOCHEMISTRY: ICD-10-PCS | Mod: 26,,, | Performed by: PATHOLOGY

## 2022-11-03 PROCEDURE — 88341 IMHCHEM/IMCYTCHM EA ADD ANTB: CPT | Mod: 26,,, | Performed by: PATHOLOGY

## 2022-11-03 PROCEDURE — 96372 PR INJECTION,THERAP/PROPH/DIAG2ST, IM OR SUBCUT: ICD-10-PCS | Mod: 59,S$GLB,, | Performed by: UROLOGY

## 2022-11-03 PROCEDURE — 88341 PR IHC OR ICC EACH ADD'L SINGLE ANTIBODY  STAINPR: ICD-10-PCS | Mod: 26,,, | Performed by: PATHOLOGY

## 2022-11-03 PROCEDURE — 88305 TISSUE EXAM BY PATHOLOGIST: CPT | Mod: 59 | Performed by: PATHOLOGY

## 2022-11-03 PROCEDURE — 76872 TRUS: ICD-10-PCS | Mod: 26,S$GLB,, | Performed by: UROLOGY

## 2022-11-03 PROCEDURE — 76872 US TRANSRECTAL: CPT | Mod: 26,S$GLB,, | Performed by: UROLOGY

## 2022-11-03 PROCEDURE — 55700 TRUS: ICD-10-PCS | Mod: S$GLB,,, | Performed by: UROLOGY

## 2022-11-03 PROCEDURE — 88342 IMHCHEM/IMCYTCHM 1ST ANTB: CPT | Mod: 59 | Performed by: PATHOLOGY

## 2022-11-03 PROCEDURE — 88342 IMHCHEM/IMCYTCHM 1ST ANTB: CPT | Mod: 26,,, | Performed by: PATHOLOGY

## 2022-11-03 PROCEDURE — 55700 TRUS: CPT | Mod: S$GLB,,, | Performed by: UROLOGY

## 2022-11-03 PROCEDURE — 88305 TISSUE EXAM BY PATHOLOGIST: CPT | Mod: 26,,, | Performed by: PATHOLOGY

## 2022-11-03 PROCEDURE — 88305 TISSUE EXAM BY PATHOLOGIST: ICD-10-PCS | Mod: 26,,, | Performed by: PATHOLOGY

## 2022-11-03 PROCEDURE — 88341 IMHCHEM/IMCYTCHM EA ADD ANTB: CPT | Performed by: PATHOLOGY

## 2022-11-03 PROCEDURE — 96372 THER/PROPH/DIAG INJ SC/IM: CPT | Mod: 59,S$GLB,, | Performed by: UROLOGY

## 2022-11-03 RX ORDER — GENTAMICIN SULFATE 40 MG/ML
80 INJECTION, SOLUTION INTRAMUSCULAR; INTRAVENOUS
Status: COMPLETED | OUTPATIENT
Start: 2022-11-03 | End: 2022-11-03

## 2022-11-03 RX ADMIN — GENTAMICIN SULFATE 80 MG: 40 INJECTION, SOLUTION INTRAMUSCULAR; INTRAVENOUS at 10:11

## 2022-11-03 NOTE — PROCEDURES
"TRUS    Date/Time: 11/3/2022 10:00 AM  Performed by: ELZBIETA Green MD  Authorized by: ELZBIETA Green MD     Consent Done?:  Yes (Written)  Time out: Immediately prior to procedure a "time out" was called to verify the correct patient, procedure, equipment, support staff and site/side marked as required.    Indications: Elevated PSA    Preparation: Patient was prepped and draped in usual sterile fashion    Position:  Left lateral  Anesthesia:  10cc's 1% Lidocaine and Lidocaine jelly  Patient sedated: No    Prostate Size:  90 gm  Lesions:: No    Left Base Biopsies: 2  Left Mid Biopsies: 2  Left Bernice Biopsies: 2  Right Base Biopsies: 2  Right Mid Biopsies: 2  Right Bernice Biopsies: 2  Transitional zone: No    Total Biopsies:  12    Patient tolerance:  Patient tolerated the procedure well with no immediate complications     PSA 6.5  "

## 2022-11-15 LAB
FINAL PATHOLOGIC DIAGNOSIS: NORMAL
GROSS: NORMAL
Lab: NORMAL

## 2022-11-18 ENCOUNTER — OFFICE VISIT (OUTPATIENT)
Dept: UROLOGY | Facility: CLINIC | Age: 82
End: 2022-11-18
Payer: MEDICARE

## 2022-11-18 VITALS — WEIGHT: 269.31 LBS | BODY MASS INDEX: 30.34 KG/M2

## 2022-11-18 DIAGNOSIS — N52.1 ERECTILE DYSFUNCTION DUE TO DISEASES CLASSIFIED ELSEWHERE: ICD-10-CM

## 2022-11-18 DIAGNOSIS — C61 PROSTATE CANCER: Primary | ICD-10-CM

## 2022-11-18 DIAGNOSIS — R35.0 URINARY FREQUENCY: ICD-10-CM

## 2022-11-18 DIAGNOSIS — N52.9 ERECTILE DYSFUNCTION, UNSPECIFIED ERECTILE DYSFUNCTION TYPE: ICD-10-CM

## 2022-11-18 PROCEDURE — 99999 PR PBB SHADOW E&M-EST. PATIENT-LVL III: ICD-10-PCS | Mod: PBBFAC,,, | Performed by: UROLOGY

## 2022-11-18 PROCEDURE — 99499 RISK ADDL DX/OHS AUDIT: ICD-10-PCS | Mod: S$GLB,,, | Performed by: UROLOGY

## 2022-11-18 PROCEDURE — 99499 UNLISTED E&M SERVICE: CPT | Mod: S$GLB,,, | Performed by: UROLOGY

## 2022-11-18 PROCEDURE — 99214 PR OFFICE/OUTPT VISIT, EST, LEVL IV, 30-39 MIN: ICD-10-PCS | Mod: S$GLB,,, | Performed by: UROLOGY

## 2022-11-18 PROCEDURE — 1126F PR PAIN SEVERITY QUANTIFIED, NO PAIN PRESENT: ICD-10-PCS | Mod: CPTII,S$GLB,, | Performed by: UROLOGY

## 2022-11-18 PROCEDURE — 1157F PR ADVANCE CARE PLAN OR EQUIV PRESENT IN MEDICAL RECORD: ICD-10-PCS | Mod: CPTII,S$GLB,, | Performed by: UROLOGY

## 2022-11-18 PROCEDURE — 3288F FALL RISK ASSESSMENT DOCD: CPT | Mod: CPTII,S$GLB,, | Performed by: UROLOGY

## 2022-11-18 PROCEDURE — 1101F PR PT FALLS ASSESS DOC 0-1 FALLS W/OUT INJ PAST YR: ICD-10-PCS | Mod: CPTII,S$GLB,, | Performed by: UROLOGY

## 2022-11-18 PROCEDURE — 1159F PR MEDICATION LIST DOCUMENTED IN MEDICAL RECORD: ICD-10-PCS | Mod: CPTII,S$GLB,, | Performed by: UROLOGY

## 2022-11-18 PROCEDURE — 1101F PT FALLS ASSESS-DOCD LE1/YR: CPT | Mod: CPTII,S$GLB,, | Performed by: UROLOGY

## 2022-11-18 PROCEDURE — 1157F ADVNC CARE PLAN IN RCRD: CPT | Mod: CPTII,S$GLB,, | Performed by: UROLOGY

## 2022-11-18 PROCEDURE — 3288F PR FALLS RISK ASSESSMENT DOCUMENTED: ICD-10-PCS | Mod: CPTII,S$GLB,, | Performed by: UROLOGY

## 2022-11-18 PROCEDURE — 99214 OFFICE O/P EST MOD 30 MIN: CPT | Mod: S$GLB,,, | Performed by: UROLOGY

## 2022-11-18 PROCEDURE — 1159F MED LIST DOCD IN RCRD: CPT | Mod: CPTII,S$GLB,, | Performed by: UROLOGY

## 2022-11-18 PROCEDURE — 1160F PR REVIEW ALL MEDS BY PRESCRIBER/CLIN PHARMACIST DOCUMENTED: ICD-10-PCS | Mod: CPTII,S$GLB,, | Performed by: UROLOGY

## 2022-11-18 PROCEDURE — 1160F RVW MEDS BY RX/DR IN RCRD: CPT | Mod: CPTII,S$GLB,, | Performed by: UROLOGY

## 2022-11-18 PROCEDURE — 1126F AMNT PAIN NOTED NONE PRSNT: CPT | Mod: CPTII,S$GLB,, | Performed by: UROLOGY

## 2022-11-18 PROCEDURE — 99999 PR PBB SHADOW E&M-EST. PATIENT-LVL III: CPT | Mod: PBBFAC,,, | Performed by: UROLOGY

## 2022-11-18 RX ORDER — SILDENAFIL 100 MG/1
100 TABLET, FILM COATED ORAL DAILY PRN
Qty: 30 TABLET | Refills: 5 | Status: SHIPPED | OUTPATIENT
Start: 2022-11-18 | End: 2023-05-22 | Stop reason: SDUPTHER

## 2022-11-18 NOTE — PROGRESS NOTES
Subjective:       Patient ID: Luther Irwin Jr. is a 82 y.o. male who was last seen in this office 11/3/2022    Chief Complaint:   Chief Complaint   Patient presents with    Results     Bx results     Follow Up Prostate Biopsy    He underwent a prostate needle biopsy on 11/3/2022.  His biopsy was indicated due to: Elevated PSA.  Afterwards he experienced: Gross Hematuria.  These symptoms have resolved.  His PSA prior to biopsy was 6.5.  His prostate size was 90 grams.  The ultrasound did not show a median lobe.  He currently does have erectile dysfunction.  His pathology showed: Prostate Cancer.     ACTIVE MEDICAL ISSUES:  Patient Active Problem List   Diagnosis    Essential hypertension    Benign prostatic hyperplasia without lower urinary tract symptoms    Type 2 diabetes mellitus with stage 3b chronic kidney disease, without long-term current use of insulin    Diabetes with proteinuria    Hyperchylomicronemia       ALLERGIES AND MEDICATIONS: updated and reviewed.  Review of patient's allergies indicates:   Allergen Reactions    Grass pollen- grass standard Itching     Eye irritation     Current Outpatient Medications   Medication Sig    amlodipine-valsartan (EXFORGE)  mg per tablet Take 1 tablet by mouth once daily.    aspirin 81 MG Chew Take 81 mg by mouth once daily.    azithromycin (ZITHROMAX) 500 MG tablet Take 2 tablets (1,000 mg total) by mouth once daily. 1000mg (2 TABS) BY MOUTH AS A SINGLE DOSE AS NEEDED FOR TRAVELER'S DIARRHEA    blood sugar diagnostic Strp To check BG 2 times daily, to use with insurance preferred meter    chlorhexidine (PERIDEX) 0.12 % solution SMARTSI.5 Ounce(s) By Mouth Twice Daily    cloNIDine (CATAPRES) 0.1 MG tablet TAKE 1 TABLET BY MOUTH 3 TIMES DAILY.    cyanocobalamin 500 MCG tablet Take 500 mcg by mouth once daily.    glipiZIDE (GLUCOTROL) 10 MG TR24 Take 1 tablet (10 mg total) by mouth 2 (two) times daily.    hydroCHLOROthiazide (HYDRODIURIL) 25 MG tablet Take 1  tablet (25 mg total) by mouth once daily.    lancets Misc To check BG 2 times daily, to use with insurance preferred meter    metroNIDAZOLE (FLAGYL) 500 MG tablet Take 2,000 mg by mouth once.    tamsulosin (FLOMAX) 0.4 mg Cap Take 1 capsule (0.4 mg total) by mouth once daily.    blood-glucose meter kit To check BG 2 times daily, to use with insurance preferred meter    sildenafiL (VIAGRA) 100 MG tablet Take 1 tablet (100 mg total) by mouth daily as needed for Erectile Dysfunction.     No current facility-administered medications for this visit.       Review of Systems   Constitutional:  Negative for activity change, fatigue, fever and unexpected weight change.   HENT:  Negative for congestion.    Eyes:  Negative for redness.   Respiratory:  Negative for chest tightness and shortness of breath.    Cardiovascular:  Negative for chest pain and leg swelling.   Gastrointestinal:  Negative for abdominal pain, constipation, diarrhea, nausea and vomiting.   Genitourinary:  Negative for dysuria, flank pain, frequency, hematuria, penile pain, penile swelling, scrotal swelling, testicular pain and urgency.   Musculoskeletal:  Negative for arthralgias and back pain.   Neurological:  Negative for dizziness and light-headedness.   Psychiatric/Behavioral:  Negative for behavioral problems and confusion. The patient is not nervous/anxious.    All other systems reviewed and are negative.    Objective:      Vitals:    11/18/22 1337   Weight: 122.1 kg (269 lb 4.7 oz)     Physical Exam  Vitals and nursing note reviewed.   Constitutional:       Appearance: He is well-developed.   HENT:      Head: Normocephalic.   Eyes:      Conjunctiva/sclera: Conjunctivae normal.   Neck:      Thyroid: No thyromegaly.      Trachea: No tracheal deviation.   Cardiovascular:      Rate and Rhythm: Normal rate.      Heart sounds: Normal heart sounds.   Pulmonary:      Effort: Pulmonary effort is normal. No respiratory distress.      Breath sounds: Normal  breath sounds. No wheezing.   Abdominal:      General: Bowel sounds are normal.      Palpations: Abdomen is soft.      Tenderness: There is no abdominal tenderness. There is no rebound.      Hernia: No hernia is present.   Musculoskeletal:         General: No tenderness. Normal range of motion.      Cervical back: Normal range of motion and neck supple.   Lymphadenopathy:      Cervical: No cervical adenopathy.   Skin:     General: Skin is warm and dry.      Findings: No erythema or rash.   Neurological:      Mental Status: He is alert and oriented to person, place, and time.   Psychiatric:         Behavior: Behavior normal.         Thought Content: Thought content normal.         Judgment: Judgment normal.           Collected: 11/03/22 1021   Result status: Final   Resulting lab: [facility]   Value: 1. Prostate, left base lateral, biopsy:   Benign prostatic tissue   2. Prostate, left base medial, biopsy:   Benign prostatic tissue   3. Prostate, left mid lateral, biopsy:   Benign prostatic tissue   4. Prostate, left mid medial, biopsy:   Benign prostatic tissue   5. Prostate, left apex lateral, biopsy:   Benign prostatic tissue, supported by immunohistochemical stains for AMACR,   p63, and HMWK with adequate controls   6. Prostate, left apex medial, biopsy:   Benign prostatic tissue   7. Prostate, right base lateral, biopsy:   Benign prostatic tissue   8. Prostate, right base medial, biopsy:   Benign prostatic tissue   9. Prostate, right mid lateral, biopsy:   Benign prostatic tissue   10. Prostate, right mid medial, biopsy:   Prostatic adenocarcinoma, Grade Group 1, Radha score (3+3)=6, supported by   immunohistochemical stains for AMACR, p63, and HMWK with adequate controls   The tumor is present in 1/2 cores   The tumor measures 1 mm in length, approximately 8% of total prostatic tissue   No perineural invasion identified   11. Prostate, right apex lateral, biopsy:   Benign prostatic tissue   12. Prostate,  right apex medial, biopsy:   Benign prostatic tissue    Comment: Interp By Prisca Denton D.O., Signed on 11/15/2022 at 11:13       Assessment:       1. Prostate cancer    2. Urinary frequency    3. Erectile dysfunction, unspecified erectile dysfunction type    4. Erectile dysfunction due to diseases classified elsewhere          Plan:       1. Prostate cancer  Very Low Risk Prostate Cancer  We discussed active surveillance, radiation, and surgery.  He has elected for active surveillance.    - Prostate Specific Antigen, Diagnostic; Future    2. Urinary frequency  stable    3. Erectile dysfunction, unspecified erectile dysfunction type  Viagra    4. Erectile dysfunction due to diseases classified elsewhere  As above  - sildenafiL (VIAGRA) 100 MG tablet; Take 1 tablet (100 mg total) by mouth daily as needed for Erectile Dysfunction.  Dispense: 30 tablet; Refill: 5          Follow up in about 3 months (around 2/18/2023) for Follow up, Review PSA.

## 2022-11-23 ENCOUNTER — TELEPHONE (OUTPATIENT)
Dept: FAMILY MEDICINE | Facility: CLINIC | Age: 82
End: 2022-11-23
Payer: MEDICARE

## 2022-11-23 NOTE — TELEPHONE ENCOUNTER
Spoke with the Patient  to scheduled an EAWV appointment requested a call back in December or January.  Patient verbally understands.

## 2022-12-27 ENCOUNTER — TELEPHONE (OUTPATIENT)
Dept: FAMILY MEDICINE | Facility: CLINIC | Age: 82
End: 2022-12-27
Payer: MEDICARE

## 2022-12-27 NOTE — TELEPHONE ENCOUNTER
----- Message from Lisa Chang sent at 12/27/2022  9:12 AM CST -----  Regarding: Patient Advice              Name of Who is Calling: Luther Irwin    Who Left The Message: Luther Irwin      What is the request in detail: Patient called requesting to have antibiotics called into his local pharmacy for an infected tooth.  Please give the patient a call back at your earliest convenience and further advise.    Thank you      Reply by [facility]: NO      Preferred Call Back :  [phone] (E)

## 2023-01-09 DIAGNOSIS — N18.32 TYPE 2 DIABETES MELLITUS WITH STAGE 3B CHRONIC KIDNEY DISEASE, WITHOUT LONG-TERM CURRENT USE OF INSULIN: ICD-10-CM

## 2023-01-09 DIAGNOSIS — E11.22 TYPE 2 DIABETES MELLITUS WITH STAGE 3B CHRONIC KIDNEY DISEASE, WITHOUT LONG-TERM CURRENT USE OF INSULIN: ICD-10-CM

## 2023-01-09 NOTE — TELEPHONE ENCOUNTER
----- Message from Lisa Chang sent at 1/9/2023 10:10 AM CST -----  Regarding: Medication  Refill  Request                Can the clinic reply in [facility]: NO      Please refill the medication listed below. Please call the patient  : [phone] (M)      Medication    [employer] blood sugar diagnostic Strp       Preferred Pharmacy:    [id number] [place] [address] AT [address]   Phone: [phone]  Fax:  [phone]

## 2023-01-09 NOTE — TELEPHONE ENCOUNTER
No new care gaps identified.  [facility] Embedded Care Gaps. Reference number: [id number]. 1/09/2023   11:40:18 AM CST

## 2023-03-22 DIAGNOSIS — I10 ESSENTIAL HYPERTENSION: ICD-10-CM

## 2023-03-22 DIAGNOSIS — E11.22 TYPE 2 DIABETES MELLITUS WITH STAGE 3B CHRONIC KIDNEY DISEASE, WITHOUT LONG-TERM CURRENT USE OF INSULIN: ICD-10-CM

## 2023-03-22 DIAGNOSIS — N40.0 BENIGN PROSTATIC HYPERPLASIA WITHOUT LOWER URINARY TRACT SYMPTOMS: ICD-10-CM

## 2023-03-22 DIAGNOSIS — N18.32 TYPE 2 DIABETES MELLITUS WITH STAGE 3B CHRONIC KIDNEY DISEASE, WITHOUT LONG-TERM CURRENT USE OF INSULIN: ICD-10-CM

## 2023-03-22 NOTE — TELEPHONE ENCOUNTER
No new care gaps identified.  [facility] Embedded Care Gaps. Reference number: [id number]. 3/22/2023   10:04:44 AM RUSTAM

## 2023-03-22 NOTE — TELEPHONE ENCOUNTER
----- Message from Carina Antoine sent at 3/22/2023  9:51 AM CDT -----  .Type: RX Refill Request    Who Called: Self    Have you contacted your pharmacy: No    Refill or New Rx: Refill    RX Name and Strength:  cloNIDine (CATAPRES) 0.1 MG tablet    glipiZIDE (GLUCOTROL) 10 MG TR24    tamsulosin (FLOMAX) 0.4 mg Cap      Preferred Pharmacy with phone number:.  [facility]/pharmacy #[id number] - [facility] [address] [address]  Phone: [phone] Fax: [phone]        Local or Mail Order: Local    Ordering Provider: Hernandez    Would the patient rather a call back or a response via [facility]? Call    Best Call Back Number:.[phone](home)       Additional Information:

## 2023-03-24 NOTE — TELEPHONE ENCOUNTER
----- Message from Ct Correa sent at 3/24/2023  4:05 PM CDT -----  Regarding: Refill Request  .Type: RX Refill Request    Who Called: self  Have you contacted your pharmacy:yes    Refill or New Rx:refill    RX Name and Strength:glipiZIDE (GLUCOTROL) 10 MG TR24 cloNIDine (CATAPRES) 0.1 MG tablet    Preferred Pharmacy with phone number: .  [facility]/pharmacy #[id number] - [facility] [address] [address]  Phone: [phone] Fax: [phone]       Local or Mail Order:local    Ordering Provider: Constance    Would the patient rather a call back or a response via [facility]?     Best Call Back Number: .[phone]     Additional Information:

## 2023-03-27 NOTE — TELEPHONE ENCOUNTER
----- Message from Vidya Leopoldo sent at 3/27/2023 10:51 AM CDT -----  Contact: XENIA HEBERT JR. [[id number]]  Type: Call Back      Who called: XENIA HEBERT JR. [[id number]]      What is the request in detail: Patient is requesting a call back. Pt is calling to check the status of his refill request.   Please advise.     Can the clinic reply by [facility]? No      Would the patient rather a call back or a response via [facility]? Call back       Best call back number: [phone]      Additional Information:  RX Name and Strength:glipiZIDE (GLUCOTROL) 10 MG TR24 cloNIDine (CATAPRES) 0.1 MG tablet     Preferred Pharmacy with phone number: .  [facility]/pharmacy #[id number] - [place], [place] - [address] [facility] [address]  Phone: [phone] Fax: [phone]

## 2023-03-29 ENCOUNTER — TELEPHONE (OUTPATIENT)
Dept: FAMILY MEDICINE | Facility: CLINIC | Age: 83
End: 2023-03-29
Payer: MEDICARE

## 2023-03-29 ENCOUNTER — PES CALL (OUTPATIENT)
Dept: ADMINISTRATIVE | Facility: CLINIC | Age: 83
End: 2023-03-29
Payer: MEDICARE

## 2023-03-29 RX ORDER — TAMSULOSIN HYDROCHLORIDE 0.4 MG/1
1 CAPSULE ORAL DAILY
Qty: 90 CAPSULE | Refills: 0 | Status: SHIPPED | OUTPATIENT
Start: 2023-03-29 | End: 2023-05-05 | Stop reason: SDUPTHER

## 2023-03-29 RX ORDER — CLONIDINE HYDROCHLORIDE 0.1 MG/1
0.1 TABLET ORAL 3 TIMES DAILY
Qty: 270 TABLET | Refills: 0 | Status: SHIPPED | OUTPATIENT
Start: 2023-03-29 | End: 2023-05-05 | Stop reason: SDUPTHER

## 2023-03-29 RX ORDER — GLIPIZIDE 10 MG/1
10 TABLET, FILM COATED, EXTENDED RELEASE ORAL 2 TIMES DAILY
Qty: 180 TABLET | Refills: 0 | Status: SHIPPED | OUTPATIENT
Start: 2023-03-29 | End: 2023-05-05 | Stop reason: SDUPTHER

## 2023-03-29 NOTE — TELEPHONE ENCOUNTER
Spoke to patient and explained per Dr. Ng that medication have been sent to the pharmacy to be refilled. Patient stated good understanding.

## 2023-03-29 NOTE — TELEPHONE ENCOUNTER
----- Message from Domonique [place] sent at 3/29/2023  2:15 PM CDT -----     .Type: Patient Call Back    Who called: self     What is the request in detail: checking on status of meds refill     Can the clinic reply by [facility]?    Would the patient rather a call back or a response via [facility]? Call     Best call back number: .[phone]

## 2023-05-03 ENCOUNTER — TELEPHONE (OUTPATIENT)
Dept: ADMINISTRATIVE | Facility: CLINIC | Age: 83
End: 2023-05-03
Payer: MEDICARE

## 2023-05-05 ENCOUNTER — OFFICE VISIT (OUTPATIENT)
Dept: FAMILY MEDICINE | Facility: CLINIC | Age: 83
End: 2023-05-05
Payer: MEDICARE

## 2023-05-05 ENCOUNTER — LAB VISIT (OUTPATIENT)
Dept: LAB | Facility: HOSPITAL | Age: 83
End: 2023-05-05
Attending: FAMILY MEDICINE
Payer: MEDICARE

## 2023-05-05 ENCOUNTER — TELEPHONE (OUTPATIENT)
Dept: UROLOGY | Facility: CLINIC | Age: 83
End: 2023-05-05
Payer: MEDICARE

## 2023-05-05 VITALS
WEIGHT: 260.56 LBS | TEMPERATURE: 98 F | BODY MASS INDEX: 30.15 KG/M2 | OXYGEN SATURATION: 96 % | DIASTOLIC BLOOD PRESSURE: 70 MMHG | SYSTOLIC BLOOD PRESSURE: 132 MMHG | HEART RATE: 58 BPM | HEIGHT: 78 IN | RESPIRATION RATE: 18 BRPM

## 2023-05-05 DIAGNOSIS — R35.0 URINARY FREQUENCY: ICD-10-CM

## 2023-05-05 DIAGNOSIS — R26.9 ABNORMALITY OF GAIT AND MOBILITY: ICD-10-CM

## 2023-05-05 DIAGNOSIS — R97.20 ELEVATED PSA: ICD-10-CM

## 2023-05-05 DIAGNOSIS — C61 PROSTATE CANCER: ICD-10-CM

## 2023-05-05 DIAGNOSIS — N18.32 TYPE 2 DIABETES MELLITUS WITH STAGE 3B CHRONIC KIDNEY DISEASE, WITHOUT LONG-TERM CURRENT USE OF INSULIN: ICD-10-CM

## 2023-05-05 DIAGNOSIS — Z00.00 ENCOUNTER FOR PREVENTIVE HEALTH EXAMINATION: Primary | ICD-10-CM

## 2023-05-05 DIAGNOSIS — N40.0 BENIGN PROSTATIC HYPERPLASIA WITHOUT LOWER URINARY TRACT SYMPTOMS: ICD-10-CM

## 2023-05-05 DIAGNOSIS — E11.22 TYPE 2 DIABETES MELLITUS WITH STAGE 3B CHRONIC KIDNEY DISEASE, WITHOUT LONG-TERM CURRENT USE OF INSULIN: ICD-10-CM

## 2023-05-05 DIAGNOSIS — I10 ESSENTIAL HYPERTENSION: ICD-10-CM

## 2023-05-05 DIAGNOSIS — N18.32 TYPE 2 DIABETES MELLITUS WITH STAGE 3B CHRONIC KIDNEY DISEASE, WITHOUT LONG-TERM CURRENT USE OF INSULIN: Primary | ICD-10-CM

## 2023-05-05 DIAGNOSIS — E11.22 TYPE 2 DIABETES MELLITUS WITH STAGE 3B CHRONIC KIDNEY DISEASE, WITHOUT LONG-TERM CURRENT USE OF INSULIN: Primary | ICD-10-CM

## 2023-05-05 LAB
ALBUMIN SERPL BCP-MCNC: 3.7 G/DL (ref 3.5–5.2)
ALP SERPL-CCNC: 128 U/L (ref 55–135)
ALT SERPL W/O P-5'-P-CCNC: 19 U/L (ref 10–44)
ANION GAP SERPL CALC-SCNC: 8 MMOL/L (ref 8–16)
AST SERPL-CCNC: 20 U/L (ref 10–40)
BILIRUB SERPL-MCNC: 0.4 MG/DL (ref 0.1–1)
BUN SERPL-MCNC: 21 MG/DL (ref 8–23)
CALCIUM SERPL-MCNC: 9.2 MG/DL (ref 8.7–10.5)
CHLORIDE SERPL-SCNC: 103 MMOL/L (ref 95–110)
CO2 SERPL-SCNC: 30 MMOL/L (ref 23–29)
COMPLEXED PSA SERPL-MCNC: 7.6 NG/ML (ref 0–4)
CREAT SERPL-MCNC: 1.8 MG/DL (ref 0.5–1.4)
EST. GFR  (NO RACE VARIABLE): 37.1 ML/MIN/1.73 M^2
ESTIMATED AVG GLUCOSE: 171 MG/DL (ref 68–131)
GLUCOSE SERPL-MCNC: 150 MG/DL (ref 70–110)
HBA1C MFR BLD: 7.6 % (ref 4–5.6)
POTASSIUM SERPL-SCNC: 4 MMOL/L (ref 3.5–5.1)
PROT SERPL-MCNC: 7.2 G/DL (ref 6–8.4)
SODIUM SERPL-SCNC: 141 MMOL/L (ref 136–145)

## 2023-05-05 PROCEDURE — 36415 COLL VENOUS BLD VENIPUNCTURE: CPT | Mod: PO | Performed by: UROLOGY

## 2023-05-05 PROCEDURE — 99499 RISK ADDL DX/OHS AUDIT: ICD-10-PCS | Mod: S$GLB,,, | Performed by: FAMILY MEDICINE

## 2023-05-05 PROCEDURE — 99499 RISK ADDL DX/OHS AUDIT: ICD-10-PCS | Mod: S$GLB,,, | Performed by: PHYSICIAN ASSISTANT

## 2023-05-05 PROCEDURE — 99214 OFFICE O/P EST MOD 30 MIN: CPT | Mod: S$GLB,,, | Performed by: FAMILY MEDICINE

## 2023-05-05 PROCEDURE — 99214 PR OFFICE/OUTPT VISIT, EST, LEVL IV, 30-39 MIN: ICD-10-PCS | Mod: S$GLB,,, | Performed by: FAMILY MEDICINE

## 2023-05-05 PROCEDURE — 83036 HEMOGLOBIN GLYCOSYLATED A1C: CPT | Performed by: FAMILY MEDICINE

## 2023-05-05 PROCEDURE — 99499 UNLISTED E&M SERVICE: CPT | Mod: S$GLB,,, | Performed by: FAMILY MEDICINE

## 2023-05-05 PROCEDURE — G0439 PR MEDICARE ANNUAL WELLNESS SUBSEQUENT VISIT: ICD-10-PCS | Mod: S$GLB,,, | Performed by: PHYSICIAN ASSISTANT

## 2023-05-05 PROCEDURE — 99999 PR PBB SHADOW E&M-EST. PATIENT-LVL III: ICD-10-PCS | Mod: PBBFAC,,, | Performed by: PHYSICIAN ASSISTANT

## 2023-05-05 PROCEDURE — 99499 UNLISTED E&M SERVICE: CPT | Mod: S$GLB,,, | Performed by: PHYSICIAN ASSISTANT

## 2023-05-05 PROCEDURE — 99999 PR PBB SHADOW E&M-EST. PATIENT-LVL IV: CPT | Mod: PBBFAC,,, | Performed by: FAMILY MEDICINE

## 2023-05-05 PROCEDURE — G0439 PPPS, SUBSEQ VISIT: HCPCS | Mod: S$GLB,,, | Performed by: PHYSICIAN ASSISTANT

## 2023-05-05 PROCEDURE — 80053 COMPREHEN METABOLIC PANEL: CPT | Performed by: FAMILY MEDICINE

## 2023-05-05 PROCEDURE — 99999 PR PBB SHADOW E&M-EST. PATIENT-LVL III: CPT | Mod: PBBFAC,,, | Performed by: PHYSICIAN ASSISTANT

## 2023-05-05 PROCEDURE — 99999 PR PBB SHADOW E&M-EST. PATIENT-LVL IV: ICD-10-PCS | Mod: PBBFAC,,, | Performed by: FAMILY MEDICINE

## 2023-05-05 PROCEDURE — 84153 ASSAY OF PSA TOTAL: CPT | Performed by: UROLOGY

## 2023-05-05 RX ORDER — HYDROCODONE BITARTRATE AND ACETAMINOPHEN 7.5; 325 MG/1; MG/1
1 TABLET ORAL
COMMUNITY
Start: 2023-01-20 | End: 2023-05-05

## 2023-05-05 RX ORDER — CLONIDINE HYDROCHLORIDE 0.1 MG/1
0.1 TABLET ORAL 3 TIMES DAILY
Qty: 270 TABLET | Refills: 1 | Status: SHIPPED | OUTPATIENT
Start: 2023-05-05 | End: 2023-09-21

## 2023-05-05 RX ORDER — TAMSULOSIN HYDROCHLORIDE 0.4 MG/1
1 CAPSULE ORAL DAILY
Qty: 90 CAPSULE | Refills: 1 | Status: SHIPPED | OUTPATIENT
Start: 2023-05-05 | End: 2023-11-10 | Stop reason: SDUPTHER

## 2023-05-05 RX ORDER — IBUPROFEN 800 MG/1
800 TABLET ORAL EVERY 8 HOURS PRN
COMMUNITY
Start: 2023-01-20 | End: 2023-05-05

## 2023-05-05 RX ORDER — ONDANSETRON 8 MG/1
8 TABLET, ORALLY DISINTEGRATING ORAL EVERY 8 HOURS
COMMUNITY
Start: 2023-01-20 | End: 2023-05-05

## 2023-05-05 RX ORDER — BNT162B2 ORIGINAL AND OMICRON BA.4/BA.5 .1125; .1125 MG/2.25ML; MG/2.25ML
INJECTION, SUSPENSION INTRAMUSCULAR
COMMUNITY
Start: 2022-12-10 | End: 2023-05-05 | Stop reason: ALTCHOICE

## 2023-05-05 RX ORDER — GLIPIZIDE 10 MG/1
10 TABLET, FILM COATED, EXTENDED RELEASE ORAL 2 TIMES DAILY
Qty: 180 TABLET | Refills: 1 | Status: SHIPPED | OUTPATIENT
Start: 2023-05-05 | End: 2023-11-10 | Stop reason: SDUPTHER

## 2023-05-05 RX ORDER — PENICILLIN V POTASSIUM 500 MG/1
500 TABLET, FILM COATED ORAL 4 TIMES DAILY
COMMUNITY
Start: 2022-12-30 | End: 2023-05-05

## 2023-05-05 RX ORDER — AMOXICILLIN 500 MG/1
500 CAPSULE ORAL 3 TIMES DAILY
COMMUNITY
Start: 2023-01-04 | End: 2023-05-05

## 2023-05-05 NOTE — PROGRESS NOTES
Subjective:       Patient ID: Luther Irwin Jr. is a 82 y.o. male.    Chief Complaint: Follow-up      Follow-up    82-year-old male presents for six-month follow-up.  Doing well overall.  States he is taking his medications.  Has not had a follow-up with Urology.  Was recommended to follow-up in 3 months for PSA checked.  Patient states he was schedule appointment.      Review of Systems   Constitutional: Negative.    HENT: Negative.     Respiratory: Negative.     Cardiovascular: Negative.    Gastrointestinal: Negative.    Endocrine: Negative.    Genitourinary: Negative.    Musculoskeletal: Negative.    Neurological: Negative.    Psychiatric/Behavioral: Negative.          Past Medical History:   Diagnosis Date    Cancer     Diabetes mellitus, type 2     Diabetes with neurologic complications     Disorder of kidney and ureter     Hypertension     Late complications of amputation stump     Prostate cancer      Past Surgical History:   Procedure Laterality Date    FINGER SURGERY  3/2014    HERNIA REPAIR  1967    KNEE SURGERY      SPINE SURGERY  10/2007    VASECTOMY       Family History   Problem Relation Age of Onset    Hypertension Mother     Hyperlipidemia Mother     Hypertension Father     Hyperlipidemia Father      Social History     Socioeconomic History    Marital status: Single    Number of children: 3    Highest education level: Bachelor's degree (e.g., BA, AB, BS)   Tobacco Use    Smoking status: Former     Packs/day: 0.25     Years: 0.10     Pack years: 0.03     Types: Cigarettes     Start date: 1960     Quit date: 1960     Years since quittin.4    Smokeless tobacco: Never   Substance and Sexual Activity    Alcohol use: Yes     Comment: special occacion    Drug use: No     Social Determinants of Health     Financial Resource Strain: Low Risk     Difficulty of Paying Living Expenses: Not hard at all   Food Insecurity: No Food Insecurity    Worried About Running Out of Food in the Last Year:  Never true    Ran Out of Food in the Last Year: Never true   Transportation Needs: No Transportation Needs    Lack of Transportation (Medical): No    Lack of Transportation (Non-Medical): No   Physical Activity: Inactive    Days of Exercise per Week: 0 days    Minutes of Exercise per Session: 0 min   Stress: No Stress Concern Present    Feeling of Stress : Not at all   Social Connections: Unknown    Frequency of Communication with Friends and Family: More than three times a week    Frequency of Social Gatherings with Friends and Family: Twice a week    Attends [employer] Services: More than 4 times per year    Active Member of Clubs or Organizations: Yes    Attends Club or Organization Meetings: More than 4 times per year   Housing Stability: Unknown    Unable to Pay for Housing in the Last Year: No    Unstable Housing in the Last Year: No       Current Outpatient Medications:     amlodipine-valsartan (EXFORGE)  mg per tablet, Take 1 tablet by mouth once daily., Disp: 90 tablet, Rfl: 3    aspirin 81 MG Chew, Take 81 mg by mouth once daily., Disp: , Rfl:     blood sugar diagnostic Strp, To check BG 2 times daily, to use with insurance preferred meter, Disp: 200 each, Rfl: 3    blood-glucose meter kit, To check BG 2 times daily, to use with insurance preferred meter, Disp: 1 each, Rfl: 0    cyanocobalamin 500 MCG tablet, Take 500 mcg by mouth once daily., Disp: , Rfl:     hydroCHLOROthiazide (HYDRODIURIL) 25 MG tablet, Take 1 tablet (25 mg total) by mouth once daily., Disp: 90 tablet, Rfl: 3    lancets Misc, To check BG 2 times daily, to use with insurance preferred meter, Disp: 200 each, Rfl: 3    sildenafiL (VIAGRA) 100 MG tablet, Take 1 tablet (100 mg total) by mouth daily as needed for Erectile Dysfunction., Disp: 30 tablet, Rfl: 5    cloNIDine (CATAPRES) 0.1 MG tablet, Take 1 tablet (0.1 mg total) by mouth 3 (three) times daily., Disp: 270 tablet, Rfl: 1    glipiZIDE (GLUCOTROL) 10 MG TR24, Take 1 tablet (10  "mg total) by mouth 2 (two) times daily., Disp: 180 tablet, Rfl: 1    tamsulosin (FLOMAX) 0.4 mg Cap, Take 1 capsule (0.4 mg total) by mouth once daily., Disp: 90 capsule, Rfl: 1   Objective:      Vitals:    05/05/23 0708   BP: 132/70   BP Location: Left arm   Patient Position: Sitting   BP Method: Small (Manual)   Pulse: (!) 58   Resp: 18   Temp: 97.9 °F (36.6 °C)   TempSrc: Oral   SpO2: 96%   Weight: 118.2 kg (260 lb 9.3 oz)   Height: 6' 7" (2.007 m)       Physical Exam  Constitutional:       General: He is not in acute distress.     Appearance: He is not diaphoretic.   HENT:      Head: Normocephalic and atraumatic.   Eyes:      Conjunctiva/sclera: Conjunctivae normal.   Pulmonary:      Effort: Pulmonary effort is normal.   Musculoskeletal:      Cervical back: Neck supple.   Skin:     Findings: No rash.   Neurological:      Mental Status: He is alert and oriented to person, place, and time.   Psychiatric:         Behavior: Behavior normal.         Thought Content: Thought content normal.         Judgment: Judgment normal.          Assessment:       1. Type 2 diabetes mellitus with stage 3b chronic kidney disease, without long-term current use of insulin    2. Prostate cancer    3. Essential hypertension    4. Benign prostatic hyperplasia without lower urinary tract symptoms        Plan:       Type 2 diabetes mellitus with stage 3b chronic kidney disease, without long-term current use of insulin  -     glipiZIDE (GLUCOTROL) 10 MG TR24; Take 1 tablet (10 mg total) by mouth 2 (two) times daily.  Dispense: 180 tablet; Refill: 1    Prostate cancer    Essential hypertension  -     cloNIDine (CATAPRES) 0.1 MG tablet; Take 1 tablet (0.1 mg total) by mouth 3 (three) times daily.  Dispense: 270 tablet; Refill: 1    Benign prostatic hyperplasia without lower urinary tract symptoms  -     tamsulosin (FLOMAX) 0.4 mg Cap; Take 1 capsule (0.4 mg total) by mouth once daily.  Dispense: 90 capsule; Refill: 1    Refill medications.  " Check PSA for patient.  Advised patient to follow-up with Urology.          Future Appointments   Date Time Provider Department Center   9/29/2023  7:20 AM [facility]       Patient note was created using [employer].  Any errors in syntax or even information may not have been identified and edited on initial review prior to signing this note.

## 2023-05-05 NOTE — PATIENT INSTRUCTIONS
Counseling and Referral of Other Preventative  (Italic type indicates deductible and co-insurance are waived)    Patient Name: Luther Irwin  Today's Date: 5/5/2023    Health Maintenance       Date Due Completion Date    Hemoglobin A1c 03/27/2023 9/27/2022    Eye Exam 08/25/2023 8/25/2022    Override on 6/10/2015: Declined (had an appoint last week )    Diabetes Urine Screening 09/27/2023 9/27/2022    Lipid Panel 09/27/2023 9/27/2022    TETANUS VACCINE 01/02/2029 1/2/2019        No orders of the defined types were placed in this encounter.      The following information is provided to all patients.  This information is to help you find resources for any of the problems found today that may be affecting your health:                Living healthy guide: www.[facility].[place].gov      Understanding Diabetes: www.diabetes.org      Eating healthy: www.cdc.gov/healthyweight      [facility] home safety checklist: www.cdc.gov/steadi/patient.html      Agency on Aging: www.goea.[place].[facility]      Alcoholics anonymous (AA): www.aa.org      Physical Activity: www.josiane.nih.gov/tp9nkte      Tobacco use: www.quitwithusla.org

## 2023-05-05 NOTE — PROGRESS NOTES
Luther Irwin presented for a  Medicare AWV and comprehensive Health Risk Assessment today. The following components were reviewed and updated:    Medical history  Family History  Social history  Allergies and Current Medications  Health Risk Assessment  Health Maintenance  Care Team         ** See Completed Assessments for Annual Wellness Visit within the encounter summary.**         The following assessments were completed:  Living Situation  CAGE  Depression Screening  Timed Get Up and Go  Whisper Test  Cognitive Function Screening  Nutrition Screening  ADL Screening  PAQ Screening        There were no vitals filed for this visit.  There is no height or weight on file to calculate BMI.  Physical Exam          Diagnoses and health risks identified today and associated recommendations/orders:    1. Encounter for preventive health examination  Provided Luther with a 5-10 year written screening schedule and personal prevention plan. Recommendations were developed using the USPSTF age appropriate recommendations. Education, counseling, and referrals were provided as needed. After Visit Summary printed and given to patient which includes a list of additional screenings\tests needed.    2. Abnormality of gait and mobility      3. Type 2 diabetes mellitus with stage 3b chronic kidney disease, without long-term current use of insulin  Stable monitored by pcp    4. Prostate cancer  Followed by urology      Review for Opioid Screening: Patient does not have rx for Opioids.    Review for Substance Use Disorders: Patient does not use substance.      No follow-ups on file.    Mera Doll PA-C  I offered to discuss advanced care planning, including how to pick a person who would make decisions for you if you were unable to make them for yourself, called a health care power of , and what kind of decisions you might make such as use of life sustaining treatments such as ventilators and tube feeding when faced with a life  limiting illness recorded on a living will that they will need to know. (How you want to be cared for as you near the end of your natural life)     X  Patient has advanced directives written and agrees to provide copies to the institution.

## 2023-05-05 NOTE — TELEPHONE ENCOUNTER
The pt was informed and an appointment has been scheduled.  ----- Message from Troy Green MD sent at 5/5/2023  4:00 PM CDT -----  He needs a follow up appointment.

## 2023-05-22 ENCOUNTER — OFFICE VISIT (OUTPATIENT)
Dept: UROLOGY | Facility: CLINIC | Age: 83
End: 2023-05-22
Payer: MEDICARE

## 2023-05-22 VITALS — WEIGHT: 262.81 LBS | BODY MASS INDEX: 29.6 KG/M2

## 2023-05-22 DIAGNOSIS — R35.0 URINARY FREQUENCY: ICD-10-CM

## 2023-05-22 DIAGNOSIS — C61 PROSTATE CANCER: Primary | ICD-10-CM

## 2023-05-22 DIAGNOSIS — N52.9 ERECTILE DYSFUNCTION, UNSPECIFIED ERECTILE DYSFUNCTION TYPE: ICD-10-CM

## 2023-05-22 DIAGNOSIS — N52.1 ERECTILE DYSFUNCTION DUE TO DISEASES CLASSIFIED ELSEWHERE: ICD-10-CM

## 2023-05-22 PROCEDURE — 99214 PR OFFICE/OUTPT VISIT, EST, LEVL IV, 30-39 MIN: ICD-10-PCS | Mod: S$GLB,,, | Performed by: UROLOGY

## 2023-05-22 PROCEDURE — 99999 PR PBB SHADOW E&M-EST. PATIENT-LVL III: CPT | Mod: PBBFAC,,, | Performed by: UROLOGY

## 2023-05-22 PROCEDURE — 81001 URINALYSIS AUTO W/SCOPE: CPT | Mod: S$GLB,,, | Performed by: UROLOGY

## 2023-05-22 PROCEDURE — 3288F FALL RISK ASSESSMENT DOCD: CPT | Mod: CPTII,S$GLB,, | Performed by: UROLOGY

## 2023-05-22 PROCEDURE — 1101F PT FALLS ASSESS-DOCD LE1/YR: CPT | Mod: CPTII,S$GLB,, | Performed by: UROLOGY

## 2023-05-22 PROCEDURE — 3288F PR FALLS RISK ASSESSMENT DOCUMENTED: ICD-10-PCS | Mod: CPTII,S$GLB,, | Performed by: UROLOGY

## 2023-05-22 PROCEDURE — 1160F PR REVIEW ALL MEDS BY PRESCRIBER/CLIN PHARMACIST DOCUMENTED: ICD-10-PCS | Mod: CPTII,S$GLB,, | Performed by: UROLOGY

## 2023-05-22 PROCEDURE — 1157F ADVNC CARE PLAN IN RCRD: CPT | Mod: CPTII,S$GLB,, | Performed by: UROLOGY

## 2023-05-22 PROCEDURE — 1101F PR PT FALLS ASSESS DOC 0-1 FALLS W/OUT INJ PAST YR: ICD-10-PCS | Mod: CPTII,S$GLB,, | Performed by: UROLOGY

## 2023-05-22 PROCEDURE — 99214 OFFICE O/P EST MOD 30 MIN: CPT | Mod: S$GLB,,, | Performed by: UROLOGY

## 2023-05-22 PROCEDURE — 1159F MED LIST DOCD IN RCRD: CPT | Mod: CPTII,S$GLB,, | Performed by: UROLOGY

## 2023-05-22 PROCEDURE — 99999 PR PBB SHADOW E&M-EST. PATIENT-LVL III: ICD-10-PCS | Mod: PBBFAC,,, | Performed by: UROLOGY

## 2023-05-22 PROCEDURE — 99213 OFFICE O/P EST LOW 20 MIN: CPT | Performed by: UROLOGY

## 2023-05-22 PROCEDURE — 81001 PR  URINALYSIS, AUTO, W/SCOPE: ICD-10-PCS | Mod: S$GLB,,, | Performed by: UROLOGY

## 2023-05-22 PROCEDURE — 1126F AMNT PAIN NOTED NONE PRSNT: CPT | Mod: CPTII,S$GLB,, | Performed by: UROLOGY

## 2023-05-22 PROCEDURE — 1160F RVW MEDS BY RX/DR IN RCRD: CPT | Mod: CPTII,S$GLB,, | Performed by: UROLOGY

## 2023-05-22 PROCEDURE — 1126F PR PAIN SEVERITY QUANTIFIED, NO PAIN PRESENT: ICD-10-PCS | Mod: CPTII,S$GLB,, | Performed by: UROLOGY

## 2023-05-22 PROCEDURE — 1159F PR MEDICATION LIST DOCUMENTED IN MEDICAL RECORD: ICD-10-PCS | Mod: CPTII,S$GLB,, | Performed by: UROLOGY

## 2023-05-22 PROCEDURE — 1157F PR ADVANCE CARE PLAN OR EQUIV PRESENT IN MEDICAL RECORD: ICD-10-PCS | Mod: CPTII,S$GLB,, | Performed by: UROLOGY

## 2023-05-22 RX ORDER — SILDENAFIL 100 MG/1
100 TABLET, FILM COATED ORAL DAILY PRN
Qty: 30 TABLET | Refills: 11 | Status: SHIPPED | OUTPATIENT
Start: 2023-05-22 | End: 2023-10-02 | Stop reason: SDUPTHER

## 2023-05-22 NOTE — PROGRESS NOTES
Subjective:       Patient ID: Luther Irwin Jr. is a 82 y.o. male The patient's last visit with me was on 11/18/2022.     Chief Complaint:   Chief Complaint   Patient presents with    Follow-up     Prostate Cancer  He patient reports frequency and nocturia three times a night. He denies weak stream. The patient states symptoms are of moderate severity. Onset of symptoms was several years ago and was gradual in onset.  He has a personal history and no family history of prostate cancer. He reports a history of no complicating symptoms. He denies flank pain, gross hematuria, kidney stones, and recurrent UTI.  He is currently taking Flomax.     He underwent a prostate needle biopsy on 11/3/2022.  His biopsy was indicated due to: Elevated PSA.  Afterwards he experienced: Gross Hematuria.  These symptoms have resolved.  His PSA prior to biopsy was 6.5.  His prostate size was 90 grams.  The ultrasound did not show a median lobe.  He currently does have erectile dysfunction.  His pathology showed: Prostate Cancer.     Very Low Risk Prostate Cancer  Elected for active surveillance    05/22/2023  He is back with a new PSA.  Viagra works well.    ACTIVE MEDICAL ISSUES:  Patient Active Problem List   Diagnosis    Essential hypertension    Benign prostatic hyperplasia without lower urinary tract symptoms    Type 2 diabetes mellitus with stage 3b chronic kidney disease, without long-term current use of insulin    Diabetes with proteinuria    Hyperchylomicronemia    Prostate cancer       ALLERGIES AND MEDICATIONS: updated and reviewed.  Review of patient's allergies indicates:   Allergen Reactions    Grass pollen-june grass standard Itching     Eye irritation     Current Outpatient Medications   Medication Sig    amlodipine-valsartan (EXFORGE)  mg per tablet Take 1 tablet by mouth once daily.    aspirin 81 MG Chew Take 81 mg by mouth once daily.    blood sugar diagnostic Strp To check BG 2 times daily, to use with insurance  preferred meter    cloNIDine (CATAPRES) 0.1 MG tablet Take 1 tablet (0.1 mg total) by mouth 3 (three) times daily.    cyanocobalamin 500 MCG tablet Take 500 mcg by mouth once daily.    glipiZIDE (GLUCOTROL) 10 MG TR24 Take 1 tablet (10 mg total) by mouth 2 (two) times daily.    hydroCHLOROthiazide (HYDRODIURIL) 25 MG tablet Take 1 tablet (25 mg total) by mouth once daily.    lancets Misc To check BG 2 times daily, to use with insurance preferred meter    tamsulosin (FLOMAX) 0.4 mg Cap Take 1 capsule (0.4 mg total) by mouth once daily.    blood-glucose meter kit To check BG 2 times daily, to use with insurance preferred meter    sildenafiL (VIAGRA) 100 MG tablet Take 1 tablet (100 mg total) by mouth daily as needed for Erectile Dysfunction.     No current facility-administered medications for this visit.       Review of Systems   Constitutional:  Negative for chills and fever.   HENT:  Negative for congestion.    Respiratory:  Negative for chest tightness and shortness of breath.    Cardiovascular:  Negative for chest pain and palpitations.   Gastrointestinal:  Negative for abdominal pain, constipation, diarrhea, nausea and vomiting.   Genitourinary:  Negative for difficulty urinating, dysuria, flank pain, hematuria and urgency.   Musculoskeletal:  Negative for arthralgias.   Neurological:  Negative for dizziness.   Psychiatric/Behavioral:  Negative for confusion.      Objective:      Vitals:    05/22/23 1506   Weight: 119.2 kg (262 lb 12.6 oz)     Physical Exam  Vitals and nursing note reviewed.   Constitutional:       Appearance: He is well-developed.   HENT:      Head: Normocephalic.   Eyes:      Conjunctiva/sclera: Conjunctivae normal.   Neck:      Thyroid: No thyromegaly.      Trachea: No tracheal deviation.   Cardiovascular:      Rate and Rhythm: Normal rate.      Heart sounds: Normal heart sounds.   Pulmonary:      Effort: Pulmonary effort is normal. No respiratory distress.      Breath sounds: Normal breath  sounds. No wheezing.   Abdominal:      General: Bowel sounds are normal.      Palpations: Abdomen is soft.      Tenderness: There is no abdominal tenderness. There is no rebound.      Hernia: No hernia is present.   Musculoskeletal:         General: No tenderness. Normal range of motion.      Cervical back: Normal range of motion and neck supple.   Lymphadenopathy:      Cervical: No cervical adenopathy.   Skin:     General: Skin is warm and dry.      Findings: No erythema or rash.   Neurological:      Mental Status: He is alert and oriented to person, place, and time.   Psychiatric:         Behavior: Behavior normal.         Thought Content: Thought content normal.         Judgment: Judgment normal.       Urine dipstick shows negative for all components.  Micro exam: negative for WBC's or RBC's.      Component Ref Range & Units 2 wk ago 7 mo ago   PSA Diagnostic 0.00 - 4.00 ng/mL 7.6 High   6.5 High  CM    Comment: The testing method is a chemiluminescent microparticle immunoassay   manufactured by [employer] and performed on the [employer]   or   [employer] system. Values obtained with different assay manufacturers   for   methods may be different and cannot be used interchangeably.   PSA Expected levels:   Hormonal Therapy: <0.05 ng/ml   Prostatectomy: <0.01 ng/ml   Radiation Therapy: <1.00 ng/ml    Resulting Agency  [facility]           Narrative  Performed by: [facility]  PSA 3 months      Specimen Collected: 05/05/23 08:11 Last Resulted: 05/05/23 15:56             Assessment:       1. Prostate cancer    2. Urinary frequency    3. Erectile dysfunction, unspecified erectile dysfunction type    4. Erectile dysfunction due to diseases classified elsewhere          Plan:       1. Prostate cancer    - Prostate Specific Antigen, Diagnostic; Future  - MRI Prostate W W/O Contrast; Future    2. Urinary frequency      3. Erectile dysfunction, unspecified erectile dysfunction type      4. Erectile dysfunction due to  diseases classified elsewhere    - sildenafiL (VIAGRA) 100 MG tablet; Take 1 tablet (100 mg total) by mouth daily as needed for Erectile Dysfunction.  Dispense: 30 tablet; Refill: 11            Follow up in about 3 months (around 8/22/2023) for Follow up Established, Review PSA, Review X-ray.

## 2023-08-15 ENCOUNTER — HOSPITAL ENCOUNTER (OUTPATIENT)
Dept: RADIOLOGY | Facility: HOSPITAL | Age: 83
Discharge: HOME | End: 2023-08-15
Attending: UROLOGY
Payer: MEDICARE

## 2023-08-15 DIAGNOSIS — C61 PROSTATE CANCER: ICD-10-CM

## 2023-08-15 LAB
CREAT SERPL-MCNC: 2.3 MG/DL (ref 0.5–1.4)
SAMPLE: ABNORMAL

## 2023-08-15 PROCEDURE — 72197 MRI PROSTATE W W/O CONTRAST: ICD-10-PCS | Mod: 26,,, | Performed by: STUDENT IN AN ORGANIZED HEALTH CARE EDUCATION/TRAINING PROGRAM

## 2023-08-15 PROCEDURE — A9585 GADOBUTROL INJECTION: HCPCS | Performed by: UROLOGY

## 2023-08-15 PROCEDURE — 72197 MRI PELVIS W/O & W/DYE: CPT | Mod: TC

## 2023-08-15 PROCEDURE — 25500020 PHARM REV CODE 255: Performed by: UROLOGY

## 2023-08-15 PROCEDURE — 72197 MRI PELVIS W/O & W/DYE: CPT | Mod: 26,,, | Performed by: STUDENT IN AN ORGANIZED HEALTH CARE EDUCATION/TRAINING PROGRAM

## 2023-08-15 RX ORDER — GADOBUTROL 604.72 MG/ML
10 INJECTION INTRAVENOUS
Status: COMPLETED | OUTPATIENT
Start: 2023-08-15 | End: 2023-08-15

## 2023-08-15 RX ADMIN — GADOBUTROL 10 ML: 604.72 INJECTION INTRAVENOUS at 03:08

## 2023-08-30 ENCOUNTER — ON-DEMAND VIRTUAL (OUTPATIENT)
Dept: URGENT CARE | Facility: CLINIC | Age: 83
End: 2023-08-30
Payer: MEDICARE

## 2023-08-30 DIAGNOSIS — Z71.2 ENCOUNTER TO DISCUSS X-RAY RESULTS: Primary | ICD-10-CM

## 2023-08-30 PROCEDURE — 99212 PR OFFICE/OUTPT VISIT, EST, LEVL II, 10-19 MIN: ICD-10-PCS | Mod: 95,,, | Performed by: INTERNAL MEDICINE

## 2023-08-30 PROCEDURE — 99212 OFFICE O/P EST SF 10 MIN: CPT | Mod: 95,,, | Performed by: INTERNAL MEDICINE

## 2023-08-30 NOTE — PROGRESS NOTES
Subjective:      Patient ID: Luther Irwin Jr. is a 83 y.o. male.    Vitals:  vitals were not taken for this visit.     Chief Complaint: Results      Visit Type: TELE AUDIOVISUAL    Present with the patient at the time of consultation: TELEMED PRESENT WITH PATIENT: None    Past Medical History:   Diagnosis Date    Cancer     Diabetes mellitus, type 2     Diabetes with neurologic complications     Disorder of kidney and ureter     Hypertension     Late complications of amputation stump     Prostate cancer      Past Surgical History:   Procedure Laterality Date    FINGER SURGERY  3/2014    HERNIA REPAIR  07/1967    KNEE SURGERY      SPINE SURGERY  10/2007    VASECTOMY  1986     Review of patient's allergies indicates:   Allergen Reactions    Grass pollen-june grass standard Itching     Eye irritation     Current Outpatient Medications on File Prior to Visit   Medication Sig Dispense Refill    amlodipine-valsartan (EXFORGE)  mg per tablet Take 1 tablet by mouth once daily. 90 tablet 3    aspirin 81 MG Chew Take 81 mg by mouth once daily.      blood sugar diagnostic Strp To check BG 2 times daily, to use with insurance preferred meter 200 each 3    blood-glucose meter kit To check BG 2 times daily, to use with insurance preferred meter 1 each 0    cloNIDine (CATAPRES) 0.1 MG tablet Take 1 tablet (0.1 mg total) by mouth 3 (three) times daily. 270 tablet 1    cyanocobalamin 500 MCG tablet Take 500 mcg by mouth once daily.      glipiZIDE (GLUCOTROL) 10 MG TR24 Take 1 tablet (10 mg total) by mouth 2 (two) times daily. 180 tablet 1    hydroCHLOROthiazide (HYDRODIURIL) 25 MG tablet Take 1 tablet (25 mg total) by mouth once daily. 90 tablet 3    lancets Misc To check BG 2 times daily, to use with insurance preferred meter 200 each 3    sildenafiL (VIAGRA) 100 MG tablet Take 1 tablet (100 mg total) by mouth daily as needed for Erectile Dysfunction. 30 tablet 11    tamsulosin (FLOMAX) 0.4 mg Cap Take 1 capsule (0.4 mg total)  by mouth once daily. 90 capsule 1     No current facility-administered medications on file prior to visit.     Family History   Problem Relation Age of Onset    Hypertension Mother     Hyperlipidemia Mother     Hypertension Father     Hyperlipidemia Father            Ohs Peq Odvv Intake    8/30/2023 12:21 PM CDT - Filed by Patient   Describe your reason for todays visit Missed last scheduled visit   What is your current physical address in the event of a medical emergency? [address], [place]   Are you able to take your vital signs? No   Please attach any relevant images or files          In [place] via video conference.     82 y/o man would like to get in touch with his doctor about his MRI results.         Constitution: Negative.        Objective:   The physical exam was conducted virtually.  Physical Exam   Constitutional: He does not appear ill. No distress.   Neurological: He is alert.   No pertinent findings on examination.     Assessment:     1. Encounter to discuss x-ray results        Plan:       Encounter to discuss x-ray results    You will need to get in touch with the doctor that ordered the MRI. They can help you with those results.

## 2023-09-16 DIAGNOSIS — I10 ESSENTIAL HYPERTENSION: ICD-10-CM

## 2023-09-16 NOTE — TELEPHONE ENCOUNTER
No care due was identified.  [facility] Embedded Care Due Messages. Reference number: [id number].   9/16/2023 11:05:29 AM CDT

## 2023-09-19 NOTE — TELEPHONE ENCOUNTER
Refill Routing Note   Medication(s) are not appropriate for processing by [facility] for the following reason(s):      Medication outside of protocol    [facility] action(s):  Route Care Due:  None identified            Appointments  past 12m or future 3m with PCP    Date Provider   Last Visit   5/5/2023 Aramis Ng MD   Next Visit   9/29/2023 Aramis Ng MD   ED visits in past 90 days: 0        Note composed:12:14 PM 09/19/2023

## 2023-09-21 RX ORDER — CLONIDINE HYDROCHLORIDE 0.1 MG/1
0.1 TABLET ORAL 3 TIMES DAILY
Qty: 270 TABLET | Refills: 1 | Status: SHIPPED | OUTPATIENT
Start: 2023-09-21 | End: 2023-11-10 | Stop reason: SDUPTHER

## 2023-10-02 ENCOUNTER — OFFICE VISIT (OUTPATIENT)
Dept: UROLOGY | Facility: CLINIC | Age: 83
End: 2023-10-02
Payer: MEDICARE

## 2023-10-02 VITALS — WEIGHT: 262.81 LBS | BODY MASS INDEX: 29.6 KG/M2

## 2023-10-02 DIAGNOSIS — N52.9 ERECTILE DYSFUNCTION, UNSPECIFIED ERECTILE DYSFUNCTION TYPE: ICD-10-CM

## 2023-10-02 DIAGNOSIS — C61 PROSTATE CANCER: Primary | ICD-10-CM

## 2023-10-02 DIAGNOSIS — R35.0 URINARY FREQUENCY: ICD-10-CM

## 2023-10-02 DIAGNOSIS — N52.1 ERECTILE DYSFUNCTION DUE TO DISEASES CLASSIFIED ELSEWHERE: ICD-10-CM

## 2023-10-02 PROCEDURE — 99214 PR OFFICE/OUTPT VISIT, EST, LEVL IV, 30-39 MIN: ICD-10-PCS | Mod: S$GLB,,, | Performed by: UROLOGY

## 2023-10-02 PROCEDURE — 3288F FALL RISK ASSESSMENT DOCD: CPT | Mod: CPTII,S$GLB,, | Performed by: UROLOGY

## 2023-10-02 PROCEDURE — 1160F RVW MEDS BY RX/DR IN RCRD: CPT | Mod: CPTII,S$GLB,, | Performed by: UROLOGY

## 2023-10-02 PROCEDURE — 1126F AMNT PAIN NOTED NONE PRSNT: CPT | Mod: CPTII,S$GLB,, | Performed by: UROLOGY

## 2023-10-02 PROCEDURE — 1159F PR MEDICATION LIST DOCUMENTED IN MEDICAL RECORD: ICD-10-PCS | Mod: CPTII,S$GLB,, | Performed by: UROLOGY

## 2023-10-02 PROCEDURE — 99999 PR PBB SHADOW E&M-EST. PATIENT-LVL II: CPT | Mod: PBBFAC,,, | Performed by: UROLOGY

## 2023-10-02 PROCEDURE — 1159F MED LIST DOCD IN RCRD: CPT | Mod: CPTII,S$GLB,, | Performed by: UROLOGY

## 2023-10-02 PROCEDURE — 99999 PR PBB SHADOW E&M-EST. PATIENT-LVL II: ICD-10-PCS | Mod: PBBFAC,,, | Performed by: UROLOGY

## 2023-10-02 PROCEDURE — 1101F PR PT FALLS ASSESS DOC 0-1 FALLS W/OUT INJ PAST YR: ICD-10-PCS | Mod: CPTII,S$GLB,, | Performed by: UROLOGY

## 2023-10-02 PROCEDURE — 1157F ADVNC CARE PLAN IN RCRD: CPT | Mod: CPTII,S$GLB,, | Performed by: UROLOGY

## 2023-10-02 PROCEDURE — 1101F PT FALLS ASSESS-DOCD LE1/YR: CPT | Mod: CPTII,S$GLB,, | Performed by: UROLOGY

## 2023-10-02 PROCEDURE — 1160F PR REVIEW ALL MEDS BY PRESCRIBER/CLIN PHARMACIST DOCUMENTED: ICD-10-PCS | Mod: CPTII,S$GLB,, | Performed by: UROLOGY

## 2023-10-02 PROCEDURE — 99214 OFFICE O/P EST MOD 30 MIN: CPT | Mod: S$GLB,,, | Performed by: UROLOGY

## 2023-10-02 PROCEDURE — 3288F PR FALLS RISK ASSESSMENT DOCUMENTED: ICD-10-PCS | Mod: CPTII,S$GLB,, | Performed by: UROLOGY

## 2023-10-02 PROCEDURE — 1157F PR ADVANCE CARE PLAN OR EQUIV PRESENT IN MEDICAL RECORD: ICD-10-PCS | Mod: CPTII,S$GLB,, | Performed by: UROLOGY

## 2023-10-02 PROCEDURE — 1126F PR PAIN SEVERITY QUANTIFIED, NO PAIN PRESENT: ICD-10-PCS | Mod: CPTII,S$GLB,, | Performed by: UROLOGY

## 2023-10-02 RX ORDER — SILDENAFIL 100 MG/1
100 TABLET, FILM COATED ORAL DAILY PRN
Qty: 30 TABLET | Refills: 11 | Status: SHIPPED | OUTPATIENT
Start: 2023-10-02

## 2023-10-02 RX ORDER — CIPROFLOXACIN 500 MG/1
500 TABLET ORAL 2 TIMES DAILY
Qty: 6 TABLET | Refills: 0 | Status: SHIPPED | OUTPATIENT
Start: 2023-10-02 | End: 2023-10-05

## 2023-10-02 NOTE — PROGRESS NOTES
Subjective:       Patient ID: Luther Irwin Jr. is a 83 y.o. male The patient's last visit with me was on 5/22/2023.     Chief Complaint:   No chief complaint on file.    Prostate Cancer  He patient reports frequency and nocturia three times a night. He denies weak stream. The patient states symptoms are of moderate severity. Onset of symptoms was several years ago and was gradual in onset.  He has a personal history and no family history of prostate cancer. He reports a history of no complicating symptoms. He denies flank pain, gross hematuria, kidney stones, and recurrent UTI.  He is currently taking Flomax.     He underwent a prostate needle biopsy on 11/3/2022.  His biopsy was indicated due to: Elevated PSA.  Afterwards he experienced: Gross Hematuria.  These symptoms have resolved.  His PSA prior to biopsy was 6.5.  His prostate size was 90 grams.  The ultrasound did not show a median lobe.  He currently does have erectile dysfunction.  His pathology showed: Prostate Cancer.     Very Low Risk Prostate Cancer  Elected for active surveillance    5/22/2023  He is back with a new PSA.  Viagra works well.    10/02/2023  He feels well.      ACTIVE MEDICAL ISSUES:  Patient Active Problem List   Diagnosis    Essential hypertension    Benign prostatic hyperplasia without lower urinary tract symptoms    Type 2 diabetes mellitus with stage 3b chronic kidney disease, without long-term current use of insulin    Diabetes with proteinuria    Hyperchylomicronemia    Prostate cancer       ALLERGIES AND MEDICATIONS: updated and reviewed.  Review of patient's allergies indicates:   Allergen Reactions    Grass pollen-june grass standard Itching     Eye irritation     Current Outpatient Medications   Medication Sig    amlodipine-valsartan (EXFORGE)  mg per tablet Take 1 tablet by mouth once daily.    aspirin 81 MG Chew Take 81 mg by mouth once daily.    blood sugar diagnostic Strp To check BG 2 times daily, to use with insurance  preferred meter    blood-glucose meter kit To check BG 2 times daily, to use with insurance preferred meter    ciprofloxacin HCl (CIPRO) 500 MG tablet Take 1 tablet (500 mg total) by mouth 2 (two) times daily. for 6 doses    cloNIDine (CATAPRES) 0.1 MG tablet TAKE 1 TABLET BY MOUTH 3 TIMES DAILY.    cyanocobalamin 500 MCG tablet Take 500 mcg by mouth once daily.    glipiZIDE (GLUCOTROL) 10 MG TR24 Take 1 tablet (10 mg total) by mouth 2 (two) times daily.    hydroCHLOROthiazide (HYDRODIURIL) 25 MG tablet Take 1 tablet (25 mg total) by mouth once daily.    lancets Misc To check BG 2 times daily, to use with insurance preferred meter    sildenafiL (VIAGRA) 100 MG tablet Take 1 tablet (100 mg total) by mouth daily as needed for Erectile Dysfunction.    tamsulosin (FLOMAX) 0.4 mg Cap Take 1 capsule (0.4 mg total) by mouth once daily.     No current facility-administered medications for this visit.       Review of Systems   Constitutional:  Negative for chills and fever.   HENT:  Negative for congestion.    Respiratory:  Negative for chest tightness and shortness of breath.    Cardiovascular:  Negative for chest pain and palpitations.   Gastrointestinal:  Negative for abdominal pain, constipation, diarrhea, nausea and vomiting.   Genitourinary:  Negative for difficulty urinating, dysuria, flank pain, hematuria and urgency.   Musculoskeletal:  Negative for arthralgias.   Neurological:  Negative for dizziness.   Psychiatric/Behavioral:  Negative for confusion.        Objective:      Vitals:    10/02/23 1613   Weight: 119.2 kg (262 lb 12.6 oz)       Physical Exam  Vitals and nursing note reviewed.   Constitutional:       Appearance: He is well-developed.   HENT:      Head: Normocephalic.   Eyes:      Conjunctiva/sclera: Conjunctivae normal.   Neck:      Thyroid: No thyromegaly.      Trachea: No tracheal deviation.   Cardiovascular:      Rate and Rhythm: Normal rate.      Heart sounds: Normal heart sounds.   Pulmonary:       Effort: Pulmonary effort is normal. No respiratory distress.      Breath sounds: Normal breath sounds. No wheezing.   Abdominal:      General: Bowel sounds are normal.      Palpations: Abdomen is soft.      Tenderness: There is no abdominal tenderness. There is no rebound.      Hernia: No hernia is present.   Musculoskeletal:         General: No tenderness. Normal range of motion.      Cervical back: Normal range of motion and neck supple.   Lymphadenopathy:      Cervical: No cervical adenopathy.   Skin:     General: Skin is warm and dry.      Findings: No erythema or rash.   Neurological:      Mental Status: He is alert and oriented to person, place, and time.   Psychiatric:         Behavior: Behavior normal.         Thought Content: Thought content normal.         Judgment: Judgment normal.         Urine dipstick shows negative for all components.  Micro exam: negative for WBC's or RBC's.    MRI Prostate W W/O Contrast  Order: [id number]  Status: Final result     Visible to patient: Yes (seen)     Next appt: 11/01/2023 at 11:40 AM in Family Medicine (Aramis Ng MD)     Dx: Prostate cancer     0 Result Notes  Details    Reading Physician Reading Date Result Priority   Kareem Adkins MD  [phone] 8/15/2023 Routine   Quentin Matta MD  [phone]  [phone] 8/15/2023      Narrative & Impression  EXAMINATION:  MRI PROSTATE W W/O CONTRAST     CLINICAL HISTORY:  Prostate cancer, staging;  Malignant neoplasm of prostate     Additional history: None provided.     TECHNIQUE:  Multiparametric MRI of the prostate/pelvis performed on a 3T scanner with phase pelvic coil. Multiplanar, multisequence images including high resolution, small field-of-view T2-WI; axial diffusion weighted images with multiple B-values and creation of ADC-maps; and dynamic contrast enhanced T1-weighted images through the prostate were obtained before, during, and after the administration of 10 cc intravenous gadolinium.      COMPARISON:  None     FINDINGS:  Previous biopsy: 11/03/2022 Radha 3+3 = 6 right mid medial     PSA: 7.6 ng/mL 05/05/2023, 6.5 ng/mL 09/27/2022     Prior therapy: None     Prostate: 6.0 x 5.2 x 5.4 cm corresponding to a computed volume of 89 cc.     Peripheral zone: Diffuse mild T2 hypointensity, score 2.     Transitional zone: Focal lesion, as below.  Additional BPH nodules.     Lesion (BALDOMERO) #T-1     Location: Side:Right; Region: Mid; Zone: posterior     Greatest dimension: 1.6 cm     T2-WI: Heterogeneous signal intensity with obscured margins, score 3.     DWI/ADC: No abnormal signal, score 0     DCE: Negative     Extraprostatic extension: Negative     PI-RADS assessment category: 3     Neurovascular bundle: Normal appearance.     Seminal vesicles: Normal appearance.     Adjacent Organ Involvement: No evidence for urinary bladder or rectal invasion.     Lymphadenopathy: None.     Other Findings: Bladder trabeculations likely related to chronic outlet obstruction.  Post-operative change of the sacrum.  Nonspecific diffuse heterogeneous marrow signal in the pelvis without discrete focal lesion.     Impression:     PI-RADS 3 lesion in the right mid transition zone.     Additional findings as above.     Overall Assessment: PI-RADS 3 - Intermediate (the presence of clinically significant cancer is equivocal)     Number of targets created for potential MR/US fusion biopsy     Peripheral zone: 0     Transition zone: 1     Electronically signed by resident: Quentin Matta  Date:                                            08/15/2023  Time:                                           15:48     Electronically signed by: Kareem Adkins  Date:                                            08/15/2023  Time:                                           16:47         Component Ref Range & Units 2 wk ago 7 mo ago   PSA Diagnostic 0.00 - 4.00 ng/mL 7.6 High   6.5 High  CM    Comment: The testing method is a chemiluminescent microparticle  immunoassay   manufactured by [employer] and performed on the    or   [employer] system. Values obtained with different assay manufacturers   for   methods may be different and cannot be used interchangeably.   PSA Expected levels:   Hormonal Therapy: <0.05 ng/ml   Prostatectomy: <0.01 ng/ml   Radiation Therapy: <1.00 ng/ml    Resulting Agency  [facility]           Narrative  Performed by: [facility]  PSA 3 months      Specimen Collected: 05/05/23 08:11 Last Resulted: 05/05/23 15:56             Assessment:       1. Prostate cancer    2. Urinary frequency    3. Erectile dysfunction, unspecified erectile dysfunction type    4. Erectile dysfunction due to diseases classified elsewhere            Plan:       1. Prostate cancer  Plan on 14 biopsies    He understands that a prostate biopsy is indicated for definitive diagnosis of prostate cancer. Risks, benefits, and alternative of TRUS PBx were discussed thoroughly. Risks include, but are not limited to, pain, bleeding, infection, and sepsis. His pre-procedure regimen would require enema the morning of PBx and appropriate PO antibiotics for 3 days starting the day prior to procedure. He will also receive IM injection of antibiotics immediately before the procedure. He understands even after a prostate biopsy, prostate cancer can be missed and close follow up is necessary, with possible further imaging and/or repeat biopsy in the future.   - ciprofloxacin HCl (CIPRO) 500 MG tablet; Take 1 tablet (500 mg total) by mouth 2 (two) times daily. for 6 doses  Dispense: 6 tablet; Refill: 0  -  Biopsy Prostate Singl Multi Specimens (xpd); Future    2. Urinary frequency  stable    3. Erectile dysfunction, unspecified erectile dysfunction type      4. Erectile dysfunction due to diseases classified elsewhere    - sildenafiL (VIAGRA) 100 MG tablet; Take 1 tablet (100 mg total) by mouth daily as needed for Erectile Dysfunction.  Dispense: 30 tablet; Refill: 11              Follow up for Prostate Biopsy.

## 2023-11-10 ENCOUNTER — LAB VISIT (OUTPATIENT)
Dept: LAB | Facility: HOSPITAL | Age: 83
End: 2023-11-10
Attending: UROLOGY
Payer: MEDICARE

## 2023-11-10 ENCOUNTER — OFFICE VISIT (OUTPATIENT)
Dept: FAMILY MEDICINE | Facility: CLINIC | Age: 83
End: 2023-11-10
Payer: MEDICARE

## 2023-11-10 VITALS
WEIGHT: 261.44 LBS | BODY MASS INDEX: 30.25 KG/M2 | RESPIRATION RATE: 18 BRPM | HEART RATE: 61 BPM | OXYGEN SATURATION: 97 % | TEMPERATURE: 98 F | SYSTOLIC BLOOD PRESSURE: 134 MMHG | DIASTOLIC BLOOD PRESSURE: 78 MMHG | HEIGHT: 78 IN

## 2023-11-10 DIAGNOSIS — C61 PROSTATE CANCER: ICD-10-CM

## 2023-11-10 DIAGNOSIS — E11.22 TYPE 2 DIABETES MELLITUS WITH STAGE 3B CHRONIC KIDNEY DISEASE, WITHOUT LONG-TERM CURRENT USE OF INSULIN: ICD-10-CM

## 2023-11-10 DIAGNOSIS — I10 ESSENTIAL HYPERTENSION: ICD-10-CM

## 2023-11-10 DIAGNOSIS — N18.32 TYPE 2 DIABETES MELLITUS WITH STAGE 3B CHRONIC KIDNEY DISEASE, WITHOUT LONG-TERM CURRENT USE OF INSULIN: ICD-10-CM

## 2023-11-10 DIAGNOSIS — N40.0 BENIGN PROSTATIC HYPERPLASIA WITHOUT LOWER URINARY TRACT SYMPTOMS: ICD-10-CM

## 2023-11-10 DIAGNOSIS — Z00.00 ANNUAL PHYSICAL EXAM: Primary | ICD-10-CM

## 2023-11-10 DIAGNOSIS — Z23 INFLUENZA VACCINE NEEDED: ICD-10-CM

## 2023-11-10 LAB
ALBUMIN SERPL BCP-MCNC: 3.6 G/DL (ref 3.5–5.2)
ALP SERPL-CCNC: 138 U/L (ref 55–135)
ALT SERPL W/O P-5'-P-CCNC: 18 U/L (ref 10–44)
ANION GAP SERPL CALC-SCNC: 11 MMOL/L (ref 8–16)
AST SERPL-CCNC: 21 U/L (ref 10–40)
BASOPHILS # BLD AUTO: 0.12 K/UL (ref 0–0.2)
BASOPHILS NFR BLD: 1.4 % (ref 0–1.9)
BILIRUB SERPL-MCNC: 0.5 MG/DL (ref 0.1–1)
BUN SERPL-MCNC: 28 MG/DL (ref 8–23)
CALCIUM SERPL-MCNC: 8.9 MG/DL (ref 8.7–10.5)
CHLORIDE SERPL-SCNC: 105 MMOL/L (ref 95–110)
CHOLEST SERPL-MCNC: 161 MG/DL (ref 120–199)
CHOLEST/HDLC SERPL: 4.1 {RATIO} (ref 2–5)
CO2 SERPL-SCNC: 25 MMOL/L (ref 23–29)
COMPLEXED PSA SERPL-MCNC: 6.4 NG/ML (ref 0–4)
CREAT SERPL-MCNC: 2.1 MG/DL (ref 0.5–1.4)
DIFFERENTIAL METHOD: ABNORMAL
EOSINOPHIL # BLD AUTO: 0.4 K/UL (ref 0–0.5)
EOSINOPHIL NFR BLD: 4.8 % (ref 0–8)
ERYTHROCYTE [DISTWIDTH] IN BLOOD BY AUTOMATED COUNT: 12.8 % (ref 11.5–14.5)
EST. GFR  (NO RACE VARIABLE): 30.7 ML/MIN/1.73 M^2
ESTIMATED AVG GLUCOSE: 166 MG/DL (ref 68–131)
GLUCOSE SERPL-MCNC: 144 MG/DL (ref 70–110)
HBA1C MFR BLD: 7.4 % (ref 4–5.6)
HCT VFR BLD AUTO: 38.7 % (ref 40–54)
HDLC SERPL-MCNC: 39 MG/DL (ref 40–75)
HDLC SERPL: 24.2 % (ref 20–50)
HGB BLD-MCNC: 12.8 G/DL (ref 14–18)
IMM GRANULOCYTES # BLD AUTO: 0.03 K/UL (ref 0–0.04)
IMM GRANULOCYTES NFR BLD AUTO: 0.4 % (ref 0–0.5)
LDLC SERPL CALC-MCNC: 107.4 MG/DL (ref 63–159)
LYMPHOCYTES # BLD AUTO: 2.2 K/UL (ref 1–4.8)
LYMPHOCYTES NFR BLD: 25.5 % (ref 18–48)
MCH RBC QN AUTO: 27.6 PG (ref 27–31)
MCHC RBC AUTO-ENTMCNC: 33.1 G/DL (ref 32–36)
MCV RBC AUTO: 84 FL (ref 82–98)
MONOCYTES # BLD AUTO: 0.7 K/UL (ref 0.3–1)
MONOCYTES NFR BLD: 8.4 % (ref 4–15)
NEUTROPHILS # BLD AUTO: 5.1 K/UL (ref 1.8–7.7)
NEUTROPHILS NFR BLD: 59.5 % (ref 38–73)
NONHDLC SERPL-MCNC: 122 MG/DL
NRBC BLD-RTO: 0 /100 WBC
PLATELET # BLD AUTO: 241 K/UL (ref 150–450)
PMV BLD AUTO: 13.2 FL (ref 9.2–12.9)
POTASSIUM SERPL-SCNC: 3.9 MMOL/L (ref 3.5–5.1)
PROT SERPL-MCNC: 7.2 G/DL (ref 6–8.4)
RBC # BLD AUTO: 4.63 M/UL (ref 4.6–6.2)
SODIUM SERPL-SCNC: 141 MMOL/L (ref 136–145)
TRIGL SERPL-MCNC: 73 MG/DL (ref 30–150)
TSH SERPL DL<=0.005 MIU/L-ACNC: 1.37 UIU/ML (ref 0.4–4)
WBC # BLD AUTO: 8.54 K/UL (ref 3.9–12.7)

## 2023-11-10 PROCEDURE — 80053 COMPREHEN METABOLIC PANEL: CPT | Performed by: FAMILY MEDICINE

## 2023-11-10 PROCEDURE — 85025 COMPLETE CBC W/AUTO DIFF WBC: CPT | Performed by: FAMILY MEDICINE

## 2023-11-10 PROCEDURE — 1101F PR PT FALLS ASSESS DOC 0-1 FALLS W/OUT INJ PAST YR: ICD-10-PCS | Mod: CPTII,S$GLB,, | Performed by: FAMILY MEDICINE

## 2023-11-10 PROCEDURE — 99999 PR PBB SHADOW E&M-EST. PATIENT-LVL III: CPT | Mod: PBBFAC,,, | Performed by: FAMILY MEDICINE

## 2023-11-10 PROCEDURE — 36415 COLL VENOUS BLD VENIPUNCTURE: CPT | Mod: PO | Performed by: UROLOGY

## 2023-11-10 PROCEDURE — 84443 ASSAY THYROID STIM HORMONE: CPT | Performed by: FAMILY MEDICINE

## 2023-11-10 PROCEDURE — 99999 PR PBB SHADOW E&M-EST. PATIENT-LVL III: ICD-10-PCS | Mod: PBBFAC,,, | Performed by: FAMILY MEDICINE

## 2023-11-10 PROCEDURE — G0008 ADMIN INFLUENZA VIRUS VAC: HCPCS | Mod: S$GLB,,, | Performed by: FAMILY MEDICINE

## 2023-11-10 PROCEDURE — 3078F PR MOST RECENT DIASTOLIC BLOOD PRESSURE < 80 MM HG: ICD-10-PCS | Mod: CPTII,S$GLB,, | Performed by: FAMILY MEDICINE

## 2023-11-10 PROCEDURE — 3075F SYST BP GE 130 - 139MM HG: CPT | Mod: CPTII,S$GLB,, | Performed by: FAMILY MEDICINE

## 2023-11-10 PROCEDURE — 3288F FALL RISK ASSESSMENT DOCD: CPT | Mod: CPTII,S$GLB,, | Performed by: FAMILY MEDICINE

## 2023-11-10 PROCEDURE — 83036 HEMOGLOBIN GLYCOSYLATED A1C: CPT | Performed by: FAMILY MEDICINE

## 2023-11-10 PROCEDURE — 90694 VACC AIIV4 NO PRSRV 0.5ML IM: CPT | Mod: S$GLB,,, | Performed by: FAMILY MEDICINE

## 2023-11-10 PROCEDURE — 1159F PR MEDICATION LIST DOCUMENTED IN MEDICAL RECORD: ICD-10-PCS | Mod: CPTII,S$GLB,, | Performed by: FAMILY MEDICINE

## 2023-11-10 PROCEDURE — 99397 PR PREVENTIVE VISIT,EST,65 & OVER: ICD-10-PCS | Mod: GZ,S$GLB,, | Performed by: FAMILY MEDICINE

## 2023-11-10 PROCEDURE — 3075F PR MOST RECENT SYSTOLIC BLOOD PRESS GE 130-139MM HG: ICD-10-PCS | Mod: CPTII,S$GLB,, | Performed by: FAMILY MEDICINE

## 2023-11-10 PROCEDURE — 3288F PR FALLS RISK ASSESSMENT DOCUMENTED: ICD-10-PCS | Mod: CPTII,S$GLB,, | Performed by: FAMILY MEDICINE

## 2023-11-10 PROCEDURE — 1159F MED LIST DOCD IN RCRD: CPT | Mod: CPTII,S$GLB,, | Performed by: FAMILY MEDICINE

## 2023-11-10 PROCEDURE — 99397 PER PM REEVAL EST PAT 65+ YR: CPT | Mod: GZ,S$GLB,, | Performed by: FAMILY MEDICINE

## 2023-11-10 PROCEDURE — 1126F PR PAIN SEVERITY QUANTIFIED, NO PAIN PRESENT: ICD-10-PCS | Mod: CPTII,S$GLB,, | Performed by: FAMILY MEDICINE

## 2023-11-10 PROCEDURE — 80061 LIPID PANEL: CPT | Performed by: FAMILY MEDICINE

## 2023-11-10 PROCEDURE — 84153 ASSAY OF PSA TOTAL: CPT | Performed by: UROLOGY

## 2023-11-10 PROCEDURE — 1126F AMNT PAIN NOTED NONE PRSNT: CPT | Mod: CPTII,S$GLB,, | Performed by: FAMILY MEDICINE

## 2023-11-10 PROCEDURE — 1101F PT FALLS ASSESS-DOCD LE1/YR: CPT | Mod: CPTII,S$GLB,, | Performed by: FAMILY MEDICINE

## 2023-11-10 PROCEDURE — 1157F PR ADVANCE CARE PLAN OR EQUIV PRESENT IN MEDICAL RECORD: ICD-10-PCS | Mod: CPTII,S$GLB,, | Performed by: FAMILY MEDICINE

## 2023-11-10 PROCEDURE — 1157F ADVNC CARE PLAN IN RCRD: CPT | Mod: CPTII,S$GLB,, | Performed by: FAMILY MEDICINE

## 2023-11-10 PROCEDURE — 3078F DIAST BP <80 MM HG: CPT | Mod: CPTII,S$GLB,, | Performed by: FAMILY MEDICINE

## 2023-11-10 PROCEDURE — G0008 FLU VACCINE - QUADRIVALENT - ADJUVANTED: ICD-10-PCS | Mod: S$GLB,,, | Performed by: FAMILY MEDICINE

## 2023-11-10 PROCEDURE — 90694 FLU VACCINE - QUADRIVALENT - ADJUVANTED: ICD-10-PCS | Mod: S$GLB,,, | Performed by: FAMILY MEDICINE

## 2023-11-10 RX ORDER — GLIPIZIDE 10 MG/1
10 TABLET, FILM COATED, EXTENDED RELEASE ORAL 2 TIMES DAILY
Qty: 180 TABLET | Refills: 3 | Status: SHIPPED | OUTPATIENT
Start: 2023-11-10

## 2023-11-10 RX ORDER — CLONIDINE HYDROCHLORIDE 0.1 MG/1
0.1 TABLET ORAL 3 TIMES DAILY
Qty: 270 TABLET | Refills: 3 | Status: SHIPPED | OUTPATIENT
Start: 2023-11-10 | End: 2023-12-15 | Stop reason: SDUPTHER

## 2023-11-10 RX ORDER — AMLODIPINE AND VALSARTAN 10; 160 MG/1; MG/1
1 TABLET ORAL DAILY
Qty: 90 TABLET | Refills: 3 | Status: SHIPPED | OUTPATIENT
Start: 2023-11-10 | End: 2023-12-13 | Stop reason: SDUPTHER

## 2023-11-10 RX ORDER — TAMSULOSIN HYDROCHLORIDE 0.4 MG/1
1 CAPSULE ORAL DAILY
Qty: 90 CAPSULE | Refills: 3 | Status: SHIPPED | OUTPATIENT
Start: 2023-11-10

## 2023-11-10 RX ORDER — HYDROCHLOROTHIAZIDE 25 MG/1
25 TABLET ORAL DAILY
Qty: 90 TABLET | Refills: 3 | Status: SHIPPED | OUTPATIENT
Start: 2023-11-10 | End: 2023-12-12

## 2023-11-10 NOTE — PROGRESS NOTES
Subjective:       Patient ID: Luther Irwin Jr. is a 83 y.o. male.    Chief Complaint: Follow-up      Follow-up    83-year-old male presents for annual exam.  Patient has a follow-up with urology for prostate biopsy next week.  States he is doing well overall.  Denies any issues.    Review of Systems   Constitutional: Negative.    HENT: Negative.     Respiratory: Negative.     Cardiovascular: Negative.    Gastrointestinal: Negative.    Endocrine: Negative.    Genitourinary: Negative.    Musculoskeletal: Negative.    Neurological: Negative.    Psychiatric/Behavioral: Negative.            Past Medical History:   Diagnosis Date    Cancer     Diabetes mellitus, type 2     Diabetes with neurologic complications     Disorder of kidney and ureter     Hypertension     Late complications of amputation stump     Prostate cancer      Past Surgical History:   Procedure Laterality Date    FINGER SURGERY  3/2014    HERNIA REPAIR  1967    KNEE SURGERY      SPINE SURGERY  10/2007    VASECTOMY  1986     Family History   Problem Relation Age of Onset    Hypertension Mother     Hyperlipidemia Mother     Hypertension Father     Hyperlipidemia Father      Social History     Socioeconomic History    Marital status: Single    Number of children: 3    Highest education level: Bachelor's degree (e.g., BA, AB, BS)   Tobacco Use    Smoking status: Former     Current packs/day: 0.00     Average packs/day: 0.3 packs/day for 0.9 years (0.2 ttl pk-yrs)     Types: Cigarettes     Start date: 1960     Quit date: 1960     Years since quittin.9    Smokeless tobacco: Never   Substance and Sexual Activity    Alcohol use: Yes     Comment: special occacion    Drug use: No     Social Determinants of Health     Financial Resource Strain: Low Risk  (2023)    Overall Financial Resource Strain (CARDIA)     Difficulty of Paying Living Expenses: Not hard at all   Food Insecurity: No Food Insecurity (2023)    Hunger Vital Sign     Worried About  Running Out of Food in the Last Year: Never true     Ran Out of Food in the Last Year: Never true   Transportation Needs: No Transportation Needs (5/5/2023)    PRAPARE - Transportation     Lack of Transportation (Medical): No     Lack of Transportation (Non-Medical): No   Physical Activity: Inactive (5/5/2023)    Exercise Vital Sign     Days of Exercise per Week: 0 days     Minutes of Exercise per Session: 0 min   Stress: No Stress Concern Present (5/5/2023)    [employer] - Occupational Stress Questionnaire     Feeling of Stress : Not at all   Social Connections: Unknown (5/5/2023)    Social Connection and Isolation Panel [NHANES]     Frequency of Communication with Friends and Family: More than three times a week     Frequency of Social Gatherings with Friends and Family: Twice a week     Attends Hindu Services: More than 4 times per year     Active Member of Clubs or Organizations: Yes     Attends Club or Organization Meetings: More than 4 times per year   Housing Stability: Unknown (5/5/2023)    Housing Stability Vital Sign     Unable to Pay for Housing in the Last Year: No     Unstable Housing in the Last Year: No       Current Outpatient Medications:     aspirin 81 MG Chew, Take 81 mg by mouth once daily., Disp: , Rfl:     blood sugar diagnostic Strp, To check BG 2 times daily, to use with insurance preferred meter, Disp: 200 each, Rfl: 3    cyanocobalamin 500 MCG tablet, Take 500 mcg by mouth once daily., Disp: , Rfl:     lancets Misc, To check BG 2 times daily, to use with insurance preferred meter, Disp: 200 each, Rfl: 3    sildenafiL (VIAGRA) 100 MG tablet, Take 1 tablet (100 mg total) by mouth daily as needed for Erectile Dysfunction., Disp: 30 tablet, Rfl: 11    amlodipine-valsartan (EXFORGE)  mg per tablet, Take 1 tablet by mouth once daily., Disp: 90 tablet, Rfl: 3    blood-glucose meter kit, To check BG 2 times daily, to use with insurance preferred meter, Disp:  "1 each, Rfl: 0    cloNIDine (CATAPRES) 0.1 MG tablet, Take 1 tablet (0.1 mg total) by mouth 3 (three) times daily., Disp: 270 tablet, Rfl: 3    glipiZIDE (GLUCOTROL) 10 MG TR24, Take 1 tablet (10 mg total) by mouth 2 (two) times daily., Disp: 180 tablet, Rfl: 3    hydroCHLOROthiazide (HYDRODIURIL) 25 MG tablet, Take 1 tablet (25 mg total) by mouth once daily., Disp: 90 tablet, Rfl: 3    tamsulosin (FLOMAX) 0.4 mg Cap, Take 1 capsule (0.4 mg total) by mouth once daily., Disp: 90 capsule, Rfl: 3   Objective:      Vitals:    11/10/23 0855   BP: 134/78   Pulse: 61   Resp: 18   Temp: 98.3 °F (36.8 °C)   TempSrc: Oral   SpO2: 97%   Weight: 118.6 kg (261 lb 7.5 oz)   Height: 6' 7" (2.007 m)       Physical Exam  Constitutional:       General: He is not in acute distress.  HENT:      Head: Normocephalic and atraumatic.   Eyes:      Conjunctiva/sclera: Conjunctivae normal.   Cardiovascular:      Rate and Rhythm: Normal rate and regular rhythm.      Heart sounds: Normal heart sounds. No murmur heard.     No friction rub. No gallop.   Pulmonary:      Effort: Pulmonary effort is normal.      Breath sounds: Normal breath sounds. No wheezing or rales.   Musculoskeletal:      Cervical back: Neck supple.   Skin:     General: Skin is warm and dry.   Neurological:      Mental Status: He is alert and oriented to person, place, and time.   Psychiatric:         Behavior: Behavior normal.         Thought Content: Thought content normal.         Judgment: Judgment normal.            Assessment:       1. Annual physical exam    2. Type 2 diabetes mellitus with stage 3b chronic kidney disease, without long-term current use of insulin    3. Prostate cancer    4. Essential hypertension    5. Benign prostatic hyperplasia without lower urinary tract symptoms    6. Influenza vaccine needed        Plan:       Annual physical exam    Type 2 diabetes mellitus with stage 3b chronic kidney disease, without long-term current use of insulin  -     CBC " Auto Differential; Future; Expected date: 11/10/2023  -     Comprehensive Metabolic Panel; Future; Expected date: 11/10/2023  -     Hemoglobin A1C; Future; Expected date: 11/10/2023  -     glipiZIDE (GLUCOTROL) 10 MG TR24; Take 1 tablet (10 mg total) by mouth 2 (two) times daily.  Dispense: 180 tablet; Refill: 3    Prostate cancer    Essential hypertension  -     TSH; Future; Expected date: 11/10/2023  -     Lipid Panel; Future; Expected date: 11/10/2023  -     amlodipine-valsartan (EXFORGE)  mg per tablet; Take 1 tablet by mouth once daily.  Dispense: 90 tablet; Refill: 3  -     cloNIDine (CATAPRES) 0.1 MG tablet; Take 1 tablet (0.1 mg total) by mouth 3 (three) times daily.  Dispense: 270 tablet; Refill: 3  -     hydroCHLOROthiazide (HYDRODIURIL) 25 MG tablet; Take 1 tablet (25 mg total) by mouth once daily.  Dispense: 90 tablet; Refill: 3    Benign prostatic hyperplasia without lower urinary tract symptoms  -     tamsulosin (FLOMAX) 0.4 mg Cap; Take 1 capsule (0.4 mg total) by mouth once daily.  Dispense: 90 capsule; Refill: 3    Influenza vaccine needed  -     Influenza (FLUAD) - Quadrivalent (Adjuvanted) *Preferred* (65+) (PF)      Follow-up labs.  Continue follow-up with Urology for prostate        Future Appointments   Date Time Provider Department Center   11/10/2023  9:15 AM LAB, [facility] [place]   11/14/2023 10:00 AM ULTRASOUND, UROLOGY [facility]       Patient note was created using [employer].  Any errors in syntax or even information may not have been identified and edited on initial review prior to signing this note.

## 2023-11-14 ENCOUNTER — OFFICE VISIT (OUTPATIENT)
Dept: UROLOGY | Facility: CLINIC | Age: 83
End: 2023-11-14
Attending: UROLOGY
Payer: MEDICARE

## 2023-11-14 DIAGNOSIS — C61 PROSTATE CANCER: Primary | ICD-10-CM

## 2023-11-14 PROCEDURE — 99999 PR PBB SHADOW E&M-EST. PATIENT-LVL I: ICD-10-PCS | Mod: PBBFAC,,,

## 2023-11-14 PROCEDURE — 99499 UNLISTED E&M SERVICE: CPT | Mod: S$GLB,,, | Performed by: UROLOGY

## 2023-11-14 PROCEDURE — 99499 NO LOS: ICD-10-PCS | Mod: S$GLB,,, | Performed by: UROLOGY

## 2023-11-14 PROCEDURE — 99999 PR PBB SHADOW E&M-EST. PATIENT-LVL I: CPT | Mod: PBBFAC,,,

## 2023-11-14 RX ORDER — CIPROFLOXACIN 500 MG/1
500 TABLET ORAL 2 TIMES DAILY
Qty: 6 TABLET | Refills: 0 | Status: SHIPPED | OUTPATIENT
Start: 2023-11-14 | End: 2023-11-17

## 2023-11-14 RX ORDER — GENTAMICIN SULFATE 40 MG/ML
80 INJECTION, SOLUTION INTRAMUSCULAR; INTRAVENOUS
Status: DISCONTINUED | OUTPATIENT
Start: 2023-11-14 | End: 2023-11-14

## 2023-11-23 ENCOUNTER — PATIENT MESSAGE (OUTPATIENT)
Dept: FAMILY MEDICINE | Facility: CLINIC | Age: 83
End: 2023-11-23
Payer: MEDICARE

## 2023-11-23 DIAGNOSIS — E11.22 TYPE 2 DIABETES MELLITUS WITH STAGE 3B CHRONIC KIDNEY DISEASE, WITHOUT LONG-TERM CURRENT USE OF INSULIN: Primary | ICD-10-CM

## 2023-11-23 DIAGNOSIS — N18.32 TYPE 2 DIABETES MELLITUS WITH STAGE 3B CHRONIC KIDNEY DISEASE, WITHOUT LONG-TERM CURRENT USE OF INSULIN: Primary | ICD-10-CM

## 2023-11-23 NOTE — TELEPHONE ENCOUNTER
Worsening kidney function. Placed referral to nephro. A1c did improve. F/u in 3 months for recheck. Please schedule.

## 2023-12-08 DIAGNOSIS — I10 ESSENTIAL HYPERTENSION: ICD-10-CM

## 2023-12-08 NOTE — TELEPHONE ENCOUNTER
No care due was identified.  [facility] Embedded Care Due Messages. Reference number: [id number].   12/08/2023 12:25:00 AM CST

## 2023-12-09 NOTE — TELEPHONE ENCOUNTER
Refill Routing Note   Medication(s) are not appropriate for processing by [facility] for the following reason(s):        Required labs abnormal    [facility] action(s):  Defer        Medication Therapy Plan: CREATININE 2.1 (H) 11/10/2023      Appointments  past 12m or future 3m with PCP    Date Provider   Last Visit   11/10/2023 Aramis Ng MD   Next Visit   3/1/2024 Aramis Ng MD   ED visits in past 90 days: 0        Note composed:6:31 PM 12/08/2023

## 2023-12-12 DIAGNOSIS — I10 ESSENTIAL HYPERTENSION: ICD-10-CM

## 2023-12-12 RX ORDER — HYDROCHLOROTHIAZIDE 25 MG/1
25 TABLET ORAL
Qty: 90 TABLET | Refills: 3 | Status: SHIPPED | OUTPATIENT
Start: 2023-12-12

## 2023-12-12 NOTE — TELEPHONE ENCOUNTER
No care due was identified.  [facility] Embedded Care Due Messages. Reference number: [id number].   12/12/2023 12:23:10 AM CST

## 2023-12-12 NOTE — TELEPHONE ENCOUNTER
Refill Decision Note   Luther Irwin  is requesting a refill authorization.  Brief Assessment and Rationale for Refill:  Approve     Medication Therapy Plan:  Change of pharmacy      Comments:     Note composed:11:26 AM 12/12/2023

## 2023-12-13 RX ORDER — AMLODIPINE AND VALSARTAN 10; 160 MG/1; MG/1
1 TABLET ORAL
Qty: 90 TABLET | Refills: 3 | Status: SHIPPED | OUTPATIENT
Start: 2023-12-13

## 2023-12-14 DIAGNOSIS — I10 ESSENTIAL HYPERTENSION: ICD-10-CM

## 2023-12-14 NOTE — TELEPHONE ENCOUNTER
No care due was identified.  [facility] Embedded Care Due Messages. Reference number: [id number].   12/14/2023 12:24:03 AM CST

## 2023-12-15 RX ORDER — CLONIDINE HYDROCHLORIDE 0.1 MG/1
0.1 TABLET ORAL 3 TIMES DAILY
Qty: 270 TABLET | Refills: 1 | Status: SHIPPED | OUTPATIENT
Start: 2023-12-15

## 2024-01-02 DIAGNOSIS — C61 PROSTATE CANCER: Primary | ICD-10-CM

## 2024-01-02 RX ORDER — CIPROFLOXACIN 500 MG/1
500 TABLET ORAL 2 TIMES DAILY
Qty: 6 TABLET | Refills: 0 | Status: SHIPPED | OUTPATIENT
Start: 2024-01-02 | End: 2024-01-05

## 2024-01-04 ENCOUNTER — PATIENT OUTREACH (OUTPATIENT)
Dept: ADMINISTRATIVE | Facility: HOSPITAL | Age: 84
End: 2024-01-04
Payer: MEDICARE

## 2024-01-04 DIAGNOSIS — N18.32 TYPE 2 DIABETES MELLITUS WITH STAGE 3B CHRONIC KIDNEY DISEASE, WITHOUT LONG-TERM CURRENT USE OF INSULIN: Primary | ICD-10-CM

## 2024-01-04 DIAGNOSIS — E11.22 TYPE 2 DIABETES MELLITUS WITH STAGE 3B CHRONIC KIDNEY DISEASE, WITHOUT LONG-TERM CURRENT USE OF INSULIN: Primary | ICD-10-CM

## 2024-01-04 NOTE — PROGRESS NOTES

## 2024-01-05 LAB
LEFT EYE DM RETINOPATHY: NEGATIVE
RIGHT EYE DM RETINOPATHY: NEGATIVE

## 2024-01-18 ENCOUNTER — PATIENT OUTREACH (OUTPATIENT)
Dept: ADMINISTRATIVE | Facility: HOSPITAL | Age: 84
End: 2024-01-18
Payer: MEDICARE

## 2024-01-18 NOTE — PROGRESS NOTES

## 2024-01-25 RX ORDER — CIPROFLOXACIN 500 MG/1
TABLET ORAL
Qty: 6 TABLET | Refills: 0 | Status: SHIPPED | OUTPATIENT
Start: 2024-01-25

## 2024-02-14 ENCOUNTER — TELEPHONE (OUTPATIENT)
Dept: UROLOGY | Facility: CLINIC | Age: 84
End: 2024-02-14
Payer: MEDICARE

## 2024-02-14 RX ORDER — ENEMA 19; 7 G/133ML; G/133ML
1 ENEMA RECTAL ONCE
Qty: 133 ML | Refills: 0 | Status: SHIPPED | OUTPATIENT
Start: 2024-02-14 | End: 2024-02-14

## 2024-02-14 RX ORDER — CIPROFLOXACIN 500 MG/1
500 TABLET ORAL 2 TIMES DAILY
Qty: 6 TABLET | Refills: 0 | Status: SHIPPED | OUTPATIENT
Start: 2024-02-14 | End: 2024-02-17

## 2024-02-14 NOTE — TELEPHONE ENCOUNTER
----- Message from Sindhu Osborn MA sent at 2/14/2024  3:22 PM CST -----  Pt scheduled prostate bx on 2/27/24. Pt will need antibiotics sent to pharmacy.

## 2024-02-23 DIAGNOSIS — E11.22 TYPE 2 DIABETES MELLITUS WITH STAGE 3B CHRONIC KIDNEY DISEASE, WITHOUT LONG-TERM CURRENT USE OF INSULIN: Primary | ICD-10-CM

## 2024-02-23 DIAGNOSIS — N18.32 TYPE 2 DIABETES MELLITUS WITH STAGE 3B CHRONIC KIDNEY DISEASE, WITHOUT LONG-TERM CURRENT USE OF INSULIN: Primary | ICD-10-CM

## 2024-02-27 ENCOUNTER — PROCEDURE VISIT (OUTPATIENT)
Dept: UROLOGY | Facility: CLINIC | Age: 84
End: 2024-02-27
Attending: UROLOGY
Payer: MEDICARE

## 2024-02-27 ENCOUNTER — LAB VISIT (OUTPATIENT)
Dept: LAB | Facility: HOSPITAL | Age: 84
End: 2024-02-27
Attending: FAMILY MEDICINE
Payer: MEDICARE

## 2024-02-27 VITALS — BODY MASS INDEX: 29.8 KG/M2 | WEIGHT: 264.56 LBS

## 2024-02-27 DIAGNOSIS — E11.22 TYPE 2 DIABETES MELLITUS WITH STAGE 3B CHRONIC KIDNEY DISEASE, WITHOUT LONG-TERM CURRENT USE OF INSULIN: ICD-10-CM

## 2024-02-27 DIAGNOSIS — N18.32 TYPE 2 DIABETES MELLITUS WITH STAGE 3B CHRONIC KIDNEY DISEASE, WITHOUT LONG-TERM CURRENT USE OF INSULIN: ICD-10-CM

## 2024-02-27 DIAGNOSIS — C61 PROSTATE CANCER: Primary | ICD-10-CM

## 2024-02-27 LAB
BACTERIA #/AREA URNS HPF: ABNORMAL /HPF
BILIRUB UR QL STRIP: NEGATIVE
CLARITY UR: ABNORMAL
COLOR UR: ABNORMAL
GLUCOSE UR QL STRIP: ABNORMAL
HGB UR QL STRIP: NEGATIVE
HYALINE CASTS #/AREA URNS LPF: ABNORMAL /LPF
KETONES UR QL STRIP: NEGATIVE
LEUKOCYTE ESTERASE UR QL STRIP: ABNORMAL
MICROSCOPIC COMMENT: ABNORMAL
NITRITE UR QL STRIP: NEGATIVE
PH UR STRIP: 6 [PH] (ref 5–8)
PROT UR QL STRIP: ABNORMAL
RBC #/AREA URNS HPF: 5 /HPF (ref 0–4)
SP GR UR STRIP: 1.01 (ref 1–1.03)
SQUAMOUS #/AREA URNS HPF: 1 /HPF
URN SPEC COLLECT METH UR: ABNORMAL
UROBILINOGEN UR STRIP-ACNC: NEGATIVE EU/DL
WBC #/AREA URNS HPF: >100 /HPF (ref 0–5)
WBC CLUMPS URNS QL MICRO: ABNORMAL

## 2024-02-27 PROCEDURE — 88305 TISSUE EXAM BY PATHOLOGIST: CPT | Mod: 59 | Performed by: PATHOLOGY

## 2024-02-27 PROCEDURE — 96372 THER/PROPH/DIAG INJ SC/IM: CPT | Mod: 59,S$GLB,, | Performed by: UROLOGY

## 2024-02-27 PROCEDURE — 55700 TRUS: CPT | Mod: S$GLB,,, | Performed by: UROLOGY

## 2024-02-27 PROCEDURE — 82043 UR ALBUMIN QUANTITATIVE: CPT | Performed by: FAMILY MEDICINE

## 2024-02-27 PROCEDURE — 84156 ASSAY OF PROTEIN URINE: CPT | Performed by: INTERNAL MEDICINE

## 2024-02-27 PROCEDURE — 76872 US TRANSRECTAL: CPT | Mod: 26,S$GLB,, | Performed by: UROLOGY

## 2024-02-27 PROCEDURE — 81000 URINALYSIS NONAUTO W/SCOPE: CPT | Performed by: INTERNAL MEDICINE

## 2024-02-27 PROCEDURE — 88305 TISSUE EXAM BY PATHOLOGIST: CPT | Mod: 26,,, | Performed by: PATHOLOGY

## 2024-02-27 RX ORDER — GENTAMICIN 40 MG/ML
80 INJECTION, SOLUTION INTRAMUSCULAR; INTRAVENOUS
Status: COMPLETED | OUTPATIENT
Start: 2024-02-27 | End: 2024-02-27

## 2024-02-27 RX ADMIN — GENTAMICIN 80 MG: 40 INJECTION, SOLUTION INTRAMUSCULAR; INTRAVENOUS at 01:02

## 2024-02-27 NOTE — PROCEDURES
"TRUS    Date/Time: 2/27/2024 1:00 PM    Performed by: rToy Green MD  Authorized by: Troy Green MD    Consent Done?:  Yes (Written)  Time out: Immediately prior to procedure a "time out" was called to verify the correct patient, procedure, equipment, support staff and site/side marked as required.    Indications: Elevated PSA    Preparation: Patient was prepped and draped in usual sterile fashion    Position:  Left lateral  Anesthesia:  10cc's 1% Lidocaine and Lidocaine jelly  Patient sedated: No    Prostate Size:  71.26 cc  Lesions:: No    Left Base Biopsies: 2  Left Mid Biopsies: 2  Left [place] Biopsies: 2  Right Base Biopsies: 2  Right Mid Biopsies: 2  Right [place] Biopsies: 2  Total Biopsies:  12    Patient tolerance:  Patient tolerated the procedure well with no immediate complications     PSA 6.4  PSAD 0.09    "

## 2024-02-27 NOTE — PROGRESS NOTES
.Gentamicin administered (see MAR).  Pt tolerated well.  Instructed to relax monitor for signs and symptoms of reaction.  RUBEN BARBOZA

## 2024-02-28 ENCOUNTER — OFFICE VISIT (OUTPATIENT)
Dept: NEPHROLOGY | Facility: CLINIC | Age: 84
End: 2024-02-28
Payer: MEDICARE

## 2024-02-28 VITALS
BODY MASS INDEX: 30.76 KG/M2 | RESPIRATION RATE: 18 BRPM | DIASTOLIC BLOOD PRESSURE: 79 MMHG | SYSTOLIC BLOOD PRESSURE: 155 MMHG | HEART RATE: 96 BPM | WEIGHT: 265.88 LBS | OXYGEN SATURATION: 97 % | HEIGHT: 78 IN

## 2024-02-28 DIAGNOSIS — I10 HYPERTENSION, UNSPECIFIED TYPE: ICD-10-CM

## 2024-02-28 DIAGNOSIS — E11.22 TYPE 2 DIABETES MELLITUS WITH STAGE 3B CHRONIC KIDNEY DISEASE, WITHOUT LONG-TERM CURRENT USE OF INSULIN: ICD-10-CM

## 2024-02-28 DIAGNOSIS — N18.4 CKD (CHRONIC KIDNEY DISEASE), STAGE IV: Primary | ICD-10-CM

## 2024-02-28 DIAGNOSIS — N18.32 TYPE 2 DIABETES MELLITUS WITH STAGE 3B CHRONIC KIDNEY DISEASE, WITHOUT LONG-TERM CURRENT USE OF INSULIN: ICD-10-CM

## 2024-02-28 LAB
ALBUMIN/CREAT UR: 789.2 UG/MG (ref 0–30)
CREAT UR-MCNC: 102 MG/DL (ref 23–375)
CREAT UR-MCNC: 102 MG/DL (ref 23–375)
MICROALBUMIN UR DL<=1MG/L-MCNC: 805 UG/ML
PROT UR-MCNC: 106 MG/DL
PROT/CREAT UR: 1.04 MG/G{CREAT} (ref 0–0.2)

## 2024-02-28 PROCEDURE — 99999 PR PBB SHADOW E&M-EST. PATIENT-LVL III: CPT | Mod: PBBFAC,,, | Performed by: INTERNAL MEDICINE

## 2024-02-28 PROCEDURE — 99204 OFFICE O/P NEW MOD 45 MIN: CPT | Mod: S$GLB,,, | Performed by: INTERNAL MEDICINE

## 2024-02-28 NOTE — PROGRESS NOTES
REASON FOR CONSULT: CKD    REFERRING PHYSICIAN: Aramis Ng MD      HISTORY OF PRESENT ILLNESS: 83 y.o. male who is new to me  has a past medical history of Cancer, Diabetes mellitus, type 2, Diabetes with neurologic complications, Disorder of kidney and ureter, Hypertension, Late complications of amputation stump, Prostate cancer, and Pseudophakia (01/05/2024). patient was referred here for abnormal renal function   The patient denies taking NSAIDs or new antibiotics, recreational drugs, recent episode of dehydration, diarrhea, nausea or vomiting, acute illness, hospitalization or exposure to IV radiocontrast.     ROS:  General: negative for chills, or fatigue  ENT: No epistaxis or headaches  Hematological and Lymphatic: No bleeding problems or blood clots.  Endocrine: No skin changes or temperature intolerance  Respiratory: No cough, shortness of breath, or wheezing  Cardiovascular: No chest pain or dyspnea   Gastrointestinal: No abdominal pain, change in bowel habits  Genito-Urinary: No dysuria, trouble voiding, or hematuria  Musculoskeletal: ROS: negative for - joint pain, joint stiffness, joint swelling, muscle pain or muscular weakness  Neurological: No focal weakness, no numbness  Dermatological: No rash or ulcers.    PAST MEDICAL HISTORY:  Past Medical History:   Diagnosis Date    Cancer     Diabetes mellitus, type 2     Diabetes with neurologic complications     Disorder of kidney and ureter     Hypertension     Late complications of amputation stump     Prostate cancer     Pseudophakia 01/05/2024       PAST SURGICAL HISTORY:  Past Surgical History:   Procedure Laterality Date    FINGER SURGERY  3/2014    HERNIA REPAIR  07/1967    KNEE SURGERY      SPINE SURGERY  10/2007    VASECTOMY  1986       FAMILY HISTORY:   Family History   Problem Relation Age of Onset    Hypertension Mother     Hyperlipidemia Mother     Hypertension Father     Hyperlipidemia Father        SOCIAL HISTORY:  Social History      Socioeconomic History    Marital status: Single    Number of children: 3    Highest education level: Bachelor's degree (e.g., BA, AB, BS)   Tobacco Use    Smoking status: Former     Current packs/day: 0.00     Average packs/day: 0.3 packs/day for 0.9 years (0.2 ttl pk-yrs)     Types: Cigarettes     Start date: 1960     Quit date: 1960     Years since quittin.2    Smokeless tobacco: Never   Substance and Sexual Activity    Alcohol use: Yes     Comment: special occacion    Drug use: No     Social Determinants of Health     Financial Resource Strain: Low Risk  (2023)    Overall Financial Resource Strain (CARDIA)     Difficulty of Paying Living Expenses: Not very hard   Food Insecurity: No Food Insecurity (2023)    Hunger Vital Sign     Worried About Running Out of Food in the Last Year: Never true     Ran Out of Food in the Last Year: Never true   Transportation Needs: No Transportation Needs (2023)    PRAPARE - Transportation     Lack of Transportation (Medical): No     Lack of Transportation (Non-Medical): No   Physical Activity: Insufficiently Active (2023)    Exercise Vital Sign     Days of Exercise per Week: 3 days     Minutes of Exercise per Session: 20 min   Stress: No Stress Concern Present (2023)    [employer] - Occupational Stress Questionnaire     Feeling of Stress : Not at all   Social Connections: Moderately Integrated (2023)    Social Connection and Isolation Panel [NHANES]     Frequency of Communication with Friends and Family: Three times a week     Frequency of Social Gatherings with Friends and Family: Once a week     Attends Islam Services: More than 4 times per year     Active Member of Clubs or Organizations: Yes     Attends Club or Organization Meetings: 1 to 4 times per year     Marital Status:    Housing Stability: Low Risk  (2023)    Housing Stability Vital Sign     Unable to Pay for Housing in  the Last Year: No     Number of Places Lived in the Last Year: 1     Unstable Housing in the Last Year: No       ALLERGIES:  Review of patient's allergies indicates:   Allergen Reactions    Grass pollen-afshin grass standard Itching     Eye irritation       MEDICATIONS:    Current Outpatient Medications:     amlodipine-valsartan (EXFORGE)  mg per tablet, TAKE 1 TABLET BY MOUTH EVERY DAY, Disp: 90 tablet, Rfl: 3    aspirin 81 MG Chew, Take 81 mg by mouth once daily., Disp: , Rfl:     blood sugar diagnostic Strp, To check BG 2 times daily, to use with insurance preferred meter, Disp: 200 each, Rfl: 3    blood-glucose meter kit, To check BG 2 times daily, to use with insurance preferred meter, Disp: 1 each, Rfl: 0    ciprofloxacin HCl (CIPRO) 500 MG tablet, TAKE 1 TABLET BY MOUTH TWICE DAILY FOR  6  DOSES, Disp: 6 tablet, Rfl: 0    cloNIDine (CATAPRES) 0.1 MG tablet, TAKE 1 TABLET BY MOUTH 3 TIMES DAILY., Disp: 270 tablet, Rfl: 1    cyanocobalamin 500 MCG tablet, Take 500 mcg by mouth once daily., Disp: , Rfl:     glipiZIDE (GLUCOTROL) 10 MG TR24, Take 1 tablet (10 mg total) by mouth 2 (two) times daily., Disp: 180 tablet, Rfl: 3    hydroCHLOROthiazide (HYDRODIURIL) 25 MG tablet, TAKE 1 TABLET BY MOUTH EVERY DAY, Disp: 90 tablet, Rfl: 3    lancets Misc, To check BG 2 times daily, to use with insurance preferred meter, Disp: 200 each, Rfl: 3    sildenafiL (VIAGRA) 100 MG tablet, Take 1 tablet (100 mg total) by mouth daily as needed for Erectile Dysfunction., Disp: 30 tablet, Rfl: 11    tamsulosin (FLOMAX) 0.4 mg Cap, Take 1 capsule (0.4 mg total) by mouth once daily., Disp: 90 capsule, Rfl: 3   Medication List with Changes/Refills   Current Medications    AMLODIPINE-VALSARTAN (EXFORGE)  MG PER TABLET    TAKE 1 TABLET BY MOUTH EVERY DAY    ASPIRIN 81 MG CHEW    Take 81 mg by mouth once daily.    BLOOD SUGAR DIAGNOSTIC STRP    To check BG 2 times daily, to use with insurance preferred meter    BLOOD-GLUCOSE  METER KIT    To check BG 2 times daily, to use with insurance preferred meter    CIPROFLOXACIN HCL (CIPRO) 500 MG TABLET    TAKE 1 TABLET BY MOUTH TWICE DAILY FOR  6  DOSES    CLONIDINE (CATAPRES) 0.1 MG TABLET    TAKE 1 TABLET BY MOUTH 3 TIMES DAILY.    CYANOCOBALAMIN 500 MCG TABLET    Take 500 mcg by mouth once daily.    GLIPIZIDE (GLUCOTROL) 10 MG TR24    Take 1 tablet (10 mg total) by mouth 2 (two) times daily.    HYDROCHLOROTHIAZIDE (HYDRODIURIL) 25 MG TABLET    TAKE 1 TABLET BY MOUTH EVERY DAY    LANCETS MISC    To check BG 2 times daily, to use with insurance preferred meter    SILDENAFIL (VIAGRA) 100 MG TABLET    Take 1 tablet (100 mg total) by mouth daily as needed for Erectile Dysfunction.    TAMSULOSIN (FLOMAX) 0.4 MG CAP    Take 1 capsule (0.4 mg total) by mouth once daily.        PHYSICAL EXAM:  There were no vitals taken for this visit.    General: No distress, No fever or chills  Head: Normocephalic,atraumatic  Eyes: conjunctivae/corneas clear. PERRL, EOM's intact.  Nose: Nares normal. Mucosa normal. No drainage or sinus tenderness.  Neck: No adenopathy,no carotid bruit,no JVD  Lungs:Clear to auscultation bilaterally, No Crackles  Heart: Regular rate and rhythm, no murmur, gallops or rubs  Abdomen: Soft, no tenderness, bowel sounds normal  Extremities: Atraumatic, no edema in LE  Skin: Turgor normal. No rashes or ulcers  Neurologic: No focal weakness, oriented.          LABS:  Lab Results   Component Value Date    CREATININE 2.3 (H) 02/27/2024    CREATININE 2.3 (H) 02/27/2024       Prot/Creat Ratio, Urine   Date Value Ref Range Status   02/27/2024 1.04 (H) 0.00 - 0.20 Final       Lab Results   Component Value Date     02/27/2024     02/27/2024    K 4.3 02/27/2024    K 4.3 02/27/2024    CO2 27 02/27/2024    CO2 27 02/27/2024       last PTH   Lab Results   Component Value Date    CALCIUM 8.8 02/27/2024    CALCIUM 8.8 02/27/2024       Lab Results   Component Value Date    HGB 13.1 (L)  02/27/2024        Lab Results   Component Value Date    HGBA1C 7.4 (H) 02/27/2024       Lab Results   Component Value Date    LDLCALC 107.4 11/10/2023           ASSESSMENT:    1- 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     2:05 PM    FRANK LEWIS MD  NEPHROLOGY ATTENDING

## 2024-03-01 ENCOUNTER — PATIENT MESSAGE (OUTPATIENT)
Dept: ADMINISTRATIVE | Facility: HOSPITAL | Age: 84
End: 2024-03-01
Payer: MEDICARE

## 2024-03-01 LAB
FINAL PATHOLOGIC DIAGNOSIS: NORMAL
GROSS: NORMAL
Lab: NORMAL

## 2024-03-12 ENCOUNTER — OFFICE VISIT (OUTPATIENT)
Dept: UROLOGY | Facility: CLINIC | Age: 84
End: 2024-03-12
Payer: MEDICARE

## 2024-03-12 DIAGNOSIS — N52.9 ERECTILE DYSFUNCTION, UNSPECIFIED ERECTILE DYSFUNCTION TYPE: ICD-10-CM

## 2024-03-12 DIAGNOSIS — C61 PROSTATE CANCER: Primary | ICD-10-CM

## 2024-03-12 DIAGNOSIS — R35.0 URINARY FREQUENCY: ICD-10-CM

## 2024-03-12 PROCEDURE — 99214 OFFICE O/P EST MOD 30 MIN: CPT | Mod: S$GLB,,, | Performed by: UROLOGY

## 2024-03-12 PROCEDURE — 99999 PR PBB SHADOW E&M-EST. PATIENT-LVL III: CPT | Mod: PBBFAC,,, | Performed by: UROLOGY

## 2024-03-12 RX ORDER — TADALAFIL 20 MG/1
20 TABLET ORAL DAILY
Qty: 30 TABLET | Refills: 11 | Status: SHIPPED | OUTPATIENT
Start: 2024-03-12 | End: 2025-03-12

## 2024-03-12 NOTE — PROGRESS NOTES
Subjective:       Patient ID: Luther Irwin Jr. is a 83 y.o. male who was last seen in this office 2/27/2024    Chief Complaint:   Chief Complaint   Patient presents with    Follow-up       Follow Up Prostate Biopsy    He underwent a prostate needle biopsy on 2/27/2024.  His biopsy was indicated due to: Prostate cancer.  Afterwards he experienced: Gross Hematuria.  These symptoms have resolved.  His PSA prior to biopsy was 6.4  PSA Density 0.09. His prostate size was 71.26 grams.  The ultrasound did not show a median lobe.  He currently does have erectile dysfunction.  His pathology showed: benign prostate.     Prostate Cancer  He patient reports frequency and nocturia three times a night. He denies weak stream. The patient states symptoms are of moderate severity. Onset of symptoms was several years ago and was gradual in onset.  He has a personal history and no family history of prostate cancer. He reports a history of no complicating symptoms. He denies flank pain, gross hematuria, kidney stones, and recurrent UTI.  He is currently taking Flomax.     He underwent a prostate needle biopsy on 11/3/2022.  His biopsy was indicated due to: Elevated PSA.  Afterwards he experienced: Gross Hematuria.  These symptoms have resolved.  His PSA prior to biopsy was 6.5.  His prostate size was 90 grams.  The ultrasound did not show a median lobe.  He currently does have erectile dysfunction.  His pathology showed: Prostate Cancer.     Very Low Risk Prostate Cancer  Elected for active surveillance    ACTIVE MEDICAL ISSUES:  Patient Active Problem List   Diagnosis    Essential hypertension    Benign prostatic hyperplasia without lower urinary tract symptoms    Type 2 diabetes mellitus with stage 3b chronic kidney disease, without long-term current use of insulin    Diabetes with proteinuria    Hyperchylomicronemia    Prostate cancer       ALLERGIES AND MEDICATIONS: updated and reviewed.  Review of patient's allergies indicates:    Allergen Reactions    Grass pollen-june grass standard Itching     Eye irritation     Current Outpatient Medications   Medication Sig    amlodipine-valsartan (EXFORGE)  mg per tablet TAKE 1 TABLET BY MOUTH EVERY DAY    aspirin 81 MG Chew Take 81 mg by mouth once daily.    blood sugar diagnostic Strp To check BG 2 times daily, to use with insurance preferred meter    blood-glucose meter kit To check BG 2 times daily, to use with insurance preferred meter    ciprofloxacin HCl (CIPRO) 500 MG tablet TAKE 1 TABLET BY MOUTH TWICE DAILY FOR  6  DOSES    cloNIDine (CATAPRES) 0.1 MG tablet TAKE 1 TABLET BY MOUTH 3 TIMES DAILY.    cyanocobalamin 500 MCG tablet Take 500 mcg by mouth once daily.    glipiZIDE (GLUCOTROL) 10 MG TR24 Take 1 tablet (10 mg total) by mouth 2 (two) times daily.    hydroCHLOROthiazide (HYDRODIURIL) 25 MG tablet TAKE 1 TABLET BY MOUTH EVERY DAY    lancets Misc To check BG 2 times daily, to use with insurance preferred meter    sildenafiL (VIAGRA) 100 MG tablet Take 1 tablet (100 mg total) by mouth daily as needed for Erectile Dysfunction.    tadalafiL (CIALIS) 20 MG Tab Take 1 tablet (20 mg total) by mouth once daily.    tamsulosin (FLOMAX) 0.4 mg Cap Take 1 capsule (0.4 mg total) by mouth once daily.     No current facility-administered medications for this visit.       Review of Systems   Constitutional:  Negative for chills and fever.   HENT:  Negative for congestion.    Respiratory:  Negative for chest tightness and shortness of breath.    Cardiovascular:  Negative for chest pain and palpitations.   Gastrointestinal:  Negative for abdominal pain, constipation, diarrhea, nausea and vomiting.   Genitourinary:  Negative for difficulty urinating, dysuria, flank pain, hematuria and urgency.   Musculoskeletal:  Negative for arthralgias.   Neurological:  Negative for dizziness.   Psychiatric/Behavioral:  Negative for confusion.        Objective:      There were no vitals filed for this  visit.  Physical Exam  Vitals and nursing note reviewed.   Constitutional:       Appearance: He is well-developed.   HENT:      Head: Normocephalic.   Eyes:      Conjunctiva/sclera: Conjunctivae normal.   Neck:      Thyroid: No thyromegaly.      Trachea: No tracheal deviation.   Cardiovascular:      Rate and Rhythm: Normal rate.      Heart sounds: Normal heart sounds.   Pulmonary:      Effort: Pulmonary effort is normal. No respiratory distress.      Breath sounds: Normal breath sounds. No wheezing.   Abdominal:      General: Bowel sounds are normal.      Palpations: Abdomen is soft.      Tenderness: There is no abdominal tenderness. There is no rebound.      Hernia: No hernia is present.   Musculoskeletal:         General: No tenderness. Normal range of motion.      Cervical back: Normal range of motion and neck supple.   Lymphadenopathy:      Cervical: No cervical adenopathy.   Skin:     General: Skin is warm and dry.      Findings: No erythema or rash.   Neurological:      Mental Status: He is alert and oriented to person, place, and time.   Psychiatric:         Behavior: Behavior normal.         Thought Content: Thought content normal.         Judgment: Judgment normal.         Urine dipstick shows not done.  Micro exam: not done.    Collected: 02/27/24 1320   Result status: Final   Resulting lab: [facility]   Value: 1. Prostate, left base lateral, needle biopsy:  Benign prostatic tissue    2. Prostate, left base medial, needle biopsy:  Benign prostatic tissue    3. Prostate, left mid lateral, needle biopsy:  Benign prostatic tissue    4. Prostate, left mid medial, needle biopsy:  Benign prostatic tissue with chronic inflammation    5. Prostate, left apex lateral, needle biopsy:  Benign prostatic tissue with chronic inflammation    6. Prostate, left apex medial, needle biopsy:  Benign prostatic tissue    7. Prostate, right base lateral, needle biopsy:  Benign prostatic tissue    8.  Prostate, right base medial, needle biopsy:  Benign prostatic tissue with chronic inflammation    9. Prostate, right mid lateral, needle biopsy:  Benign prostatic tissue with chronic inflammation    10. Prostate, right mid medial, needle biopsy:  Benign prostatic tissue    11. Prostate, right apex lateral, needle biopsy:  Benign prostatic tissue with chronic inflammation    12. Prostate, right apex medial, needle biopsy:  Benign prostatic tissue   Comment: Interp By Bob Graham MD, Signed on 03/01/2024 at 10:29       Assessment:       1. Prostate cancer    2. Erectile dysfunction, unspecified erectile dysfunction type    3. Urinary frequency          Plan:       1. Prostate cancer  Continue active surveillance  - Prostate Specific Antigen, Diagnostic; Future    2. Erectile dysfunction, unspecified erectile dysfunction type  Viagra    3. Urinary frequency  Flomax            Follow up in about 6 months (around 9/12/2024) for Follow up Established.

## 2024-03-28 ENCOUNTER — PATIENT MESSAGE (OUTPATIENT)
Dept: ADMINISTRATIVE | Facility: HOSPITAL | Age: 84
End: 2024-03-28
Payer: MEDICARE

## 2024-04-10 DIAGNOSIS — I10 ESSENTIAL HYPERTENSION: ICD-10-CM

## 2024-04-10 NOTE — TELEPHONE ENCOUNTER
No care due was identified.  [facility] Embedded Care Due Messages. Reference number: [id number].   4/10/2024 3:04:37 PM CDT

## 2024-04-11 NOTE — TELEPHONE ENCOUNTER
Refill Routing Note   Medication(s) are not appropriate for processing by [facility] for the following reason(s):        Required labs abnormal  Required vitals abnormal    [facility] action(s):  Defer             Appointments  past 12m or future 3m with PCP    Date Provider   Last Visit   11/10/2023 Aramis Ng MD   Next Visit   5/10/2024 Aramis Ng MD   ED visits in past 90 days: 0        Note composed:1:47 PM 04/11/2024

## 2024-04-12 RX ORDER — AMLODIPINE AND VALSARTAN 10; 160 MG/1; MG/1
1 TABLET ORAL
Qty: 90 TABLET | Refills: 1 | Status: SHIPPED | OUTPATIENT
Start: 2024-04-12 | End: 2024-05-10 | Stop reason: SDUPTHER

## 2024-05-10 ENCOUNTER — OFFICE VISIT (OUTPATIENT)
Dept: FAMILY MEDICINE | Facility: CLINIC | Age: 84
End: 2024-05-10
Payer: MEDICARE

## 2024-05-10 VITALS
OXYGEN SATURATION: 97 % | DIASTOLIC BLOOD PRESSURE: 78 MMHG | HEIGHT: 78 IN | TEMPERATURE: 98 F | HEART RATE: 60 BPM | RESPIRATION RATE: 16 BRPM | WEIGHT: 261.25 LBS | SYSTOLIC BLOOD PRESSURE: 138 MMHG | BODY MASS INDEX: 30.23 KG/M2

## 2024-05-10 DIAGNOSIS — E11.22 TYPE 2 DIABETES MELLITUS WITH STAGE 3B CHRONIC KIDNEY DISEASE, WITHOUT LONG-TERM CURRENT USE OF INSULIN: Primary | ICD-10-CM

## 2024-05-10 DIAGNOSIS — N18.32 TYPE 2 DIABETES MELLITUS WITH STAGE 3B CHRONIC KIDNEY DISEASE, WITHOUT LONG-TERM CURRENT USE OF INSULIN: Primary | ICD-10-CM

## 2024-05-10 DIAGNOSIS — I10 ESSENTIAL HYPERTENSION: ICD-10-CM

## 2024-05-10 DIAGNOSIS — C61 PROSTATE CANCER: ICD-10-CM

## 2024-05-10 PROCEDURE — 99214 OFFICE O/P EST MOD 30 MIN: CPT | Mod: S$GLB,,, | Performed by: FAMILY MEDICINE

## 2024-05-10 PROCEDURE — 3288F FALL RISK ASSESSMENT DOCD: CPT | Mod: CPTII,S$GLB,, | Performed by: FAMILY MEDICINE

## 2024-05-10 PROCEDURE — 1159F MED LIST DOCD IN RCRD: CPT | Mod: CPTII,S$GLB,, | Performed by: FAMILY MEDICINE

## 2024-05-10 PROCEDURE — 99999 PR PBB SHADOW E&M-EST. PATIENT-LVL III: CPT | Mod: PBBFAC,,, | Performed by: FAMILY MEDICINE

## 2024-05-10 PROCEDURE — 3078F DIAST BP <80 MM HG: CPT | Mod: CPTII,S$GLB,, | Performed by: FAMILY MEDICINE

## 2024-05-10 PROCEDURE — 3075F SYST BP GE 130 - 139MM HG: CPT | Mod: CPTII,S$GLB,, | Performed by: FAMILY MEDICINE

## 2024-05-10 PROCEDURE — 2023F DILAT RTA XM W/O RTNOPTHY: CPT | Mod: CPTII,S$GLB,, | Performed by: FAMILY MEDICINE

## 2024-05-10 PROCEDURE — 1101F PT FALLS ASSESS-DOCD LE1/YR: CPT | Mod: CPTII,S$GLB,, | Performed by: FAMILY MEDICINE

## 2024-05-10 PROCEDURE — 1157F ADVNC CARE PLAN IN RCRD: CPT | Mod: CPTII,S$GLB,, | Performed by: FAMILY MEDICINE

## 2024-05-10 RX ORDER — HYDROCHLOROTHIAZIDE 25 MG/1
25 TABLET ORAL DAILY
Qty: 90 TABLET | Refills: 3 | Status: SHIPPED | OUTPATIENT
Start: 2024-05-10

## 2024-05-10 RX ORDER — AMLODIPINE AND VALSARTAN 10; 160 MG/1; MG/1
1 TABLET ORAL DAILY
Qty: 90 TABLET | Refills: 3 | Status: SHIPPED | OUTPATIENT
Start: 2024-05-10

## 2024-05-10 RX ORDER — TAMSULOSIN HYDROCHLORIDE 0.4 MG/1
1 CAPSULE ORAL DAILY
Qty: 90 CAPSULE | Refills: 3 | Status: SHIPPED | OUTPATIENT
Start: 2024-05-10

## 2024-05-10 RX ORDER — CLONIDINE HYDROCHLORIDE 0.1 MG/1
0.1 TABLET ORAL 3 TIMES DAILY
Qty: 270 TABLET | Refills: 3 | Status: SHIPPED | OUTPATIENT
Start: 2024-05-10

## 2024-05-10 RX ORDER — GLIPIZIDE 10 MG/1
10 TABLET, FILM COATED, EXTENDED RELEASE ORAL 2 TIMES DAILY
Qty: 180 TABLET | Refills: 3 | Status: SHIPPED | OUTPATIENT
Start: 2024-05-10

## 2024-05-10 NOTE — PROGRESS NOTES
Health Maintenance Due   Topic     RSV Vaccine (Age 60+ and Pregnant patients) (1 - 1-dose 60+ series) Not given at this facility       COVID-19 Vaccine (7 - 2023-24 season) Not given at this facility

## 2024-05-10 NOTE — PROGRESS NOTES
Subjective:       Patient ID: Luther Irwin Jr. is a 83 y.o. male.    Chief Complaint: Follow-up      Follow-up      83-year-old male presents for diabetes follow-up.  Continues follow-up with Urology for prostate.  A1c remains the same at 7.4.  Patient follows with Nephrology as well.  States he takes his meds.        Review of Systems   Constitutional: Negative.    HENT: Negative.     Respiratory: Negative.     Cardiovascular: Negative.    Gastrointestinal: Negative.    Endocrine: Negative.    Genitourinary: Negative.    Musculoskeletal: Negative.    Neurological: Negative.    Psychiatric/Behavioral: Negative.            Past Medical History:   Diagnosis Date    Cancer     Diabetes mellitus, type 2     Diabetes with neurologic complications     Disorder of kidney and ureter     Hypertension     Late complications of amputation stump     Prostate cancer     Pseudophakia 2024     Past Surgical History:   Procedure Laterality Date    FINGER SURGERY  3/2014    HERNIA REPAIR  1967    KNEE SURGERY      SPINE SURGERY  10/2007    VASECTOMY  1986     Family History   Problem Relation Name Age of Onset    Hypertension Mother      Hyperlipidemia Mother      Hypertension Father      Hyperlipidemia Father       Social History     Socioeconomic History    Marital status: Single    Number of children: 3    Highest education level: Bachelor's degree (e.g., BA, AB, BS)   Tobacco Use    Smoking status: Former     Current packs/day: 0.00     Average packs/day: 0.3 packs/day for 0.9 years (0.2 ttl pk-yrs)     Types: Cigarettes     Start date: 1960     Quit date: 1960     Years since quittin.4    Smokeless tobacco: Never   Substance and Sexual Activity    Alcohol use: Yes     Comment: special occacion    Drug use: No     Social Determinants of Health     Financial Resource Strain: Low Risk  (2023)    Overall Financial Resource Strain (CARDIA)     Difficulty of Paying Living Expenses: Not very hard   Food  Insecurity: No Food Insecurity (12/30/2023)    Hunger Vital Sign     Worried About Running Out of Food in the Last Year: Never true     Ran Out of Food in the Last Year: Never true   Transportation Needs: No Transportation Needs (12/30/2023)    PRAPARE - Transportation     Lack of Transportation (Medical): No     Lack of Transportation (Non-Medical): No   Physical Activity: Insufficiently Active (12/30/2023)    Exercise Vital Sign     Days of Exercise per Week: 3 days     Minutes of Exercise per Session: 20 min   Stress: No Stress Concern Present (12/30/2023)    [employer] [place] [employer] - Occupational Stress Questionnaire     Feeling of Stress : Not at all   Housing Stability: Low Risk  (12/30/2023)    Housing Stability Vital Sign     Unable to Pay for Housing in the Last Year: No     Number of Places Lived in the Last Year: 1     Unstable Housing in the Last Year: No       Current Outpatient Medications:     aspirin 81 MG Chew, Take 81 mg by mouth once daily., Disp: , Rfl:     blood sugar diagnostic Strp, To check BG 2 times daily, to use with insurance preferred meter, Disp: 200 each, Rfl: 3    blood-glucose meter kit, To check BG 2 times daily, to use with insurance preferred meter, Disp: 1 each, Rfl: 0    lancets Misc, To check BG 2 times daily, to use with insurance preferred meter, Disp: 200 each, Rfl: 3    sildenafiL (VIAGRA) 100 MG tablet, Take 1 tablet (100 mg total) by mouth daily as needed for Erectile Dysfunction., Disp: 30 tablet, Rfl: 11    tadalafiL (CIALIS) 20 MG Tab, Take 1 tablet (20 mg total) by mouth once daily., Disp: 30 tablet, Rfl: 11    amlodipine-valsartan (EXFORGE)  mg per tablet, Take 1 tablet by mouth once daily., Disp: 90 tablet, Rfl: 3    ciprofloxacin HCl (CIPRO) 500 MG tablet, TAKE 1 TABLET BY MOUTH TWICE DAILY FOR  6  DOSES (Patient not taking: Reported on 5/10/2024), Disp: 6 tablet, Rfl: 0    cloNIDine (CATAPRES) 0.1 MG tablet, Take 1 tablet (0.1 mg total) by  "mouth 3 (three) times daily., Disp: 270 tablet, Rfl: 3    empagliflozin (JARDIANCE) 10 mg tablet, Take 1 tablet (10 mg total) by mouth once daily., Disp: 30 tablet, Rfl: 11    glipiZIDE (GLUCOTROL) 10 MG TR24, Take 1 tablet (10 mg total) by mouth 2 (two) times daily., Disp: 180 tablet, Rfl: 3    hydroCHLOROthiazide (HYDRODIURIL) 25 MG tablet, Take 1 tablet (25 mg total) by mouth once daily., Disp: 90 tablet, Rfl: 3    tamsulosin (FLOMAX) 0.4 mg Cap, Take 1 capsule (0.4 mg total) by mouth once daily., Disp: 90 capsule, Rfl: 3   Objective:      Vitals:    05/10/24 0904   BP: 138/78   BP Location: Left arm   Patient Position: Sitting   BP Method: Medium (Manual)   Pulse: 60   Resp: 16   Temp: 97.6 °F (36.4 °C)   TempSrc: Oral   SpO2: 97%   Weight: 118.5 kg (261 lb 3.9 oz)   Height: 6' 7" (2.007 m)       Physical Exam  Constitutional:       General: He is not in acute distress.  HENT:      Head: Normocephalic and atraumatic.   Eyes:      Conjunctiva/sclera: Conjunctivae normal.   Cardiovascular:      Rate and Rhythm: Normal rate and regular rhythm.      Heart sounds: Normal heart sounds. No murmur heard.     No friction rub. No gallop.   Pulmonary:      Effort: Pulmonary effort is normal.      Breath sounds: Normal breath sounds. No wheezing or rales.   Musculoskeletal:      Cervical back: Neck supple.   Skin:     General: Skin is warm and dry.   Neurological:      Mental Status: He is alert and oriented to person, place, and time.   Psychiatric:         Behavior: Behavior normal.         Thought Content: Thought content normal.         Judgment: Judgment normal.            Assessment:       1. Type 2 diabetes mellitus with stage 3b chronic kidney disease, without long-term current use of insulin    2. Prostate cancer    3. Essential hypertension        Plan:       Type 2 diabetes mellitus with stage 3b chronic kidney disease, without long-term current use of insulin  -     empagliflozin (JARDIANCE) 10 mg tablet; Take 1 " tablet (10 mg total) by mouth once daily.  Dispense: 30 tablet; Refill: 11  -     glipiZIDE (GLUCOTROL) 10 MG TR24; Take 1 tablet (10 mg total) by mouth 2 (two) times daily.  Dispense: 180 tablet; Refill: 3  -     Hemoglobin A1C; Future; Expected date: 05/10/2024    Prostate cancer  -     tamsulosin (FLOMAX) 0.4 mg Cap; Take 1 capsule (0.4 mg total) by mouth once daily.  Dispense: 90 capsule; Refill: 3    Essential hypertension  -     amlodipine-valsartan (EXFORGE)  mg per tablet; Take 1 tablet by mouth once daily.  Dispense: 90 tablet; Refill: 3  -     cloNIDine (CATAPRES) 0.1 MG tablet; Take 1 tablet (0.1 mg total) by mouth 3 (three) times daily.  Dispense: 270 tablet; Refill: 3  -     hydroCHLOROthiazide (HYDRODIURIL) 25 MG tablet; Take 1 tablet (25 mg total) by mouth once daily.  Dispense: 90 tablet; Refill: 3  -     CBC Auto Differential; Future; Expected date: 05/10/2024  -     Comprehensive Metabolic Panel; Future; Expected date: 05/10/2024  -     TSH; Future; Expected date: 05/10/2024  -     Lipid Panel; Future; Expected date: 05/10/2024    We will start patient on Jardiance for diabetes and chronic kidney disease.  Continue follow-up with specialists.          Future Appointments   Date Time Provider Department Center   9/12/2024  9:10 AM LAB, [facility]   9/16/2024  1:15 PM Troy Green MD [facility]   11/8/2024  8:30 AM LAB, [facility]   11/11/2024  8:20 [facility]       Patient note was created using [employer].  Any errors in syntax or even information may not have been identified and edited on initial review prior to signing this note.

## 2024-05-28 DIAGNOSIS — Z00.00 ENCOUNTER FOR MEDICARE ANNUAL WELLNESS EXAM: ICD-10-CM

## 2024-07-01 DIAGNOSIS — N18.32 TYPE 2 DIABETES MELLITUS WITH STAGE 3B CHRONIC KIDNEY DISEASE, WITHOUT LONG-TERM CURRENT USE OF INSULIN: ICD-10-CM

## 2024-07-01 DIAGNOSIS — E11.22 TYPE 2 DIABETES MELLITUS WITH STAGE 3B CHRONIC KIDNEY DISEASE, WITHOUT LONG-TERM CURRENT USE OF INSULIN: ICD-10-CM

## 2024-07-01 NOTE — TELEPHONE ENCOUNTER
No care due was identified.  [facility] Embedded Care Due Messages. Reference number: [id number].   7/01/2024 9:00:07 AM CDT

## 2024-07-01 NOTE — TELEPHONE ENCOUNTER
Refill Decision Note   Luther Dc  is requesting a refill authorization.  Brief Assessment and Rationale for Refill:  Approve     Medication Therapy Plan:  FLOS      Comments:     Note composed:3:10 PM 07/01/2024

## 2024-07-01 NOTE — TELEPHONE ENCOUNTER
Care Due:                  Date            Visit Type   Department     Provider  --------------------------------------------------------------------------------                                EP -                              PRIMARY      [facility]  Last Visit: 05-      CARE ([facility])   MEDICINE       Aramis Ng  Next Visit: None Scheduled  None         None Found                                                            Last  Test          Frequency    Reason                     Performed    Due Date  --------------------------------------------------------------------------------    HBA1C.......  6 months...  empagliflozin, glipiZIDE.  02- 08-    [facility] Embedded Care Due Messages. Reference number: [id number].   7/01/2024 8:58:56 AM CDT

## 2024-07-03 DIAGNOSIS — N18.6 TYPE 2 DIABETES MELLITUS WITH END-STAGE RENAL DISEASE: Primary | ICD-10-CM

## 2024-07-03 DIAGNOSIS — N18.32 CHRONIC KIDNEY DISEASE (CKD) STAGE G3B/A1, MODERATELY DECREASED GLOMERULAR FILTRATION RATE (GFR) BETWEEN 30-44 ML/MIN/1.73 SQUARE METER AND ALBUMINURIA CREATININE RATIO LESS THAN 30 MG/G: ICD-10-CM

## 2024-07-03 DIAGNOSIS — E11.22 TYPE 2 DIABETES MELLITUS WITH END-STAGE RENAL DISEASE: Primary | ICD-10-CM

## 2024-07-03 NOTE — TELEPHONE ENCOUNTER
No care due was identified.  [facility] Embedded Care Due Messages. Reference number: [id number].   7/03/2024 1:02:15 PM CDT

## 2024-07-03 NOTE — TELEPHONE ENCOUNTER
----- Message from Kristin Torres sent at 7/3/2024 12:43 PM CDT -----  Regarding: Patient call back  .Type: Patient Call Back    Who called:self     What is the request in detail:caller states the incorrect  test strips are being sent to pharmacy. States he needs the Accu-chek guide test strips.    Can the clinic reply by [facility]?no     Would the patient rather a call back or a response via [facility]? Call     Best call back number:.[phone] or [phone]      Additional Information:.      [id number] - [place] [address] [facility] [place] [address] [address]  [facility] [address]  Phone: [phone] Fax: [phone]

## 2024-07-03 NOTE — TELEPHONE ENCOUNTER
Patient stated the wrong test strips have been being sent to pharmacy.  Would like to have Accu-chek guide test strips ordered.  Please advise.

## 2024-07-12 ENCOUNTER — NURSE TRIAGE (OUTPATIENT)
Dept: ADMINISTRATIVE | Facility: CLINIC | Age: 84
End: 2024-07-12
Payer: MEDICARE

## 2024-07-12 ENCOUNTER — HOSPITAL ENCOUNTER (INPATIENT)
Facility: HOSPITAL | Age: 84
LOS: 6 days | Discharge: HOME | DRG: 065 | End: 2024-07-18
Attending: EMERGENCY MEDICINE | Admitting: STUDENT IN AN ORGANIZED HEALTH CARE EDUCATION/TRAINING PROGRAM
Payer: MEDICARE

## 2024-07-12 DIAGNOSIS — I10 HTN (HYPERTENSION): ICD-10-CM

## 2024-07-12 DIAGNOSIS — D32.9 MENINGIOMA: ICD-10-CM

## 2024-07-12 DIAGNOSIS — I44.0 1ST DEGREE AV BLOCK: ICD-10-CM

## 2024-07-12 DIAGNOSIS — I65.23 BILATERAL CAROTID ARTERY STENOSIS: Primary | ICD-10-CM

## 2024-07-12 DIAGNOSIS — I63.521 ACUTE ISCHEMIC RIGHT ACA STROKE: ICD-10-CM

## 2024-07-12 DIAGNOSIS — R94.31 PROLONGED Q-T INTERVAL ON ECG: ICD-10-CM

## 2024-07-12 DIAGNOSIS — I63.9 CVA (CEREBRAL VASCULAR ACCIDENT): ICD-10-CM

## 2024-07-12 DIAGNOSIS — R29.818 ACUTE FOCAL NEUROLOGICAL DEFICIT: ICD-10-CM

## 2024-07-12 DIAGNOSIS — I63.9 STROKE: ICD-10-CM

## 2024-07-12 LAB
ALBUMIN SERPL BCP-MCNC: 3.7 G/DL (ref 3.5–5.2)
ALLENS TEST: ABNORMAL
ALLENS TEST: ABNORMAL
ALP SERPL-CCNC: 164 U/L (ref 55–135)
ALT SERPL W/O P-5'-P-CCNC: 17 U/L (ref 10–44)
ANION GAP SERPL CALC-SCNC: 13 MMOL/L (ref 8–16)
ANION GAP SERPL CALC-SCNC: 15 MMOL/L (ref 8–16)
APTT PPP: 29.5 SEC (ref 21–32)
AST SERPL-CCNC: 18 U/L (ref 10–40)
BASOPHILS # BLD AUTO: 0.1 K/UL (ref 0–0.2)
BASOPHILS NFR BLD: 1 % (ref 0–1.9)
BILIRUB SERPL-MCNC: 0.2 MG/DL (ref 0.1–1)
BNP SERPL-MCNC: 45 PG/ML (ref 0–99)
BUN SERPL-MCNC: 32 MG/DL (ref 6–30)
BUN SERPL-MCNC: 36 MG/DL (ref 8–23)
CALCIUM SERPL-MCNC: 9.3 MG/DL (ref 8.7–10.5)
CHLORIDE SERPL-SCNC: 101 MMOL/L (ref 95–110)
CHLORIDE SERPL-SCNC: 101 MMOL/L (ref 95–110)
CHOLEST SERPL-MCNC: 168 MG/DL (ref 120–199)
CHOLEST/HDLC SERPL: 5.1 {RATIO} (ref 2–5)
CO2 SERPL-SCNC: 24 MMOL/L (ref 23–29)
CREAT SERPL-MCNC: 2.9 MG/DL (ref 0.5–1.4)
CREAT SERPL-MCNC: 2.9 MG/DL (ref 0.5–1.4)
DELSYS: ABNORMAL
DELSYS: ABNORMAL
DIFFERENTIAL METHOD BLD: ABNORMAL
EOSINOPHIL # BLD AUTO: 0.4 K/UL (ref 0–0.5)
EOSINOPHIL NFR BLD: 3.6 % (ref 0–8)
ERYTHROCYTE [DISTWIDTH] IN BLOOD BY AUTOMATED COUNT: 12.3 % (ref 11.5–14.5)
EST. GFR  (NO RACE VARIABLE): 21 ML/MIN/1.73 M^2
GLUCOSE SERPL-MCNC: 158 MG/DL (ref 70–110)
GLUCOSE SERPL-MCNC: 162 MG/DL (ref 70–110)
HCO3 UR-SCNC: 28.5 MMOL/L (ref 24–28)
HCT VFR BLD AUTO: 41.1 % (ref 40–54)
HCT VFR BLD CALC: 41 %PCV (ref 36–54)
HDLC SERPL-MCNC: 33 MG/DL (ref 40–75)
HDLC SERPL: 19.6 % (ref 20–50)
HGB BLD-MCNC: 13.3 G/DL (ref 14–18)
IMM GRANULOCYTES # BLD AUTO: 0.03 K/UL (ref 0–0.04)
IMM GRANULOCYTES NFR BLD AUTO: 0.3 % (ref 0–0.5)
INR PPP: 1 (ref 0.8–1.2)
LDLC SERPL CALC-MCNC: 92.2 MG/DL (ref 63–159)
LYMPHOCYTES # BLD AUTO: 2.6 K/UL (ref 1–4.8)
LYMPHOCYTES NFR BLD: 26 % (ref 18–48)
MAGNESIUM SERPL-MCNC: 2.6 MG/DL (ref 1.6–2.6)
MCH RBC QN AUTO: 27.3 PG (ref 27–31)
MCHC RBC AUTO-ENTMCNC: 32.4 G/DL (ref 32–36)
MCV RBC AUTO: 84 FL (ref 82–98)
MONOCYTES # BLD AUTO: 0.8 K/UL (ref 0.3–1)
MONOCYTES NFR BLD: 7.7 % (ref 4–15)
NEUTROPHILS # BLD AUTO: 6.2 K/UL (ref 1.8–7.7)
NEUTROPHILS NFR BLD: 61.4 % (ref 38–73)
NONHDLC SERPL-MCNC: 135 MG/DL
NRBC BLD-RTO: 0 /100 WBC
PCO2 BLDA: 45.9 MMHG (ref 35–45)
PH SMN: 7.4 [PH] (ref 7.35–7.45)
PHOSPHATE SERPL-MCNC: 3.9 MG/DL (ref 2.7–4.5)
PLATELET # BLD AUTO: 257 K/UL (ref 150–450)
PMV BLD AUTO: 12.4 FL (ref 9.2–12.9)
PO2 BLDA: 48 MMHG (ref 40–60)
POC BE: 3 MMOL/L
POC IONIZED CALCIUM: 1.11 MMOL/L (ref 1.06–1.42)
POC SATURATED O2: 83 % (ref 95–100)
POC TCO2 (MEASURED): 26 MMOL/L (ref 23–29)
POC TCO2: 30 MMOL/L (ref 24–29)
POCT GLUCOSE: 160 MG/DL (ref 70–110)
POTASSIUM BLD-SCNC: 3.3 MMOL/L (ref 3.5–5.1)
POTASSIUM SERPL-SCNC: 3.4 MMOL/L (ref 3.5–5.1)
PROT SERPL-MCNC: 7.9 G/DL (ref 6–8.4)
PROTHROMBIN TIME: 10.6 SEC (ref 9–12.5)
RBC # BLD AUTO: 4.88 M/UL (ref 4.6–6.2)
SAMPLE: ABNORMAL
SAMPLE: ABNORMAL
SITE: ABNORMAL
SITE: ABNORMAL
SODIUM BLD-SCNC: 138 MMOL/L (ref 136–145)
SODIUM SERPL-SCNC: 138 MMOL/L (ref 136–145)
TRIGL SERPL-MCNC: 214 MG/DL (ref 30–150)
TROPONIN I SERPL DL<=0.01 NG/ML-MCNC: 0.03 NG/ML (ref 0–0.03)
TSH SERPL DL<=0.005 MIU/L-ACNC: 1.78 UIU/ML (ref 0.4–4)
WBC # BLD AUTO: 10.11 K/UL (ref 3.9–12.7)

## 2024-07-12 PROCEDURE — 99285 EMERGENCY DEPT VISIT HI MDM: CPT | Mod: 25

## 2024-07-12 PROCEDURE — 99900035 HC TECH TIME PER 15 MIN (STAT)

## 2024-07-12 PROCEDURE — 85014 HEMATOCRIT: CPT

## 2024-07-12 PROCEDURE — 84443 ASSAY THYROID STIM HORMONE: CPT | Performed by: EMERGENCY MEDICINE

## 2024-07-12 PROCEDURE — 11000001 HC ACUTE MED/SURG PRIVATE ROOM

## 2024-07-12 PROCEDURE — 63600175 PHARM REV CODE 636 W HCPCS: Performed by: EMERGENCY MEDICINE

## 2024-07-12 PROCEDURE — 84100 ASSAY OF PHOSPHORUS: CPT | Performed by: EMERGENCY MEDICINE

## 2024-07-12 PROCEDURE — 82565 ASSAY OF CREATININE: CPT

## 2024-07-12 PROCEDURE — 80053 COMPREHEN METABOLIC PANEL: CPT | Performed by: EMERGENCY MEDICINE

## 2024-07-12 PROCEDURE — 12000002 HC ACUTE/MED SURGE SEMI-PRIVATE ROOM

## 2024-07-12 PROCEDURE — 83880 ASSAY OF NATRIURETIC PEPTIDE: CPT | Performed by: EMERGENCY MEDICINE

## 2024-07-12 PROCEDURE — 82962 GLUCOSE BLOOD TEST: CPT

## 2024-07-12 PROCEDURE — 80061 LIPID PANEL: CPT | Performed by: EMERGENCY MEDICINE

## 2024-07-12 PROCEDURE — 84132 ASSAY OF SERUM POTASSIUM: CPT

## 2024-07-12 PROCEDURE — 85610 PROTHROMBIN TIME: CPT | Performed by: EMERGENCY MEDICINE

## 2024-07-12 PROCEDURE — 25000003 PHARM REV CODE 250: Performed by: EMERGENCY MEDICINE

## 2024-07-12 PROCEDURE — 85730 THROMBOPLASTIN TIME PARTIAL: CPT | Performed by: EMERGENCY MEDICINE

## 2024-07-12 PROCEDURE — 84484 ASSAY OF TROPONIN QUANT: CPT | Performed by: EMERGENCY MEDICINE

## 2024-07-12 PROCEDURE — 84295 ASSAY OF SERUM SODIUM: CPT

## 2024-07-12 PROCEDURE — 93005 ELECTROCARDIOGRAM TRACING: CPT

## 2024-07-12 PROCEDURE — 93010 ELECTROCARDIOGRAM REPORT: CPT | Mod: 76,,, | Performed by: INTERNAL MEDICINE

## 2024-07-12 PROCEDURE — 85025 COMPLETE CBC W/AUTO DIFF WBC: CPT | Performed by: EMERGENCY MEDICINE

## 2024-07-12 PROCEDURE — 82330 ASSAY OF CALCIUM: CPT

## 2024-07-12 PROCEDURE — 83735 ASSAY OF MAGNESIUM: CPT | Performed by: EMERGENCY MEDICINE

## 2024-07-12 PROCEDURE — 82803 BLOOD GASES ANY COMBINATION: CPT

## 2024-07-12 PROCEDURE — 82800 BLOOD PH: CPT

## 2024-07-12 RX ORDER — VALSARTAN 80 MG/1
160 TABLET ORAL DAILY
Status: DISCONTINUED | OUTPATIENT
Start: 2024-07-13 | End: 2024-07-13

## 2024-07-12 RX ORDER — IBUPROFEN 200 MG
16 TABLET ORAL
Status: DISCONTINUED | OUTPATIENT
Start: 2024-07-12 | End: 2024-07-18 | Stop reason: HOSPADM

## 2024-07-12 RX ORDER — ATORVASTATIN CALCIUM 40 MG/1
40 TABLET, FILM COATED ORAL NIGHTLY
Status: DISCONTINUED | OUTPATIENT
Start: 2024-07-12 | End: 2024-07-18 | Stop reason: HOSPADM

## 2024-07-12 RX ORDER — ATORVASTATIN CALCIUM 40 MG/1
80 TABLET, FILM COATED ORAL NIGHTLY
Status: DISCONTINUED | OUTPATIENT
Start: 2024-07-12 | End: 2024-07-12

## 2024-07-12 RX ORDER — BISACODYL 10 MG/1
10 SUPPOSITORY RECTAL DAILY PRN
Status: DISCONTINUED | OUTPATIENT
Start: 2024-07-12 | End: 2024-07-18 | Stop reason: HOSPADM

## 2024-07-12 RX ORDER — CLOPIDOGREL BISULFATE 300 MG/1
300 TABLET, FILM COATED ORAL ONCE
Status: COMPLETED | OUTPATIENT
Start: 2024-07-12 | End: 2024-07-12

## 2024-07-12 RX ORDER — ASPIRIN 81 MG/1
81 TABLET ORAL DAILY
Status: DISCONTINUED | OUTPATIENT
Start: 2024-07-13 | End: 2024-07-18 | Stop reason: HOSPADM

## 2024-07-12 RX ORDER — MAGNESIUM SULFATE HEPTAHYDRATE 40 MG/ML
2 INJECTION, SOLUTION INTRAVENOUS ONCE
Status: COMPLETED | OUTPATIENT
Start: 2024-07-12 | End: 2024-07-12

## 2024-07-12 RX ORDER — AMLODIPINE BESYLATE 5 MG/1
10 TABLET ORAL DAILY
Status: DISCONTINUED | OUTPATIENT
Start: 2024-07-13 | End: 2024-07-13

## 2024-07-12 RX ORDER — CLOPIDOGREL BISULFATE 75 MG/1
75 TABLET ORAL DAILY
Status: DISCONTINUED | OUTPATIENT
Start: 2024-07-13 | End: 2024-07-18 | Stop reason: HOSPADM

## 2024-07-12 RX ORDER — LABETALOL HYDROCHLORIDE 5 MG/ML
10 INJECTION, SOLUTION INTRAVENOUS
Status: DISCONTINUED | OUTPATIENT
Start: 2024-07-12 | End: 2024-07-13

## 2024-07-12 RX ORDER — POLYETHYLENE GLYCOL 3350 17 G/17G
17 POWDER, FOR SOLUTION ORAL DAILY
Status: DISCONTINUED | OUTPATIENT
Start: 2024-07-13 | End: 2024-07-18 | Stop reason: HOSPADM

## 2024-07-12 RX ORDER — ASPIRIN 325 MG
325 TABLET ORAL
Status: COMPLETED | OUTPATIENT
Start: 2024-07-12 | End: 2024-07-12

## 2024-07-12 RX ORDER — GLUCAGON 1 MG
1 KIT INJECTION
Status: DISCONTINUED | OUTPATIENT
Start: 2024-07-12 | End: 2024-07-18 | Stop reason: HOSPADM

## 2024-07-12 RX ORDER — TALC
6 POWDER (GRAM) TOPICAL NIGHTLY PRN
Status: DISCONTINUED | OUTPATIENT
Start: 2024-07-12 | End: 2024-07-18 | Stop reason: HOSPADM

## 2024-07-12 RX ORDER — HYDROCHLOROTHIAZIDE 25 MG/1
25 TABLET ORAL DAILY
Status: DISCONTINUED | OUTPATIENT
Start: 2024-07-13 | End: 2024-07-13

## 2024-07-12 RX ORDER — HYDRALAZINE HYDROCHLORIDE 20 MG/ML
5 INJECTION INTRAMUSCULAR; INTRAVENOUS EVERY 6 HOURS PRN
Status: DISCONTINUED | OUTPATIENT
Start: 2024-07-12 | End: 2024-07-13

## 2024-07-12 RX ORDER — TAMSULOSIN HYDROCHLORIDE 0.4 MG/1
1 CAPSULE ORAL NIGHTLY
Status: DISCONTINUED | OUTPATIENT
Start: 2024-07-12 | End: 2024-07-18 | Stop reason: HOSPADM

## 2024-07-12 RX ORDER — IBUPROFEN 200 MG
24 TABLET ORAL
Status: DISCONTINUED | OUTPATIENT
Start: 2024-07-12 | End: 2024-07-18 | Stop reason: HOSPADM

## 2024-07-12 RX ORDER — SODIUM CHLORIDE 0.9 % (FLUSH) 0.9 %
10 SYRINGE (ML) INJECTION
Status: DISCONTINUED | OUTPATIENT
Start: 2024-07-12 | End: 2024-07-18 | Stop reason: HOSPADM

## 2024-07-12 RX ORDER — INSULIN ASPART 100 [IU]/ML
0-5 INJECTION, SOLUTION INTRAVENOUS; SUBCUTANEOUS
Status: DISCONTINUED | OUTPATIENT
Start: 2024-07-12 | End: 2024-07-18 | Stop reason: HOSPADM

## 2024-07-12 RX ORDER — ONDANSETRON HYDROCHLORIDE 2 MG/ML
4 INJECTION, SOLUTION INTRAVENOUS EVERY 6 HOURS PRN
Status: DISCONTINUED | OUTPATIENT
Start: 2024-07-12 | End: 2024-07-18 | Stop reason: HOSPADM

## 2024-07-12 RX ORDER — ACETAMINOPHEN 325 MG/1
650 TABLET ORAL EVERY 6 HOURS PRN
Status: DISCONTINUED | OUTPATIENT
Start: 2024-07-12 | End: 2024-07-18 | Stop reason: HOSPADM

## 2024-07-12 RX ADMIN — POTASSIUM BICARBONATE 20 MEQ: 391 TABLET, EFFERVESCENT ORAL at 09:07

## 2024-07-12 RX ADMIN — CLOPIDOGREL BISULFATE 300 MG: 300 TABLET, FILM COATED ORAL at 09:07

## 2024-07-12 RX ADMIN — MAGNESIUM SULFATE HEPTAHYDRATE 2 G: 40 INJECTION, SOLUTION INTRAVENOUS at 09:07

## 2024-07-12 RX ADMIN — ASPIRIN 325 MG ORAL TABLET 325 MG: 325 PILL ORAL at 09:07

## 2024-07-12 NOTE — TELEPHONE ENCOUNTER
[place]    PCP:  Dr. Aramis Ng    C/O new-onset difficulty walking d/t LLE stiff/weakness.  Pt states after Tylenol and suppository.  Denies rectal bleeding, speech difficulty, LUE weakness, difficulty swallowing, dizziness, blurred vision, SOB, CP, and fever.  He does report a small nosebleed a couple of days ago.  Per protocol, care advised is call  now.  Pt VU.  Advised to call for worsening/questions/concerns.  VU.    Reason for Disposition   [1] SEVERE weakness (i.e., unable to walk or barely able to walk, requires support) AND [2] new-onset or getting worse    Additional Information   Negative: SEVERE difficulty breathing (e.g., struggling for each breath, speaks in single words)   Negative: Shock suspected (e.g., cold/pale/clammy skin, too weak to stand, low BP, rapid pulse)   Negative: Difficult to awaken or acting confused (e.g., disoriented, slurred speech)   Negative: [1] Fainted > 15 minutes ago AND [2] still feels too weak or dizzy to stand    Protocols used: Weakness (Generalized) and Fatigue-A-AH

## 2024-07-12 NOTE — Clinical Note
Diagnosis: CVA (cerebral vascular accident) [298707]   Future Attending Provider: CYNDI CORTEZ [[id number]]   Reason for IP Medical Treatment  (Clinical interventions that can only be accomplished in the IP setting? ) :: Patient Tuesday night started feeling off balance with ambulation   I certify that Inpatient services for greater than or equal to 2 midnights are medically necessary:: Yes   Plans for Post-Acute care--if anticipated (pick the single best option):: A. No post acute care anticipated at this time   Special Needs:: Fall Risk [15]

## 2024-07-13 PROBLEM — R79.89 ELEVATED TROPONIN: Status: ACTIVE | Noted: 2024-07-13

## 2024-07-13 PROBLEM — N18.32 STAGE 3B CHRONIC KIDNEY DISEASE: Status: ACTIVE | Noted: 2024-07-13

## 2024-07-13 LAB
ANION GAP SERPL CALC-SCNC: 14 MMOL/L (ref 8–16)
BASOPHILS # BLD AUTO: 0.08 K/UL (ref 0–0.2)
BASOPHILS NFR BLD: 0.9 % (ref 0–1.9)
BUN SERPL-MCNC: 37 MG/DL (ref 8–23)
CALCIUM SERPL-MCNC: 9 MG/DL (ref 8.7–10.5)
CHLORIDE SERPL-SCNC: 101 MMOL/L (ref 95–110)
CO2 SERPL-SCNC: 24 MMOL/L (ref 23–29)
CREAT SERPL-MCNC: 2.5 MG/DL (ref 0.5–1.4)
DIFFERENTIAL METHOD BLD: ABNORMAL
EOSINOPHIL # BLD AUTO: 0.4 K/UL (ref 0–0.5)
EOSINOPHIL NFR BLD: 4.6 % (ref 0–8)
ERYTHROCYTE [DISTWIDTH] IN BLOOD BY AUTOMATED COUNT: 12.3 % (ref 11.5–14.5)
EST. GFR  (NO RACE VARIABLE): 25 ML/MIN/1.73 M^2
ESTIMATED AVG GLUCOSE: 154 MG/DL (ref 68–131)
GLUCOSE SERPL-MCNC: 137 MG/DL (ref 70–110)
HBA1C MFR BLD: 7 % (ref 4–5.6)
HCT VFR BLD AUTO: 37.8 % (ref 40–54)
HGB BLD-MCNC: 12.4 G/DL (ref 14–18)
IMM GRANULOCYTES # BLD AUTO: 0.02 K/UL (ref 0–0.04)
IMM GRANULOCYTES NFR BLD AUTO: 0.2 % (ref 0–0.5)
LYMPHOCYTES # BLD AUTO: 2.5 K/UL (ref 1–4.8)
LYMPHOCYTES NFR BLD: 26.7 % (ref 18–48)
MCH RBC QN AUTO: 27.1 PG (ref 27–31)
MCHC RBC AUTO-ENTMCNC: 32.8 G/DL (ref 32–36)
MCV RBC AUTO: 83 FL (ref 82–98)
MONOCYTES # BLD AUTO: 0.7 K/UL (ref 0.3–1)
MONOCYTES NFR BLD: 7.6 % (ref 4–15)
NEUTROPHILS # BLD AUTO: 5.5 K/UL (ref 1.8–7.7)
NEUTROPHILS NFR BLD: 60 % (ref 38–73)
NRBC BLD-RTO: 0 /100 WBC
OHS QRS DURATION: 170 MS
OHS QRS DURATION: 172 MS
OHS QTC CALCULATION: 505 MS
OHS QTC CALCULATION: 513 MS
PLATELET # BLD AUTO: 235 K/UL (ref 150–450)
PMV BLD AUTO: 12.5 FL (ref 9.2–12.9)
POCT GLUCOSE: 137 MG/DL (ref 70–110)
POCT GLUCOSE: 190 MG/DL (ref 70–110)
POCT GLUCOSE: 194 MG/DL (ref 70–110)
POCT GLUCOSE: 240 MG/DL (ref 70–110)
POTASSIUM SERPL-SCNC: 3.6 MMOL/L (ref 3.5–5.1)
RBC # BLD AUTO: 4.57 M/UL (ref 4.6–6.2)
SODIUM SERPL-SCNC: 139 MMOL/L (ref 136–145)
TROPONIN I SERPL DL<=0.01 NG/ML-MCNC: 0.03 NG/ML (ref 0–0.03)
TROPONIN I SERPL DL<=0.01 NG/ML-MCNC: 0.04 NG/ML (ref 0–0.03)
TROPONIN I SERPL DL<=0.01 NG/ML-MCNC: 0.04 NG/ML (ref 0–0.03)
WBC # BLD AUTO: 9.18 K/UL (ref 3.9–12.7)

## 2024-07-13 PROCEDURE — 97165 OT EVAL LOW COMPLEX 30 MIN: CPT

## 2024-07-13 PROCEDURE — 97161 PT EVAL LOW COMPLEX 20 MIN: CPT | Performed by: PHYSICAL THERAPIST

## 2024-07-13 PROCEDURE — 25000003 PHARM REV CODE 250: Performed by: INTERNAL MEDICINE

## 2024-07-13 PROCEDURE — 36415 COLL VENOUS BLD VENIPUNCTURE: CPT | Performed by: EMERGENCY MEDICINE

## 2024-07-13 PROCEDURE — 11000001 HC ACUTE MED/SURG PRIVATE ROOM

## 2024-07-13 PROCEDURE — 36415 COLL VENOUS BLD VENIPUNCTURE: CPT | Performed by: INTERNAL MEDICINE

## 2024-07-13 PROCEDURE — 85025 COMPLETE CBC W/AUTO DIFF WBC: CPT | Performed by: INTERNAL MEDICINE

## 2024-07-13 PROCEDURE — 97116 GAIT TRAINING THERAPY: CPT | Performed by: PHYSICAL THERAPIST

## 2024-07-13 PROCEDURE — 80048 BASIC METABOLIC PNL TOTAL CA: CPT | Performed by: INTERNAL MEDICINE

## 2024-07-13 PROCEDURE — 83036 HEMOGLOBIN GLYCOSYLATED A1C: CPT | Performed by: INTERNAL MEDICINE

## 2024-07-13 PROCEDURE — 97530 THERAPEUTIC ACTIVITIES: CPT

## 2024-07-13 PROCEDURE — 84484 ASSAY OF TROPONIN QUANT: CPT | Performed by: EMERGENCY MEDICINE

## 2024-07-13 PROCEDURE — 92610 EVALUATE SWALLOWING FUNCTION: CPT

## 2024-07-13 PROCEDURE — 84484 ASSAY OF TROPONIN QUANT: CPT | Mod: 91 | Performed by: INTERNAL MEDICINE

## 2024-07-13 RX ORDER — HYDRALAZINE HYDROCHLORIDE 20 MG/ML
5 INJECTION INTRAMUSCULAR; INTRAVENOUS EVERY 6 HOURS PRN
Status: DISCONTINUED | OUTPATIENT
Start: 2024-07-13 | End: 2024-07-18 | Stop reason: HOSPADM

## 2024-07-13 RX ORDER — HYDRALAZINE HYDROCHLORIDE 20 MG/ML
10 INJECTION INTRAMUSCULAR; INTRAVENOUS EVERY 6 HOURS PRN
Status: DISCONTINUED | OUTPATIENT
Start: 2024-07-13 | End: 2024-07-13

## 2024-07-13 RX ORDER — VALSARTAN 40 MG/1
40 TABLET ORAL NIGHTLY
Status: DISCONTINUED | OUTPATIENT
Start: 2024-07-13 | End: 2024-07-13

## 2024-07-13 RX ADMIN — ATORVASTATIN CALCIUM 40 MG: 40 TABLET, FILM COATED ORAL at 12:07

## 2024-07-13 RX ADMIN — HYDROCHLOROTHIAZIDE 25 MG: 25 TABLET ORAL at 08:07

## 2024-07-13 RX ADMIN — TAMSULOSIN HYDROCHLORIDE 0.4 MG: 0.4 CAPSULE ORAL at 08:07

## 2024-07-13 RX ADMIN — ASPIRIN 81 MG: 81 TABLET, COATED ORAL at 08:07

## 2024-07-13 RX ADMIN — Medication 6 MG: at 08:07

## 2024-07-13 RX ADMIN — AMLODIPINE BESYLATE 10 MG: 5 TABLET ORAL at 08:07

## 2024-07-13 RX ADMIN — ATORVASTATIN CALCIUM 40 MG: 40 TABLET, FILM COATED ORAL at 08:07

## 2024-07-13 RX ADMIN — VALSARTAN 160 MG: 80 TABLET, FILM COATED ORAL at 08:07

## 2024-07-13 RX ADMIN — CLOPIDOGREL BISULFATE 75 MG: 75 TABLET ORAL at 08:07

## 2024-07-13 RX ADMIN — TAMSULOSIN HYDROCHLORIDE 0.4 MG: 0.4 CAPSULE ORAL at 12:07

## 2024-07-13 RX ADMIN — Medication 6 MG: at 12:07

## 2024-07-13 NOTE — PROGRESS NOTES
[facility] Medicine  Progress Note    Patient Name: Luther Irwin Jr.  MRN: [id number]  Patient Class: IP- Inpatient   Admission Date: 7/12/2024  Length of Stay: 1 days  Attending Physician: Benny Roman,*  Primary Care Provider: Randee Marie MD        Subjective:     Principal Problem:CVA (cerebral vascular accident)        HPI:  84-year-old  male with a past medical history significant for primary essential hypertension, non-insulin-dependent type 2 diabetes, BPH/prostate cancer presents to the ER with persistent left lower extremity weakness and uncoordination.  States that symptoms started on Tuesday.  Denies any falls or trauma to the hip.  Denies any pain.  States that he can not describe it but feels like his leg just isn't cooperating with what he wants to do.  No history of strokes in the past.  Denies any headaches, vision changes, speech problems, swallowing issues, sensory deficits.  States that he feels like he is going to fall when he walks.  Tends to lean towards his weaker side which is his left currently.    Denies missing any of his blood pressure medications or acute changes in his medication regimen.  Denies any changes in his diet.    Workup in the ER was noted for a CT head that showed a right frontal parietal lesion concerning for possible meningioma.  MRI of the brain and MRA of head and neck were ordered and noted:   1. Small foci of acute infarction within the parasagittal right parietal lobe near the vertex.   2. Generalized cerebral volume loss and moderate chronic microvascular ischemic disease.   3. MRA brain without high-grade focal stenosis or large vessel occlusion.   4. Small extra-axial lesion over the lateral aspect of the right frontal parietal region.  This may represent a small meningioma.  No mass effect.     1. Suspected moderate stenosis at the right ICA origin.  Consider further evaluation with CTA and/or carotid ultrasound.   2. MRA  neck with no evidence of additional high-grade stenosis or occlusion.       Started on aspirin 325 mg and Plavix 300 mg p.o. x1 by the ER,  given the timeframe of onset of his deficits he was not stroke activated given that he was outside the window.  There was no large vessel occlusion noted on the MRA.     Because this patient has a new stroke with deficits and requires PT/OT/ST, Neurology eval and possible rehab  , care in an alternative location isn't clinically appropriate for the reasons stated above.     We have been consulted for further management of his stroke as well as inpatient admission to the hospitalist telemetry service.         Overview/Hospital Course:  84-year-old  male with past medical history of hypertension, type 2 diabetes, BPH/prostate cancer who was admitted for left lower extremity weakness secondary to acute CVA of right parietal lobe.  Neurology was consulted.  On aspirin/Plavix/statin.  Allow for permissive hypertension.  Not a candidate for tPA, out of window  MRI revealed small foci of acute infarction within the parasagittal right parietal lobe near the vertex.  MRA brain revealed right frontoparietal meningioma with no mass effect.  No large vessel occlusion.  Moderate stenosis at right ICA.  PT/OT/ST consulted.  Echo pending.  TSH WNL.  Troponin elevated likely type 2 demand mismatch.  Aggressive risk factor control.    Interval History:  Patient continues to have left lower extremity weakness.  No new focal deficits    Review of Systems   Constitutional: Negative.    Respiratory: Negative.     Cardiovascular: Negative.    Gastrointestinal: Negative.    Genitourinary: Negative.    Musculoskeletal: Negative.    Neurological:  Positive for weakness.     Objective:     Vital Signs (Most Recent):  Temp: 97.6 °F (36.4 °C) (07/13/24 1200)  Pulse: 61 (07/13/24 1200)  Resp: 18 (07/13/24 1200)  BP: (!) 160/71 (07/13/24 1200)  SpO2: 97 % (07/13/24 1200) Vital Signs (24h  Range):  Temp:  [97.6 °F (36.4 °C)-98.6 °F (37 °C)] 97.6 °F (36.4 °C)  Pulse:  [50-72] 61  Resp:  [15-20] 18  SpO2:  [95 %-99 %] 97 %  BP: (145-226)/(67-98) 160/71     Weight: 114.8 kg (253 lb 1.4 oz)  Body mass index is 28.51 kg/m².    Intake/Output Summary (Last 24 hours) at 7/13/2024 1317  Last data filed at 7/13/2024 1225  Gross per 24 hour   Intake 480 ml   Output 1150 ml   Net -670 ml         Physical Exam  Constitutional:       General: He is not in acute distress.     Appearance: He is normal weight.   Cardiovascular:      Rate and Rhythm: Normal rate.      Pulses: Normal pulses.      Heart sounds: No murmur heard.  Pulmonary:      Effort: No respiratory distress.      Breath sounds: Normal breath sounds. No wheezing.   Abdominal:      General: Bowel sounds are normal. There is no distension.      Palpations: Abdomen is soft.      Tenderness: There is no abdominal tenderness.   Musculoskeletal:      Right lower leg: No edema.      Left lower leg: No edema.   Skin:     General: Skin is warm.   Neurological:      Mental Status: He is alert and oriented to person, place, and time.      Motor: Weakness (Left lower extremity) present.      Coordination: Coordination abnormal.   Psychiatric:         Mood and Affect: Mood normal.             Significant Labs: All pertinent labs within the past 24 hours have been reviewed.    Significant Imaging: I have reviewed all pertinent imaging results/findings within the past 24 hours.    Assessment/Plan:      * CVA (cerebral vascular accident)    Antithrombotics for secondary stroke prevention: Antiplatelets: Aspirin: 81 mg daily  Aspirin: 325 mg loading dosex1, now  Clopidogrel: 300 mg loading dose x 1, now  Clopidogrel: 75 mg daily    Statins for secondary stroke prevention and hyperlipidemia, if present:   Statins: Atorvastatin- 40 mg daily    Aggressive risk factor modification: HTN, DM, HLD     Rehab efforts: The patient has been evaluated by a stroke team provider and  the therapy needs have been fully considered based off the presenting complaints and exam findings. The following therapy evaluations are needed: PT evaluate and treat, OT evaluate and treat, SLP evaluate and treat    Diagnostics ordered/pending: HgbA1C to assess blood glucose levels, MRA head to assess vasculature, MRA neck/arch to assess vasculature, MRI head without contrast to assess brain parenchyma, TTE to assess cardiac function/status     VTE prophylaxis:  fall risk and getting loaded with ASA and Plavix.     BP parameters: Infarct:  But appears to have had the initial insult greater than 24 hours ago.  For thus resume his blood pressure medicines and goals of slept blood pressure less than 160.        Elevated troponin  Likely demand mismatch.  Troponin trend peaked at 0.044.  Follow up on echo denied chest pain/shortness of breath.    Stage 3b chronic kidney disease  Creatinine trend 2.9-2.5 this morning..  Baseline creatinine 2.1-2.3.    Type 2 diabetes mellitus with stage 3b chronic kidney disease, without long-term current use of insulin  F/u on A1c. Hold his glipizide 10mg BID and Jardiance 10mg daily.  SSI started and diet adjusted.     Essential hypertension  Chronic, uncontrolled. Latest blood pressure and vitals reviewed-     Temp:  [97.6 °F (36.4 °C)-98.6 °F (37 °C)]   Pulse:  [50-72]   Resp:  [15-20]   BP: (145-226)/(67-98)   SpO2:  [95 %-99 %] .   Home meds for hypertension were reviewed and noted below.   Hypertension Medications               amlodipine-valsartan (EXFORGE)  mg per tablet Take 1 tablet by mouth once daily.    cloNIDine (CATAPRES) 0.1 MG tablet Take 1 tablet (0.1 mg total) by mouth 3 (three) times daily.    hydroCHLOROthiazide (HYDRODIURIL) 25 MG tablet Take 1 tablet (25 mg total) by mouth once daily.            While in the hospital, will manage blood pressure as follows;.  Allow for permissive hypertension.      Will utilize p.r.n. blood pressure medication only if  patient's blood pressure greater than 180/110 and he develops symptoms such as worsening chest pain or shortness of breath.      VTE Risk Mitigation (From admission, onward)           Ordered     Reason for No Pharmacological VTE Prophylaxis  Once        Question:  Reasons:  Answer:  Physician Provided (leave comment)  Comment:  Patient got loaded with aspirin and Plavix.  Also is a fall risk.    07/12/24 2207     IP VTE HIGH RISK PATIENT  Once         07/12/24 2207     Place sequential compression device  Until discontinued         07/12/24 2207                    Discharge Planning   PRASANNA:      Code Status: Full Code   Is the patient medically ready for discharge?:     Reason for patient still in hospital (select all that apply): Patient trending condition and Treatment  Discharge Plan A: Home (Follow-ups)                  Benny Roman MD  Department of Hospital Medicine   [facility]

## 2024-07-13 NOTE — ASSESSMENT & PLAN NOTE
Chronic, uncontrolled. Latest blood pressure and vitals reviewed-     Temp:  [97.6 °F (36.4 °C)-98.6 °F (37 °C)]   Pulse:  [50-72]   Resp:  [15-20]   BP: (145-226)/(67-98)   SpO2:  [95 %-99 %] .   Home meds for hypertension were reviewed and noted below.   Hypertension Medications               amlodipine-valsartan (EXFORGE)  mg per tablet Take 1 tablet by mouth once daily.    cloNIDine (CATAPRES) 0.1 MG tablet Take 1 tablet (0.1 mg total) by mouth 3 (three) times daily.    hydroCHLOROthiazide (HYDRODIURIL) 25 MG tablet Take 1 tablet (25 mg total) by mouth once daily.            While in the hospital, will manage blood pressure as follows; Adjust home antihypertensive regimen as follows- noted to be bradycardic on telemetry with EKG showing AV block first-degree.  We will follow up on echo but we will also hold his clonidine 0.1 mg p.o. b.i.d. he says he takes.  We will transition him to valsartan 40 mg at night, his Exforge : 10/160mg in the am, cont. His [id number] daily.  Will need to avoid betablockers for him as well given bradycardia and heart block.     Will utilize p.r.n. blood pressure medication only if patient's blood pressure greater than 180/110 and he develops symptoms such as worsening chest pain or shortness of breath.

## 2024-07-13 NOTE — PLAN OF CARE
Problem: Physical Therapy  Goal: Physical Therapy Goal  Description: Goals to be met by: 24     Patient will increase functional independence with mobility by performin. Pt will be able to tolerate PT eval remaining free from falls and demonstrating good tolerance to activity    Outcome: Met     PT eval completed today. Pt reports some residual L LE weakness but otherwise feeling better. Pt performs bed mobility/ transfers with supervision. Pt amb 300 ft, initially used RW but able to amb without it last 200 ft. Pt demonstrates mod trendelenburg in L stance causing R LE to crossover during swing through otherwise gait mechanics sound. Pt with good balance. Pt would benefit from PT in outpatient setting and straight point cane at discharge.

## 2024-07-13 NOTE — PLAN OF CARE
B/s swallow eval completed. ST recs regular diet w/ thin liquids. Meds whole. No further ST is required at this time, as the pt is consuming the safest and least restrictive diet.

## 2024-07-13 NOTE — ED TRIAGE NOTES
Pt arrived to ED with c/o left leg weakness from hip descending towards her leg. Since few days. Denies any pain, any fall injuries, denies headache, change in vision but mentions of dizziness few days back but not at present. Denies chest pain, SOB. Pt is alert and oriented.

## 2024-07-13 NOTE — NURSING TRANSFER
Nursing Transfer Note      7/13/2024   1:55 AM    Patient to room 300. Awake, alert, and oriented. Offers no complaints of pain or distress. Reports left knee gave out on him and he is having trouble with weakness to left leg with ambulation. Instructed not to get out of bed alone. Bed alarm set and urinal at bedside.    Nurse giving handoff:Una (ED)  Nurse receiving handoff:Mariana    Reason patient is being transferred: CVA    Transfer From: ED/MRI    Transfer via stretcher    Transfer with cardiac monitoring    Transported by Transport personnel    Transfer Vital Signs:  Blood Pressure:191/87  Heart Rate:60  O2:98  Temperature:97.6  Respirations:20    Telemetry: Box Number [id number]  Order for Tele Monitor? Yes    Additional Lines: NA    4eyes on Skin: yes    Medicines sent: NA    Any special needs or follow-up needed: NA    Patient belongings transferred with patient: Yes    Chart send with patient: Yes      Patient reassessed at: 0030 7/13/2024  Upon arrival to floor: cardiac monitor in place, patient oriented to room, call bell in reach, and bed in lowest position

## 2024-07-13 NOTE — NURSING
Report received from JENNIFER Peña. Patient awake and alert. Safety measures in place. Call light within reach.

## 2024-07-13 NOTE — PT/OT/SLP EVAL
Physical Therapy Evaluation and Discharge Note    Patient Name:  Luther Irwin Jr.   MRN:  [id number]    Recommendations:     Discharge Recommendations: Low Intensity Therapy  Discharge Equipment Recommendations: cane, straight   Barriers to discharge: None    Assessment:     Luther Irwin Jr. is a 84 y.o. male admitted with a medical diagnosis of CVA (cerebral vascular accident). Pt reports some residual L LE weakness but otherwise feeling better. Pt performs bed mobility/ transfers with supervision. Pt amb 300 ft, initially used RW but able to amb without it last 200 ft. Pt demonstrates mod trendelenburg in L stance causing R LE to crossover during swing through otherwise gait mechanics sound. .  At this time, patient is functioning at their prior level of function and does not require further acute PT services.     Recent Surgery: * No surgery found *      Plan:     During this hospitalization, patient does not require further acute PT services.  Please re-consult if situation changes.      Subjective     Chief Complaint: L LE weakness that has been improving  Patient/Family Comments/goals: regain strength to achieve mobility PLOF  Pain/Comfort:  Pain Rating 1: 0/10  Pain Rating Post-Intervention 1: 0/10    Patients cultural, spiritual, Buddhism conflicts given the current situation: no    Living Environment:  Pt lives alone in [facility] w/ 0 SKY  Prior to admission, patients level of function was independent with all ADL's, amb w/out AD, driving himself to grocery/ doctors appointments.  Equipment used at home: none.  DME owned (not currently used): none.  Upon discharge, patient will have assistance from friends.    Objective:     Communicated with Leigha angel prior to session.  Patient found HOB elevated with telemetry upon PT entry to room.    General Precautions: Standard,      Orthopedic Precautions:N/A   Braces: N/A  Respiratory Status: Room air    Exams:  Cognitive Exam:  Patient is oriented to Person, Place, Time,  and Situation  RLE ROM: WFL  RLE Strength: WFL  LLE ROM: WFL  LLE Strength: Deficits: 4-/5 knee extension, 3+/5 hip abduction    Functional Mobility:  Bed Mobility:     Rolling Right: supervision  Scooting: supervision  Supine to Sit: supervision  Sit to Supine: supervision  Transfers:     Sit to Stand:  contact guard assistance with rolling walker  Gait: Pt amb 300 ft, first 100 ft w/ RW, last 200 ft w/ no AD. Pt with trendelenburg in L stance phase otherwise sound mechanics  Balance: good in sitting, good in standing    AM-PAC 6 CLICK MOBILITY  Total Score:24       Treatment and Education:  Pt educated on fall prevention strategies with amb/transfers. Pt performed SL hip abd x15 with L LE to improve gait mechanics    AM-PAC 6 CLICK MOBILITY  Total Score:24     Patient left up in chair with all lines intact, call button in reach, and nsg notified.    GOALS:   Multidisciplinary Problems       Physical Therapy Goals       Not on file              Multidisciplinary Problems (Resolved)          Problem: Physical Therapy    Goal Priority Disciplines Outcome Goal Variances Interventions   Physical Therapy Goal   (Resolved)     PT, PT/OT Met     Description: Goals to be met by: 24     Patient will increase functional independence with mobility by performin. Pt will be able to tolerate PT eval remaining free from falls and demonstrating good tolerance to activity                         History:     Past Medical History:   Diagnosis Date    Cancer     CVA (cerebral vascular accident) 2024    Diabetes mellitus, type 2     Diabetes with neurologic complications     Disorder of kidney and ureter     Hypertension     Late complications of amputation stump     Prostate cancer     Pseudophakia 2024       Past Surgical History:   Procedure Laterality Date    FINGER SURGERY  3/2014    HERNIA REPAIR  1967    KNEE SURGERY      SPINE SURGERY  10/2007    VASECTOMY         Time Tracking:     PT Received  On: 07/13/24  PT Start Time: 1043     PT Stop Time: 1107  PT Total Time (min): 24 min     Billable Minutes: Evaluation 10 and Gait Training 14      07/13/2024

## 2024-07-13 NOTE — PT/OT/SLP EVAL
Speech Language Pathology Evaluation  Bedside Swallow    Patient Name:  Luther Irwin Jr.   MRN:  [id number]  Admitting Diagnosis: CVA (cerebral vascular accident)    Recommendations:                 General Recommendations:  Follow-up not indicated  Diet recommendations:  Regular Diet - IDDSI Level 7, Thin liquids - IDDSI Level 0   Aspiration Precautions: Meds whole 1 at a time and Standard aspiration precautions   General Precautions: Standard,    Communication strategies:  none    Assessment:     Lutehr Irwin Jr. is a 84 y.o. male with a dx of CVA (cerebral vascular accident), who presents with adequate swallowing fx to cont to a regular diet with thin liquids. Meds whole. No further ST is required at this time, as the pt is consuming the safest and least restrictive diet.     History:     Past Medical History:   Diagnosis Date    Cancer     CVA (cerebral vascular accident) 7/12/2024    Diabetes mellitus, type 2     Diabetes with neurologic complications     Disorder of kidney and ureter     Hypertension     Late complications of amputation stump     Prostate cancer     Pseudophakia 01/05/2024       Past Surgical History:   Procedure Laterality Date    FINGER SURGERY  3/2014    HERNIA REPAIR  07/1967    KNEE SURGERY      SPINE SURGERY  10/2007    VASECTOMY  1986       Social History: Patient lives with alone and was grossly indep prior to admit.    Prior Intubation HX:  None    Modified Barium Swallow: None per EMR    Chest X-Rays:   7/13/24    Impression:     No acute cardiopulmonary process identified.    MRI Brain Without Contrast:  7/12/24    Impression:     1. Small foci of acute infarction within the parasagittal right parietal lobe near the vertex.  2. Generalized cerebral volume loss and moderate chronic microvascular ischemic disease.  3. MRA brain without high-grade focal stenosis or large vessel occlusion.  4. Small extra-axial lesion over the lateral aspect of the right frontal parietal region.  This may  "represent a small meningioma.  No mass effect.    Prior diet: Unrestricted, per pt.      Subjective     ST obtained clearance from pt's nurse for b/s swallow evaluation prior to entrance. Pt was alert and awake upon ST's entrance. Pt greeted ST and was able to recall all events that led to admit, w/ speech 100% intelligible at the conversational level.     Patient goals: D/c "soon"     Pain/Comfort:  Pain Rating 1: 0/10    Respiratory Status: Room air    Objective:     Oral Musculature Evaluation  Oral Musculature: WFL  Dentition: present and adequate  Secretion Management: adequate  Mucosal Quality: good  Oral Labial Strength and Mobility: WFL  Lingual Strength and Mobility: WFL  Volitional Cough: Intact  Volitional Swallow: Intact  Voice Prior to PO Intake: Clear and audible    Bedside Swallow Eval:   Consistencies Assessed:  Thin liquids -Half a cup of water via straw  Solids -x2 bite sized pieces of dustin cracker      Oral Phase:   WFL    Pharyngeal Phase:   no overt clinical signs/symptoms of aspiration  no overt clinical signs/symptoms of pharyngeal dysphagia    Compensatory Strategies  None    Treatment: It should be noted that silent aspiration cannot be r/o via b/s assessment. ST provided education to the pt which included diet recs, as well as no further ST required at this time, as the pt is consuming the safest and least restrictive diet. Pt verbalized an understanding of all information given and was agreeable to all recs.       Goals:   Multidisciplinary Problems       SLP Goals       Not on file                    Plan:     Patient to be seen:      Plan of Care expires:     Plan of Care reviewed with:  patient   SLP Follow-Up:  No       Discharge recommendations:  No Therapy Indicated   Barriers to Discharge:  None    Time Tracking:     SLP Treatment Date:   07/13/24  Speech Start Time:  1100  Speech Stop Time:  1112     Speech Total Time (min):  12 min    Billable Minutes: Eval Swallow and Oral " Function 12    07/13/2024

## 2024-07-13 NOTE — PLAN OF CARE
Problem: Diabetes Comorbidity  Goal: Blood Glucose Level Within Targeted Range  Outcome: Progressing  Intervention: Monitor and Manage Glycemia  Flowsheets (Taken 7/13/2024 9232)  Glycemic Management: blood glucose monitored

## 2024-07-13 NOTE — ASSESSMENT & PLAN NOTE
Chronic, uncontrolled. Latest blood pressure and vitals reviewed-     Temp:  [97.6 °F (36.4 °C)-98.6 °F (37 °C)]   Pulse:  [50-72]   Resp:  [15-20]   BP: (145-226)/(67-98)   SpO2:  [95 %-99 %] .   Home meds for hypertension were reviewed and noted below.   Hypertension Medications               amlodipine-valsartan (EXFORGE)  mg per tablet Take 1 tablet by mouth once daily.    cloNIDine (CATAPRES) 0.1 MG tablet Take 1 tablet (0.1 mg total) by mouth 3 (three) times daily.    hydroCHLOROthiazide (HYDRODIURIL) 25 MG tablet Take 1 tablet (25 mg total) by mouth once daily.            While in the hospital, will manage blood pressure as follows;.  Allow for permissive hypertension.      Will utilize p.r.n. blood pressure medication only if patient's blood pressure greater than 180/110 and he develops symptoms such as worsening chest pain or shortness of breath.

## 2024-07-13 NOTE — PLAN OF CARE
Recommendation:  1. Change diet to 2400 calorie ADA diet.   2. Encourage intake at meals as tolerated. 3. Monitor weight/labs.   4. RD to follow to monitor po intake    Goals:  Pt will tolerate diet with at least 50-75% intake at meals by RD follow up  Nutrition Goal Status: new

## 2024-07-13 NOTE — ASSESSMENT & PLAN NOTE
Mildly elevated and we will continue to trend his troponin while he is here.  Monitor on telemetry.  Patient with no complaints of chest pain or pressure.  Denies any syncope.  Did note on the monitor to have episodes of bradycardia and AV block on EKG.  Also noted to have a bundle-branch block.  We will follow up on echo.  Possible cardiology evaluation pending follow up troponin levels and echo results.  Currently getting aspirin and Plavix.

## 2024-07-13 NOTE — CONSULTS
"  [facility] [place] - Telemetry  Adult Nutrition  Consult Note    SUMMARY     Recommendations    Recommendation:  1. Change diet to 2400 calorie ADA diet.   2. Encourage intake at meals as tolerated. 3. Monitor weight/labs.   4. RD to follow to monitor po intake    Goals:  Pt will tolerate diet with at least 50-75% intake at meals by RD follow up  Nutrition Goal Status: new  Communication of RD Recs: reviewed with RN    Assessment and Plan  No nutrition dx at this time     Malnutrition Assessment  Unable to assess NFPE 2/2 RD working remotely for weekend coverage      Weight Loss (Malnutrition):  (4% x 5 months)       Reason for Assessment  Reason For Assessment: consult (stroke pathway)  Diagnosis:  (L leg weakness)  Relevant Medical History: HTN, DM, prostate cancer, hernia repair, spine surgery  General Information Comments: Pt admitted with left leg weakness. Pt on 1500 calorie ADA Cardiac diet. Intake not recorded. Noted 12lb weight loss x 5 months. Danyel 20-skin intact. Unable to assess NFPE 2/2 RD working remotely for weekend coverage. Heart healthy diet handouts attached to d/c paperwork  Nutrition Discharge Planning: pt to d/c on ADA Cardiac diet    Nutrition Risk Screen  Nutrition Risk Screen: no indicators present    Nutrition/Diet History  Food Preferences: no Anabaptist or cultural food prefs identified  Factors Affecting Nutritional Intake: None identified at this time    Anthropometrics  Temp: 97.9 °F (36.6 °C)  Height Method: Stated  Height: 6' 7" (200.7 cm)  Height (inches): 79 in  Weight Method: Bed Scale  Weight: 114.8 kg (253 lb 1.4 oz)  Weight (lb): 253.09 lb  Ideal Body Weight (IBW), Male: 220 lb  % Ideal Body Weight, Male (lb): 115.04 %  BMI (Calculated): 28.5  BMI Grade: 25 - 29.9 - overweight  Usual Body Weight (UBW), k kg ()  % Usual Body Weight: 95.87  % Weight Change From Usual Weight: -4.33 %    Lab/Procedures/Meds  Pertinent Labs Reviewed: reviewed  Pertinent Labs Comments: BUN 37H, " Crea 2.5H, Glu 137H  Pertinent Medications Reviewed: reviewed  Pertinent Medications Comments: aspirin    Estimated/Assessed Needs  Weight Used For Calorie Calculations: 114.8 kg (253 lb 1.4 oz)  Energy Calorie Requirements (kcal): 2870 (25 kcal/kg)  Energy Need Method: Kcal/kg  Protein Requirements: 91g (0.8g/kg)  Weight Used For Protein Calculations: 114.8 kg (253 lb 1.4 oz)  Estimated Fluid Requirement Method: RDA Method  RDA Method (mL): 2870  CHO Requirement: 315g    Nutrition Prescription Ordered  Current Diet Order: 1500 calorie ADA Cardiac diet    Evaluation of Received Nutrient/Fluid Intake  I/O: 240/550  Energy Calories Required: not meeting needs  Protein Required: not meeting needs  Fluid Required: not meeting needs  Comments: LBM 7/11  % Intake of Estimated Energy Needs: Other: intake not recorded  % Meal Intake: Other: intake not recorded    Nutrition Risk  Level of Risk/Frequency of Follow-up:  (2xweekly)     Monitor and Evaluation  Food and Nutrient Intake: food and beverage intake  Food and Nutrient Adminstration: diet order  Physical Activity and Function: nutrition-related ADLs and IADLs  Anthropometric Measurements: weight  Biochemical Data, Medical Tests and Procedures: electrolyte and renal panel  Nutrition-Focused Physical Findings: overall appearance     Nutrition Related Social Determinants of Health: SDOH: Unable to assess at this time.      Nutrition Follow-Up    RD Follow-up?: Yes

## 2024-07-13 NOTE — HPI
84-year-old  male with a past medical history significant for primary essential hypertension, non-insulin-dependent type 2 diabetes, BPH/prostate cancer presents to the ER with persistent left lower extremity weakness and uncoordination.  States that symptoms started on Tuesday.  Denies any falls or trauma to the hip.  Denies any pain.  States that he can not describe it but feels like his leg just isn't cooperating with what he wants to do.  No history of strokes in the past.  Denies any headaches, vision changes, speech problems, swallowing issues, sensory deficits.  States that he feels like he is going to fall when he walks.  Tends to lean towards his weaker side which is his left currently.    Denies missing any of his blood pressure medications or acute changes in his medication regimen.  Denies any changes in his diet.    Workup in the ER was noted for a CT head that showed a right frontal parietal lesion concerning for possible meningioma.  MRI of the brain and MRA of head and neck were ordered and noted:   1. Small foci of acute infarction within the parasagittal right parietal lobe near the vertex.   2. Generalized cerebral volume loss and moderate chronic microvascular ischemic disease.   3. MRA brain without high-grade focal stenosis or large vessel occlusion.   4. Small extra-axial lesion over the lateral aspect of the right frontal parietal region.  This may represent a small meningioma.  No mass effect.     1. Suspected moderate stenosis at the right ICA origin.  Consider further evaluation with CTA and/or carotid ultrasound.   2. MRA neck with no evidence of additional high-grade stenosis or occlusion.       Started on aspirin 325 mg and Plavix 300 mg p.o. x1 by the ER,  given the timeframe of onset of his deficits he was not stroke activated given that he was outside the window.  There was no large vessel occlusion noted on the MRA.     Because this patient has a new stroke with  deficits and requires PT/OT/ST, Neurology eval and possible rehab  , care in an alternative location isn't clinically appropriate for the reasons stated above.     We have been consulted for further management of his stroke as well as inpatient admission to the hospitalist telemetry service.

## 2024-07-13 NOTE — SUBJECTIVE & OBJECTIVE
Past Medical History:   Diagnosis Date    Cancer     CVA (cerebral vascular accident) 2024    Diabetes mellitus, type 2     Diabetes with neurologic complications     Disorder of kidney and ureter     Hypertension     Late complications of amputation stump     Prostate cancer     Pseudophakia 2024       Past Surgical History:   Procedure Laterality Date    FINGER SURGERY  3/2014    HERNIA REPAIR  1967    KNEE SURGERY      SPINE SURGERY  10/2007    VASECTOMY  1986       Review of patient's allergies indicates:   Allergen Reactions    Grass pollen- grass standard Itching     Eye irritation       No current facility-administered medications on file prior to encounter.     Current Outpatient Medications on File Prior to Encounter   Medication Sig    amlodipine-valsartan (EXFORGE)  mg per tablet Take 1 tablet by mouth once daily.    aspirin 81 MG Chew Take 81 mg by mouth once daily.    cloNIDine (CATAPRES) 0.1 MG tablet Take 1 tablet (0.1 mg total) by mouth 3 (three) times daily.    empagliflozin (JARDIANCE) 10 mg tablet Take 1 tablet (10 mg total) by mouth once daily.    glipiZIDE (GLUCOTROL) 10 MG TR24 Take 1 tablet (10 mg total) by mouth 2 (two) times daily.    hydroCHLOROthiazide (HYDRODIURIL) 25 MG tablet Take 1 tablet (25 mg total) by mouth once daily.    tadalafiL (CIALIS) 20 MG Tab Take 1 tablet (20 mg total) by mouth once daily.    tamsulosin (FLOMAX) 0.4 mg Cap Take 1 capsule (0.4 mg total) by mouth once daily.     Family History       Problem Relation (Age of Onset)    Hyperlipidemia Mother, Father    Hypertension Mother, Father          Tobacco Use    Smoking status: Former     Current packs/day: 0.00     Average packs/day: 0.3 packs/day for 0.9 years (0.2 ttl pk-yrs)     Types: Cigarettes     Start date: 1960     Quit date: 1960     Years since quittin.6    Smokeless tobacco: Never   Substance and Sexual Activity    Alcohol use: Yes     Comment: special occacion    Drug  use: No    Sexual activity: Not on file     Review of Systems   Constitutional:  Positive for activity change. Negative for chills, fatigue and fever.   HENT:  Negative for congestion and sore throat.    Eyes:  Negative for visual disturbance.   Respiratory:  Negative for cough, chest tightness, shortness of breath, wheezing and stridor.    Cardiovascular:  Negative for chest pain, palpitations and leg swelling.   Gastrointestinal:  Negative for abdominal pain, constipation, diarrhea, nausea and vomiting.   Genitourinary:  Negative for dysuria and hematuria.   Musculoskeletal:  Positive for gait problem (Due to his left lower extremity weakness/and uncoordination.). Negative for arthralgias, myalgias and neck stiffness.   Neurological:  Positive for weakness (left lower extremity). Negative for dizziness, seizures, syncope, light-headedness, numbness and headaches.   Psychiatric/Behavioral:  Negative for confusion. The patient is not nervous/anxious.      Objective:     Vital Signs (Most Recent):  Temp: 97.7 °F (36.5 °C) (07/13/24 0353)  Pulse: 60 (Simultaneous filing. User may not have seen previous data.) (07/13/24 0353)  Resp: 20 (07/13/24 0353)  BP: (!) 155/74 (07/13/24 0353)  SpO2: 96 % (07/13/24 0033) Vital Signs (24h Range):  Temp:  [97.6 °F (36.4 °C)-98.6 °F (37 °C)] 97.7 °F (36.5 °C)  Pulse:  [50-72] 60  Resp:  [15-20] 20  SpO2:  [95 %-99 %] 96 %  BP: (145-226)/(67-98) 155/74     Weight: 114.8 kg (253 lb 1.4 oz)  Body mass index is 28.51 kg/m².     Physical Exam  Vitals and nursing note reviewed.   Constitutional:       General: He is not in acute distress.     Appearance: Normal appearance. He is normal weight. He is not ill-appearing, toxic-appearing or diaphoretic.   HENT:      Head: Normocephalic and atraumatic.      Nose: Nose normal. No congestion or rhinorrhea.      Mouth/Throat:      Mouth: Mucous membranes are moist.      Pharynx: Oropharynx is clear. No oropharyngeal exudate or posterior  oropharyngeal erythema.   Eyes:      General: No scleral icterus.     Extraocular Movements: Extraocular movements intact.      Conjunctiva/sclera: Conjunctivae normal.      Pupils: Pupils are equal, round, and reactive to light.   Neck:      Vascular: No carotid bruit.   Cardiovascular:      Rate and Rhythm: Normal rate and regular rhythm.      Pulses: Normal pulses.      Heart sounds: Normal heart sounds. No murmur heard.     No friction rub. No gallop.   Pulmonary:      Effort: Pulmonary effort is normal. No respiratory distress.      Breath sounds: Normal breath sounds. No wheezing, rhonchi or rales.   Abdominal:      General: Bowel sounds are normal. There is no distension.      Palpations: Abdomen is soft.      Tenderness: There is no abdominal tenderness. There is no guarding or rebound.   Musculoskeletal:         General: No swelling. Normal range of motion.      Cervical back: Normal range of motion and neck supple.      Right lower leg: No edema.      Left lower leg: No edema.   Lymphadenopathy:      Cervical: No cervical adenopathy.   Skin:     General: Skin is warm.      Capillary Refill: Capillary refill takes less than 2 seconds.      Coloration: Skin is not pale.   Neurological:      General: No focal deficit present.      Mental Status: He is alert and oriented to person, place, and time. Mental status is at baseline.      Cranial Nerves: No cranial nerve deficit.      Motor: No weakness.      Coordination: Coordination normal (in bed his appears coordinated in all extremities. Walking the patient states he feels off balance on the left leg and it gets or feels stuck.).   Psychiatric:         Mood and Affect: Mood normal.         Behavior: Behavior normal.              CRANIAL NERVES     CN III, IV, VI   Pupils are equal, round, and reactive to light.       Recent Results (from the past 24 hour(s))   POCT glucose    Collection Time: 07/12/24  7:55 PM   Result Value Ref Range    POCT Glucose 160 (H)  70 - 110 mg/dL   CBC W/ AUTO DIFFERENTIAL    Collection Time: 07/12/24  8:14 PM   Result Value Ref Range    WBC 10.11 3.90 - 12.70 K/uL    RBC 4.88 4.60 - 6.20 M/uL    Hemoglobin 13.3 (L) 14.0 - 18.0 g/dL    Hematocrit 41.1 40.0 - 54.0 %    MCV 84 82 - 98 fL    MCH 27.3 27.0 - 31.0 pg    MCHC 32.4 32.0 - 36.0 g/dL    RDW 12.3 11.5 - 14.5 %    Platelets 257 150 - 450 K/uL    MPV 12.4 9.2 - 12.9 fL    Immature Granulocytes 0.3 0.0 - 0.5 %    Gran # (ANC) 6.2 1.8 - 7.7 K/uL    Immature Grans (Abs) 0.03 0.00 - 0.04 K/uL    Lymph # 2.6 1.0 - 4.8 K/uL    Mono # 0.8 0.3 - 1.0 K/uL    Eos # 0.4 0.0 - 0.5 K/uL    Baso # 0.10 0.00 - 0.20 K/uL    nRBC 0 0 /100 WBC    Gran % 61.4 38.0 - 73.0 %    Lymph % 26.0 18.0 - 48.0 %    Mono % 7.7 4.0 - 15.0 %    Eosinophil % 3.6 0.0 - 8.0 %    Basophil % 1.0 0.0 - 1.9 %    Differential Method Automated    Comprehensive metabolic panel    Collection Time: 07/12/24  8:14 PM   Result Value Ref Range    Sodium 138 136 - 145 mmol/L    Potassium 3.4 (L) 3.5 - 5.1 mmol/L    Chloride 101 95 - 110 mmol/L    CO2 24 23 - 29 mmol/L    Glucose 162 (H) 70 - 110 mg/dL    BUN 36 (H) 8 - 23 mg/dL    Creatinine 2.9 (H) 0.5 - 1.4 mg/dL    Calcium 9.3 8.7 - 10.5 mg/dL    Total Protein 7.9 6.0 - 8.4 g/dL    Albumin 3.7 3.5 - 5.2 g/dL    Total Bilirubin 0.2 0.1 - 1.0 mg/dL    Alkaline Phosphatase 164 (H) 55 - 135 U/L    AST 18 10 - 40 U/L    ALT 17 10 - 44 U/L    eGFR 21 (A) >60 mL/min/1.73 m^2    Anion Gap 13 8 - 16 mmol/L   Protime-INR    Collection Time: 07/12/24  8:14 PM   Result Value Ref Range    Prothrombin Time 10.6 9.0 - 12.5 sec    INR 1.0 0.8 - 1.2   TSH    Collection Time: 07/12/24  8:14 PM   Result Value Ref Range    TSH 1.778 0.400 - 4.000 uIU/mL   LDL - Lipid Panel    Collection Time: 07/12/24  8:14 PM   Result Value Ref Range    Cholesterol 168 120 - 199 mg/dL    Triglycerides 214 (H) 30 - 150 mg/dL    HDL 33 (L) 40 - 75 mg/dL    LDL Cholesterol 92.2 63.0 - 159.0 mg/dL    HDL/Cholesterol Ratio  19.6 (L) 20.0 - 50.0 %    Total Cholesterol/HDL Ratio 5.1 (H) 2.0 - 5.0    Non-HDL Cholesterol 135 mg/dL   APTT    Collection Time: 07/12/24  8:14 PM   Result Value Ref Range    aPTT 29.5 21.0 - 32.0 sec   Troponin I    Collection Time: 07/12/24  8:14 PM   Result Value Ref Range    Troponin I 0.033 (H) 0.000 - 0.026 ng/mL   Magnesium    Collection Time: 07/12/24  8:14 PM   Result Value Ref Range    Magnesium 2.6 1.6 - 2.6 mg/dL   Phosphorus    Collection Time: 07/12/24  8:14 PM   Result Value Ref Range    Phosphorus 3.9 2.7 - 4.5 mg/dL   B-Type natriuretic peptide    Collection Time: 07/12/24  8:15 PM   Result Value Ref Range    BNP 45 0 - 99 pg/mL   ISTAT PROCEDURE    Collection Time: 07/12/24  8:24 PM   Result Value Ref Range    POC Glucose 158 (H) 70 - 110 mg/dL    POC BUN 32 (H) 6 - 30 mg/dL    POC Creatinine 2.9 (H) 0.5 - 1.4 mg/dL    POC Sodium 138 136 - 145 mmol/L    POC Potassium 3.3 (L) 3.5 - 5.1 mmol/L    POC Chloride 101 95 - 110 mmol/L    POC TCO2 (MEASURED) 26 23 - 29 mmol/L    POC Anion Gap 15 8 - 16 mmol/L    POC Ionized Calcium 1.11 1.06 - 1.42 mmol/L    POC Hematocrit 41 36 - 54 %PCV    Sample VENOUS     Site Other     Allens Test N/A     [facility]Sys Room Air    ISTAT PROCEDURE    Collection Time: 07/12/24  8:25 PM   Result Value Ref Range    POC PH 7.401 7.35 - 7.45    POC PCO2 45.9 (H) 35 - 45 mmHg    POC PO2 48 40 - 60 mmHg    POC HCO3 28.5 (H) 24 - 28 mmol/L    POC BE 3 (H) -2 to 2 mmol/L    POC SATURATED O2 83 95 - 100 %    POC TCO2 30 (H) 24 - 29 mmol/L    Sample VENOUS     Site Other     Allens Test N/A     Dels Room Air    Troponin I    Collection Time: 07/13/24 12:11 AM   Result Value Ref Range    Troponin I 0.045 (H) 0.000 - 0.026 ng/mL   Troponin I    Collection Time: 07/13/24 12:11 AM   Result Value Ref Range    Troponin I 0.045 (H) 0.000 - 0.026 ng/mL       Microbiology Results (last 7 days)       ** No results found for the last 168 hours. **            Imaging Results              MRA  Neck without contrast (Final result)  Result time 07/13/24 00:56:21   Procedure changed from MRA Neck     Final result by Cristin Rayo MD (07/13/24 00:56:21)                   Impression:      Motion limited examination.    1. Suspected moderate stenosis at the right ICA origin.  Consider further evaluation with CTA and/or carotid ultrasound.  2. MRA neck with no evidence of additional high-grade stenosis or occlusion.      Electronically signed by: Cristin Rayo MD  Date:    07/13/2024  Time:    00:56               Narrative:    EXAMINATION:  MRA NECK WITHOUT CONTRAST    CLINICAL HISTORY:  Stroke/TIA, determine embolic source;    TECHNIQUE:  Non-contrast 2D and/or 3D time-of-flight MR angiography of the neck was performed, with MIP reformatting.    COMPARISON:  Concurrent MRI/MRA brain.    FINDINGS:  Motion limited examination.    Bilateral common carotid and internal carotid arteries appear grossly patent.  There is suspected focal narrowing with at least moderate stenosis at the right ICA origin, noting evaluation is limited on MRA with motion artifact.  No additional common carotid or internal carotid high-grade stenosis or occlusion seen.    Vertebral arteries appear grossly patent throughout their course, however noting that bilateral V3 segments are not well visualized.  This appears symmetric bilaterally.  Otherwise no evidence of vertebral artery high-grade stenosis or occlusion.                                        MRI Brain Without Contrast (Final result)  Result time 07/13/24 00:43:20      Final result by Cristin Rayo MD (07/13/24 00:43:20)                   Impression:      1. Small foci of acute infarction within the parasagittal right parietal lobe near the vertex.  2. Generalized cerebral volume loss and moderate chronic microvascular ischemic disease.  3. MRA brain without high-grade focal stenosis or large vessel occlusion.  4. Small extra-axial lesion over the lateral aspect of the  right frontal parietal region.  This may represent a small meningioma.  No mass effect.  This report was flagged in Epic as abnormal.      Electronically signed by: Cristin Rayo MD  Date:    07/13/2024  Time:    00:43               Narrative:    EXAMINATION:  MRI BRAIN WITHOUT CONTRAST; MRA BRAIN WITHOUT CONTRAST    CLINICAL HISTORY:  Stroke, follow up;; Stroke/TIA, determine embolic source;    TECHNIQUE:  Multiplanar multisequence MR imaging of the brain was performed without contrast.  By separate acquisition, noncontrast MR angiography was performed of the intracranial vasculature with 3D time-of-flight technique through the [place] [facility] [place], with MIP reformatting.    COMPARISON:  CT head from the same date.    FINDINGS:  There is generalized cerebral volume loss and moderate chronic microvascular ischemic disease.  Small foci of acute infarction are seen within the right parasagittal parietal lobe at the vertex.  Ventricles are normal in size and configuration.  No intracranial hemorrhage or extraaxial fluid collection.  There is redemonstration of extra-axial lesion measuring approximately 2 x 1 cm over the lateral aspect of the right frontal parietal region.    Visualized paranasal sinuses and mastoid air cells are clear. Orbits show no acute abnormalities.    The bilateral distal cervical, petrous, cavernous and supraclinoid segments of the ICA's and the visualized bilateral anterior and middle cerebral arteries are patent without evidence for high-grade focal stenosis or large vessel occlusion.  The distal vertebral arteries, basilar artery and posterior cerebral arteries are patent without evidence for high-grade focal stenosis or large vessel occlusion.                                        MRA Brain (Final result)  Result time 07/13/24 00:43:20      Final result by Cristin Rayo MD (07/13/24 00:43:20)                   Impression:      1. Small foci of acute infarction within the parasagittal  right parietal lobe near the vertex.  2. Generalized cerebral volume loss and moderate chronic microvascular ischemic disease.  3. MRA brain without high-grade focal stenosis or large vessel occlusion.  4. Small extra-axial lesion over the lateral aspect of the right frontal parietal region.  This may represent a small meningioma.  No mass effect.  This report was flagged in Epic as abnormal.      Electronically signed by: Cristin Rayo MD  Date:    07/13/2024  Time:    00:43               Narrative:    EXAMINATION:  MRI BRAIN WITHOUT CONTRAST; MRA BRAIN WITHOUT CONTRAST    CLINICAL HISTORY:  Stroke, follow up;; Stroke/TIA, determine embolic source;    TECHNIQUE:  Multiplanar multisequence MR imaging of the brain was performed without contrast.  By separate acquisition, noncontrast MR angiography was performed of the intracranial vasculature with 3D time-of-flight technique through the [place], with MIP reformatting.    COMPARISON:  CT head from the same date.    FINDINGS:  There is generalized cerebral volume loss and moderate chronic microvascular ischemic disease.  Small foci of acute infarction are seen within the right parasagittal parietal lobe at the vertex.  Ventricles are normal in size and configuration.  No intracranial hemorrhage or extraaxial fluid collection.  There is redemonstration of extra-axial lesion measuring approximately 2 x 1 cm over the lateral aspect of the right frontal parietal region.    Visualized paranasal sinuses and mastoid air cells are clear. Orbits show no acute abnormalities.    The bilateral distal cervical, petrous, cavernous and supraclinoid segments of the ICA's and the visualized bilateral anterior and middle cerebral arteries are patent without evidence for high-grade focal stenosis or large vessel occlusion.  The distal vertebral arteries, basilar artery and posterior cerebral arteries are patent without evidence for high-grade focal stenosis or large vessel  occlusion.                                       X-Ray Chest AP Portable (Final result)  Result time 07/12/24 20:38:44      Final result by Cristin Rayo MD (07/12/24 20:38:44)                   Impression:      No acute cardiopulmonary process identified.      Electronically signed by: Cristin Rayo MD  Date:    07/12/2024  Time:    20:38               Narrative:    EXAMINATION:  XR CHEST AP PORTABLE    CLINICAL HISTORY:  Essential (primary) hypertension    TECHNIQUE:  Single frontal view of the chest was performed.    COMPARISON:  None    FINDINGS:  Cardiac silhouette is normal in size.  Lungs are symmetrically expanded.  No evidence of focal consolidative process, pneumothorax, or significant pleural effusion.  No acute osseous abnormality identified.                                       CT Head Without Contrast (Final result)  Result time 07/12/24 20:33:45      Final result by Cristni Rayo MD (07/12/24 20:33:45)                   Impression:      No acute intracranial abnormalities identified.  Generalized cerebral volume loss and chronic microvascular ischemic disease.  If clinical concern for acute infarction, future MRI follow-up may be obtained.    Extra-axial 2.0 x 0.5 cm lesion over the right frontal parietal lobe region.  This is indeterminate on the basis of CT but may represent a meningioma.  No mass effect.  No prior studies are available for comparison.      Electronically signed by: Cristin Rayo MD  Date:    07/12/2024  Time:    20:33               Narrative:    EXAMINATION:  CT HEAD WITHOUT CONTRAST    CLINICAL HISTORY:  Neuro deficit, acute, stroke suspected;    TECHNIQUE:  Low dose axial images were obtained through the head.  Coronal and sagittal reformations were also performed. Contrast was not administered.    COMPARISON:  None.    FINDINGS:  There is generalized cerebral volume loss and chronic microvascular ischemic disease.  No evidence of acute/recent major vascular  distribution cerebral infarction, intraparenchymal hemorrhage, or intra-axial space occupying lesion.  There is a 2.0 x 0.5 cm extra-axial lesion seen over the right frontal parietal lobe region.  The ventricular system is normal in size and configuration with no evidence of hydrocephalus. No effacement of the skull-base cisterns.  Visualized paranasal sinuses and mastoid air cells are clear. The calvarium shows no significant abnormality.

## 2024-07-13 NOTE — ASSESSMENT & PLAN NOTE
F/u on A1c. Hold his glipizide 10mg BID and Jardiance 10mg daily.  SSI started and diet adjusted.

## 2024-07-13 NOTE — ASSESSMENT & PLAN NOTE
Reviewed his past labs and appears to be close to his baseline at this time.  We will continue to monitor while he is here.  We will also closely monitor since increasing his valsartan to 160 in the morning and 40 in the evening.

## 2024-07-13 NOTE — PLAN OF CARE
Case Management Assessment     PCP: Tomeka Marie MD  Pharmacy: [facility]  Patient Arrived From: Home alone  Existing Help at Home: [facility]  Barriers to Discharge: None    Discharge Plan:    A. Home; follow-ups   B. TBD    Independent at home alone; Carla-friend assists if needed; no assist needed; no current medical services or equipment; no discharge needs anticipated at this time; new PCP needed-resource provided.      07/13/24 0821   Discharge Assessment   Assessment Type Discharge Planning Assessment   Confirmed/corrected address, phone number and insurance Yes   Confirmed Demographics Correct on Facesheet   Source of Information patient;health record   Communicated PRASANNA with patient/caregiver Date not available/Unable to determine   Reason For Admission HTN   People in Home alone   Facility Arrived From: Home   Do you expect to return to your current living situation? Yes   Do you have help at home or someone to help you manage your care at home? Yes   Who are your caregiver(s) and their phone number(s)? Carla-friend: [phone]   Prior to hospitilization cognitive status: Alert/Oriented   Current cognitive status: Alert/Oriented   Walking or Climbing Stairs Difficulty no   Dressing/Bathing Difficulty no   Home Accessibility wheelchair accessible   Home Layout Able to live on 1st floor   Equipment Currently Used at Home none   Readmission within 30 days? No   Patient currently being followed by outpatient case management? No   Do you currently have service(s) that help you manage your care at home? No   Do you take prescription medications? Yes   Do you have prescription coverage? Yes   Coverage PHN   Do you have any problems affording any of your prescribed medications? No   Is the patient taking medications as prescribed? yes   Who is going to help you get home at discharge? Carla-jerri   How do you get to doctors appointments? car, drives self   Are you on dialysis? No    Do you take coumadin? No   Discharge Plan A Home  (Follow-ups)   Discharge Plan B Other  (TBD)   DME Needed Upon Discharge  other (see comments)  (TBD)   Discharge Plan discussed with: Patient   Transition of Care Barriers None     SW Role explained to patient; two patient identifiers recognized; SW contact information placed on Communication board. Discussed patient managing health care at home and discussed discharge plans A and B; determined who would be helping patient at home with recovery: Carla, friend, will help with recovery

## 2024-07-13 NOTE — NURSING
[facility] [place]  Nurses Note -- 4 Eyes      7/13/2024       Skin assessed on: Q Shift      [x] No Pressure Injuries Present    []Prevention Measures Documented    [] Yes LDA  for Pressure Injury Previously documented     [] Yes New Pressure Injury Discovered   [] LDA for New Pressure Injury Added      Attending RN:  Leigha Sandoval RN     Second RN:  JENNIFER Peña

## 2024-07-13 NOTE — ASSESSMENT & PLAN NOTE
Antithrombotics for secondary stroke prevention: Antiplatelets: Aspirin: 81 mg daily  Aspirin: 325 mg loading dosex1, now  Clopidogrel: 300 mg loading dose x 1, now  Clopidogrel: 75 mg daily    Statins for secondary stroke prevention and hyperlipidemia, if present:   Statins: Atorvastatin- 40 mg daily    Aggressive risk factor modification: HTN, DM, HLD     Rehab efforts: The patient has been evaluated by a stroke team provider and the therapy needs have been fully considered based off the presenting complaints and exam findings. The following therapy evaluations are needed: PT evaluate and treat, OT evaluate and treat, SLP evaluate and treat    Diagnostics ordered/pending: HgbA1C to assess blood glucose levels, MRA head to assess vasculature, MRA neck/arch to assess vasculature, MRI head without contrast to assess brain parenchyma, TTE to assess cardiac function/status     VTE prophylaxis:  fall risk and getting loaded with ASA and Plavix.     BP parameters: Infarct:  But appears to have had the initial insult greater than 24 hours ago.  For thus resume his blood pressure medicines and goals of slept blood pressure less than 160.

## 2024-07-13 NOTE — PLAN OF CARE
Problem: Adult Inpatient Plan of Care  Goal: Plan of Care Review  Flowsheets (Taken 7/13/2024 0719)  Plan of Care Reviewed With: patient     Problem: Stroke, Ischemic (Includes Transient Ischemic Attack)  Goal: Optimal Coping  Intervention: Support Psychosocial Response to Stroke  Flowsheets (Taken 7/13/2024 0719)  Supportive Measures: active listening utilized  Goal: Optimal Cerebral Tissue Perfusion  Intervention: Protect and Optimize Cerebral Perfusion  Flowsheets (Taken 7/13/2024 0719)  Sensory Stimulation Regulation: quiet environment promoted  Cerebral Perfusion Promotion: blood pressure monitored  Goal: Optimal Cognitive Function  Intervention: Optimize Cognitive Function  Flowsheets (Taken 7/13/2024 0719)  Sensory Stimulation Regulation: quiet environment promoted  Goal: Optimal Functional Ability  Intervention: Optimize Functional Ability  Flowsheets (Taken 7/13/2024 0719)  Self-Care Promotion: independence encouraged  Activity Management: Rolling - L1  Goal: Effective Oxygenation and Ventilation  Intervention: Optimize Oxygenation and Ventilation  Flowsheets (Taken 7/13/2024 0719)  Head of Bed (HOB) Positioning: HOB elevated  Goal: Improved Sensorimotor Function  Intervention: Optimize Sensory and Perceptual Ability  Flowsheets (Taken 7/13/2024 0719)  Pressure Reduction Techniques: frequent weight shift encouraged  Goal: Safe and Effective Swallow  Intervention: Promote and Optimize Fluid and Food Intake  Flowsheets (Taken 7/13/2024 0719)  Aspiration Precautions:   awake/alert before oral intake   distractions minimized during oral intake   respiratory status monitored     Problem: Fall Injury Risk  Goal: Absence of Fall and Fall-Related Injury  Intervention: Identify and Manage Contributors  Flowsheets (Taken 7/13/2024 0719)  Self-Care Promotion: independence encouraged  Medication Review/Management: medications reviewed  Intervention: Promote Injury-Free Environment  Flowsheets (Taken 7/13/2024  0712)  Safety Promotion/Fall Prevention:   assistive device/personal item within reach   nonskid shoes/socks when out of bed   side rails raised x 2     Problem: Diabetes Comorbidity  Goal: Blood Glucose Level Within Targeted Range  Intervention: Monitor and Manage Glycemia  Flowsheets (Taken 7/13/2024 8203)  Glycemic Management: blood glucose monitored

## 2024-07-13 NOTE — SUBJECTIVE & OBJECTIVE
Interval History:  Patient continues to have left lower extremity weakness.  No new focal deficits    Review of Systems   Constitutional: Negative.    Respiratory: Negative.     Cardiovascular: Negative.    Gastrointestinal: Negative.    Genitourinary: Negative.    Musculoskeletal: Negative.    Neurological:  Positive for weakness.     Objective:     Vital Signs (Most Recent):  Temp: 97.6 °F (36.4 °C) (07/13/24 1200)  Pulse: 61 (07/13/24 1200)  Resp: 18 (07/13/24 1200)  BP: (!) 160/71 (07/13/24 1200)  SpO2: 97 % (07/13/24 1200) Vital Signs (24h Range):  Temp:  [97.6 °F (36.4 °C)-98.6 °F (37 °C)] 97.6 °F (36.4 °C)  Pulse:  [50-72] 61  Resp:  [15-20] 18  SpO2:  [95 %-99 %] 97 %  BP: (145-226)/(67-98) 160/71     Weight: 114.8 kg (253 lb 1.4 oz)  Body mass index is 28.51 kg/m².    Intake/Output Summary (Last 24 hours) at 7/13/2024 1317  Last data filed at 7/13/2024 1225  Gross per 24 hour   Intake 480 ml   Output 1150 ml   Net -670 ml         Physical Exam  Constitutional:       General: He is not in acute distress.     Appearance: He is normal weight.   Cardiovascular:      Rate and Rhythm: Normal rate.      Pulses: Normal pulses.      Heart sounds: No murmur heard.  Pulmonary:      Effort: No respiratory distress.      Breath sounds: Normal breath sounds. No wheezing.   Abdominal:      General: Bowel sounds are normal. There is no distension.      Palpations: Abdomen is soft.      Tenderness: There is no abdominal tenderness.   Musculoskeletal:      Right lower leg: No edema.      Left lower leg: No edema.   Skin:     General: Skin is warm.   Neurological:      Mental Status: He is alert and oriented to person, place, and time.      Motor: Weakness (Left lower extremity) present.      Coordination: Coordination abnormal.   Psychiatric:         Mood and Affect: Mood normal.             Significant Labs: All pertinent labs within the past 24 hours have been reviewed.    Significant Imaging: I have reviewed all pertinent  imaging results/findings within the past 24 hours.

## 2024-07-13 NOTE — CONSULTS
Patient to discharge home alone with assistance from friend if needed; patient will follow-up with Dr. Marie as a new patient. Message was sent to Dr. Marie's scheduling staff to assist with scheduling the follow-up. See full discharge assessment for more comprehensive patient information.

## 2024-07-13 NOTE — ED PROVIDER NOTES
Encounter Date: 2024       History     Chief Complaint   Patient presents with    Extremity Weakness     Pt reports LEFT leg (from hip on down) weakness starting on Tuesday (24); pt denies any falls, known injury, pain, or hx of this happening before; denies any other deficits; pt reports weakness slightly improved after using suppository for hemorrhoid flair up but still states his gait is not back to normal     84-year-old male with past medical history of type 2 diabetes, high blood pressure, prostate cancer presents to the ED with chief complaint of left leg not working since Tuesday evening.  Patient states he feels off balance when he tries to walk.  Patient reports he feels that the left leg is not working.  And he feels like he is going to fall over when he walks.  He called the [facility] on-call nurse and was told to go to the hospital to be evaluated for a stroke.        Review of patient's allergies indicates:   Allergen Reactions    Grass pollen- grass standard Itching     Eye irritation     Past Medical History:   Diagnosis Date    Cancer     CVA (cerebral vascular accident) 2024    Diabetes mellitus, type 2     Diabetes with neurologic complications     Disorder of kidney and ureter     Hypertension     Late complications of amputation stump     Prostate cancer     Pseudophakia 2024     Past Surgical History:   Procedure Laterality Date    FINGER SURGERY  3/2014    HERNIA REPAIR  1967    KNEE SURGERY      SPINE SURGERY  10/2007    VASECTOMY  1986     Family History   Problem Relation Name Age of Onset    Hypertension Mother      Hyperlipidemia Mother      Hypertension Father      Hyperlipidemia Father       Social History     Tobacco Use    Smoking status: Former     Current packs/day: 0.00     Average packs/day: 0.3 packs/day for 0.9 years (0.2 ttl pk-yrs)     Types: Cigarettes     Start date: 1960     Quit date: 1960     Years since quittin.6    Smokeless tobacco:  Never   Substance Use Topics    Alcohol use: Yes     Comment: special occacion    Drug use: No     Review of Systems   Constitutional:  Negative for fever.   HENT:  Negative for sore throat.    Respiratory:  Negative for shortness of breath.    Cardiovascular:  Negative for chest pain.   Gastrointestinal:  Negative for nausea.   Genitourinary:  Negative for dysuria.   Musculoskeletal:  Positive for gait problem. Negative for back pain.   Skin:  Negative for rash.   Neurological:  Positive for weakness.        Patient is off balance and feels his left leg is not working   Hematological:  Does not bruise/bleed easily.       Physical Exam     Initial Vitals [07/12/24 1947]   BP Pulse Resp Temp SpO2   (!) 145/75 72 18 98.6 °F (37 °C) 98 %      MAP       --         Physical Exam    Nursing note and vitals reviewed.  Constitutional: He appears well-developed and well-nourished. He is not diaphoretic. No distress.   HENT:   Head: Normocephalic and atraumatic.   Right Ear: External ear normal.   Left Ear: External ear normal.   Nose: Nose normal.   Mouth/Throat: Oropharynx is clear and moist.   Eyes: EOM are normal. Pupils are equal, round, and reactive to light. Right eye exhibits no discharge. Left eye exhibits no discharge.   Neck: Neck supple.   Normal range of motion.  Cardiovascular:  Normal rate, regular rhythm, normal heart sounds and intact distal pulses.     Exam reveals no gallop and no friction rub.       No murmur heard.  Pulmonary/Chest: Breath sounds normal. No respiratory distress. He has no wheezes. He has no rhonchi. He has no rales. He exhibits no tenderness.   Abdominal: Abdomen is soft. Bowel sounds are normal. He exhibits no distension. There is no abdominal tenderness. There is no rebound and no guarding.   Musculoskeletal:         General: Normal range of motion.      Cervical back: Normal range of motion and neck supple.     Neurological: He is alert and oriented to person, place, and time. He has  normal strength and normal reflexes. No cranial nerve deficit or sensory deficit. GCS score is 15. GCS eye subscore is 4. GCS verbal subscore is 5. GCS motor subscore is 6.   Skin: Skin is warm. No rash noted. No pallor.   Psychiatric: He has a normal mood and affect.         ED Course   Procedures  Labs Reviewed   CBC W/ AUTO DIFFERENTIAL - Abnormal; Notable for the following components:       Result Value    Hemoglobin 13.3 (*)     All other components within normal limits   COMPREHENSIVE METABOLIC PANEL - Abnormal; Notable for the following components:    Potassium 3.4 (*)     Glucose 162 (*)     BUN 36 (*)     Creatinine 2.9 (*)     Alkaline Phosphatase 164 (*)     eGFR 21 (*)     All other components within normal limits   LIPID PANEL - Abnormal; Notable for the following components:    Triglycerides 214 (*)     HDL 33 (*)     HDL/Cholesterol Ratio 19.6 (*)     Total Cholesterol/HDL Ratio 5.1 (*)     All other components within normal limits   TROPONIN I - Abnormal; Notable for the following components:    Troponin I 0.033 (*)     All other components within normal limits   POCT GLUCOSE - Abnormal; Notable for the following components:    POCT Glucose 160 (*)     All other components within normal limits   ISTAT PROCEDURE - Abnormal; Notable for the following components:    POC Glucose 158 (*)     POC BUN 32 (*)     POC Creatinine 2.9 (*)     POC Potassium 3.3 (*)     All other components within normal limits   ISTAT PROCEDURE - Abnormal; Notable for the following components:    POC PCO2 45.9 (*)     POC HCO3 28.5 (*)     POC BE 3 (*)     POC TCO2 30 (*)     All other components within normal limits   PROTIME-INR   TSH   APTT   B-TYPE NATRIURETIC PEPTIDE   MAGNESIUM   PHOSPHORUS   MAGNESIUM   PHOSPHORUS   TROPONIN I   TROPONIN I   POCT GLUCOSE, HAND-HELD DEVICE   ISTAT CHEM8   POCT PROTIME-INR     EKG Readings: (Independently Interpreted)   Initial Reading: No STEMI. DO NOT USE Rhythm: Sinus rhythm. Heart Rate:  Sixty-one. Conduction: LBBB. ST Segments: Non-Specific ST Segment Depression. Axis: Normal. Other Impression: Abnormal EKG.  QTC prolonged at 505.  No prior EKG available for comparison.       Imaging Results              MRA Brain (In process)  Result time 07/12/24 22:57:45                     X-Ray Chest AP Portable (Final result)  Result time 07/12/24 20:38:44      Final result by Cristin Rayo MD (07/12/24 20:38:44)                   Impression:      No acute cardiopulmonary process identified.      Electronically signed by: Cristin Rayo MD  Date:    07/12/2024  Time:    20:38               Narrative:    EXAMINATION:  XR CHEST AP PORTABLE    CLINICAL HISTORY:  Essential (primary) hypertension    TECHNIQUE:  Single frontal view of the chest was performed.    COMPARISON:  None    FINDINGS:  Cardiac silhouette is normal in size.  Lungs are symmetrically expanded.  No evidence of focal consolidative process, pneumothorax, or significant pleural effusion.  No acute osseous abnormality identified.                                       CT Head Without Contrast (Final result)  Result time 07/12/24 20:33:45      Final result by Cristin Rayo MD (07/12/24 20:33:45)                   Impression:      No acute intracranial abnormalities identified.  Generalized cerebral volume loss and chronic microvascular ischemic disease.  If clinical concern for acute infarction, future MRI follow-up may be obtained.    Extra-axial 2.0 x 0.5 cm lesion over the right frontal parietal lobe region.  This is indeterminate on the basis of CT but may represent a meningioma.  No mass effect.  No prior studies are available for comparison.      Electronically signed by: Cristin Rayo MD  Date:    07/12/2024  Time:    20:33               Narrative:    EXAMINATION:  CT HEAD WITHOUT CONTRAST    CLINICAL HISTORY:  Neuro deficit, acute, stroke suspected;    TECHNIQUE:  Low dose axial images were obtained through the head.  Coronal and  sagittal reformations were also performed. Contrast was not administered.    COMPARISON:  None.    FINDINGS:  There is generalized cerebral volume loss and chronic microvascular ischemic disease.  No evidence of acute/recent major vascular distribution cerebral infarction, intraparenchymal hemorrhage, or intra-axial space occupying lesion.  There is a 2.0 x 0.5 cm extra-axial lesion seen over the right frontal parietal lobe region.  The ventricular system is normal in size and configuration with no evidence of hydrocephalus. No effacement of the skull-base cisterns.  Visualized paranasal sinuses and mastoid air cells are clear. The calvarium shows no significant abnormality.                                       Medications   sodium chloride 0.9% flush 10 mL (has no administration in time range)   sodium chloride 0.9% bolus 500 mL 500 mL (has no administration in time range)   labetaloL injection 10 mg (has no administration in time range)   bisacodyL suppository 10 mg (has no administration in time range)   aspirin EC tablet 81 mg (has no administration in time range)   ondansetron injection 4 mg (has no administration in time range)   polyethylene glycol packet 17 g (has no administration in time range)   acetaminophen tablet 650 mg (has no administration in time range)   melatonin tablet 6 mg (has no administration in time range)   clopidogreL tablet 75 mg (has no administration in time range)   atorvastatin tablet 40 mg (has no administration in time range)   glucose chewable tablet 16 g (has no administration in time range)   glucose chewable tablet 24 g (has no administration in time range)   glucagon (human recombinant) injection 1 mg (has no administration in time range)   insulin aspart U-100 pen 0-5 Units (has no administration in time range)   dextrose 10% bolus 125 mL 125 mL (has no administration in time range)   dextrose 10% bolus 250 mL 250 mL (has no administration in time range)   amLODIPine tablet  10 mg (has no administration in time range)   valsartan tablet 160 mg (has no administration in time range)   hydroCHLOROthiazide tablet 25 mg (has no administration in time range)   tamsulosin 24 hr capsule 0.4 mg (has no administration in time range)   hydrALAZINE injection 5 mg (has no administration in time range)   magnesium sulfate 2g in water 50mL IVPB (premix) (2 g Intravenous New Bag 7/12/24 2142)   potassium bicarbonate disintegrating tablet 20 mEq (20 mEq Oral Given 7/12/24 2142)   aspirin tablet 325 mg (325 mg Oral Given 7/12/24 2136)   clopidogreL tablet 300 mg (300 mg Oral Given 7/12/24 2136)     Medical Decision Making  Amount and/or Complexity of Data Reviewed  Labs: ordered.  Radiology: ordered.    Risk  OTC drugs.  Prescription drug management.  Decision regarding hospitalization.    Code stroke not activated as patient's symptoms started Tuesday evening.    This is an evaluation of a 84 y.o. male that presents to the Emergency Department for   Chief Complaint   Patient presents with    Extremity Weakness     Pt reports LEFT leg (from hip on down) weakness starting on Tuesday (7/9/24); pt denies any falls, known injury, pain, or hx of this happening before; denies any other deficits; pt reports weakness slightly improved after using suppository for hemorrhoid flair up but still states his gait is not back to normal     The patient is a non-toxic and well appearing patient. On physical exam, patient appears well hydrated with moist mucus membranes. Breath sounds are clear and equal bilaterally with no adventitious breath sounds, tachypnea or respiratory distress. Regular rate and rhythm. No murmurs. Abdomen soft and non tender. Patient is tolerating PO without difficulty. Physical exam otherwise as above.     I have reviewed vital signs and nursing notes.   Vital Signs Are Reassuring.     Based on the patient's symptoms, I am considering and evaluating for the following differential diagnoses:   Hypoglycemia, hyperglycemia, CVA, intracranial mass, intracranial hemorrhage, hypertension, uncontrolled hypertension.    Consider hospitalization for:  Off balance    Considered tPA initially however patient's symptoms started greater than 4 hours ago    Patient is agreeable to transfer and admission to [facility]    ED Course:Treatment in the ED included Physical Exam and medications given in ED  Medications   sodium chloride 0.9% flush 10 mL (has no administration in time range)   sodium chloride 0.9% bolus 500 mL 500 mL (has no administration in time range)   labetaloL injection 10 mg (has no administration in time range)   bisacodyL suppository 10 mg (has no administration in time range)   aspirin EC tablet 81 mg (has no administration in time range)   ondansetron injection 4 mg (has no administration in time range)   polyethylene glycol packet 17 g (has no administration in time range)   acetaminophen tablet 650 mg (has no administration in time range)   melatonin tablet 6 mg (has no administration in time range)   clopidogreL tablet 75 mg (has no administration in time range)   atorvastatin tablet 40 mg (has no administration in time range)   glucose chewable tablet 16 g (has no administration in time range)   glucose chewable tablet 24 g (has no administration in time range)   glucagon (human recombinant) injection 1 mg (has no administration in time range)   insulin aspart U-100 pen 0-5 Units (has no administration in time range)   dextrose 10% bolus 125 mL 125 mL (has no administration in time range)   dextrose 10% bolus 250 mL 250 mL (has no administration in time range)   amLODIPine tablet 10 mg (has no administration in time range)   valsartan tablet 160 mg (has no administration in time range)   hydroCHLOROthiazide tablet 25 mg (has no administration in time range)   tamsulosin 24 hr capsule 0.4 mg (has no administration in time range)   hydrALAZINE injection 5 mg (has no administration  in time range)   magnesium sulfate 2g in water 50mL IVPB (premix) (2 g Intravenous New Bag 7/12/24 2142)   potassium bicarbonate disintegrating tablet 20 mEq (20 mEq Oral Given 7/12/24 2142)   aspirin tablet 325 mg (325 mg Oral Given 7/12/24 2136)   clopidogreL tablet 300 mg (300 mg Oral Given 7/12/24 2136)   .   Patient reports feeling better after treatment in the ER.       External Data/Documents Reviewed: Previous medical records and vital signs reviewed, see HPI and Physical exam.   Labs: ordered and reviewed.  Glucose 160.  Radiology: ordered as indicated and reviewed.  No pneumothorax  ECG/medicine tests: ordered and independent interpretation performed by Dr. Parris Mckeon DO. Decision-making details documented in ED Course.   Cardiac monitor placed for hypertension. Monitor shows Normal Sinus Rhythm with  rate of 61. Interpreted by Dr. Parris Mckeon DO.    Risk  Diagnosis or treatment significantly limited by the following social determinants of health: Body mass index is 28.16 kg/m².     In shared decision making with the patient, we discussed treatment, prescriptions, labs, and imaging results.      I contacted  at time 9:20pm, requesting consult with hospitalist for admission. Requesting consultation with Hospital Medicine for services not available at this facility.   Discussed patient's presentation, past medical history, physical exam, labs, radiology results, vital signs, and ED course.      Patient accepted by Dr Vargas on call for Dr Momin for d admission at time 9:30   At this time patient will be  admitted.      At time of admission patient is awake alert oriented x4 speaking clearly in full sentences and moving all 4 extremities.     The following labs and imaging were reviewed:        Admission on 07/12/2024   Component Date Value Ref Range Status    POCT Glucose 07/12/2024 160 (H)  70 - 110 mg/dL Final    WBC 07/12/2024 10.11  3.90 - 12.70 K/uL Final    RBC 07/12/2024 4.88  4.60 - 6.20 M/uL  Final    Hemoglobin 07/12/2024 13.3 (L)  14.0 - 18.0 g/dL Final    Hematocrit 07/12/2024 41.1  40.0 - 54.0 % Final    MCV 07/12/2024 84  82 - 98 fL Final    MCH 07/12/2024 27.3  27.0 - 31.0 pg Final    MCHC 07/12/2024 32.4  32.0 - 36.0 g/dL Final    RDW 07/12/2024 12.3  11.5 - 14.5 % Final    Platelets 07/12/2024 257  150 - 450 K/uL Final    MPV 07/12/2024 12.4  9.2 - 12.9 fL Final    Immature Granulocytes 07/12/2024 0.3  0.0 - 0.5 % Final    Gran # (ANC) 07/12/2024 6.2  1.8 - 7.7 K/uL Final    Immature Grans (Abs) 07/12/2024 0.03  0.00 - 0.04 K/uL Final    Comment: Mild elevation in immature granulocytes is non specific and   can be seen in a variety of conditions including stress response,   acute inflammation, trauma and pregnancy. Correlation with other   laboratory and clinical findings is essential.      Lymph # 07/12/2024 2.6  1.0 - 4.8 K/uL Final    Mono # 07/12/2024 0.8  0.3 - 1.0 K/uL Final    Eos # 07/12/2024 0.4  0.0 - 0.5 K/uL Final    Baso # 07/12/2024 0.10  0.00 - 0.20 K/uL Final    nRBC 07/12/2024 0  0 /100 WBC Final    Gran % 07/12/2024 61.4  38.0 - 73.0 % Final    Lymph % 07/12/2024 26.0  18.0 - 48.0 % Final    Mono % 07/12/2024 7.7  4.0 - 15.0 % Final    Eosinophil % 07/12/2024 3.6  0.0 - 8.0 % Final    Basophil % 07/12/2024 1.0  0.0 - 1.9 % Final    Differential Method 07/12/2024 Automated   Final    Sodium 07/12/2024 138  136 - 145 mmol/L Final    Potassium 07/12/2024 3.4 (L)  3.5 - 5.1 mmol/L Final    Chloride 07/12/2024 101  95 - 110 mmol/L Final    CO2 07/12/2024 24  23 - 29 mmol/L Final    Glucose 07/12/2024 162 (H)  70 - 110 mg/dL Final    BUN 07/12/2024 36 (H)  8 - 23 mg/dL Final    Creatinine 07/12/2024 2.9 (H)  0.5 - 1.4 mg/dL Final    Calcium 07/12/2024 9.3  8.7 - 10.5 mg/dL Final    Total Protein 07/12/2024 7.9  6.0 - 8.4 g/dL Final    Albumin 07/12/2024 3.7  3.5 - 5.2 g/dL Final    Total Bilirubin 07/12/2024 0.2  0.1 - 1.0 mg/dL Final    Comment: For infants and newborns,  interpretation of results should be based  on gestational age, weight and in agreement with clinical  observations.    Premature Infant recommended reference ranges:  Up to 24 hours.............<8.0 mg/dL  Up to 48 hours............<12.0 mg/dL  3-5 days..................<15.0 mg/dL  6-29 days.................<15.0 mg/dL      Alkaline Phosphatase 07/12/2024 164 (H)  55 - 135 U/L Final    AST 07/12/2024 18  10 - 40 U/L Final    ALT 07/12/2024 17  10 - 44 U/L Final    eGFR 07/12/2024 21 (A)  >60 mL/min/1.73 m^2 Final    Anion Gap 07/12/2024 13  8 - 16 mmol/L Final    Prothrombin Time 07/12/2024 10.6  9.0 - 12.5 sec Final    INR 07/12/2024 1.0  0.8 - 1.2 Final    Comment: Coumadin Therapy:  2.0 - 3.0 for INR for all indicators except mechanical heart valves  and antiphospholipid syndromes which should use 2.5 - 3.5.      TSH 07/12/2024 1.778  0.400 - 4.000 uIU/mL Final    Cholesterol 07/12/2024 168  120 - 199 mg/dL Final    Comment: The National Cholesterol Education Program (NCEP) has set the  following guidelines (reference ranges) for Cholesterol:  Optimal.....................<200 mg/dL  Borderline High.............200-239 mg/dL  High........................> or = 240 mg/dL      Triglycerides 07/12/2024 214 (H)  30 - 150 mg/dL Final    Comment: The National Cholesterol Education Program (NCEP) has set the  following guidelines (reference values) for triglycerides:  Normal......................<150 mg/dL  Borderline High.............150-199 mg/dL  High........................200-499 mg/dL      HDL 07/12/2024 33 (L)  40 - 75 mg/dL Final    Comment: The National Cholesterol Education Program (NCEP) has set the  following guidelines (reference values) for HDL Cholesterol:  Low...............<40 mg/dL  Optimal...........>60 mg/dL      LDL Cholesterol 07/12/2024 92.2  63.0 - 159.0 mg/dL Final    Comment: The National Cholesterol Education Program (NCEP) has set the  following guidelines (reference values) for LDL  Cholesterol:  Optimal.......................<130 mg/dL  Borderline High...............130-159 mg/dL  High..........................160-189 mg/dL  Very High.....................>190 mg/dL      HDL/Cholesterol Ratio 07/12/2024 19.6 (L)  20.0 - 50.0 % Final    Total Cholesterol/HDL Ratio 07/12/2024 5.1 (H)  2.0 - 5.0 Final    Non-HDL Cholesterol 07/12/2024 135  mg/dL Final    Comment: Risk category and Non-HDL cholesterol goals:  Coronary heart disease (CHD)or equivalent (10-year risk of CHD >20%):  Non-HDL cholesterol goal     <130 mg/dL  Two or more CHD risk factors and 10-year risk of CHD <= 20%:  Non-HDL cholesterol goal     <160 mg/dL  0 to 1 CHD risk factor:  Non-HDL cholesterol goal     <190 mg/dL      aPTT 07/12/2024 29.5  21.0 - 32.0 sec Final    Comment: Refer to local heparin nomogram for intensity/dose specific   therapeutic   range.      Troponin I 07/12/2024 0.033 (H)  0.000 - 0.026 ng/mL Final    Comment: The reference interval for Troponin I represents the 99th percentile   cutoff   for our facility and is consistent with 3rd generation assay   performance.      BNP 07/12/2024 45  0 - 99 pg/mL Final    Values of less than 100 pg/ml are consistent with non-CHF populations.    POC Glucose 07/12/2024 158 (H)  70 - 110 mg/dL Final    POC BUN 07/12/2024 32 (H)  6 - 30 mg/dL Final    POC Creatinine 07/12/2024 2.9 (H)  0.5 - 1.4 mg/dL Final    POC Sodium 07/12/2024 138  136 - 145 mmol/L Final    POC Potassium 07/12/2024 3.3 (L)  3.5 - 5.1 mmol/L Final    POC Chloride 07/12/2024 101  95 - 110 mmol/L Final    POC TCO2 (MEASURED) 07/12/2024 26  23 - 29 mmol/L Final    POC Anion Gap 07/12/2024 15  8 - 16 mmol/L Final    POC Ionized Calcium 07/12/2024 1.11  1.06 - 1.42 mmol/L Final    POC Hematocrit 07/12/2024 41  36 - 54 %PCV Final    Sample 07/12/2024 VENOUS   Final    Site 07/12/2024 Other   Final    Allens Test 07/12/2024 N/A   Final    DelSys 07/12/2024 Room Air   Final    POC PH 07/12/2024 7.401  7.35 - 7.45  Final    POC PCO2 07/12/2024 45.9 (H)  35 - 45 mmHg Final    POC PO2 07/12/2024 48  40 - 60 mmHg Final    POC HCO3 07/12/2024 28.5 (H)  24 - 28 mmol/L Final    POC BE 07/12/2024 3 (H)  -2 to 2 mmol/L Final    POC SATURATED O2 07/12/2024 83  95 - 100 % Final    POC TCO2 07/12/2024 30 (H)  24 - 29 mmol/L Final    Sample 07/12/2024 VENOUS   Final    Site 07/12/2024 Other   Final    Allens Test 07/12/2024 N/A   Final    DelSys 07/12/2024 Room Air   Final    Magnesium 07/12/2024 2.6  1.6 - 2.6 mg/dL Final    Phosphorus 07/12/2024 3.9  2.7 - 4.5 mg/dL Final        Imaging Results              MRA Brain (In process)  Result time 07/12/24 22:57:45                     X-Ray Chest AP Portable (Final result)  Result time 07/12/24 20:38:44      Final result by Cristin Rayo MD (07/12/24 20:38:44)                   Impression:      No acute cardiopulmonary process identified.      Electronically signed by: Cristin Rayo MD  Date:    07/12/2024  Time:    20:38               Narrative:    EXAMINATION:  XR CHEST AP PORTABLE    CLINICAL HISTORY:  Essential (primary) hypertension    TECHNIQUE:  Single frontal view of the chest was performed.    COMPARISON:  None    FINDINGS:  Cardiac silhouette is normal in size.  Lungs are symmetrically expanded.  No evidence of focal consolidative process, pneumothorax, or significant pleural effusion.  No acute osseous abnormality identified.                                       CT Head Without Contrast (Final result)  Result time 07/12/24 20:33:45      Final result by Cristin Rayo MD (07/12/24 20:33:45)                   Impression:      No acute intracranial abnormalities identified.  Generalized cerebral volume loss and chronic microvascular ischemic disease.  If clinical concern for acute infarction, future MRI follow-up may be obtained.    Extra-axial 2.0 x 0.5 cm lesion over the right frontal parietal lobe region.  This is indeterminate on the basis of CT but may represent a  meningioma.  No mass effect.  No prior studies are available for comparison.      Electronically signed by: Cristin Rayo MD  Date:    07/12/2024  Time:    20:33               Narrative:    EXAMINATION:  CT HEAD WITHOUT CONTRAST    CLINICAL HISTORY:  Neuro deficit, acute, stroke suspected;    TECHNIQUE:  Low dose axial images were obtained through the head.  Coronal and sagittal reformations were also performed. Contrast was not administered.    COMPARISON:  None.    FINDINGS:  There is generalized cerebral volume loss and chronic microvascular ischemic disease.  No evidence of acute/recent major vascular distribution cerebral infarction, intraparenchymal hemorrhage, or intra-axial space occupying lesion.  There is a 2.0 x 0.5 cm extra-axial lesion seen over the right frontal parietal lobe region.  The ventricular system is normal in size and configuration with no evidence of hydrocephalus. No effacement of the skull-base cisterns.  Visualized paranasal sinuses and mastoid air cells are clear. The calvarium shows no significant abnormality.                                               ED Course as of 07/12/24 2305 Fri Jul 12, 2024 2113 EKG obtained at 8:43 p.m. patient reports he has no chest pain.  He feels fine.  Sinus rhythm, ventricular rate of 65.  Normal axis.  Abnormal EKG.  QTC prolonged at 513.  Compared to earlier EKG similar.  Rate has increased by 4 beats per minute [RF]      ED Course User Index  [RF] Parris Mckeon DO                           Clinical Impression:  Final diagnoses:  [R29.818] Acute focal neurological deficit  [I10] HTN (hypertension)  [I44.0] 1st degree AV block  [R94.31] Prolonged Q-T interval on ECG (Primary)  [I63.9] CVA (cerebral vascular accident)          ED Disposition Condition    Admit Stable                Parris Mckeon DO  07/12/24 2302

## 2024-07-13 NOTE — HOSPITAL COURSE
84-year-old  male with past medical history of hypertension, type 2 diabetes, BPH/prostate cancer who was admitted for left lower extremity weakness secondary to acute CVA of right parietal lobe.  Neurology was consulted.  Initiate On aspirin/Plavix/statin.  Allowed for permissive hypertension x 72 hours.  Not a candidate for tPA, out of window  MRI revealed small foci of acute infarction within the parasagittal right parietal lobe near the vertex.  MRA brain revealed right frontoparietal meningioma with no mass effect.  No large vessel occlusion.  Moderate stenosis at right ICA.  Carotid ultrasound with no evidence of stenosis not consistent with MRA. Echo with bubble study negative TSH WNL.  Troponin elevated likely type 2 demand mismatch. 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  Incidentally found left carotid bulb intraluminal projection likely concern for ulcerated plaque versus thrombus.  Vascular/neurology recommended patient to be discharged on aspirin/Eliquis.  Eliquis to continue for at least 3 months and until repeat CTA head and neck outpatient to look for interval changes of the left carotid.  No plans for surgical intervention per vascular.  Patient's creatinine remained stable at baseline post CTA.  Patient is discharged home to follow up with Neurology/vascular/PCP outpatient.  Outpatient PT/OT set up.  Patient is also recommended to follow up with Cardiology outpatient for Holter monitoring.

## 2024-07-13 NOTE — H&P
[facility] Medicine  History & Physical    Patient Name: Luther Irwin Jr.  MRN: [id number]  Patient Class: IP- Inpatient  Admission Date: 7/12/2024  Attending Physician: Dr. Guerda Vargas   Primary Care Provider: Aramis Ng MD         Patient information was obtained from patient, past medical records, and ER records.     Subjective:     Principal Problem:CVA (cerebral vascular accident)    Chief Complaint:   Chief Complaint   Patient presents with    Extremity Weakness     Pt reports LEFT leg (from hip on down) weakness starting on Tuesday (7/9/24); pt denies any falls, known injury, pain, or hx of this happening before; denies any other deficits; pt reports weakness slightly improved after using suppository for hemorrhoid flair up but still states his gait is not back to normal        HPI: 84-year-old  male with a past medical history significant for primary essential hypertension, non-insulin-dependent type 2 diabetes, BPH/prostate cancer presents to the ER with persistent left lower extremity weakness and uncoordination.  States that symptoms started on Tuesday.  Denies any falls or trauma to the hip.  Denies any pain.  States that he can not describe it but feels like his leg just isn't cooperating with what he wants to do.  No history of strokes in the past.  Denies any headaches, vision changes, speech problems, swallowing issues, sensory deficits.  States that he feels like he is going to fall when he walks.  Tends to lean towards his weaker side which is his left currently.    Denies missing any of his blood pressure medications or acute changes in his medication regimen.  Denies any changes in his diet.    Workup in the ER was noted for a CT head that showed a right frontal parietal lesion concerning for possible meningioma.  MRI of the brain and MRA of head and neck were ordered and noted:   1. Small foci of acute infarction within the parasagittal  right parietal lobe near the vertex.   2. Generalized cerebral volume loss and moderate chronic microvascular ischemic disease.   3. MRA brain without high-grade focal stenosis or large vessel occlusion.   4. Small extra-axial lesion over the lateral aspect of the right frontal parietal region.  This may represent a small meningioma.  No mass effect.     1. Suspected moderate stenosis at the right ICA origin.  Consider further evaluation with CTA and/or carotid ultrasound.   2. MRA neck with no evidence of additional high-grade stenosis or occlusion.       Started on aspirin 325 mg and Plavix 300 mg p.o. x1 by the ER,  given the timeframe of onset of his deficits he was not stroke activated given that he was outside the window.  There was no large vessel occlusion noted on the MRA.     Because this patient has a new stroke with deficits and requires PT/OT/ST, Neurology eval and possible rehab  , care in an alternative location isn't clinically appropriate for the reasons stated above.     We have been consulted for further management of his stroke as well as inpatient admission to the hospitalist telemetry service.         Past Medical History:   Diagnosis Date    Cancer     CVA (cerebral vascular accident) 7/12/2024    Diabetes mellitus, type 2     Diabetes with neurologic complications     Disorder of kidney and ureter     Hypertension     Late complications of amputation stump     Prostate cancer     Pseudophakia 01/05/2024       Past Surgical History:   Procedure Laterality Date    FINGER SURGERY  3/2014    HERNIA REPAIR  07/1967    KNEE SURGERY      SPINE SURGERY  10/2007    VASECTOMY  1986       Review of patient's allergies indicates:   Allergen Reactions    Grass pollen-june grass standard Itching     Eye irritation       No current facility-administered medications on file prior to encounter.     Current Outpatient Medications on File Prior to Encounter   Medication Sig    amlodipine-valsartan (EXFORGE)   mg per tablet Take 1 tablet by mouth once daily.    aspirin 81 MG Chew Take 81 mg by mouth once daily.    cloNIDine (CATAPRES) 0.1 MG tablet Take 1 tablet (0.1 mg total) by mouth 3 (three) times daily.    empagliflozin (JARDIANCE) 10 mg tablet Take 1 tablet (10 mg total) by mouth once daily.    glipiZIDE (GLUCOTROL) 10 MG TR24 Take 1 tablet (10 mg total) by mouth 2 (two) times daily.    hydroCHLOROthiazide (HYDRODIURIL) 25 MG tablet Take 1 tablet (25 mg total) by mouth once daily.    tadalafiL (CIALIS) 20 MG Tab Take 1 tablet (20 mg total) by mouth once daily.    tamsulosin (FLOMAX) 0.4 mg Cap Take 1 capsule (0.4 mg total) by mouth once daily.     Family History       Problem Relation (Age of Onset)    Hyperlipidemia Mother, Father    Hypertension Mother, Father          Tobacco Use    Smoking status: Former     Current packs/day: 0.00     Average packs/day: 0.3 packs/day for 0.9 years (0.2 ttl pk-yrs)     Types: Cigarettes     Start date: 1960     Quit date: 1960     Years since quittin.6    Smokeless tobacco: Never   Substance and Sexual Activity    Alcohol use: Yes     Comment: special occacion    Drug use: No    Sexual activity: Not on file     Review of Systems   Constitutional:  Positive for activity change. Negative for chills, fatigue and fever.   HENT:  Negative for congestion and sore throat.    Eyes:  Negative for visual disturbance.   Respiratory:  Negative for cough, chest tightness, shortness of breath, wheezing and stridor.    Cardiovascular:  Negative for chest pain, palpitations and leg swelling.   Gastrointestinal:  Negative for abdominal pain, constipation, diarrhea, nausea and vomiting.   Genitourinary:  Negative for dysuria and hematuria.   Musculoskeletal:  Positive for gait problem (Due to his left lower extremity weakness/and uncoordination.). Negative for arthralgias, myalgias and neck stiffness.   Neurological:  Positive for weakness (left lower extremity). Negative for  dizziness, seizures, syncope, light-headedness, numbness and headaches.   Psychiatric/Behavioral:  Negative for confusion. The patient is not nervous/anxious.      Objective:     Vital Signs (Most Recent):  Temp: 97.7 °F (36.5 °C) (07/13/24 0353)  Pulse: 60 (Simultaneous filing. User may not have seen previous data.) (07/13/24 0353)  Resp: 20 (07/13/24 0353)  BP: (!) 155/74 (07/13/24 0353)  SpO2: 96 % (07/13/24 0033) Vital Signs (24h Range):  Temp:  [97.6 °F (36.4 °C)-98.6 °F (37 °C)] 97.7 °F (36.5 °C)  Pulse:  [50-72] 60  Resp:  [15-20] 20  SpO2:  [95 %-99 %] 96 %  BP: (145-226)/(67-98) 155/74     Weight: 114.8 kg (253 lb 1.4 oz)  Body mass index is 28.51 kg/m².     Physical Exam  Vitals and nursing note reviewed.   Constitutional:       General: He is not in acute distress.     Appearance: Normal appearance. He is normal weight. He is not ill-appearing, toxic-appearing or diaphoretic.   HENT:      Head: Normocephalic and atraumatic.      Nose: Nose normal. No congestion or rhinorrhea.      Mouth/Throat:      Mouth: Mucous membranes are moist.      Pharynx: Oropharynx is clear. No oropharyngeal exudate or posterior oropharyngeal erythema.   Eyes:      General: No scleral icterus.     Extraocular Movements: Extraocular movements intact.      Conjunctiva/sclera: Conjunctivae normal.      Pupils: Pupils are equal, round, and reactive to light.   Neck:      Vascular: No carotid bruit.   Cardiovascular:      Rate and Rhythm: Normal rate and regular rhythm.      Pulses: Normal pulses.      Heart sounds: Normal heart sounds. No murmur heard.     No friction rub. No gallop.   Pulmonary:      Effort: Pulmonary effort is normal. No respiratory distress.      Breath sounds: Normal breath sounds. No wheezing, rhonchi or rales.   Abdominal:      General: Bowel sounds are normal. There is no distension.      Palpations: Abdomen is soft.      Tenderness: There is no abdominal tenderness. There is no guarding or rebound.    Musculoskeletal:         General: No swelling. Normal range of motion.      Cervical back: Normal range of motion and neck supple.      Right lower leg: No edema.      Left lower leg: No edema.   Lymphadenopathy:      Cervical: No cervical adenopathy.   Skin:     General: Skin is warm.      Capillary Refill: Capillary refill takes less than 2 seconds.      Coloration: Skin is not pale.   Neurological:      General: No focal deficit present.      Mental Status: He is alert and oriented to person, place, and time. Mental status is at baseline.      Cranial Nerves: No cranial nerve deficit.      Motor: No weakness.      Coordination: Coordination normal (in bed his appears coordinated in all extremities. Walking the patient states he feels off balance on the left leg and it gets or feels stuck.).   Psychiatric:         Mood and Affect: Mood normal.         Behavior: Behavior normal.              CRANIAL NERVES     CN III, IV, VI   Pupils are equal, round, and reactive to light.       Recent Results (from the past 24 hour(s))   POCT glucose    Collection Time: 07/12/24  7:55 PM   Result Value Ref Range    POCT Glucose 160 (H) 70 - 110 mg/dL   CBC W/ AUTO DIFFERENTIAL    Collection Time: 07/12/24  8:14 PM   Result Value Ref Range    WBC 10.11 3.90 - 12.70 K/uL    RBC 4.88 4.60 - 6.20 M/uL    Hemoglobin 13.3 (L) 14.0 - 18.0 g/dL    Hematocrit 41.1 40.0 - 54.0 %    MCV 84 82 - 98 fL    MCH 27.3 27.0 - 31.0 pg    MCHC 32.4 32.0 - 36.0 g/dL    RDW 12.3 11.5 - 14.5 %    Platelets 257 150 - 450 K/uL    MPV 12.4 9.2 - 12.9 fL    Immature Granulocytes 0.3 0.0 - 0.5 %    Gran # (ANC) 6.2 1.8 - 7.7 K/uL    Immature Grans (Abs) 0.03 0.00 - 0.04 K/uL    Lymph # 2.6 1.0 - 4.8 K/uL    Mono # 0.8 0.3 - 1.0 K/uL    Eos # 0.4 0.0 - 0.5 K/uL    Baso # 0.10 0.00 - 0.20 K/uL    nRBC 0 0 /100 WBC    Gran % 61.4 38.0 - 73.0 %    Lymph % 26.0 18.0 - 48.0 %    Mono % 7.7 4.0 - 15.0 %    Eosinophil % 3.6 0.0 - 8.0 %    Basophil % 1.0 0.0 - 1.9  %    Differential Method Automated    Comprehensive metabolic panel    Collection Time: 07/12/24  8:14 PM   Result Value Ref Range    Sodium 138 136 - 145 mmol/L    Potassium 3.4 (L) 3.5 - 5.1 mmol/L    Chloride 101 95 - 110 mmol/L    CO2 24 23 - 29 mmol/L    Glucose 162 (H) 70 - 110 mg/dL    BUN 36 (H) 8 - 23 mg/dL    Creatinine 2.9 (H) 0.5 - 1.4 mg/dL    Calcium 9.3 8.7 - 10.5 mg/dL    Total Protein 7.9 6.0 - 8.4 g/dL    Albumin 3.7 3.5 - 5.2 g/dL    Total Bilirubin 0.2 0.1 - 1.0 mg/dL    Alkaline Phosphatase 164 (H) 55 - 135 U/L    AST 18 10 - 40 U/L    ALT 17 10 - 44 U/L    eGFR 21 (A) >60 mL/min/1.73 m^2    Anion Gap 13 8 - 16 mmol/L   Protime-INR    Collection Time: 07/12/24  8:14 PM   Result Value Ref Range    Prothrombin Time 10.6 9.0 - 12.5 sec    INR 1.0 0.8 - 1.2   TSH    Collection Time: 07/12/24  8:14 PM   Result Value Ref Range    TSH 1.778 0.400 - 4.000 uIU/mL   LDL - Lipid Panel    Collection Time: 07/12/24  8:14 PM   Result Value Ref Range    Cholesterol 168 120 - 199 mg/dL    Triglycerides 214 (H) 30 - 150 mg/dL    HDL 33 (L) 40 - 75 mg/dL    LDL Cholesterol 92.2 63.0 - 159.0 mg/dL    HDL/Cholesterol Ratio 19.6 (L) 20.0 - 50.0 %    Total Cholesterol/HDL Ratio 5.1 (H) 2.0 - 5.0    Non-HDL Cholesterol 135 mg/dL   APTT    Collection Time: 07/12/24  8:14 PM   Result Value Ref Range    aPTT 29.5 21.0 - 32.0 sec   Troponin I    Collection Time: 07/12/24  8:14 PM   Result Value Ref Range    Troponin I 0.033 (H) 0.000 - 0.026 ng/mL   Magnesium    Collection Time: 07/12/24  8:14 PM   Result Value Ref Range    Magnesium 2.6 1.6 - 2.6 mg/dL   Phosphorus    Collection Time: 07/12/24  8:14 PM   Result Value Ref Range    Phosphorus 3.9 2.7 - 4.5 mg/dL   B-Type natriuretic peptide    Collection Time: 07/12/24  8:15 PM   Result Value Ref Range    BNP 45 0 - 99 pg/mL   ISTAT PROCEDURE    Collection Time: 07/12/24  8:24 PM   Result Value Ref Range    POC Glucose 158 (H) 70 - 110 mg/dL    POC BUN 32 (H) 6 - 30 mg/dL     POC Creatinine 2.9 (H) 0.5 - 1.4 mg/dL    POC Sodium 138 136 - 145 mmol/L    POC Potassium 3.3 (L) 3.5 - 5.1 mmol/L    POC Chloride 101 95 - 110 mmol/L    POC TCO2 (MEASURED) 26 23 - 29 mmol/L    POC Anion Gap 15 8 - 16 mmol/L    POC Ionized Calcium 1.11 1.06 - 1.42 mmol/L    POC Hematocrit 41 36 - 54 %PCV    Sample VENOUS     Site Other     Allens Test N/A     [facility] Room Air    ISTAT PROCEDURE    Collection Time: 07/12/24  8:25 PM   Result Value Ref Range    POC PH 7.401 7.35 - 7.45    POC PCO2 45.9 (H) 35 - 45 mmHg    POC PO2 48 40 - 60 mmHg    POC HCO3 28.5 (H) 24 - 28 mmol/L    POC BE 3 (H) -2 to 2 mmol/L    POC SATURATED O2 83 95 - 100 %    POC TCO2 30 (H) 24 - 29 mmol/L    Sample VENOUS     Site Other     Allens Test N/A     [employer] Room Air    Troponin I    Collection Time: 07/13/24 12:11 AM   Result Value Ref Range    Troponin I 0.045 (H) 0.000 - 0.026 ng/mL   Troponin I    Collection Time: 07/13/24 12:11 AM   Result Value Ref Range    Troponin I 0.045 (H) 0.000 - 0.026 ng/mL       Microbiology Results (last 7 days)       ** No results found for the last 168 hours. **            Imaging Results              MRA Neck without contrast (Final result)  Result time 07/13/24 00:56:21   Procedure changed from MRA Neck     Final result by Cristin Rayo MD (07/13/24 00:56:21)                   Impression:      Motion limited examination.    1. Suspected moderate stenosis at the right ICA origin.  Consider further evaluation with CTA and/or carotid ultrasound.  2. MRA neck with no evidence of additional high-grade stenosis or occlusion.      Electronically signed by: Cristin Rayo MD  Date:    07/13/2024  Time:    00:56               Narrative:    EXAMINATION:  MRA NECK WITHOUT CONTRAST    CLINICAL HISTORY:  Stroke/TIA, determine embolic source;    TECHNIQUE:  Non-contrast 2D and/or 3D time-of-flight MR angiography of the neck was performed, with MIP reformatting.    COMPARISON:  Concurrent MRI/MRA  brain.    FINDINGS:  Motion limited examination.    Bilateral common carotid and internal carotid arteries appear grossly patent.  There is suspected focal narrowing with at least moderate stenosis at the right ICA origin, noting evaluation is limited on MRA with motion artifact.  No additional common carotid or internal carotid high-grade stenosis or occlusion seen.    Vertebral arteries appear grossly patent throughout their course, however noting that bilateral V3 segments are not well visualized.  This appears symmetric bilaterally.  Otherwise no evidence of vertebral artery high-grade stenosis or occlusion.                                        MRI Brain Without Contrast (Final result)  Result time 07/13/24 00:43:20      Final result by Cristin Rayo MD (07/13/24 00:43:20)                   Impression:      1. Small foci of acute infarction within the parasagittal right parietal lobe near the vertex.  2. Generalized cerebral volume loss and moderate chronic microvascular ischemic disease.  3. MRA brain without high-grade focal stenosis or large vessel occlusion.  4. Small extra-axial lesion over the lateral aspect of the right frontal parietal region.  This may represent a small meningioma.  No mass effect.  This report was flagged in Epic as abnormal.      Electronically signed by: Cristin Rayo MD  Date:    07/13/2024  Time:    00:43               Narrative:    EXAMINATION:  MRI BRAIN WITHOUT CONTRAST; MRA BRAIN WITHOUT CONTRAST    CLINICAL HISTORY:  Stroke, follow up;; Stroke/TIA, determine embolic source;    TECHNIQUE:  Multiplanar multisequence MR imaging of the brain was performed without contrast.  By separate acquisition, noncontrast MR angiography was performed of the intracranial vasculature with 3D time-of-flight technique through the [place] [facility] [place], with MIP reformatting.    COMPARISON:  CT head from the same date.    FINDINGS:  There is generalized cerebral volume loss and moderate chronic  microvascular ischemic disease.  Small foci of acute infarction are seen within the right parasagittal parietal lobe at the vertex.  Ventricles are normal in size and configuration.  No intracranial hemorrhage or extraaxial fluid collection.  There is redemonstration of extra-axial lesion measuring approximately 2 x 1 cm over the lateral aspect of the right frontal parietal region.    Visualized paranasal sinuses and mastoid air cells are clear. Orbits show no acute abnormalities.    The bilateral distal cervical, petrous, cavernous and supraclinoid segments of the ICA's and the visualized bilateral anterior and middle cerebral arteries are patent without evidence for high-grade focal stenosis or large vessel occlusion.  The distal vertebral arteries, basilar artery and posterior cerebral arteries are patent without evidence for high-grade focal stenosis or large vessel occlusion.                                        MRA Brain (Final result)  Result time 07/13/24 00:43:20      Final result by Cristin Rayo MD (07/13/24 00:43:20)                   Impression:      1. Small foci of acute infarction within the parasagittal right parietal lobe near the vertex.  2. Generalized cerebral volume loss and moderate chronic microvascular ischemic disease.  3. MRA brain without high-grade focal stenosis or large vessel occlusion.  4. Small extra-axial lesion over the lateral aspect of the right frontal parietal region.  This may represent a small meningioma.  No mass effect.  This report was flagged in Epic as abnormal.      Electronically signed by: Cristin Rayo MD  Date:    07/13/2024  Time:    00:43               Narrative:    EXAMINATION:  MRI BRAIN WITHOUT CONTRAST; MRA BRAIN WITHOUT CONTRAST    CLINICAL HISTORY:  Stroke, follow up;; Stroke/TIA, determine embolic source;    TECHNIQUE:  Multiplanar multisequence MR imaging of the brain was performed without contrast.  By separate acquisition, noncontrast MR  angiography was performed of the intracranial vasculature with 3D time-of-flight technique through the [place] [facility] [place], with MIP reformatting.    COMPARISON:  CT head from the same date.    FINDINGS:  There is generalized cerebral volume loss and moderate chronic microvascular ischemic disease.  Small foci of acute infarction are seen within the right parasagittal parietal lobe at the vertex.  Ventricles are normal in size and configuration.  No intracranial hemorrhage or extraaxial fluid collection.  There is redemonstration of extra-axial lesion measuring approximately 2 x 1 cm over the lateral aspect of the right frontal parietal region.    Visualized paranasal sinuses and mastoid air cells are clear. Orbits show no acute abnormalities.    The bilateral distal cervical, petrous, cavernous and supraclinoid segments of the ICA's and the visualized bilateral anterior and middle cerebral arteries are patent without evidence for high-grade focal stenosis or large vessel occlusion.  The distal vertebral arteries, basilar artery and posterior cerebral arteries are patent without evidence for high-grade focal stenosis or large vessel occlusion.                                       X-Ray Chest AP Portable (Final result)  Result time 07/12/24 20:38:44      Final result by Cristin Rayo MD (07/12/24 20:38:44)                   Impression:      No acute cardiopulmonary process identified.      Electronically signed by: Cristin Rayo MD  Date:    07/12/2024  Time:    20:38               Narrative:    EXAMINATION:  XR CHEST AP PORTABLE    CLINICAL HISTORY:  Essential (primary) hypertension    TECHNIQUE:  Single frontal view of the chest was performed.    COMPARISON:  None    FINDINGS:  Cardiac silhouette is normal in size.  Lungs are symmetrically expanded.  No evidence of focal consolidative process, pneumothorax, or significant pleural effusion.  No acute osseous abnormality identified.                                        CT Head Without Contrast (Final result)  Result time 07/12/24 20:33:45      Final result by Cristin Rayo MD (07/12/24 20:33:45)                   Impression:      No acute intracranial abnormalities identified.  Generalized cerebral volume loss and chronic microvascular ischemic disease.  If clinical concern for acute infarction, future MRI follow-up may be obtained.    Extra-axial 2.0 x 0.5 cm lesion over the right frontal parietal lobe region.  This is indeterminate on the basis of CT but may represent a meningioma.  No mass effect.  No prior studies are available for comparison.      Electronically signed by: Cristin Rayo MD  Date:    07/12/2024  Time:    20:33               Narrative:    EXAMINATION:  CT HEAD WITHOUT CONTRAST    CLINICAL HISTORY:  Neuro deficit, acute, stroke suspected;    TECHNIQUE:  Low dose axial images were obtained through the head.  Coronal and sagittal reformations were also performed. Contrast was not administered.    COMPARISON:  None.    FINDINGS:  There is generalized cerebral volume loss and chronic microvascular ischemic disease.  No evidence of acute/recent major vascular distribution cerebral infarction, intraparenchymal hemorrhage, or intra-axial space occupying lesion.  There is a 2.0 x 0.5 cm extra-axial lesion seen over the right frontal parietal lobe region.  The ventricular system is normal in size and configuration with no evidence of hydrocephalus. No effacement of the skull-base cisterns.  Visualized paranasal sinuses and mastoid air cells are clear. The calvarium shows no significant abnormality.                                        Assessment/Plan:     * CVA (cerebral vascular accident)    Antithrombotics for secondary stroke prevention: Antiplatelets: Aspirin: 81 mg daily  Aspirin: 325 mg loading dosex1, now  Clopidogrel: 300 mg loading dose x 1, now  Clopidogrel: 75 mg daily    Statins for secondary stroke prevention and hyperlipidemia, if present:    Statins: Atorvastatin- 40 mg daily    Aggressive risk factor modification: HTN, DM, HLD     Rehab efforts: The patient has been evaluated by a stroke team provider and the therapy needs have been fully considered based off the presenting complaints and exam findings. The following therapy evaluations are needed: PT evaluate and treat, OT evaluate and treat, SLP evaluate and treat    Diagnostics ordered/pending: HgbA1C to assess blood glucose levels, MRA head to assess vasculature, MRA neck/arch to assess vasculature, MRI head without contrast to assess brain parenchyma, TTE to assess cardiac function/status     VTE prophylaxis:  fall risk and getting loaded with ASA and Plavix.     BP parameters: Infarct:  But appears to have had the initial insult greater than 24 hours ago.  For thus resume his blood pressure medicines and goals of slept blood pressure less than 160.        Type 2 diabetes mellitus with stage 3b chronic kidney disease, without long-term current use of insulin  F/u on A1c. Hold his glipizide 10mg BID and Jardiance 10mg daily.  SSI started and diet adjusted.     Essential hypertension  Chronic, uncontrolled. Latest blood pressure and vitals reviewed-     Temp:  [97.6 °F (36.4 °C)-98.6 °F (37 °C)]   Pulse:  [50-72]   Resp:  [15-20]   BP: (145-226)/(67-98)   SpO2:  [95 %-99 %] .   Home meds for hypertension were reviewed and noted below.   Hypertension Medications               amlodipine-valsartan (EXFORGE)  mg per tablet Take 1 tablet by mouth once daily.    cloNIDine (CATAPRES) 0.1 MG tablet Take 1 tablet (0.1 mg total) by mouth 3 (three) times daily.    hydroCHLOROthiazide (HYDRODIURIL) 25 MG tablet Take 1 tablet (25 mg total) by mouth once daily.            While in the hospital, will manage blood pressure as follows; Adjust home antihypertensive regimen as follows- noted to be bradycardic on telemetry with EKG showing AV block first-degree.  We will follow up on echo but we will also hold  his clonidine 0.1 mg p.o. b.i.d. he says he takes.  We will transition him to valsartan 40 mg at night, his Exforge : 10/160mg in the am, cont. His [id number] daily.  Will need to avoid betablockers for him as well given bradycardia and heart block.     Will utilize p.r.n. blood pressure medication only if patient's blood pressure greater than 180/110 and he develops symptoms such as worsening chest pain or shortness of breath.    Stage 3b chronic kidney disease  Reviewed his past labs and appears to be close to his baseline at this time.  We will continue to monitor while he is here.  We will also closely monitor since increasing his valsartan to 160 in the morning and 40 in the evening.    Elevated troponin  Mildly elevated and we will continue to trend his troponin while he is here.  Monitor on telemetry.  Patient with no complaints of chest pain or pressure.  Denies any syncope.  Did note on the monitor to have episodes of bradycardia and AV block on EKG.  Also noted to have a bundle-branch block.  We will follow up on echo.  Possible cardiology evaluation pending follow up troponin levels and echo results.  Currently getting aspirin and Plavix.      VTE Risk Mitigation (From admission, onward)           Ordered     Reason for No Pharmacological VTE Prophylaxis  Once        Question:  Reasons:  Answer:  Physician Provided (leave comment)  Comment:  Patient got loaded with aspirin and Plavix.  Also is a fall risk.    07/12/24 2207     IP VTE HIGH RISK PATIENT  Once         07/12/24 2207     Place sequential compression device  Until discontinued         07/12/24 2207                      CODE STATUS: FULL CODE          Guerda Vargas MD  Department of Hospital Medicine  [facility] [place]

## 2024-07-13 NOTE — NURSING
Nurses Note -- 4 Eyes      7/13/2024   2:00 AM      Skin assessed during: Admit      [] No Pressure Injuries Present    [x]Prevention Measures Documented      [] Yes- Altered Skin Integrity Present or Discovered   [] LDA Added if Not in Epic (Describe Wound)   [] New Altered Skin Integrity was Present on Admit and Documented in LDA   [] Wound Image Taken      Attending Nurse:  Mariana Quinteros RN/Staff Member:  Vandana

## 2024-07-13 NOTE — PT/OT/SLP EVAL
Occupational Therapy   Evaluation and Discharge Note    Name: Luther Irwin Jr.  MRN: [id number]  Admitting Diagnosis: CVA (cerebral vascular accident)  Recent Surgery: * No surgery found *      Recommendations:     Discharge Recommendations: Low Intensity Therapy (Out Patient PT recommended.)  Discharge Equipment Recommendations: none  Barriers to discharge:   (Condition trend)    Assessment:     Luther Irwin Jr. is a 84 y.o. male with a medical diagnosis of CVA (cerebral vascular accident). At this time, patient is functioning volitionally and well/ no nursing safety concerns reported while attending to own self management in room. Per collaborative POC discussion this date: Patient has (+) post d/c support of friends, is willing to engage at outpatient level for PT focused LB /general balance interventions for recovery support at this time. No further acute OT services to be rendered. Please re consult if Patient status level or input changes emerge.      Plan:     During this hospitalization, patient does not require further acute OT services.  Please re-consult if situation changes.    Plan of Care Reviewed with: patient    Subjective     Chief Complaint: LLE weakness  Patient/Family Comments/goals: Return to own home at [facility].     Occupational Profile:  Living Environment: SSH, no steps, alone. (I) ADLs, home management and all errands and appointments. (+) driving, recent enjoyable travel travel to [place], engaged in out door activities w/ friend, at time of symptom emergence. Supportive local friends.   Equipment Used at home: none  Assistance upon Discharge: Self, local supportive friends. Offspring non local but in routine contact.     Pain/Comfort:  Pain Rating 1: 0/10    Patients cultural, spiritual, Rastafarian conflicts given the current situation: no    Objective:     Communicated with: RN prior to session.  Patient found up in chair with telemetry upon OT entry to room.    General Precautions: Standard,   (universal, LLE weakness recent onset.)  Orthopedic Precautions: N/A  Braces: N/A  Respiratory Status: Room air     Occupational Performance:    Bed Mobility:  NT 2* Patient encountered as above, and stating wish to remain OOB at time of eval completion.       Functional Mobility/Transfers:  Patient completed Sit <> Stand Transfer with modified independence <> supervision  with  no assistive device   Patient completed Bed <> Chair amb demo using step transfer technique with modified independence <> supervision with no assistive device  Functional Mobility: Good task initiation and general task tolerance. Patient reports hie is attending volitionally in own self management needs this am. Nursing staff endorsing. Patient advised to call for assist 2* unfamiliar nature of current care setting . Patient states understanding and intent to comply.     Activities of Daily Living:  Feeding:  independence    Grooming: modified independence seated  Upper Body Dressing: modified independence gown back ties and medical lines (became disconnected during sit to stand) assist required.   Lower Body Dressing: modified independence seated foot coverings  Toileting: NT, needs untimely, Patient reporting attending to self w/o concerns, nursing endorsing.           Cognitive/Visual Perceptual:Intact, A+OX4, able to follow complex direction, able to make complex needs known, congruent information integration at time of eval. Patient cooperative / active in own POC.  Behavior: Pleasant, conversant, cheerful.   Upper Body Physical Exam:  RESPIRATORY: non-labored / normal effort / rate. (-) accessory MM engagement.    UB SKIN: Observable UB skin warm and dry.   Sensation: Norm in UB extremities light touch/localization.  Posture: Norm, however Patient reporting slight change in standing LLE, however unable to clarify. Remained focused, congruently interactive throughout all mobility trials.    BUE AROM WNL  BUE MMT WNL  UB GM/FM  coordination WNL  Good (-) dynamic standing bedside  (-) UB edema      Treatment & Education:  Discussed role of OT,eval and collaborative POC / Discharge discussion completed. Patient states understanding and agreement with all herein.   Interventions:   UE ROM/MMT assessment  Bed mobility training / assessment  Functional mobility assessment  Sit/standing balance assessment  Educated on importance of sitting OOB in bedside chair to promote increased strength, endurance & proper breathing. Gen in room safety.   Intro UB seated  AROM constructs BUE all joints, all planes seated (no c/o pain) 1x10, x3 daily recommended for (I) ex as suggested to counter (-) effects of limited mobility inherent in acute setting.         AMPA 6 Click ADL:  AMPAC Total Score: 24      Patient left up in chair with all lines intact, call button in reach, and RN notified.    GOALS:   Multidisciplinary Problems       Occupational Therapy Goals       Not on file                    History:     Past Medical History:   Diagnosis Date    Cancer     CVA (cerebral vascular accident) 7/12/2024    Diabetes mellitus, type 2     Diabetes with neurologic complications     Disorder of kidney and ureter     Hypertension     Late complications of amputation stump     Prostate cancer     Pseudophakia 01/05/2024         Past Surgical History:   Procedure Laterality Date    FINGER SURGERY  3/2014    HERNIA REPAIR  07/1967    KNEE SURGERY      SPINE SURGERY  10/2007    VASECTOMY  1986       Time Tracking:     OT Date of Treatment: 07/13/24  OT Start Time: 1134  OT Stop Time: 1159  OT Total Time (min): 25 min    Billable Minutes:Evaluation 15  Therapeutic Activity 10    7/13/2024

## 2024-07-13 NOTE — ASSESSMENT & PLAN NOTE
Likely demand mismatch.  Troponin trend peaked at 0.044.  Follow up on echo denied chest pain/shortness of breath.

## 2024-07-14 PROBLEM — I63.521 ACUTE ISCHEMIC RIGHT ACA STROKE: Status: ACTIVE | Noted: 2024-07-12

## 2024-07-14 PROBLEM — D32.9 MENINGIOMA: Status: ACTIVE | Noted: 2024-07-14

## 2024-07-14 LAB
ALBUMIN SERPL BCP-MCNC: 3.5 G/DL (ref 3.5–5.2)
ALP SERPL-CCNC: 127 U/L (ref 55–135)
ALT SERPL W/O P-5'-P-CCNC: 13 U/L (ref 10–44)
ANION GAP SERPL CALC-SCNC: 10 MMOL/L (ref 8–16)
AST SERPL-CCNC: 16 U/L (ref 10–40)
BASOPHILS # BLD AUTO: 0.11 K/UL (ref 0–0.2)
BASOPHILS NFR BLD: 1.2 % (ref 0–1.9)
BILIRUB SERPL-MCNC: 0.4 MG/DL (ref 0.1–1)
BUN SERPL-MCNC: 38 MG/DL (ref 8–23)
CALCIUM SERPL-MCNC: 8.9 MG/DL (ref 8.7–10.5)
CHLORIDE SERPL-SCNC: 101 MMOL/L (ref 95–110)
CO2 SERPL-SCNC: 28 MMOL/L (ref 23–29)
CREAT SERPL-MCNC: 2.7 MG/DL (ref 0.5–1.4)
DIFFERENTIAL METHOD BLD: ABNORMAL
EOSINOPHIL # BLD AUTO: 0.4 K/UL (ref 0–0.5)
EOSINOPHIL NFR BLD: 3.8 % (ref 0–8)
ERYTHROCYTE [DISTWIDTH] IN BLOOD BY AUTOMATED COUNT: 12.2 % (ref 11.5–14.5)
EST. GFR  (NO RACE VARIABLE): 23 ML/MIN/1.73 M^2
GLUCOSE SERPL-MCNC: 150 MG/DL (ref 70–110)
HCT VFR BLD AUTO: 39.9 % (ref 40–54)
HGB BLD-MCNC: 12.8 G/DL (ref 14–18)
IMM GRANULOCYTES # BLD AUTO: 0.03 K/UL (ref 0–0.04)
IMM GRANULOCYTES NFR BLD AUTO: 0.3 % (ref 0–0.5)
LYMPHOCYTES # BLD AUTO: 2.2 K/UL (ref 1–4.8)
LYMPHOCYTES NFR BLD: 23.6 % (ref 18–48)
MCH RBC QN AUTO: 27.2 PG (ref 27–31)
MCHC RBC AUTO-ENTMCNC: 32.1 G/DL (ref 32–36)
MCV RBC AUTO: 85 FL (ref 82–98)
MONOCYTES # BLD AUTO: 0.7 K/UL (ref 0.3–1)
MONOCYTES NFR BLD: 7.2 % (ref 4–15)
NEUTROPHILS # BLD AUTO: 5.8 K/UL (ref 1.8–7.7)
NEUTROPHILS NFR BLD: 63.9 % (ref 38–73)
NRBC BLD-RTO: 0 /100 WBC
PLATELET # BLD AUTO: 246 K/UL (ref 150–450)
PMV BLD AUTO: 12.7 FL (ref 9.2–12.9)
POCT GLUCOSE: 141 MG/DL (ref 70–110)
POCT GLUCOSE: 152 MG/DL (ref 70–110)
POCT GLUCOSE: 171 MG/DL (ref 70–110)
POCT GLUCOSE: 190 MG/DL (ref 70–110)
POCT GLUCOSE: 220 MG/DL (ref 70–110)
POCT GLUCOSE: 229 MG/DL (ref 70–110)
POTASSIUM SERPL-SCNC: 3.7 MMOL/L (ref 3.5–5.1)
PROT SERPL-MCNC: 7.2 G/DL (ref 6–8.4)
RBC # BLD AUTO: 4.71 M/UL (ref 4.6–6.2)
SODIUM SERPL-SCNC: 139 MMOL/L (ref 136–145)
WBC # BLD AUTO: 9.11 K/UL (ref 3.9–12.7)

## 2024-07-14 PROCEDURE — G0425 INPT/ED TELECONSULT30: HCPCS | Mod: 95,,, | Performed by: STUDENT IN AN ORGANIZED HEALTH CARE EDUCATION/TRAINING PROGRAM

## 2024-07-14 PROCEDURE — 94761 N-INVAS EAR/PLS OXIMETRY MLT: CPT

## 2024-07-14 PROCEDURE — 36415 COLL VENOUS BLD VENIPUNCTURE: CPT | Performed by: STUDENT IN AN ORGANIZED HEALTH CARE EDUCATION/TRAINING PROGRAM

## 2024-07-14 PROCEDURE — 25000003 PHARM REV CODE 250: Performed by: INTERNAL MEDICINE

## 2024-07-14 PROCEDURE — 63600175 PHARM REV CODE 636 W HCPCS: Performed by: STUDENT IN AN ORGANIZED HEALTH CARE EDUCATION/TRAINING PROGRAM

## 2024-07-14 PROCEDURE — 11000001 HC ACUTE MED/SURG PRIVATE ROOM

## 2024-07-14 PROCEDURE — 85025 COMPLETE CBC W/AUTO DIFF WBC: CPT | Performed by: STUDENT IN AN ORGANIZED HEALTH CARE EDUCATION/TRAINING PROGRAM

## 2024-07-14 PROCEDURE — 99499 UNLISTED E&M SERVICE: CPT | Mod: GT,,, | Performed by: STUDENT IN AN ORGANIZED HEALTH CARE EDUCATION/TRAINING PROGRAM

## 2024-07-14 PROCEDURE — 63600175 PHARM REV CODE 636 W HCPCS: Performed by: INTERNAL MEDICINE

## 2024-07-14 PROCEDURE — 80053 COMPREHEN METABOLIC PANEL: CPT | Performed by: STUDENT IN AN ORGANIZED HEALTH CARE EDUCATION/TRAINING PROGRAM

## 2024-07-14 RX ORDER — SODIUM CHLORIDE, SODIUM LACTATE, POTASSIUM CHLORIDE, CALCIUM CHLORIDE 600; 310; 30; 20 MG/100ML; MG/100ML; MG/100ML; MG/100ML
INJECTION, SOLUTION INTRAVENOUS CONTINUOUS
Status: ACTIVE | OUTPATIENT
Start: 2024-07-14 | End: 2024-07-15

## 2024-07-14 RX ADMIN — SODIUM CHLORIDE, POTASSIUM CHLORIDE, SODIUM LACTATE AND CALCIUM CHLORIDE: 600; 310; 30; 20 INJECTION, SOLUTION INTRAVENOUS at 11:07

## 2024-07-14 RX ADMIN — ATORVASTATIN CALCIUM 40 MG: 40 TABLET, FILM COATED ORAL at 08:07

## 2024-07-14 RX ADMIN — Medication 6 MG: at 08:07

## 2024-07-14 RX ADMIN — TAMSULOSIN HYDROCHLORIDE 0.4 MG: 0.4 CAPSULE ORAL at 08:07

## 2024-07-14 RX ADMIN — POLYETHYLENE GLYCOL 3350 17 G: 17 POWDER, FOR SOLUTION ORAL at 08:07

## 2024-07-14 RX ADMIN — CLOPIDOGREL BISULFATE 75 MG: 75 TABLET ORAL at 08:07

## 2024-07-14 RX ADMIN — SODIUM CHLORIDE, POTASSIUM CHLORIDE, SODIUM LACTATE AND CALCIUM CHLORIDE: 600; 310; 30; 20 INJECTION, SOLUTION INTRAVENOUS at 10:07

## 2024-07-14 RX ADMIN — INSULIN ASPART 1 UNITS: 100 INJECTION, SOLUTION INTRAVENOUS; SUBCUTANEOUS at 10:07

## 2024-07-14 RX ADMIN — ASPIRIN 81 MG: 81 TABLET, COATED ORAL at 08:07

## 2024-07-14 NOTE — SUBJECTIVE & OBJECTIVE
HPI per chart:  84 y.o.  pleasant male with a past medical history significant for primary essential hypertension, non-insulin-dependent type 2 diabetes, BPH/prostate cancer presents to the ER with persistent left lower extremity weakness and uncoordination. States that symptoms started on Tuesday.  He was walking in his home and all of a sudden he felt that his left leg is out of his control and he has a drag it.  Later, next morning his weakness started to improve so he did not seek emergent medical attention.  On Friday he called his primary doctor to get an appointment and nurse on the phone after hearing his symptoms advised him to call 911 immediately as he could have had a stroke and that is how he ended up in the hospital.   He reports of taking 1 baby aspirin but is unclear why.  Denies having any history of strokes before.  As of now he does not completely fill at his baseline but his left leg weakness has 90% improved.     Images personally reviewed and interpreted:  Study: Head CT, MRI Brain, and MRA Brain & Neck  Study Interpretation:   Imaging:       Imaging: Images were reviewed remotely as they were acquired.    CTH:  possible right frontal meningioma but no acute intracranial abnormalities     MRA H/N:  possible right ICA origin stenosis.  No intracranial high-grade stenosis or occlusion    MRI:  multifocal areas of restricted diffusion in right superior frontal parasagittal region with corresponding ADC correlate indicative of acute infarct.  Possible right frontal meningioma               Additional studies reviewed:   Study: Cardiac_Neuro: 2D echo  Study Interpretation:  ordered but pending    Laboratory studies reviewed:  BMP:   Lab Results   Component Value Date     07/14/2024    K 3.7 07/14/2024     07/14/2024    CO2 28 07/14/2024    BUN 38 (H) 07/14/2024    CREATININE 2.7 (H) 07/14/2024    CALCIUM 8.9 07/14/2024     CBC:   Lab Results   Component Value Date    WBC 9.11 07/14/2024     RBC 4.71 07/14/2024    HGB 12.8 (L) 07/14/2024    HCT 39.9 (L) 07/14/2024    HCT 41 07/12/2024     07/14/2024    MCV 85 07/14/2024    MCH 27.2 07/14/2024    MCHC 32.1 07/14/2024     Lipid Panel:   Lab Results   Component Value Date    CHOL 168 07/12/2024    LDLCALC 92.2 07/12/2024    HDL 33 (L) 07/12/2024    TRIG 214 (H) 07/12/2024     Coagulation:   Lab Results   Component Value Date    INR 1.0 07/12/2024    APTT 29.5 07/12/2024     Hgb A1C:   Lab Results   Component Value Date    HGBA1C 7.0 (H) 07/13/2024     TSH:   Lab Results   Component Value Date    TSH 1.778 07/12/2024       Documentation personally reviewed:  Notes: Notes: H&P       Post charge discharge plan:  Clinic follow up: Vascular Neurology    Visit Type: in person or virtual  Timeframe: 4 weeks

## 2024-07-14 NOTE — PLAN OF CARE
Problem: Adult Inpatient Plan of Care  Goal: Optimal Comfort and Wellbeing  Outcome: Progressing     Problem: Stroke, Ischemic (Includes Transient Ischemic Attack)  Goal: Optimal Coping  Outcome: Progressing     Problem: Fall Injury Risk  Goal: Absence of Fall and Fall-Related Injury  Outcome: Progressing     Problem: Diabetes Comorbidity  Goal: Blood Glucose Level Within Targeted Range  Outcome: Progressing

## 2024-07-14 NOTE — PROGRESS NOTES
[facility]  Progress Note    Patient Name: Luther Irwin Jr.  MRN: [id number]  Patient Class: IP- Inpatient   Admission Date: 7/12/2024  Length of Stay: 2 days  Attending Physician: Benny Roman,*  Primary Care Provider: Randee Marie MD        Subjective:     Principal Problem:Acute ischemic right GLORIA stroke        HPI:  84-year-old  male with a past medical history significant for primary essential hypertension, non-insulin-dependent type 2 diabetes, BPH/prostate cancer presents to the ER with persistent left lower extremity weakness and uncoordination.  States that symptoms started on Tuesday.  Denies any falls or trauma to the hip.  Denies any pain.  States that he can not describe it but feels like his leg just isn't cooperating with what he wants to do.  No history of strokes in the past.  Denies any headaches, vision changes, speech problems, swallowing issues, sensory deficits.  States that he feels like he is going to fall when he walks.  Tends to lean towards his weaker side which is his left currently.    Denies missing any of his blood pressure medications or acute changes in his medication regimen.  Denies any changes in his diet.    Workup in the ER was noted for a CT head that showed a right frontal parietal lesion concerning for possible meningioma.  MRI of the brain and MRA of head and neck were ordered and noted:   1. Small foci of acute infarction within the parasagittal right parietal lobe near the vertex.   2. Generalized cerebral volume loss and moderate chronic microvascular ischemic disease.   3. MRA brain without high-grade focal stenosis or large vessel occlusion.   4. Small extra-axial lesion over the lateral aspect of the right frontal parietal region.  This may represent a small meningioma.  No mass effect.     1. Suspected moderate stenosis at the right ICA origin.  Consider further evaluation with CTA and/or carotid ultrasound.   2. MRA  neck with no evidence of additional high-grade stenosis or occlusion.       Started on aspirin 325 mg and Plavix 300 mg p.o. x1 by the ER,  given the timeframe of onset of his deficits he was not stroke activated given that he was outside the window.  There was no large vessel occlusion noted on the MRA.     Because this patient has a new stroke with deficits and requires PT/OT/ST, Neurology eval and possible rehab  , care in an alternative location isn't clinically appropriate for the reasons stated above.     We have been consulted for further management of his stroke as well as inpatient admission to the hospitalist telemetry service.         Overview/Hospital Course:  84-year-old  male with past medical history of hypertension, type 2 diabetes, BPH/prostate cancer who was admitted for left lower extremity weakness secondary to acute CVA of right parietal lobe.  Neurology was consulted.  On aspirin/Plavix/statin.  Allow for permissive hypertension.  Not a candidate for tPA, out of window  MRI revealed small foci of acute infarction within the parasagittal right parietal lobe near the vertex.  MRA brain revealed right frontoparietal meningioma with no mass effect.  No large vessel occlusion.  Moderate stenosis at right ICA.  Obtain carotid ultrasound.  Neurology on board.  PT/OT/ST consulted.  Echo pending.  TSH WNL.  Troponin elevated likely type 2 demand mismatch.  Aggressive risk factor control.    Interval History:  Left Lower extremity weakness is improving    Review of Systems   Constitutional: Negative.    Respiratory: Negative.     Cardiovascular: Negative.    Gastrointestinal: Negative.    Genitourinary: Negative.    Musculoskeletal: Negative.    Neurological:  Positive for weakness.     Objective:     Vital Signs (Most Recent):  Temp: 98 °F (36.7 °C) (07/14/24 1155)  Pulse: (!) 59 (07/14/24 1453)  Resp: 19 (07/14/24 1155)  BP: (!) 175/83 (07/14/24 1155)  SpO2: 96 % (07/14/24 1155) Vital  Signs (24h Range):  Temp:  [97.4 °F (36.3 °C)-98 °F (36.7 °C)] 98 °F (36.7 °C)  Pulse:  [55-78] 59  Resp:  [18-19] 19  SpO2:  [96 %-98 %] 96 %  BP: (137-179)/(65-84) 175/83     Weight: 114.8 kg (253 lb 1.4 oz)  Body mass index is 28.51 kg/m².    Intake/Output Summary (Last 24 hours) at 7/14/2024 1509  Last data filed at 7/14/2024 1156  Gross per 24 hour   Intake 240 ml   Output 1625 ml   Net -1385 ml         Physical Exam  Constitutional:       General: He is not in acute distress.     Appearance: He is normal weight.   Cardiovascular:      Rate and Rhythm: Normal rate.      Pulses: Normal pulses.   Pulmonary:      Effort: No respiratory distress.      Breath sounds: Normal breath sounds. No wheezing.   Abdominal:      General: Bowel sounds are normal. There is no distension.      Palpations: Abdomen is soft.      Tenderness: There is no abdominal tenderness.   Musculoskeletal:      Right lower leg: No edema.      Left lower leg: No edema.   Skin:     General: Skin is warm.   Neurological:      Mental Status: He is alert and oriented to person, place, and time. Mental status is at baseline.   Psychiatric:         Mood and Affect: Mood normal.             Significant Labs: All pertinent labs within the past 24 hours have been reviewed.    Significant Imaging: I have reviewed all pertinent imaging results/findings within the past 24 hours.    Assessment/Plan:      * Acute ischemic right GLORIA stroke    Antithrombotics for secondary stroke prevention: Antiplatelets: Aspirin: 81 mg daily  Aspirin: 325 mg loading dosex1, now  Clopidogrel: 300 mg loading dose x 1, now  Clopidogrel: 75 mg daily    Statins for secondary stroke prevention and hyperlipidemia, if present:   Statins: Atorvastatin- 40 mg daily    Aggressive risk factor modification: HTN, DM, HLD     Rehab efforts: The patient has been evaluated by a stroke team provider and the therapy needs have been fully considered based off the presenting complaints and exam  findings. The following therapy evaluations are needed: PT evaluate and treat, OT evaluate and treat, SLP evaluate and treat    Diagnostics ordered/pending: HgbA1C to assess blood glucose levels, MRA head to assess vasculature, MRA neck/arch to assess vasculature, MRI head without contrast to assess brain parenchyma, TTE to assess cardiac function/status     VTE prophylaxis:  fall risk and getting loaded with ASA and Plavix.     BP parameters: Infarct:  But appears to have had the initial insult greater than 24 hours ago.  For thus resume his blood pressure medicines and goals of slept blood pressure less than 160.        Meningioma        Elevated troponin  Likely demand mismatch.  Troponin trend peaked at 0.044.  Follow up on echo denied chest pain/shortness of breath.    Stage 3b chronic kidney disease  Creatinine trend 2.9-2.5 this morning..  Baseline creatinine 2.1-2.3.    Type 2 diabetes mellitus with stage 3b chronic kidney disease, without long-term current use of insulin  F/u on A1c. Hold his glipizide 10mg BID and Jardiance 10mg daily.  SSI started and diet adjusted.     Essential hypertension  Chronic, uncontrolled. Latest blood pressure and vitals reviewed-     Temp:  [97.6 °F (36.4 °C)-98.6 °F (37 °C)]   Pulse:  [50-72]   Resp:  [15-20]   BP: (145-226)/(67-98)   SpO2:  [95 %-99 %] .   Home meds for hypertension were reviewed and noted below.   Hypertension Medications               amlodipine-valsartan (EXFORGE)  mg per tablet Take 1 tablet by mouth once daily.    cloNIDine (CATAPRES) 0.1 MG tablet Take 1 tablet (0.1 mg total) by mouth 3 (three) times daily.    hydroCHLOROthiazide (HYDRODIURIL) 25 MG tablet Take 1 tablet (25 mg total) by mouth once daily.            While in the hospital, will manage blood pressure as follows;.  Allow for permissive hypertension.      Will utilize p.r.n. blood pressure medication only if patient's blood pressure greater than 180/110 and he develops symptoms such  as worsening chest pain or shortness of breath.      VTE Risk Mitigation (From admission, onward)           Ordered     Reason for No Pharmacological VTE Prophylaxis  Once        Question:  Reasons:  Answer:  Physician Provided (leave comment)  Comment:  Patient got loaded with aspirin and Plavix.  Also is a fall risk.    07/12/24 2207     IP VTE HIGH RISK PATIENT  Once         07/12/24 2207     Place sequential compression device  Until discontinued         07/12/24 2207                    Discharge Planning   PRASANNA:      Code Status: Full Code   Is the patient medically ready for discharge?:     Reason for patient still in hospital (select all that apply): Patient trending condition and Treatment  Discharge Plan A: Home (Follow-ups)                  Benny Roman MD  Department of Hospital Medicine   [facility]

## 2024-07-14 NOTE — TELEMEDICINE CONSULT
[facility]  Tele-Vascular Neurology   Consult Note      Consult Information  Inpatient Consult Tele-Vascular Neurology  Consult performed by: Ephraim Garcia MD  Consult ordered by: Benny Roman MD  Reason for consult: Right GLORIA territory  stroke  Assessment/Recommendations: Risk factors: HTN, HLD, DM II, Age,  right proximal ICA stenosis causing atheroemboli ipsilaterally    -US carotids  to identify flow dynamics through the stenosed right ICA.  After normalization of creatinine, recommend obtaining CT angio head and neck with contrast  -Transthoracic ECHO (TTE). Holter monitor at d/c if TTE is negative  - patient is status post load with ASA 325mg and Plavix 300mg  on the day of admission  -Daily aspirin 81 mg /  clopidogrel 75 mg x 90 (for symptomatic right ICA stenosis) days followed by ASA 81mg OD monotherapy therafter    -Aspirin and Plavix response study. If not therapeutically adequate, switch to Brilinta 90mg BID  -High intensity statin (Atorvastatin 80mg OD/Rosuvastatin 20mg OD)   -Goal Parameters for TIA/stroke: LDL<70 mg/dl, HbA1C: <7.0 %,  SBP<130/80 (discussed risk factor optimization in order to reduce the risk of future events with help of PCP)  -PT/OT recs for discharge planning    -Diet and Exercise: Discussed Mediterranean Diet recommendations (Adopted from Eladio et al, NE, 2018.). Eat primarily plant-based foods, such as fruits and vegetables, whole grains, legumes (beans) and nuts. Limit refined carbohydrates (white pasta, bread, rice).  Replace butter with healthy fats such as olive oil.  Use herbs and spices instead of salt to flavor foods.  Limit red meat and processed meats to no more than a few times a month. Avoid sugary sodas, bakery goods, and sweets. Eat fish and poultry at least twice a week. Daily exercise (150 minutes per week).  -Patient/Family Stroke Education:  Using BE FAST pnemonic, I went over the usual stroke warning signs and symptoms, and the need  "to activate EMS (call 911) as soon as the symptoms present including-sudden onset numbness or weakness of the face, arm, or leg; especially on one side of the body; sudden confusion, trouble speaking, or understanding; sudden trouble seeing in one or both eyes; sudden trouble walking; dizziness; loss of coordination.  - recommend nonurgent neurosurgery consult for meningioma for evaluation            Consulting Provider:    Current Providers  No providers found    Patient Location:  [facility]    Spoke hospital nurse at bedside with patient assisting consultant.  Patient information was obtained from patient.       Vascular Neurology Documentation       NIH Scale:         Modified Gray:    [place] Coma Scale:     ABCD2 Score:    [id number] Score:    HAS -BLED Score:    ICH Score:    Hunt & Boo Classification:      Blood pressure (!) 175/83, pulse 61, temperature 98 °F (36.7 °C), resp. rate 19, height 6' 7" (2.007 m), weight 114.8 kg (253 lb 1.4 oz), SpO2 96%.    Van Negative    Medical Decision Making  HPI per chart:  84 y.o.  pleasant male with a past medical history significant for primary essential hypertension, non-insulin-dependent type 2 diabetes, BPH/prostate cancer presents to the ER with persistent left lower extremity weakness and uncoordination. States that symptoms started on Tuesday.  He was walking in his home and all of a sudden he felt that his left leg is out of his control and he has a drag it.  Later, next morning his weakness started to improve so he did not seek emergent medical attention.  On Friday he called his primary doctor to get an appointment and nurse on the phone after hearing his symptoms advised him to call 911 immediately as he could have had a stroke and that is how he ended up in the hospital.   He reports of taking 1 baby aspirin but is unclear why.  Denies having any history of strokes before.  As of now he does not completely fill at his baseline but his left " leg weakness has 90% improved.     Images personally reviewed and interpreted:  Study: Head CT, MRI Brain, and MRA Brain & Neck  Study Interpretation:   Imaging:     Imaging: Images were reviewed remotely as they were acquired.    CTH:  possible right frontal meningioma but no acute intracranial abnormalities     MRA H/N:  possible right ICA origin stenosis.  No intracranial high-grade stenosis or occlusion    MRI:  multifocal areas of restricted diffusion in right superior frontal parasagittal region with corresponding ADC correlate indicative of acute infarct.  Possible right frontal meningioma               Additional studies reviewed:   Study: Cardiac_Neuro: 2D echo  Study Interpretation:  ordered but pending    Laboratory studies reviewed:  BMP:   Lab Results   Component Value Date     07/14/2024    K 3.7 07/14/2024     07/14/2024    CO2 28 07/14/2024    BUN 38 (H) 07/14/2024    CREATININE 2.7 (H) 07/14/2024    CALCIUM 8.9 07/14/2024     CBC:   Lab Results   Component Value Date    WBC 9.11 07/14/2024    RBC 4.71 07/14/2024    HGB 12.8 (L) 07/14/2024    HCT 39.9 (L) 07/14/2024    HCT 41 07/12/2024     07/14/2024    MCV 85 07/14/2024    MCH 27.2 07/14/2024    MCHC 32.1 07/14/2024     Lipid Panel:   Lab Results   Component Value Date    CHOL 168 07/12/2024    LDLCALC 92.2 07/12/2024    HDL 33 (L) 07/12/2024    TRIG 214 (H) 07/12/2024     Coagulation:   Lab Results   Component Value Date    INR 1.0 07/12/2024    APTT 29.5 07/12/2024     Hgb A1C:   Lab Results   Component Value Date    HGBA1C 7.0 (H) 07/13/2024     TSH:   Lab Results   Component Value Date    TSH 1.778 07/12/2024       Documentation personally reviewed:  Notes: Notes: H&P       Post charge discharge plan:  Clinic follow up: Vascular Neurology    Visit Type: in person or virtual  Timeframe: 4 weeks        Additional Physical Exam, History, & ROS  ROS  Physical Exam  Neurological:      Mental Status: He is alert and oriented to  person, place, and time.      GCS: GCS eye subscore is 4. GCS verbal subscore is 5. GCS motor subscore is 6.      Cranial Nerves: Cranial nerves 2-12 are intact.      Motor: Weakness (left lower extremity drift) present.      Coordination: Coordination is intact.       Past Medical History:   Diagnosis Date    Cancer     CVA (cerebral vascular accident) 7/12/2024    Diabetes mellitus, type 2     Diabetes with neurologic complications     Disorder of kidney and ureter     Hypertension     Late complications of amputation stump     Prostate cancer     Pseudophakia 01/05/2024     Past Surgical History:   Procedure Laterality Date    FINGER SURGERY  3/2014    HERNIA REPAIR  07/1967    KNEE SURGERY      SPINE SURGERY  10/2007    VASECTOMY  1986     Family History   Problem Relation Name Age of Onset    Hypertension Mother      Hyperlipidemia Mother      Hypertension Father      Hyperlipidemia Father         Diagnoses  No problems updated.    Ephraim Garcia MD    Neurology consultation requested by spoke provider. Audiovisual encounter with the patient performed using a secure connection.  Results and impressions from the visit are documented on this note and were communicated to the consulting provider/team via direct communication. The note has been shared for addition to the patients electronic medical record.

## 2024-07-14 NOTE — PLAN OF CARE
Problem: Stroke, Ischemic (Includes Transient Ischemic Attack)  Goal: Optimal Coping  Intervention: Support Psychosocial Response to Stroke  Flowsheets (Taken 7/14/2024 0334)  Supportive Measures: active listening utilized  Goal: Optimal Cerebral Tissue Perfusion  Intervention: Protect and Optimize Cerebral Perfusion  Flowsheets (Taken 7/14/2024 0334)  Sensory Stimulation Regulation:   care clustered   quiet environment promoted  Cerebral Perfusion Promotion: blood pressure monitored  Goal: Optimal Cognitive Function  Intervention: Optimize Cognitive Function  Flowsheets (Taken 7/14/2024 0334)  Sensory Stimulation Regulation:   care clustered   quiet environment promoted  Goal: Improved Communication Skills  Intervention: Optimize Communication Skills  Flowsheets (Taken 7/14/2024 0334)  Communication Enhancement Strategies:   call light answered in person   one-step directions provided  Goal: Optimal Functional Ability  Intervention: Optimize Functional Ability  Flowsheets (Taken 7/14/2024 0334)  Self-Care Promotion: independence encouraged  Activity Management: Rolling - L1  Goal: Effective Oxygenation and Ventilation  Intervention: Optimize Oxygenation and Ventilation  Flowsheets (Taken 7/14/2024 0334)  Head of Bed (HOB) Positioning: HOB elevated  Goal: Safe and Effective Swallow  Intervention: Promote and Optimize Fluid and Food Intake  Flowsheets (Taken 7/14/2024 0334)  Aspiration Precautions:   awake/alert before oral intake   respiratory status monitored     Problem: Fall Injury Risk  Goal: Absence of Fall and Fall-Related Injury  Intervention: Identify and Manage Contributors  Flowsheets (Taken 7/14/2024 0334)  Self-Care Promotion: independence encouraged  Medication Review/Management: medications reviewed  Intervention: Promote Injury-Free Environment  Flowsheets (Taken 7/14/2024 0334)  Safety Promotion/Fall Prevention:   assistive device/personal item within reach   commode/urinal/bedpan at bedside    diversional activities provided   Fall Risk reviewed with patient/family   lighting adjusted   medications reviewed   nonskid shoes/socks when out of bed   room near unit station   side rails raised x 2     Problem: Diabetes Comorbidity  Goal: Blood Glucose Level Within Targeted Range  Intervention: Monitor and Manage Glycemia  Flowsheets (Taken 7/14/2024 3930)  Glycemic Management: blood glucose monitored

## 2024-07-14 NOTE — NURSING
[facility], [facility]  Nurses Note -- 4 Eyes      7/14/2024       Skin assessed on: Q Shift      [x] No Pressure Injuries Present    []Prevention Measures Documented    [] Yes LDA  for Pressure Injury Previously documented     [] Yes New Pressure Injury Discovered   [] LDA for New Pressure Injury Added      Attending :  Lenka Dunn LPN     Second RN:  Mariana Sarmiento RN

## 2024-07-14 NOTE — NURSING
Nurses Note -- 4 Eyes      7/14/2024   7:29 AM      Skin assessed during: Q Shift Change      [x] No Pressure Injuries present Present    [x]Prevention Measures Documented      [] Yes- Altered Skin Integrity Present or Discovered   [] LDA Added if Not in Epic (Describe Wound)   [] New Altered Skin Integrity was Present on Admit and Documented in LDA   [] Wound Image Taken      Attending Nurse:  Mariana Quinteros RN/Staff Member:  Leigha

## 2024-07-14 NOTE — SUBJECTIVE & OBJECTIVE
Interval History:  Left Lower extremity weakness is improving    Review of Systems   Constitutional: Negative.    Respiratory: Negative.     Cardiovascular: Negative.    Gastrointestinal: Negative.    Genitourinary: Negative.    Musculoskeletal: Negative.    Neurological:  Positive for weakness.     Objective:     Vital Signs (Most Recent):  Temp: 98 °F (36.7 °C) (07/14/24 1155)  Pulse: (!) 59 (07/14/24 1453)  Resp: 19 (07/14/24 1155)  BP: (!) 175/83 (07/14/24 1155)  SpO2: 96 % (07/14/24 1155) Vital Signs (24h Range):  Temp:  [97.4 °F (36.3 °C)-98 °F (36.7 °C)] 98 °F (36.7 °C)  Pulse:  [55-78] 59  Resp:  [18-19] 19  SpO2:  [96 %-98 %] 96 %  BP: (137-179)/(65-84) 175/83     Weight: 114.8 kg (253 lb 1.4 oz)  Body mass index is 28.51 kg/m².    Intake/Output Summary (Last 24 hours) at 7/14/2024 1509  Last data filed at 7/14/2024 1156  Gross per 24 hour   Intake 240 ml   Output 1625 ml   Net -1385 ml         Physical Exam  Constitutional:       General: He is not in acute distress.     Appearance: He is normal weight.   Cardiovascular:      Rate and Rhythm: Normal rate.      Pulses: Normal pulses.   Pulmonary:      Effort: No respiratory distress.      Breath sounds: Normal breath sounds. No wheezing.   Abdominal:      General: Bowel sounds are normal. There is no distension.      Palpations: Abdomen is soft.      Tenderness: There is no abdominal tenderness.   Musculoskeletal:      Right lower leg: No edema.      Left lower leg: No edema.   Skin:     General: Skin is warm.   Neurological:      Mental Status: He is alert and oriented to person, place, and time. Mental status is at baseline.   Psychiatric:         Mood and Affect: Mood normal.             Significant Labs: All pertinent labs within the past 24 hours have been reviewed.    Significant Imaging: I have reviewed all pertinent imaging results/findings within the past 24 hours.

## 2024-07-15 LAB
ALBUMIN SERPL BCP-MCNC: 3.4 G/DL (ref 3.5–5.2)
ALP SERPL-CCNC: 122 U/L (ref 55–135)
ALT SERPL W/O P-5'-P-CCNC: 12 U/L (ref 10–44)
ANION GAP SERPL CALC-SCNC: 10 MMOL/L (ref 8–16)
ASCENDING AORTA: 3.12 CM
AST SERPL-CCNC: 16 U/L (ref 10–40)
AV INDEX (PROSTH): 0.57
AV MEAN GRADIENT: 4 MMHG
AV PEAK GRADIENT: 7 MMHG
AV VALVE AREA BY VELOCITY RATIO: 2.63 CM²
AV VALVE AREA: 2.91 CM²
AV VELOCITY RATIO: 0.51
BASOPHILS # BLD AUTO: 0.08 K/UL (ref 0–0.2)
BASOPHILS NFR BLD: 0.9 % (ref 0–1.9)
BILIRUB SERPL-MCNC: 0.4 MG/DL (ref 0.1–1)
BSA FOR ECHO PROCEDURE: 2.52 M2
BUN SERPL-MCNC: 30 MG/DL (ref 8–23)
CALCIUM SERPL-MCNC: 8.8 MG/DL (ref 8.7–10.5)
CHLORIDE SERPL-SCNC: 102 MMOL/L (ref 95–110)
CO2 SERPL-SCNC: 26 MMOL/L (ref 23–29)
CREAT SERPL-MCNC: 2 MG/DL (ref 0.5–1.4)
CV ECHO LV RWT: 0.71 CM
DIFFERENTIAL METHOD BLD: ABNORMAL
DOP CALC AO PEAK VEL: 1.34 M/S
DOP CALC AO VTI: 30.7 CM
DOP CALC LVOT AREA: 5.1 CM2
DOP CALC LVOT DIAMETER: 2.55 CM
DOP CALC LVOT PEAK VEL: 0.69 M/S
DOP CALC LVOT STROKE VOLUME: 89.33 CM3
DOP CALC MV VTI: 27.9 CM
DOP CALCLVOT PEAK VEL VTI: 17.5 CM
E WAVE DECELERATION TIME: 159.82 MSEC
E/A RATIO: 0.5
E/E' RATIO: 10.6 M/S
ECHO LV POSTERIOR WALL: 1.96 CM (ref 0.6–1.1)
EOSINOPHIL # BLD AUTO: 0.3 K/UL (ref 0–0.5)
EOSINOPHIL NFR BLD: 3.3 % (ref 0–8)
ERYTHROCYTE [DISTWIDTH] IN BLOOD BY AUTOMATED COUNT: 12 % (ref 11.5–14.5)
EST. GFR  (NO RACE VARIABLE): 32 ML/MIN/1.73 M^2
FRACTIONAL SHORTENING: 27 % (ref 28–44)
GLUCOSE SERPL-MCNC: 142 MG/DL (ref 70–110)
HCT VFR BLD AUTO: 40.5 % (ref 40–54)
HGB BLD-MCNC: 13.1 G/DL (ref 14–18)
IMM GRANULOCYTES # BLD AUTO: 0.02 K/UL (ref 0–0.04)
IMM GRANULOCYTES NFR BLD AUTO: 0.2 % (ref 0–0.5)
INTERVENTRICULAR SEPTUM: 1.98 CM (ref 0.6–1.1)
LA MAJOR: 5.1 CM
LA MINOR: 4.26 CM
LA WIDTH: 4.3 CM
LEFT ATRIUM SIZE: 5.01 CM
LEFT ATRIUM VOLUME INDEX: 33.9 ML/M2
LEFT ATRIUM VOLUME: 85.01 CM3
LEFT INTERNAL DIMENSION IN SYSTOLE: 3.99 CM (ref 2.1–4)
LEFT VENTRICLE DIASTOLIC VOLUME INDEX: 58.75 ML/M2
LEFT VENTRICLE DIASTOLIC VOLUME: 147.45 ML
LEFT VENTRICLE MASS INDEX: 224 G/M2
LEFT VENTRICLE SYSTOLIC VOLUME INDEX: 27.8 ML/M2
LEFT VENTRICLE SYSTOLIC VOLUME: 69.73 ML
LEFT VENTRICULAR INTERNAL DIMENSION IN DIASTOLE: 5.5 CM (ref 3.5–6)
LEFT VENTRICULAR MASS: 562.08 G
LV LATERAL E/E' RATIO: 8.83 M/S
LV SEPTAL E/E' RATIO: 13.25 M/S
LVED V (TEICH): 147.45 ML
LVES V (TEICH): 69.73 ML
LVOT MG: 1.1 MMHG
LVOT MV: 0.51 CM/S
LYMPHOCYTES # BLD AUTO: 1.7 K/UL (ref 1–4.8)
LYMPHOCYTES NFR BLD: 19.2 % (ref 18–48)
MAGNESIUM SERPL-MCNC: 2.2 MG/DL (ref 1.6–2.6)
MCH RBC QN AUTO: 27.1 PG (ref 27–31)
MCHC RBC AUTO-ENTMCNC: 32.3 G/DL (ref 32–36)
MCV RBC AUTO: 84 FL (ref 82–98)
MONOCYTES # BLD AUTO: 0.7 K/UL (ref 0.3–1)
MONOCYTES NFR BLD: 7.8 % (ref 4–15)
MV MEAN GRADIENT: 2 MMHG
MV PEAK A VEL: 1.06 M/S
MV PEAK E VEL: 0.53 M/S
MV PEAK GRADIENT: 7 MMHG
MV STENOSIS PRESSURE HALF TIME: 73.67 MS
MV VALVE AREA BY CONTINUITY EQUATION: 3.2 CM2
MV VALVE AREA P 1/2 METHOD: 2.99 CM2
NEUTROPHILS # BLD AUTO: 6.1 K/UL (ref 1.8–7.7)
NEUTROPHILS NFR BLD: 68.6 % (ref 38–73)
NRBC BLD-RTO: 0 /100 WBC
OHS CV RV/LV RATIO: 0.83 CM
OHS QRS DURATION: 166 MS
OHS QTC CALCULATION: 499 MS
PISA TR MAX VEL: 1.35 M/S
PLATELET # BLD AUTO: 239 K/UL (ref 150–450)
PMV BLD AUTO: 12 FL (ref 9.2–12.9)
POCT GLUCOSE: 174 MG/DL (ref 70–110)
POCT GLUCOSE: 176 MG/DL (ref 70–110)
POCT GLUCOSE: 188 MG/DL (ref 70–110)
POCT GLUCOSE: 196 MG/DL (ref 70–110)
POTASSIUM SERPL-SCNC: 3.4 MMOL/L (ref 3.5–5.1)
PROT SERPL-MCNC: 7.1 G/DL (ref 6–8.4)
PV PEAK GRADIENT: 3 MMHG
PV PEAK VELOCITY: 0.85 M/S
RA MAJOR: 3.67 CM
RA PRESSURE ESTIMATED: 3 MMHG
RA WIDTH: 3.19 CM
RBC # BLD AUTO: 4.83 M/UL (ref 4.6–6.2)
RIGHT VENTRICLE DIASTOLIC BASEL DIMENSION: 4.5 CM
RIGHT VENTRICULAR END-DIASTOLIC DIMENSION: 4.54 CM
RV TB RVSP: 4 MMHG
SINUS: 4.19 CM
SODIUM SERPL-SCNC: 138 MMOL/L (ref 136–145)
STJ: 3.61 CM
TDI LATERAL: 0.06 M/S
TDI SEPTAL: 0.04 M/S
TDI: 0.05 M/S
TR MAX PG: 7 MMHG
TRICUSPID ANNULAR PLANE SYSTOLIC EXCURSION: 2.85 CM
TV REST PULMONARY ARTERY PRESSURE: 10 MMHG
WBC # BLD AUTO: 8.85 K/UL (ref 3.9–12.7)
Z-SCORE OF LEFT VENTRICULAR DIMENSION IN END DIASTOLE: -9.55
Z-SCORE OF LEFT VENTRICULAR DIMENSION IN END SYSTOLE: -5.95

## 2024-07-15 PROCEDURE — 94761 N-INVAS EAR/PLS OXIMETRY MLT: CPT

## 2024-07-15 PROCEDURE — 36415 COLL VENOUS BLD VENIPUNCTURE: CPT | Performed by: STUDENT IN AN ORGANIZED HEALTH CARE EDUCATION/TRAINING PROGRAM

## 2024-07-15 PROCEDURE — 99233 SBSQ HOSP IP/OBS HIGH 50: CPT | Mod: ,,, | Performed by: INTERNAL MEDICINE

## 2024-07-15 PROCEDURE — 93010 ELECTROCARDIOGRAM REPORT: CPT | Mod: ,,, | Performed by: INTERNAL MEDICINE

## 2024-07-15 PROCEDURE — 85025 COMPLETE CBC W/AUTO DIFF WBC: CPT | Performed by: STUDENT IN AN ORGANIZED HEALTH CARE EDUCATION/TRAINING PROGRAM

## 2024-07-15 PROCEDURE — 99223 1ST HOSP IP/OBS HIGH 75: CPT | Mod: ,,, | Performed by: SURGERY

## 2024-07-15 PROCEDURE — 83735 ASSAY OF MAGNESIUM: CPT | Performed by: STUDENT IN AN ORGANIZED HEALTH CARE EDUCATION/TRAINING PROGRAM

## 2024-07-15 PROCEDURE — 25000003 PHARM REV CODE 250: Performed by: STUDENT IN AN ORGANIZED HEALTH CARE EDUCATION/TRAINING PROGRAM

## 2024-07-15 PROCEDURE — 11000001 HC ACUTE MED/SURG PRIVATE ROOM

## 2024-07-15 PROCEDURE — 80053 COMPREHEN METABOLIC PANEL: CPT | Performed by: STUDENT IN AN ORGANIZED HEALTH CARE EDUCATION/TRAINING PROGRAM

## 2024-07-15 PROCEDURE — 93005 ELECTROCARDIOGRAM TRACING: CPT

## 2024-07-15 PROCEDURE — 25000003 PHARM REV CODE 250: Performed by: INTERNAL MEDICINE

## 2024-07-15 RX ORDER — POTASSIUM CHLORIDE 20 MEQ/1
40 TABLET, EXTENDED RELEASE ORAL ONCE
Status: COMPLETED | OUTPATIENT
Start: 2024-07-15 | End: 2024-07-15

## 2024-07-15 RX ADMIN — CLOPIDOGREL BISULFATE 75 MG: 75 TABLET ORAL at 10:07

## 2024-07-15 RX ADMIN — TAMSULOSIN HYDROCHLORIDE 0.4 MG: 0.4 CAPSULE ORAL at 08:07

## 2024-07-15 RX ADMIN — ATORVASTATIN CALCIUM 40 MG: 40 TABLET, FILM COATED ORAL at 08:07

## 2024-07-15 RX ADMIN — ASPIRIN 81 MG: 81 TABLET, COATED ORAL at 10:07

## 2024-07-15 RX ADMIN — POTASSIUM CHLORIDE 40 MEQ: 1500 TABLET, EXTENDED RELEASE ORAL at 10:07

## 2024-07-15 NOTE — NURSING
Nurses Note -- 4 Eyes      7/14/2024   11:14 PM      Skin assessed during: Q Shift Change      [x] No Pressure Injuries Present    [x]Prevention Measures Documented      [] Yes- Altered Skin Integrity Present or Discovered   [] LDA Added if Not in Epic (Describe Wound)   [] New Altered Skin Integrity was Present on Admit and Documented in LDA   [] Wound Image Taken      Attending Nurse:  Mariana Quinteros RN/Staff Member:  Lenka

## 2024-07-15 NOTE — PROGRESS NOTES
[facility]  Progress Note    Patient Name: Luther Irwin Jr.  MRN: [id number]  Patient Class: IP- Inpatient   Admission Date: 7/12/2024  Length of Stay: 3 days  Attending Physician: Benny Roman,*  Primary Care Provider: Randee Marie MD        Subjective:     Principal Problem:Acute ischemic right GLORIA stroke        HPI:  84-year-old  male with a past medical history significant for primary essential hypertension, non-insulin-dependent type 2 diabetes, BPH/prostate cancer presents to the ER with persistent left lower extremity weakness and uncoordination.  States that symptoms started on Tuesday.  Denies any falls or trauma to the hip.  Denies any pain.  States that he can not describe it but feels like his leg just isn't cooperating with what he wants to do.  No history of strokes in the past.  Denies any headaches, vision changes, speech problems, swallowing issues, sensory deficits.  States that he feels like he is going to fall when he walks.  Tends to lean towards his weaker side which is his left currently.    Denies missing any of his blood pressure medications or acute changes in his medication regimen.  Denies any changes in his diet.    Workup in the ER was noted for a CT head that showed a right frontal parietal lesion concerning for possible meningioma.  MRI of the brain and MRA of head and neck were ordered and noted:   1. Small foci of acute infarction within the parasagittal right parietal lobe near the vertex.   2. Generalized cerebral volume loss and moderate chronic microvascular ischemic disease.   3. MRA brain without high-grade focal stenosis or large vessel occlusion.   4. Small extra-axial lesion over the lateral aspect of the right frontal parietal region.  This may represent a small meningioma.  No mass effect.     1. Suspected moderate stenosis at the right ICA origin.  Consider further evaluation with CTA and/or carotid ultrasound.   2. MRA  neck with no evidence of additional high-grade stenosis or occlusion.       Started on aspirin 325 mg and Plavix 300 mg p.o. x1 by the ER,  given the timeframe of onset of his deficits he was not stroke activated given that he was outside the window.  There was no large vessel occlusion noted on the MRA.     Because this patient has a new stroke with deficits and requires PT/OT/ST, Neurology eval and possible rehab  , care in an alternative location isn't clinically appropriate for the reasons stated above.     We have been consulted for further management of his stroke as well as inpatient admission to the hospitalist telemetry service.         Overview/Hospital Course:  84-year-old  male with past medical history of hypertension, type 2 diabetes, BPH/prostate cancer who was admitted for left lower extremity weakness secondary to acute CVA of right parietal lobe.  Neurology was consulted.  On aspirin/Plavix/statin.  Allow for permissive hypertension.  Not a candidate for tPA, out of window  MRI revealed small foci of acute infarction within the parasagittal right parietal lobe near the vertex.  MRA brain revealed right frontoparietal meningioma with no mass effect.  No large vessel occlusion.  Moderate stenosis at right ICA.  Carotid ultrasound with no evidence of stenosis not consistent with MRA.  Unable to obtain CTA given DIANE on CKD  Neurology on board.  Consulted vascular for further recs.  PT/OT/ST consulted.  Echo pending.  TSH WNL.  Troponin elevated likely type 2 demand mismatch.  Aggressive risk factor control.    Interval History:  Patient was noted to have 5-6 beats of nonsustained VT overnight on telemetry.  Correct K/Mag.  No AFib noted.    Review of Systems   Constitutional: Negative.    Respiratory: Negative.     Cardiovascular: Negative.    Gastrointestinal: Negative.    Genitourinary: Negative.    Musculoskeletal: Negative.    Neurological:  Positive for weakness.     Objective:      Vital Signs (Most Recent):  Temp: 97.9 °F (36.6 °C) (07/15/24 1102)  Pulse: (!) 57 (07/15/24 1447)  Resp: 20 (07/15/24 1102)  BP: (!) 182/84 (07/15/24 1102)  SpO2: 98 % (07/15/24 1102) Vital Signs (24h Range):  Temp:  [97.5 °F (36.4 °C)-98.2 °F (36.8 °C)] 97.9 °F (36.6 °C)  Pulse:  [57-83] 57  Resp:  [17-20] 20  SpO2:  [96 %-99 %] 98 %  BP: (173-186)/(84-88) 182/84     Weight: 114 kg (251 lb 5.2 oz)  Body mass index is 28.31 kg/m².    Intake/Output Summary (Last 24 hours) at 7/15/2024 1535  Last data filed at 7/15/2024 0722  Gross per 24 hour   Intake 1680 ml   Output 3000 ml   Net -1320 ml         Physical Exam  Constitutional:       General: He is not in acute distress.     Appearance: He is normal weight.   Cardiovascular:      Rate and Rhythm: Normal rate.      Pulses: Normal pulses.   Pulmonary:      Effort: No respiratory distress.      Breath sounds: Normal breath sounds. No wheezing.   Abdominal:      General: Bowel sounds are normal. There is no distension.      Palpations: Abdomen is soft.      Tenderness: There is no abdominal tenderness.   Musculoskeletal:      Right lower leg: No edema.      Left lower leg: No edema.   Skin:     General: Skin is warm.   Neurological:      Mental Status: He is alert and oriented to person, place, and time.      Motor: Weakness (Left lower extremity) present.      Coordination: Coordination abnormal.   Psychiatric:         Mood and Affect: Mood normal.             Significant Labs: All pertinent labs within the past 24 hours have been reviewed.    Significant Imaging: I have reviewed all pertinent imaging results/findings within the past 24 hours.      Assessment/Plan:      * Acute ischemic right GLORIA stroke    Antithrombotics for secondary stroke prevention: Antiplatelets: Aspirin: 81 mg daily  Aspirin: 325 mg loading dosex1, now  Clopidogrel: 300 mg loading dose x 1, now  Clopidogrel: 75 mg daily    Statins for secondary stroke prevention and hyperlipidemia, if  present:   Statins: Atorvastatin- 40 mg daily    Aggressive risk factor modification: HTN, DM, HLD     Rehab efforts: The patient has been evaluated by a stroke team provider and the therapy needs have been fully considered based off the presenting complaints and exam findings. The following therapy evaluations are needed: PT evaluate and treat, OT evaluate and treat, SLP evaluate and treat    Diagnostics ordered/pending: HgbA1C to assess blood glucose levels, MRA head to assess vasculature, MRA neck/arch to assess vasculature, MRI head without contrast to assess brain parenchyma, TTE to assess cardiac function/status     VTE prophylaxis:  fall risk and getting loaded with ASA and Plavix.     BP parameters: Infarct:  But appears to have had the initial insult greater than 24 hours ago.  For thus resume his blood pressure medicines and goals of slept blood pressure less than 160.        Meningioma    Need outpatient f/u    Elevated troponin  Likely demand mismatch.  Troponin trend peaked at 0.044.  Follow up on echo denied chest pain/shortness of breath.    Stage 3b chronic kidney disease  Creatinine trend 2.9-2.5--2 this morning..  Baseline creatinine 2.1-2.3.    Type 2 diabetes mellitus with stage 3b chronic kidney disease, without long-term current use of insulin  A1c 7 7/24.. Hold his glipizide 10mg BID and Jardiance 10mg daily.  SSI started and diet adjusted.     Essential hypertension  Chronic, uncontrolled. Latest blood pressure and vitals reviewed-     Temp:  [97.6 °F (36.4 °C)-98.6 °F (37 °C)]   Pulse:  [50-72]   Resp:  [15-20]   BP: (145-226)/(67-98)   SpO2:  [95 %-99 %] .   Home meds for hypertension were reviewed and noted below.   Hypertension Medications               amlodipine-valsartan (EXFORGE)  mg per tablet Take 1 tablet by mouth once daily.    cloNIDine (CATAPRES) 0.1 MG tablet Take 1 tablet (0.1 mg total) by mouth 3 (three) times daily.    hydroCHLOROthiazide (HYDRODIURIL) 25 MG tablet  Take 1 tablet (25 mg total) by mouth once daily.            While in the hospital, will manage blood pressure as follows;.  Allow for permissive hypertension.      Will utilize p.r.n. blood pressure medication only if patient's blood pressure greater than 180/110 and he develops symptoms such as worsening chest pain or shortness of breath.      VTE Risk Mitigation (From admission, onward)           Ordered     Reason for No Pharmacological VTE Prophylaxis  Once        Question:  Reasons:  Answer:  Physician Provided (leave comment)  Comment:  Patient got loaded with aspirin and Plavix.  Also is a fall risk.    07/12/24 2207     IP VTE HIGH RISK PATIENT  Once         07/12/24 2207     Place sequential compression device  Until discontinued         07/12/24 2207                    Discharge Planning   PRASANNA:      Code Status: Full Code   Is the patient medically ready for discharge?:     Reason for patient still in hospital (select all that apply): Patient trending condition and Treatment  Discharge Plan A: Home (Follow-ups)                  Benny Roman MD  Department of Hospital Medicine   [facility]

## 2024-07-15 NOTE — NURSING
[facility], [facility] [place]  Nurses Note -- 4 Eyes      7/15/2024       Skin assessed on: Q Shift      [x] No Pressure Injuries Present    []Prevention Measures Documented    [] Yes LDA  for Pressure Injury Previously documented     [] Yes New Pressure Injury Discovered   [] LDA for New Pressure Injury Added      Attending :  Lenka Dunn LPN     Second RN:  Mariana Sarmiento RN

## 2024-07-15 NOTE — CONSULTS
[facility] [place] [facility]  Vascular Surgery  Consult Note    Inpatient consult to Vascular Surgery  Consult performed by: Chuckie Stern MD  Consult ordered by: Benny Roman MD  Reason for consult: Concern for symptomatic carotid stenosis        Subjective:     Chief Complaint/Reason for Admission:  Left leg weakness concerning for stroke    History of Present Illness:  84-year-old man with CKD, and diabetes, nonsmoker, who presents after episode of left leg weakness that started on Tuesday.  Patient weakness has resolved since being in the hospital.  He underwent stroke workup which revealed a parietal acute infarct in the right hemisphere.  Additionally MRA suggestive of right carotid stenosis.  Patient was previously on aspirin daily.  His since been started on Plavix.  Medications Prior to Admission   Medication Sig Dispense Refill Last Dose    amlodipine-valsartan (EXFORGE)  mg per tablet Take 1 tablet by mouth once daily. 90 tablet 3 7/12/2024    aspirin 81 MG Chew Take 81 mg by mouth once daily.   Past Week    cloNIDine (CATAPRES) 0.1 MG tablet Take 1 tablet (0.1 mg total) by mouth 3 (three) times daily. 270 tablet 3 7/11/2024    empagliflozin (JARDIANCE) 10 mg tablet Take 1 tablet (10 mg total) by mouth once daily. 30 tablet 11     glipiZIDE (GLUCOTROL) 10 MG TR24 Take 1 tablet (10 mg total) by mouth 2 (two) times daily. 180 tablet 3     hydroCHLOROthiazide (HYDRODIURIL) 25 MG tablet Take 1 tablet (25 mg total) by mouth once daily. 90 tablet 3     tadalafiL (CIALIS) 20 MG Tab Take 1 tablet (20 mg total) by mouth once daily. 30 tablet 11     tamsulosin (FLOMAX) 0.4 mg Cap Take 1 capsule (0.4 mg total) by mouth once daily. 90 capsule 3        Review of patient's allergies indicates:   Allergen Reactions    Grass pollen-june grass standard Itching     Eye irritation       Past Medical History:   Diagnosis Date    Cancer     CVA (cerebral vascular accident) 7/12/2024    Diabetes  mellitus, type 2     Diabetes with neurologic complications     Disorder of kidney and ureter     Hypertension     Late complications of amputation stump     Prostate cancer     Pseudophakia 2024     Past Surgical History:   Procedure Laterality Date    FINGER SURGERY  3/2014    HERNIA REPAIR  1967    KNEE SURGERY      SPINE SURGERY  10/2007    VASECTOMY  1986     Family History       Problem Relation (Age of Onset)    Hyperlipidemia Mother, Father    Hypertension Mother, Father          Tobacco Use    Smoking status: Former     Current packs/day: 0.00     Average packs/day: 0.3 packs/day for 0.9 years (0.2 ttl pk-yrs)     Types: Cigarettes     Start date: 1960     Quit date: 1960     Years since quittin.6    Smokeless tobacco: Never   Substance and Sexual Activity    Alcohol use: Yes     Comment: special occacion    Drug use: No    Sexual activity: Not on file     Review of Systems  Objective:     Vital Signs (Most Recent):  Temp: 97.9 °F (36.6 °C) (07/15/24 1102)  Pulse: (!) 58 (07/15/24 1102)  Resp: 20 (07/15/24 1102)  BP: (!) 182/84 (07/15/24 1102)  SpO2: 98 % (07/15/24 1102) Vital Signs (24h Range):  Temp:  [97.5 °F (36.4 °C)-98.2 °F (36.8 °C)] 97.9 °F (36.6 °C)  Pulse:  [58-83] 58  Resp:  [17-20] 20  SpO2:  [96 %-99 %] 98 %  BP: (173-186)/(84-88) 182/84     Weight: 114 kg (251 lb 5.2 oz)  Body mass index is 28.31 kg/m².    Date 07/15/24 07 - 24 0659   Shift 0939-3703 5068-0873 2413-0700 24 Hour Total   INTAKE   P.O. 120   120   Shift Total(mL/kg) 120(1.1)   120(1.1)   OUTPUT   Shift Total(mL/kg)       Weight (kg) 114 114 114 114       Physical Exam  5/5 strength in bilateral lower extremities  Significant Labs:  CBC:   Recent Labs   Lab 07/15/24  0532   WBC 8.85   RBC 4.83   HGB 13.1*   HCT 40.5      MCV 84   MCH 27.1   MCHC 32.3     CMP:   Recent Labs   Lab 07/15/24  0532   *   CALCIUM 8.8   ALBUMIN 3.4*   PROT 7.1      K 3.4*   CO2 26      BUN 30*  "  CREATININE 2.0*   ALKPHOS 122   ALT 12   AST 16   BILITOT 0.4     Coagulation: No results for input(s): "LABPROT", "INR", "APTT" in the last 48 hours.  eGFR: No results for input(s): "EGFRIFAFRICA", "EGFRCYSTC", "LABGLOM" in the last 48 hours.    Invalid input(s): "EGFRNON-AA"    Significant Diagnostics:  I have reviewed all pertinent imaging results/findings within the past 24 hours.  MRI 7/13   Impression:     1. Small foci of acute infarction within the parasagittal right parietal lobe near the vertex.  2. Generalized cerebral volume loss and moderate chronic microvascular ischemic disease.  3. MRA brain without high-grade focal stenosis or large vessel occlusion.  4. Small extra-axial lesion over the lateral aspect of the right frontal parietal region.  This may represent a small meningioma.  No mass effect.  Assessment/Plan:     Active Diagnoses:    Diagnosis Date Noted POA    PRINCIPAL PROBLEM:  Acute ischemic right GLORIA stroke [I63.521] 07/12/2024 Yes    Meningioma [D32.9] 07/14/2024 Yes    Stage 3b chronic kidney disease [N18.32] 07/13/2024 Yes    Elevated troponin [R79.89] 07/13/2024 Yes    Type 2 diabetes mellitus with stage 3b chronic kidney disease, without long-term current use of insulin [E11.22, N18.32] 04/02/2015 Yes    Benign prostatic hyperplasia without lower urinary tract symptoms [N40.0] 01/07/2015 Yes    Essential hypertension [I10] 10/23/2014 Yes      Problems Resolved During this Admission:   84-year-old man presenting with left leg weakness found to have right parietal infarct on MRI.  MRA suggested of right carotid stenosis.  He than 50% however duplex imaging is not consistent.    Plan  Repeat carotid duplex with cardiovascular lab  If any evidence of stenosis on repeat carotid duplex, perform CTA head and neck once able.  Agree with aspirin and Plavix   Start high-intensity statin therapy.  Appreciate Neurology recommendations for further medical management    Thank you for your consult. " I will follow-up with patient. Please contact us if you have any additional questions.    Chuckie Stern MD  Vascular Surgery  [facility]

## 2024-07-15 NOTE — PLAN OF CARE
Problem: Stroke, Ischemic (Includes Transient Ischemic Attack)  Goal: Optimal Coping  Intervention: Support Psychosocial Response to Stroke  Flowsheets (Taken 7/15/2024 0644)  Supportive Measures: active listening utilized  Goal: Optimal Cerebral Tissue Perfusion  Intervention: Protect and Optimize Cerebral Perfusion  Flowsheets (Taken 7/15/2024 0644)  Sensory Stimulation Regulation:   care clustered   quiet environment promoted  Cerebral Perfusion Promotion: blood pressure monitored  Goal: Optimal Cognitive Function  Intervention: Optimize Cognitive Function  Flowsheets (Taken 7/15/2024 0644)  Sensory Stimulation Regulation:   care clustered   quiet environment promoted  Goal: Improved Communication Skills  Intervention: Optimize Communication Skills  Flowsheets (Taken 7/15/2024 0644)  Communication Enhancement Strategies: call light answered in person  Goal: Optimal Functional Ability  Intervention: Optimize Functional Ability  Flowsheets (Taken 7/15/2024 0644)  Self-Care Promotion: independence encouraged  Activity Management: Rolling - L1  Goal: Effective Oxygenation and Ventilation  Intervention: Optimize Oxygenation and Ventilation  Flowsheets (Taken 7/15/2024 0644)  Head of Bed (HOB) Positioning: HOB elevated  Goal: Improved Sensorimotor Function  Intervention: Optimize Sensory and Perceptual Ability  Flowsheets (Taken 7/15/2024 0644)  Pressure Reduction Techniques: frequent weight shift encouraged  Goal: Safe and Effective Swallow  Intervention: Promote and Optimize Fluid and Food Intake  Flowsheets (Taken 7/15/2024 0644)  Aspiration Precautions: awake/alert before oral intake     Problem: Fall Injury Risk  Goal: Absence of Fall and Fall-Related Injury  Intervention: Identify and Manage Contributors  Flowsheets (Taken 7/15/2024 0644)  Self-Care Promotion: independence encouraged  Medication Review/Management: medications reviewed  Intervention: Promote Injury-Free Environment  Flowsheets (Taken 7/15/2024  0644)  Safety Promotion/Fall Prevention:   assistive device/personal item within reach   commode/urinal/bedpan at bedside   diversional activities provided   lighting adjusted   medications reviewed   side rails raised x 2     Problem: Diabetes Comorbidity  Goal: Blood Glucose Level Within Targeted Range  Intervention: Monitor and Manage Glycemia  Flowsheets (Taken 7/15/2024 2820)  Glycemic Management: blood glucose monitored

## 2024-07-15 NOTE — SUBJECTIVE & OBJECTIVE
Interval History:  Patient was noted to have 5-6 beats of nonsustained VT overnight on telemetry.  Correct K/Mag.  No AFib noted.    Review of Systems   Constitutional: Negative.    Respiratory: Negative.     Cardiovascular: Negative.    Gastrointestinal: Negative.    Genitourinary: Negative.    Musculoskeletal: Negative.    Neurological:  Positive for weakness.     Objective:     Vital Signs (Most Recent):  Temp: 97.9 °F (36.6 °C) (07/15/24 1102)  Pulse: (!) 57 (07/15/24 1447)  Resp: 20 (07/15/24 1102)  BP: (!) 182/84 (07/15/24 1102)  SpO2: 98 % (07/15/24 1102) Vital Signs (24h Range):  Temp:  [97.5 °F (36.4 °C)-98.2 °F (36.8 °C)] 97.9 °F (36.6 °C)  Pulse:  [57-83] 57  Resp:  [17-20] 20  SpO2:  [96 %-99 %] 98 %  BP: (173-186)/(84-88) 182/84     Weight: 114 kg (251 lb 5.2 oz)  Body mass index is 28.31 kg/m².    Intake/Output Summary (Last 24 hours) at 7/15/2024 1535  Last data filed at 7/15/2024 0722  Gross per 24 hour   Intake 1680 ml   Output 3000 ml   Net -1320 ml         Physical Exam  Constitutional:       General: He is not in acute distress.     Appearance: He is normal weight.   Cardiovascular:      Rate and Rhythm: Normal rate.      Pulses: Normal pulses.   Pulmonary:      Effort: No respiratory distress.      Breath sounds: Normal breath sounds. No wheezing.   Abdominal:      General: Bowel sounds are normal. There is no distension.      Palpations: Abdomen is soft.      Tenderness: There is no abdominal tenderness.   Musculoskeletal:      Right lower leg: No edema.      Left lower leg: No edema.   Skin:     General: Skin is warm.   Neurological:      Mental Status: He is alert and oriented to person, place, and time.      Motor: Weakness (Left lower extremity) present.      Coordination: Coordination abnormal.   Psychiatric:         Mood and Affect: Mood normal.             Significant Labs: All pertinent labs within the past 24 hours have been reviewed.    Significant Imaging: I have reviewed all  pertinent imaging results/findings within the past 24 hours.

## 2024-07-15 NOTE — ASSESSMENT & PLAN NOTE
A1c 7 7/24.. Hold his glipizide 10mg BID and Jardiance 10mg daily.  SSI started and diet adjusted.

## 2024-07-15 NOTE — NURSING
Notified Belia GLASER of 30 beats of V Tach while asleep. No change in VS. EKG showed SB with first degree block with Left BBB. Ordered add on Mag lab. Awaiting results.

## 2024-07-15 NOTE — PROGRESS NOTES
[facility]  Neurology Consult Note    Patient Name: Luther Irwin Jr.  Age: 84 y.o.  MRN: [id number]  Admission Date: 7/12/2024  Reason for consult:  Left leg weakness    CC:  Left leg weakness    HPI:   Luther Irwin Jr. is a 84 y.o. year old male with past medical history of primary essential hypertension, non-insulin-dependent type 2 diabetes, BPH/prostate cancer presented to the ER on 07/12/2024 with left leg weakness.     Per initial consult note:   States that symptoms started on Tuesday.  He was walking in his home and all of a sudden he felt that his left leg is out of his control and he has a drag it.  Later, next morning his weakness started to improve so he did not seek emergent medical attention.  On Friday he called his primary doctor to get an appointment and nurse on the phone after hearing his symptoms advised him to call 911 immediately as he could have had a stroke and that is how he ended up in the hospital.   He reports of taking 1 baby aspirin but is unclear why.  Denies having any history of strokes before.  As of now he does not completely fill at his baseline but his left leg weakness has 90% improved.     07/15:  Work-up reviewed from over the weekend.  Carotid ultrasound was obtained but did not show any significant stenosis which is not consistent with a MRA neck study.  Discussed with primary team, unable to obtain CTA at this time given the kidney function.  Recommended vascular surgery evaluation.    Histories:  Allergies:  Grass pollen-june grass standard    Current Medications:    Current Facility-Administered Medications   Medication Dose Route Frequency Provider Last Rate Last Admin    acetaminophen tablet 650 mg  650 mg Oral Q6H PRN Guerda Vargas MD        aspirin EC tablet 81 mg  81 mg Oral Daily Guerda Vargas MD   81 mg at 07/15/24 1012    atorvastatin tablet 40 mg  40 mg Oral QHS Guerda Vargas MD   40 mg at 07/14/24 2022    bisacodyL suppository 10 mg  10 mg  Rectal Daily PRN Guerda Vargas MD        clopidogreL tablet 75 mg  75 mg Oral Daily Guerda Vargas MD   75 mg at 07/15/24 1012    dextrose 10% bolus 125 mL 125 mL  12.5 g Intravenous PRN Guerda Vargas MD        dextrose 10% bolus 250 mL 250 mL  25 g Intravenous PRN Guerda Vargas MD        glucagon (human recombinant) injection 1 mg  1 mg Intramuscular PRN Guerda Vargas MD        glucose chewable tablet 16 g  16 g Oral PRN Guerda Vargas MD        glucose chewable tablet 24 g  24 g Oral PRN Guerda Vargas MD        hydrALAZINE injection 5 mg  5 mg Intravenous Q6H PRN Guerda Vargas MD        insulin aspart U-100 pen 0-5 Units  0-5 Units Subcutaneous QID (AC + HS) Guerda Packer MD   1 Units at 07/14/24 2245    melatonin tablet 6 mg  6 mg Oral Nightly PRN Guerda Vargas MD   6 mg at 07/14/24 2022    ondansetron injection 4 mg  4 mg Intravenous Q6H PRN Guerda Vargas MD        polyethylene glycol packet 17 g  17 g Oral Daily Guerda Vargas MD   17 g at 07/14/24 0807    sodium chloride 0.9% bolus 500 mL 500 mL  500 mL Intravenous PRN Guerda Vargas MD        sodium chloride 0.9% flush 10 mL  10 mL Intravenous PRN Guerda Vargas MD        tamsulosin 24 hr capsule 0.4 mg  1 capsule Oral Nightly Guerda Vargas MD   0.4 mg at 07/14/24 2022       Medical:   Past Medical History:   Diagnosis Date    Cancer     CVA (cerebral vascular accident) 7/12/2024    Diabetes mellitus, type 2     Diabetes with neurologic complications     Disorder of kidney and ureter     Hypertension     Late complications of amputation stump     Prostate cancer     Pseudophakia 01/05/2024        Surgeries:   Past Surgical History:   Procedure Laterality Date    FINGER SURGERY  3/2014    HERNIA REPAIR  07/1967    KNEE SURGERY      SPINE SURGERY  10/2007    VASECTOMY  1986        Family:   Family History   Problem Relation Name Age of Onset    Hypertension Mother       "Hyperlipidemia Mother      Hypertension Father      Hyperlipidemia Father         Tobacco Use    Smoking status: Former     Current packs/day: 0.00     Average packs/day: 0.3 packs/day for 0.9 years (0.2 ttl pk-yrs)     Types: Cigarettes     Start date: 1960     Quit date: 1960     Years since quittin.6    Smokeless tobacco: Never   Substance and Sexual Activity    Alcohol use: Yes     Comment: special occacion    Drug use: No    Sexual activity: Not on file         Physical Exam:     Physical Examination  BP (!) 182/84   Pulse (!) 58   Temp 97.9 °F (36.6 °C)   Resp 20   Ht 6' 7" (2.007 m)   Wt 114 kg (251 lb 5.2 oz)   SpO2 98%   BMI 28.31 kg/m²   Body mass index is 28.31 kg/m².    Neurological Exam  NIHSS ([employer] [place] [employer] Stroke Scale)   1a  Level of consciousness: 0=alert; keenly responsive   1b. LOC questions:  0 = Answers both questions correctly   1c. LOC commands: 0=Answers both tasks correctly   2.  Best Gaze: 0=normal   3. Visual: 0=No visual loss   4. Facial Palsy: 0=Normal symmetric movement   5a. Motor left arm: 0=No drift, limb holds 90 (or 45) degrees for full 10 seconds   5b.  Motor right arm: 0=No drift, limb holds 90 (or 45) degrees for full 10 seconds   6a. motor left le=Drift, limb holds 90 (or 45) degrees but drifts down before full 10 seconds: does not hit bed   6b  Motor right le=No drift, limb holds 90 (or 45) degrees for full 10 seconds   7. Limb Ataxia: 0=Absent   8.  Sensory: 0=Normal; no sensory loss   9. Best Language:  0=No aphasia, normal   10. Dysarthria: 0=Normal   11. Extinction and Inattention: 0=No abnormality         Total:   1        Significant Labs:   Recent Lab Results  (Last 5 results in the past 24 hours)        07/15/24  1102   07/15/24  0944   07/15/24  0532   24  2225   24  1937        Albumin     3.4           ALP     122           ALT     12           Anion Gap     10           AST     16           Baso #     0.08       "     Basophil %     0.9           BILIRUBIN TOTAL     0.4  Comment: For infants and newborns, interpretation of results should be based  on gestational age, weight and in agreement with clinical  observations.    Premature Infant recommended reference ranges:  Up to 24 hours.............<8.0 mg/dL  Up to 48 hours............<12.0 mg/dL  3-5 days..................<15.0 mg/dL  6-29 days.................<15.0 mg/dL             BUN     30           Calcium     8.8           Chloride     102           CO2     26           Creatinine     2.0           Differential Method     Automated           eGFR     32           Eos #     0.3           Eos %     3.3           Glucose     142           Gran # (ANC)     6.1           Gran %     68.6           Hematocrit     40.5           Hemoglobin     13.1           Immature Grans (Abs)     0.02  Comment: Mild elevation in immature granulocytes is non specific and   can be seen in a variety of conditions including stress response,   acute inflammation, trauma and pregnancy. Correlation with other   laboratory and clinical findings is essential.             Immature Granulocytes     0.2           Lymph #     1.7           Lymph %     19.2           Magnesium      2.2           MCH     27.1           MCHC     32.3           MCV     84           Mono #     0.7           Mono %     7.8           MPV     12.0           nRBC     0           Platelet Count     239           POCT Glucose 174   196     220   229       Potassium     3.4           PROTEIN TOTAL     7.1           RBC     4.83           RDW     12.0           Sodium     138           WBC     8.85                                  Significant Imaging:   Images were personally reviewed and interpreted:   MRI brain without contrast:  Acute infarct in the right parietal lobe parasagittal region near the vertex, extra-axial lesion noted in the right frontoparietal region likely related to small meningioma    MRA head and neck:  Moderate  stenosis of the right ICA origin suspected. No intracranial HGS/LVO    Carotid ultrasound:  No hemodynamically significant stenosis noted in bilateral carotids    Echo done, results pending    Assessment and Plan:   84 y.o. year old male with past medical history of primary essential hypertension, non-insulin-dependent type 2 diabetes, BPH/prostate cancer presented to the ER on 07/12/2024 with left leg weakness.  MRI revealing right parasagittal parietal lobe infarct close to the vertex.  Etiology likely arthrosclerosis.  MRA concerning for moderate stenosis of the right ICA however carotid ultrasound with discrepancy.  If at all, right ICA disease is noted, patient would be amenable for revascularization.     Plan:   -was previously on aspirin 81 mg daily.  Patient has been loaded with aspirin and Plavix.  Continue on aspirin 81 mg and Plavix 75 mg daily  - continue Lipitor 40 mg daily  -Transthoracic ECHO (TTE) results pending.  Consider out patient cardiac monitoring  - unable to obtain CTA head and neck as discussed with primary team.  Recommend vascular surgery evaluation.  -Goal Parameters for TIA/stroke: LDL<70 mg/dl, HbA1C: <7.0 %,  SBP<130/80 (discussed risk factor optimization in order to reduce the risk of future events with help of PCP)  -Diet and Exercise: Discussed aggressive lifestyle modification. Eat primarily plant-based foods, such as fruits and vegetables, whole grains, legumes (beans) and nuts. Discussed Mediterranean diet. Daily exercise (150 minutes per week).  -Provided education regarding future stroke identification: I went over the usual stroke warning signs and symptoms, and the need to activate EMS (call 911) as soon as the symptoms present including-sudden onset numbness or weakness of the face, arm, or leg; especially on one side of the body; sudden confusion, trouble speaking, or understanding; sudden trouble seeing in one or both eyes; sudden trouble walking; dizziness; loss of  coordination.   - follow up as outpatient in Neurology Clinic      Thank you for your consult. I will sign off. Please contact us if you have any additional questions.    Time spent on this encounter: 30 minutes. This includes face to face time and non-face to face time preparing to see the patient (eg, review of tests), obtaining and/or reviewing separately obtained history, documenting clinical information in the electronic or other health record, independently interpreting results and communicating results to the patient/family/caregiver, or care coordinator.     Demond Licona MD  Neurology  [facility]

## 2024-07-16 PROBLEM — I65.29 CAROTID ARTERY STENOSIS: Status: ACTIVE | Noted: 2024-07-16

## 2024-07-16 LAB
ANION GAP SERPL CALC-SCNC: 13 MMOL/L (ref 8–16)
BUN SERPL-MCNC: 30 MG/DL (ref 8–23)
CALCIUM SERPL-MCNC: 9.4 MG/DL (ref 8.7–10.5)
CHLORIDE SERPL-SCNC: 99 MMOL/L (ref 95–110)
CO2 SERPL-SCNC: 25 MMOL/L (ref 23–29)
CREAT SERPL-MCNC: 2.2 MG/DL (ref 0.5–1.4)
EST. GFR  (NO RACE VARIABLE): 29 ML/MIN/1.73 M^2
GLUCOSE SERPL-MCNC: 152 MG/DL (ref 70–110)
POCT GLUCOSE: 137 MG/DL (ref 70–110)
POCT GLUCOSE: 154 MG/DL (ref 70–110)
POCT GLUCOSE: 171 MG/DL (ref 70–110)
POCT GLUCOSE: 206 MG/DL (ref 70–110)
POTASSIUM SERPL-SCNC: 3.7 MMOL/L (ref 3.5–5.1)
SODIUM SERPL-SCNC: 137 MMOL/L (ref 136–145)

## 2024-07-16 PROCEDURE — 25000003 PHARM REV CODE 250: Performed by: INTERNAL MEDICINE

## 2024-07-16 PROCEDURE — 80048 BASIC METABOLIC PNL TOTAL CA: CPT | Performed by: STUDENT IN AN ORGANIZED HEALTH CARE EDUCATION/TRAINING PROGRAM

## 2024-07-16 PROCEDURE — 63600175 PHARM REV CODE 636 W HCPCS: Performed by: STUDENT IN AN ORGANIZED HEALTH CARE EDUCATION/TRAINING PROGRAM

## 2024-07-16 PROCEDURE — 11000001 HC ACUTE MED/SURG PRIVATE ROOM

## 2024-07-16 PROCEDURE — 25000003 PHARM REV CODE 250: Performed by: STUDENT IN AN ORGANIZED HEALTH CARE EDUCATION/TRAINING PROGRAM

## 2024-07-16 PROCEDURE — 94761 N-INVAS EAR/PLS OXIMETRY MLT: CPT

## 2024-07-16 PROCEDURE — 63600175 PHARM REV CODE 636 W HCPCS: Performed by: INTERNAL MEDICINE

## 2024-07-16 PROCEDURE — 36415 COLL VENOUS BLD VENIPUNCTURE: CPT | Performed by: STUDENT IN AN ORGANIZED HEALTH CARE EDUCATION/TRAINING PROGRAM

## 2024-07-16 RX ORDER — SODIUM CHLORIDE, SODIUM LACTATE, POTASSIUM CHLORIDE, CALCIUM CHLORIDE 600; 310; 30; 20 MG/100ML; MG/100ML; MG/100ML; MG/100ML
INJECTION, SOLUTION INTRAVENOUS CONTINUOUS
Status: DISCONTINUED | OUTPATIENT
Start: 2024-07-16 | End: 2024-07-18

## 2024-07-16 RX ORDER — AMLODIPINE BESYLATE 5 MG/1
10 TABLET ORAL DAILY
Status: DISCONTINUED | OUTPATIENT
Start: 2024-07-16 | End: 2024-07-18 | Stop reason: HOSPADM

## 2024-07-16 RX ORDER — POTASSIUM CHLORIDE 20 MEQ/1
40 TABLET, EXTENDED RELEASE ORAL ONCE
Status: COMPLETED | OUTPATIENT
Start: 2024-07-16 | End: 2024-07-16

## 2024-07-16 RX ADMIN — HYDRALAZINE HYDROCHLORIDE 5 MG: 20 INJECTION INTRAMUSCULAR; INTRAVENOUS at 12:07

## 2024-07-16 RX ADMIN — CLOPIDOGREL BISULFATE 75 MG: 75 TABLET ORAL at 07:07

## 2024-07-16 RX ADMIN — POTASSIUM CHLORIDE 40 MEQ: 1500 TABLET, EXTENDED RELEASE ORAL at 09:07

## 2024-07-16 RX ADMIN — AMLODIPINE BESYLATE 10 MG: 5 TABLET ORAL at 02:07

## 2024-07-16 RX ADMIN — HYDRALAZINE HYDROCHLORIDE 5 MG: 20 INJECTION INTRAMUSCULAR; INTRAVENOUS at 04:07

## 2024-07-16 RX ADMIN — ASPIRIN 81 MG: 81 TABLET, COATED ORAL at 07:07

## 2024-07-16 RX ADMIN — ATORVASTATIN CALCIUM 40 MG: 40 TABLET, FILM COATED ORAL at 08:07

## 2024-07-16 RX ADMIN — SODIUM CHLORIDE, POTASSIUM CHLORIDE, SODIUM LACTATE AND CALCIUM CHLORIDE: 600; 310; 30; 20 INJECTION, SOLUTION INTRAVENOUS at 08:07

## 2024-07-16 RX ADMIN — TAMSULOSIN HYDROCHLORIDE 0.4 MG: 0.4 CAPSULE ORAL at 08:07

## 2024-07-16 RX ADMIN — INSULIN ASPART 1 UNITS: 100 INJECTION, SOLUTION INTRAVENOUS; SUBCUTANEOUS at 10:07

## 2024-07-16 RX ADMIN — SODIUM CHLORIDE, POTASSIUM CHLORIDE, SODIUM LACTATE AND CALCIUM CHLORIDE: 600; 310; 30; 20 INJECTION, SOLUTION INTRAVENOUS at 09:07

## 2024-07-16 NOTE — NURSING
[facility] [place]  Nurses Note -- 4 Eyes      7/15/2024       Skin assessed on: Q Shift      [x] No Pressure Injuries Present    [x]Prevention Measures Documented    [] Yes LDA  for Pressure Injury Previously documented     [] Yes New Pressure Injury Discovered   [] LDA for New Pressure Injury Added      Attending RN:  Marin Mahajan RN     Second RN:  Lenka Dunn LPN

## 2024-07-16 NOTE — NURSING
[facility] [place] [place]     Followed up with patient for proactive rounding. BP elevated 209/103, RN aware, hydralazine given. /80 post admin. Reviewed plan of care with primary nurse, Marin.   Please call Rapid Response RN with any questions or concerns at  [phone]

## 2024-07-16 NOTE — PLAN OF CARE
"Per MD, pt waiting on CTA. If labs improve, pt will have tomorrow. Pt stated " I'm ready to go." CM reassured pt that MD is ensuring all tests are completed to ensure a safe discharge. Pt stated "ok".     07/16/24 1422   Discharge Reassessment   Assessment Type Discharge Planning Reassessment   Did the patient's condition or plan change since previous assessment? Yes   Discharge Plan discussed with: Patient   Discharge Plan A Home   Discharge Plan B Other  (tbd)   DME Needed Upon Discharge  other (see comments)  (tbd)   Transition of Care Barriers None   Why the patient remains in the hospital   (none)   Post-Acute Status   Coverage [facility] CHOICES -   Discharge Delays None known at this time       "

## 2024-07-16 NOTE — PLAN OF CARE
Problem: Adult Inpatient Plan of Care  Goal: Plan of Care Review  Outcome: Progressing  Flowsheets (Taken 7/16/2024 0308)  Plan of Care Reviewed With: patient  Goal: Absence of Hospital-Acquired Illness or Injury  Outcome: Progressing  Intervention: Identify and Manage Fall Risk  Flowsheets (Taken 7/16/2024 0308)  Safety Promotion/Fall Prevention:   assistive device/personal item within reach   Fall Risk reviewed with patient/family   nonskid shoes/socks when out of bed   room near unit station   side rails raised x 2  Intervention: Prevent Skin Injury  Flowsheets (Taken 7/16/2024 0308)  Body Position: position changed independently  Skin Protection: incontinence pads utilized  Device Skin Pressure Protection:   absorbent pad utilized/changed   adhesive use limited   positioning supports utilized  Intervention: Prevent and Manage VTE (Venous Thromboembolism) Risk  Flowsheets (Taken 7/16/2024 0308)  VTE Prevention/Management:   bleeding risk assessed   ambulation promoted  Intervention: Prevent Infection  Flowsheets (Taken 7/16/2024 0308)  Infection Prevention:   rest/sleep promoted   single patient room provided     Patient is AAOx4. On room air. Tele maintained. No falls or injuries reported during shift, safety precautions maintained.

## 2024-07-16 NOTE — SUBJECTIVE & OBJECTIVE
Interval History:  Patient reports leg weakness is improving.  Participating with PT/OT.  Further runs of V-tach on monitor.  K repleted.  Noted to have PVCs on monitor no AFib    Review of Systems   Constitutional: Negative.    Respiratory: Negative.     Cardiovascular: Negative.    Gastrointestinal: Negative.    Genitourinary: Negative.    Musculoskeletal: Negative.    Neurological: Negative.    Hematological: Negative.      Objective:     Vital Signs (Most Recent):  Temp: 97.6 °F (36.4 °C) (07/16/24 1122)  Pulse: 62 (07/16/24 1122)  Resp: 20 (07/16/24 1122)  BP: (!) 178/83 (07/16/24 1122)  SpO2: 99 % (07/16/24 1122) Vital Signs (24h Range):  Temp:  [97.6 °F (36.4 °C)-98.5 °F (36.9 °C)] 97.6 °F (36.4 °C)  Pulse:  [57-92] 62  Resp:  [20] 20  SpO2:  [96 %-99 %] 99 %  BP: (164-225)/() 178/83     Weight: 114 kg (251 lb 5.2 oz)  Body mass index is 28.31 kg/m².    Intake/Output Summary (Last 24 hours) at 7/16/2024 1345  Last data filed at 7/16/2024 1223  Gross per 24 hour   Intake 940 ml   Output 2300 ml   Net -1360 ml         Physical Exam  Constitutional:       General: He is not in acute distress.     Appearance: He is normal weight.   Cardiovascular:      Rate and Rhythm: Normal rate.      Pulses: Normal pulses.   Pulmonary:      Effort: No respiratory distress.      Breath sounds: Normal breath sounds. No wheezing.   Abdominal:      General: Bowel sounds are normal. There is no distension.      Palpations: Abdomen is soft.      Tenderness: There is no abdominal tenderness.   Musculoskeletal:      Right lower leg: No edema.      Left lower leg: No edema.   Skin:     General: Skin is warm.   Neurological:      Mental Status: He is alert and oriented to person, place, and time.      Motor: Weakness present.      Coordination: Coordination abnormal.   Psychiatric:         Mood and Affect: Mood normal.             Significant Labs: All pertinent labs within the past 24 hours have been reviewed.    Significant  Imaging: I have reviewed all pertinent imaging results/findings within the past 24 hours.

## 2024-07-16 NOTE — ASSESSMENT & PLAN NOTE
Patient was noted to have right ICA stenosis on MRA   Carotid ultrasound negative.  Vascular on board.  Recommend to repeat vascular ultrasound of carotid rule out stenosis.  CTA head/neck with improvement in creatinine to plan for surgical evaluation

## 2024-07-16 NOTE — ASSESSMENT & PLAN NOTE
Likely demand mismatch.  Troponin trend peaked at 0.044.  F echo with no evidence of regional wall motion abnormalities

## 2024-07-16 NOTE — NURSING
[facility], [facility]  Nurses Note -- 4 Eyes      7/16/2024       Skin assessed on: Q Shift      [] No Pressure Injuries Present    []Prevention Measures Documented    [] Yes LDA  for Pressure Injury Previously documented     [] Yes New Pressure Injury Discovered   [] LDA for New Pressure Injury Added      Attending RN:  Bety Christian LPN     Second RN:  Marin RODRIGUEZ

## 2024-07-16 NOTE — PROGRESS NOTES
[facility] Medicine  Progress Note    Patient Name: Luther Irwin Jr.  MRN: [id number]  Patient Class: IP- Inpatient   Admission Date: 7/12/2024  Length of Stay: 4 days  Attending Physician: Benny Roman,*  Primary Care Provider: Randee Marie MD        Subjective:     Principal Problem:Acute ischemic right GLORIA stroke        HPI:  84-year-old  male with a past medical history significant for primary essential hypertension, non-insulin-dependent type 2 diabetes, BPH/prostate cancer presents to the ER with persistent left lower extremity weakness and uncoordination.  States that symptoms started on Tuesday.  Denies any falls or trauma to the hip.  Denies any pain.  States that he can not describe it but feels like his leg just isn't cooperating with what he wants to do.  No history of strokes in the past.  Denies any headaches, vision changes, speech problems, swallowing issues, sensory deficits.  States that he feels like he is going to fall when he walks.  Tends to lean towards his weaker side which is his left currently.    Denies missing any of his blood pressure medications or acute changes in his medication regimen.  Denies any changes in his diet.    Workup in the ER was noted for a CT head that showed a right frontal parietal lesion concerning for possible meningioma.  MRI of the brain and MRA of head and neck were ordered and noted:   1. Small foci of acute infarction within the parasagittal right parietal lobe near the vertex.   2. Generalized cerebral volume loss and moderate chronic microvascular ischemic disease.   3. MRA brain without high-grade focal stenosis or large vessel occlusion.   4. Small extra-axial lesion over the lateral aspect of the right frontal parietal region.  This may represent a small meningioma.  No mass effect.     1. Suspected moderate stenosis at the right ICA origin.  Consider further evaluation with CTA and/or carotid ultrasound.   2. MRA  neck with no evidence of additional high-grade stenosis or occlusion.       Started on aspirin 325 mg and Plavix 300 mg p.o. x1 by the ER,  given the timeframe of onset of his deficits he was not stroke activated given that he was outside the window.  There was no large vessel occlusion noted on the MRA.     Because this patient has a new stroke with deficits and requires PT/OT/ST, Neurology eval and possible rehab  , care in an alternative location isn't clinically appropriate for the reasons stated above.     We have been consulted for further management of his stroke as well as inpatient admission to the hospitalist telemetry service.         Overview/Hospital Course:  84-year-old  male with past medical history of hypertension, type 2 diabetes, BPH/prostate cancer who was admitted for left lower extremity weakness secondary to acute CVA of right parietal lobe.  Neurology was consulted.  On aspirin/Plavix/statin.  Allow for permissive hypertension.  Not a candidate for tPA, out of window  MRI revealed small foci of acute infarction within the parasagittal right parietal lobe near the vertex.  MRA brain revealed right frontoparietal meningioma with no mass effect.  No large vessel occlusion.  Moderate stenosis at right ICA.  Carotid ultrasound with no evidence of stenosis not consistent with MRA.  Unable to obtain CTA given DIANE on CKD  Neurology on board.  Consulted vascular for further recs.  PT/OT/ST consulted.  Echo with bubble study negative TSH WNL.  Troponin elevated likely type 2 demand mismatch.  Aggressive risk factor control.  Monitor creatinine with IVF.  Vascular recommend to obtain vascular ultrasound of carotids and CTA head and neck once creatinine is better.    Interval History:  Patient reports leg weakness is improving.  Participating with PT/OT.  Further runs of V-tach on monitor.  K repleted.  Noted to have PVCs on monitor no AFib    Review of Systems   Constitutional: Negative.     Respiratory: Negative.     Cardiovascular: Negative.    Gastrointestinal: Negative.    Genitourinary: Negative.    Musculoskeletal: Negative.    Neurological: Negative.    Hematological: Negative.      Objective:     Vital Signs (Most Recent):  Temp: 97.6 °F (36.4 °C) (07/16/24 1122)  Pulse: 62 (07/16/24 1122)  Resp: 20 (07/16/24 1122)  BP: (!) 178/83 (07/16/24 1122)  SpO2: 99 % (07/16/24 1122) Vital Signs (24h Range):  Temp:  [97.6 °F (36.4 °C)-98.5 °F (36.9 °C)] 97.6 °F (36.4 °C)  Pulse:  [57-92] 62  Resp:  [20] 20  SpO2:  [96 %-99 %] 99 %  BP: (164-225)/() 178/83     Weight: 114 kg (251 lb 5.2 oz)  Body mass index is 28.31 kg/m².    Intake/Output Summary (Last 24 hours) at 7/16/2024 1345  Last data filed at 7/16/2024 1223  Gross per 24 hour   Intake 940 ml   Output 2300 ml   Net -1360 ml         Physical Exam  Constitutional:       General: He is not in acute distress.     Appearance: He is normal weight.   Cardiovascular:      Rate and Rhythm: Normal rate.      Pulses: Normal pulses.   Pulmonary:      Effort: No respiratory distress.      Breath sounds: Normal breath sounds. No wheezing.   Abdominal:      General: Bowel sounds are normal. There is no distension.      Palpations: Abdomen is soft.      Tenderness: There is no abdominal tenderness.   Musculoskeletal:      Right lower leg: No edema.      Left lower leg: No edema.   Skin:     General: Skin is warm.   Neurological:      Mental Status: He is alert and oriented to person, place, and time.      Motor: Weakness present.      Coordination: Coordination abnormal.   Psychiatric:         Mood and Affect: Mood normal.             Significant Labs: All pertinent labs within the past 24 hours have been reviewed.    Significant Imaging: I have reviewed all pertinent imaging results/findings within the past 24 hours.      Assessment/Plan:      * Acute ischemic right GLORIA stroke    Antithrombotics for secondary stroke prevention: Antiplatelets: Aspirin: 81  mg daily  Aspirin: 325 mg loading dosex1, now  Clopidogrel: 300 mg loading dose x 1, now  Clopidogrel: 75 mg daily    Statins for secondary stroke prevention and hyperlipidemia, if present:   Statins: Atorvastatin- 40 mg daily    Aggressive risk factor modification: HTN, DM, HLD     Rehab efforts: The patient has been evaluated by a stroke team provider and the therapy needs have been fully considered based off the presenting complaints and exam findings. The following therapy evaluations are needed: PT evaluate and treat, OT evaluate and treat, SLP evaluate and treat    Diagnostics ordered/pending: HgbA1C to assess blood glucose levels, MRA head to assess vasculature, MRA neck/arch to assess vasculature, MRI head without contrast to assess brain parenchyma, TTE to assess cardiac function/status     VTE prophylaxis:  fall risk and getting loaded with ASA and Plavix.     BP parameters: Infarct:  But appears to have had the initial insult greater than 24 hours ago.  For thus resume his blood pressure medicines and goals of slept blood pressure less than 160.        Carotid artery stenosis  Patient was noted to have right ICA stenosis on MRA   Carotid ultrasound negative.  Vascular on board.  Recommend to repeat vascular ultrasound of carotid rule out stenosis.  CTA head/neck with improvement in creatinine to plan for surgical evaluation      Meningioma    Need outpatient f/u    Elevated troponin  Likely demand mismatch.  Troponin trend peaked at 0.044.  F echo with no evidence of regional wall motion abnormalities    Stage 3b chronic kidney disease  Creatinine trend 2.9-2.5--2 this morning..  Baseline creatinine 2.1-2.3.    Type 2 diabetes mellitus with stage 3b chronic kidney disease, without long-term current use of insulin  A1c 7 7/24.. Hold his glipizide 10mg BID and Jardiance 10mg daily.  SSI started and diet adjusted.     Essential hypertension  Chronic, uncontrolled. Latest blood pressure and vitals reviewed-      Temp:  [97.6 °F (36.4 °C)-98.6 °F (37 °C)]   Pulse:  [50-72]   Resp:  [15-20]   BP: (145-226)/(67-98)   SpO2:  [95 %-99 %] .   Home meds for hypertension were reviewed and noted below.   Hypertension Medications               amlodipine-valsartan (EXFORGE)  mg per tablet Take 1 tablet by mouth once daily.    cloNIDine (CATAPRES) 0.1 MG tablet Take 1 tablet (0.1 mg total) by mouth 3 (three) times daily.    hydroCHLOROthiazide (HYDRODIURIL) 25 MG tablet Take 1 tablet (25 mg total) by mouth once daily.            While in the hospital, will manage blood pressure as follows;.  Allow for permissive hypertension.      Will utilize p.r.n. blood pressure medication only if patient's blood pressure greater than 180/110 and he develops symptoms such as worsening chest pain or shortness of breath.      VTE Risk Mitigation (From admission, onward)           Ordered     Reason for No Pharmacological VTE Prophylaxis  Once        Question:  Reasons:  Answer:  Physician Provided (leave comment)  Comment:  Patient got loaded with aspirin and Plavix.  Also is a fall risk.    07/12/24 2207     IP VTE HIGH RISK PATIENT  Once         07/12/24 2207     Place sequential compression device  Until discontinued         07/12/24 2207                    Discharge Planning   PRASANNA:      Code Status: Full Code   Is the patient medically ready for discharge?:     Reason for patient still in hospital (select all that apply): Patient trending condition and Treatment  Discharge Plan A: Home (Follow-ups)                  Benny Roman MD  Department of Hospital Medicine   [facility]

## 2024-07-16 NOTE — PLAN OF CARE
Problem: Adult Inpatient Plan of Care  Goal: Plan of Care Review  Outcome: Progressing  Flowsheets (Taken 7/16/2024 1853)  Plan of Care Reviewed With: patient     Problem: Stroke, Ischemic (Includes Transient Ischemic Attack)  Goal: Optimal Coping  Outcome: Progressing  Intervention: Support Psychosocial Response to Stroke  Flowsheets (Taken 7/16/2024 1853)  Supportive Measures:   active listening utilized   decision-making supported     Problem: Fall Injury Risk  Goal: Absence of Fall and Fall-Related Injury  Outcome: Progressing  Intervention: Identify and Manage Contributors  Flowsheets (Taken 7/16/2024 1853)  Self-Care Promotion:   independence encouraged   BADL personal objects within reach   BADL personal routines maintained  Medication Review/Management: medications reviewed     Problem: Diabetes Comorbidity  Goal: Blood Glucose Level Within Targeted Range  Outcome: Progressing  Intervention: Monitor and Manage Glycemia  Flowsheets (Taken 7/16/2024 1853)  Glycemic Management: blood glucose monitored

## 2024-07-17 LAB
ANION GAP SERPL CALC-SCNC: 11 MMOL/L (ref 8–16)
BUN SERPL-MCNC: 32 MG/DL (ref 8–23)
CALCIUM SERPL-MCNC: 9.1 MG/DL (ref 8.7–10.5)
CHLORIDE SERPL-SCNC: 104 MMOL/L (ref 95–110)
CO2 SERPL-SCNC: 22 MMOL/L (ref 23–29)
CREAT SERPL-MCNC: 2.2 MG/DL (ref 0.5–1.4)
EST. GFR  (NO RACE VARIABLE): 29 ML/MIN/1.73 M^2
GLUCOSE SERPL-MCNC: 144 MG/DL (ref 70–110)
POCT GLUCOSE: 128 MG/DL (ref 70–110)
POCT GLUCOSE: 194 MG/DL (ref 70–110)
POCT GLUCOSE: 211 MG/DL (ref 70–110)
POTASSIUM SERPL-SCNC: 3.7 MMOL/L (ref 3.5–5.1)
SODIUM SERPL-SCNC: 137 MMOL/L (ref 136–145)

## 2024-07-17 PROCEDURE — 25000003 PHARM REV CODE 250: Performed by: STUDENT IN AN ORGANIZED HEALTH CARE EDUCATION/TRAINING PROGRAM

## 2024-07-17 PROCEDURE — 80048 BASIC METABOLIC PNL TOTAL CA: CPT | Performed by: STUDENT IN AN ORGANIZED HEALTH CARE EDUCATION/TRAINING PROGRAM

## 2024-07-17 PROCEDURE — 94761 N-INVAS EAR/PLS OXIMETRY MLT: CPT

## 2024-07-17 PROCEDURE — 99233 SBSQ HOSP IP/OBS HIGH 50: CPT | Mod: ,,, | Performed by: SURGERY

## 2024-07-17 PROCEDURE — 63600175 PHARM REV CODE 636 W HCPCS: Performed by: STUDENT IN AN ORGANIZED HEALTH CARE EDUCATION/TRAINING PROGRAM

## 2024-07-17 PROCEDURE — 25000003 PHARM REV CODE 250: Performed by: INTERNAL MEDICINE

## 2024-07-17 PROCEDURE — 36415 COLL VENOUS BLD VENIPUNCTURE: CPT | Performed by: STUDENT IN AN ORGANIZED HEALTH CARE EDUCATION/TRAINING PROGRAM

## 2024-07-17 PROCEDURE — 25500020 PHARM REV CODE 255: Performed by: STUDENT IN AN ORGANIZED HEALTH CARE EDUCATION/TRAINING PROGRAM

## 2024-07-17 PROCEDURE — 11000001 HC ACUTE MED/SURG PRIVATE ROOM

## 2024-07-17 RX ORDER — HYDRALAZINE HYDROCHLORIDE 25 MG/1
50 TABLET, FILM COATED ORAL EVERY 8 HOURS
Status: DISCONTINUED | OUTPATIENT
Start: 2024-07-17 | End: 2024-07-18 | Stop reason: HOSPADM

## 2024-07-17 RX ADMIN — HYDRALAZINE HYDROCHLORIDE 50 MG: 25 TABLET ORAL at 08:07

## 2024-07-17 RX ADMIN — ASPIRIN 81 MG: 81 TABLET, COATED ORAL at 09:07

## 2024-07-17 RX ADMIN — INSULIN ASPART 2 UNITS: 100 INJECTION, SOLUTION INTRAVENOUS; SUBCUTANEOUS at 12:07

## 2024-07-17 RX ADMIN — IOHEXOL 75 ML: 350 INJECTION, SOLUTION INTRAVENOUS at 01:07

## 2024-07-17 RX ADMIN — ATORVASTATIN CALCIUM 40 MG: 40 TABLET, FILM COATED ORAL at 08:07

## 2024-07-17 RX ADMIN — SODIUM CHLORIDE, POTASSIUM CHLORIDE, SODIUM LACTATE AND CALCIUM CHLORIDE: 600; 310; 30; 20 INJECTION, SOLUTION INTRAVENOUS at 07:07

## 2024-07-17 RX ADMIN — HYDRALAZINE HYDROCHLORIDE 50 MG: 25 TABLET ORAL at 03:07

## 2024-07-17 RX ADMIN — AMLODIPINE BESYLATE 10 MG: 5 TABLET ORAL at 09:07

## 2024-07-17 RX ADMIN — TAMSULOSIN HYDROCHLORIDE 0.4 MG: 0.4 CAPSULE ORAL at 08:07

## 2024-07-17 RX ADMIN — CLOPIDOGREL BISULFATE 75 MG: 75 TABLET ORAL at 09:07

## 2024-07-17 RX ADMIN — Medication 6 MG: at 08:07

## 2024-07-17 NOTE — PROGRESS NOTES
84-year-old man presenting with left leg weakness and right parietal infarct seen on MRI.  High suspicion for symptomatic carotid lesion per Neurology.  MRA suggestive of significant right carotid stenosis, however discordant with ultrasound results.  Recommend CTA to confirm presence of stenosis, which would indicate need for surgery for further stroke prevention.  Additionally CTA needed for surgical planning.  Recommend proceeding with CTA despite patient's CKD as benefit of CTA outweighs potential risks..  Recommend pre and post IV hydration

## 2024-07-17 NOTE — PROGRESS NOTES
[facility] Medicine  Progress Note    Patient Name: Luther Irwin Jr.  MRN: [id number]  Patient Class: IP- Inpatient   Admission Date: 7/12/2024  Length of Stay: 5 days  Attending Physician: Benny Roman,*  Primary Care Provider: Randee Marie MD        Subjective:     Principal Problem:Acute ischemic right GLORIA stroke        HPI:  84-year-old  male with a past medical history significant for primary essential hypertension, non-insulin-dependent type 2 diabetes, BPH/prostate cancer presents to the ER with persistent left lower extremity weakness and uncoordination.  States that symptoms started on Tuesday.  Denies any falls or trauma to the hip.  Denies any pain.  States that he can not describe it but feels like his leg just isn't cooperating with what he wants to do.  No history of strokes in the past.  Denies any headaches, vision changes, speech problems, swallowing issues, sensory deficits.  States that he feels like he is going to fall when he walks.  Tends to lean towards his weaker side which is his left currently.    Denies missing any of his blood pressure medications or acute changes in his medication regimen.  Denies any changes in his diet.    Workup in the ER was noted for a CT head that showed a right frontal parietal lesion concerning for possible meningioma.  MRI of the brain and MRA of head and neck were ordered and noted:   1. Small foci of acute infarction within the parasagittal right parietal lobe near the vertex.   2. Generalized cerebral volume loss and moderate chronic microvascular ischemic disease.   3. MRA brain without high-grade focal stenosis or large vessel occlusion.   4. Small extra-axial lesion over the lateral aspect of the right frontal parietal region.  This may represent a small meningioma.  No mass effect.     1. Suspected moderate stenosis at the right ICA origin.  Consider further evaluation with CTA and/or carotid ultrasound.   2. MRA  neck with no evidence of additional high-grade stenosis or occlusion.       Started on aspirin 325 mg and Plavix 300 mg p.o. x1 by the ER,  given the timeframe of onset of his deficits he was not stroke activated given that he was outside the window.  There was no large vessel occlusion noted on the MRA.     Because this patient has a new stroke with deficits and requires PT/OT/ST, Neurology eval and possible rehab  , care in an alternative location isn't clinically appropriate for the reasons stated above.     We have been consulted for further management of his stroke as well as inpatient admission to the hospitalist telemetry service.         Overview/Hospital Course:  84-year-old  male with past medical history of hypertension, type 2 diabetes, BPH/prostate cancer who was admitted for left lower extremity weakness secondary to acute CVA of right parietal lobe.  Neurology was consulted.  On aspirin/Plavix/statin.  Allow for permissive hypertension.  Not a candidate for tPA, out of window  MRI revealed small foci of acute infarction within the parasagittal right parietal lobe near the vertex.  MRA brain revealed right frontoparietal meningioma with no mass effect.  No large vessel occlusion.  Moderate stenosis at right ICA.  Carotid ultrasound with no evidence of stenosis not consistent with MRA.  Unable to obtain CTA given DIANE on CKD  Neurology on board.  Consulted vascular for further recs.  PT/OT/ST consulted.  Echo with bubble study negative TSH WNL.  Troponin elevated likely type 2 demand mismatch.  Aggressive risk factor control.  Monitor creatinine with IVF.  Vascular recommend to obtain vascular ultrasound of carotids and CTA head and neck.  Patient's GFR is 29.  Discussed with patient and explained all risks including SIDDHARTH and renal failure, patient understands and agreed to proceed with CTA.  On IVF prevent SIDDHARTH, to continue 6 to 12 hours post contrast administration    Interval History:  Review of Systems   Constitutional: Negative.    Respiratory: Negative.     Cardiovascular: Negative.    Gastrointestinal: Negative.    Genitourinary: Negative.    Musculoskeletal: Negative.    Neurological:  Positive for weakness.     Objective:     Vital Signs (Most Recent):  Temp: 97.9 °F (36.6 °C) (07/17/24 1124)  Pulse: 75 (07/17/24 1124)  Resp: 16 (07/17/24 1124)  BP: (!) 173/79 (07/17/24 1124)  SpO2: (!) 94 % (07/17/24 1124) Vital Signs (24h Range):  Temp:  [97.5 °F (36.4 °C)-98.3 °F (36.8 °C)] 97.9 °F (36.6 °C)  Pulse:  [59-82] 75  Resp:  [16-20] 16  SpO2:  [94 %-100 %] 94 %  BP: (168-186)/(78-96) 173/79     Weight: 114 kg (251 lb 5.2 oz)  Body mass index is 28.31 kg/m².    Intake/Output Summary (Last 24 hours) at 7/17/2024 1339  Last data filed at 7/17/2024 1029  Gross per 24 hour   Intake 340 ml   Output 3225 ml   Net -2885 ml         Physical Exam  Constitutional:       General: He is not in acute distress.     Appearance: He is normal weight.   Cardiovascular:      Rate and Rhythm: Normal rate.      Pulses: Normal pulses.   Pulmonary:      Effort: No respiratory distress.      Breath sounds: Normal breath sounds. No wheezing.   Abdominal:      General: Bowel sounds are normal. There is no distension.      Palpations: Abdomen is soft.      Tenderness: There is no abdominal tenderness.   Musculoskeletal:      Right lower leg: No edema.      Left lower leg: No edema.   Skin:     General: Skin is warm.   Neurological:      Mental Status: He is alert and oriented to person, place, and time.      Motor: Weakness (Left lower extremity weakness) present.   Psychiatric:         Mood and Affect: Mood normal.             Significant Labs: All pertinent labs within the past 24 hours have been reviewed.    Significant Imaging: I have reviewed all pertinent imaging results/findings within the past 24 hours.    Assessment/Plan:      * Acute ischemic right GLORIA stroke    Antithrombotics for secondary stroke prevention:  Antiplatelets: Aspirin: 81 mg daily  Aspirin: 325 mg loading dosex1, now  Clopidogrel: 300 mg loading dose x 1, now  Clopidogrel: 75 mg daily    Statins for secondary stroke prevention and hyperlipidemia, if present:   Statins: Atorvastatin- 40 mg daily    Aggressive risk factor modification: HTN, DM, HLD     Rehab efforts: The patient has been evaluated by a stroke team provider and the therapy needs have been fully considered based off the presenting complaints and exam findings. The following therapy evaluations are needed: PT evaluate and treat, OT evaluate and treat, SLP evaluate and treat    Diagnostics ordered/pending: HgbA1C to assess blood glucose levels, MRA head to assess vasculature, MRA neck/arch to assess vasculature, MRI head without contrast to assess brain parenchyma, TTE to assess cardiac function/status     VTE prophylaxis:  fall risk and getting loaded with ASA and Plavix.     BP parameters: Infarct:  But appears to have had the initial insult greater than 24 hours ago.  For thus resume his blood pressure medicines and goals of slept blood pressure less than 160.        Carotid artery stenosis  Patient was noted to have right ICA stenosis on MRA   Carotid ultrasound negative.  Vascular on board.  Recommend to repeat vascular ultrasound of carotid rule out stenosis.   CTA head/neck today    Meningioma    Need outpatient f/u    Elevated troponin  Likely demand mismatch.  Troponin trend peaked at 0.044.  F echo with no evidence of regional wall motion abnormalities    Stage 3b chronic kidney disease  Creatinine trend 2.9-2.5--2 this morning..  Baseline creatinine 2.1-2.3.  Continue IVF monitor creatinine in a.m.    Type 2 diabetes mellitus with stage 3b chronic kidney disease, without long-term current use of insulin  A1c 7 7/24.. Hold his glipizide 10mg BID and Jardiance 10mg daily.  SSI started and diet adjusted.     Essential hypertension  Chronic, uncontrolled. Latest blood pressure and vitals  reviewed-     Temp:  [97.6 °F (36.4 °C)-98.6 °F (37 °C)]   Pulse:  [50-72]   Resp:  [15-20]   BP: (145-226)/(67-98)   SpO2:  [95 %-99 %] .   Home meds for hypertension were reviewed and noted below.   Hypertension Medications               amlodipine-valsartan (EXFORGE)  mg per tablet Take 1 tablet by mouth once daily.    cloNIDine (CATAPRES) 0.1 MG tablet Take 1 tablet (0.1 mg total) by mouth 3 (three) times daily.    hydroCHLOROthiazide (HYDRODIURIL) 25 MG tablet Take 1 tablet (25 mg total) by mouth once daily.            While in the hospital, will manage blood pressure as follows;.  Allow for permissive hypertension.      Will utilize p.r.n. blood pressure medication only if patient's blood pressure greater than 180/110 and he develops symptoms such as worsening chest pain or shortness of breath.      VTE Risk Mitigation (From admission, onward)           Ordered     Reason for No Pharmacological VTE Prophylaxis  Once        Question:  Reasons:  Answer:  Physician Provided (leave comment)  Comment:  Patient got loaded with aspirin and Plavix.  Also is a fall risk.    07/12/24 2207     IP VTE HIGH RISK PATIENT  Once         07/12/24 2207     Place sequential compression device  Until discontinued         07/12/24 2207                    Discharge Planning   PRASANNA: 7/18/2024     Code Status: Full Code   Is the patient medically ready for discharge?:     Reason for patient still in hospital (select all that apply): Patient trending condition and Treatment  Discharge Plan A: Home   Discharge Delays: None known at this time              Benny Roman MD  Department of Hospital Medicine   [facility]

## 2024-07-17 NOTE — PLAN OF CARE
07/17/24 1029   Medicare Message   Important Message from Medicare regarding Discharge Appeal Rights Given to patient/caregiver;Explained to patient/caregiver;Signed/date by patient/caregiver   Date IMM was signed 07/17/24   Time IMM was signed 1020

## 2024-07-17 NOTE — ASSESSMENT & PLAN NOTE
Patient was noted to have right ICA stenosis on MRA   Carotid ultrasound negative.  Vascular on board.  Recommend to repeat vascular ultrasound of carotid rule out stenosis.   CTA head/neck today

## 2024-07-17 NOTE — SUBJECTIVE & OBJECTIVE
Interval History: Review of Systems   Constitutional: Negative.    Respiratory: Negative.     Cardiovascular: Negative.    Gastrointestinal: Negative.    Genitourinary: Negative.    Musculoskeletal: Negative.    Neurological:  Positive for weakness.     Objective:     Vital Signs (Most Recent):  Temp: 97.9 °F (36.6 °C) (07/17/24 1124)  Pulse: 75 (07/17/24 1124)  Resp: 16 (07/17/24 1124)  BP: (!) 173/79 (07/17/24 1124)  SpO2: (!) 94 % (07/17/24 1124) Vital Signs (24h Range):  Temp:  [97.5 °F (36.4 °C)-98.3 °F (36.8 °C)] 97.9 °F (36.6 °C)  Pulse:  [59-82] 75  Resp:  [16-20] 16  SpO2:  [94 %-100 %] 94 %  BP: (168-186)/(78-96) 173/79     Weight: 114 kg (251 lb 5.2 oz)  Body mass index is 28.31 kg/m².    Intake/Output Summary (Last 24 hours) at 7/17/2024 1339  Last data filed at 7/17/2024 1029  Gross per 24 hour   Intake 340 ml   Output 3225 ml   Net -2885 ml         Physical Exam  Constitutional:       General: He is not in acute distress.     Appearance: He is normal weight.   Cardiovascular:      Rate and Rhythm: Normal rate.      Pulses: Normal pulses.   Pulmonary:      Effort: No respiratory distress.      Breath sounds: Normal breath sounds. No wheezing.   Abdominal:      General: Bowel sounds are normal. There is no distension.      Palpations: Abdomen is soft.      Tenderness: There is no abdominal tenderness.   Musculoskeletal:      Right lower leg: No edema.      Left lower leg: No edema.   Skin:     General: Skin is warm.   Neurological:      Mental Status: He is alert and oriented to person, place, and time.      Motor: Weakness (Left lower extremity weakness) present.   Psychiatric:         Mood and Affect: Mood normal.             Significant Labs: All pertinent labs within the past 24 hours have been reviewed.    Significant Imaging: I have reviewed all pertinent imaging results/findings within the past 24 hours.

## 2024-07-17 NOTE — PLAN OF CARE
Problem: Adult Inpatient Plan of Care  Goal: Plan of Care Review  Outcome: Progressing  Flowsheets (Taken 7/17/2024 0234)  Plan of Care Reviewed With: patient  Goal: Absence of Hospital-Acquired Illness or Injury  Outcome: Progressing  Intervention: Identify and Manage Fall Risk  Flowsheets (Taken 7/17/2024 0234)  Safety Promotion/Fall Prevention:   assistive device/personal item within reach   bed alarm set   Fall Risk reviewed with patient/family   nonskid shoes/socks when out of bed   room near unit station   side rails raised x 2  Intervention: Prevent Skin Injury  Flowsheets (Taken 7/17/2024 0234)  Body Position: position changed independently  Skin Protection: incontinence pads utilized  Device Skin Pressure Protection:   adhesive use limited   absorbent pad utilized/changed   positioning supports utilized  Intervention: Prevent and Manage VTE (Venous Thromboembolism) Risk  Flowsheets (Taken 7/17/2024 0234)  VTE Prevention/Management: bleeding risk assessed  Intervention: Prevent Infection  Flowsheets (Taken 7/17/2024 0234)  Infection Prevention:   rest/sleep promoted   single patient room provided     Patient is AAOx4. On room air. Tele maintained. No falls or injuries reported during shift, safety precautions maintained.

## 2024-07-17 NOTE — NURSING
Report received from marie Funes RN. Patient resting quietly, no apparent distress noted at this time. Wheels locked in lowest position, call light within reach, Telemetry monitoring in place.    [facility] [place]  Nurses Note -- 4 Eyes      7/17/2024       Skin assessed on: Q Shift      [x] No Pressure Injuries Present    [x]Prevention Measures Documented    [] Yes LDA  for Pressure Injury Previously documented     [] Yes New Pressure Injury Discovered   [] LDA for New Pressure Injury Added      Attending RN:  Diana Nair LPN     Second RN:  JENNIFER Funes

## 2024-07-17 NOTE — NURSING
[facility], [facility]  Nurses Note -- 4 Eyes      7/16/2024       Skin assessed on: Q Shift      [x] No Pressure Injuries Present    [x]Prevention Measures Documented    [] Yes LDA  for Pressure Injury Previously documented     [] Yes New Pressure Injury Discovered   [] LDA for New Pressure Injury Added      Attending RN:  Marin Mahajan RN     Second RN:  Bety Walker LPN

## 2024-07-17 NOTE — ASSESSMENT & PLAN NOTE
Creatinine trend 2.9-2.5--2 this morning..  Baseline creatinine 2.1-2.3.  Continue IVF monitor creatinine in a.m.

## 2024-07-18 VITALS
BODY MASS INDEX: 29.08 KG/M2 | SYSTOLIC BLOOD PRESSURE: 152 MMHG | OXYGEN SATURATION: 97 % | DIASTOLIC BLOOD PRESSURE: 78 MMHG | TEMPERATURE: 98 F | RESPIRATION RATE: 18 BRPM | WEIGHT: 251.31 LBS | HEIGHT: 78 IN | HEART RATE: 74 BPM

## 2024-07-18 LAB
ANION GAP SERPL CALC-SCNC: 10 MMOL/L (ref 8–16)
BUN SERPL-MCNC: 31 MG/DL (ref 8–23)
CALCIUM SERPL-MCNC: 8.8 MG/DL (ref 8.7–10.5)
CHLORIDE SERPL-SCNC: 105 MMOL/L (ref 95–110)
CO2 SERPL-SCNC: 22 MMOL/L (ref 23–29)
CREAT SERPL-MCNC: 2.1 MG/DL (ref 0.5–1.4)
EST. GFR  (NO RACE VARIABLE): 30 ML/MIN/1.73 M^2
GLUCOSE SERPL-MCNC: 135 MG/DL (ref 70–110)
POCT GLUCOSE: 111 MG/DL (ref 70–110)
POCT GLUCOSE: 153 MG/DL (ref 70–110)
POCT GLUCOSE: 205 MG/DL (ref 70–110)
POTASSIUM SERPL-SCNC: 3.7 MMOL/L (ref 3.5–5.1)
SODIUM SERPL-SCNC: 137 MMOL/L (ref 136–145)

## 2024-07-18 PROCEDURE — 80048 BASIC METABOLIC PNL TOTAL CA: CPT | Performed by: STUDENT IN AN ORGANIZED HEALTH CARE EDUCATION/TRAINING PROGRAM

## 2024-07-18 PROCEDURE — 25000003 PHARM REV CODE 250: Performed by: STUDENT IN AN ORGANIZED HEALTH CARE EDUCATION/TRAINING PROGRAM

## 2024-07-18 PROCEDURE — 63600175 PHARM REV CODE 636 W HCPCS: Performed by: INTERNAL MEDICINE

## 2024-07-18 PROCEDURE — 36415 COLL VENOUS BLD VENIPUNCTURE: CPT | Performed by: STUDENT IN AN ORGANIZED HEALTH CARE EDUCATION/TRAINING PROGRAM

## 2024-07-18 PROCEDURE — 63600175 PHARM REV CODE 636 W HCPCS: Performed by: STUDENT IN AN ORGANIZED HEALTH CARE EDUCATION/TRAINING PROGRAM

## 2024-07-18 PROCEDURE — 25000003 PHARM REV CODE 250: Performed by: INTERNAL MEDICINE

## 2024-07-18 RX ORDER — FUROSEMIDE 40 MG/1
40 TABLET ORAL DAILY
Qty: 30 TABLET | Refills: 2 | Status: SHIPPED | OUTPATIENT
Start: 2024-07-18 | End: 2024-10-16

## 2024-07-18 RX ADMIN — HYDRALAZINE HYDROCHLORIDE 50 MG: 25 TABLET ORAL at 05:07

## 2024-07-18 RX ADMIN — HYDRALAZINE HYDROCHLORIDE 5 MG: 20 INJECTION INTRAMUSCULAR; INTRAVENOUS at 12:07

## 2024-07-18 RX ADMIN — SODIUM CHLORIDE, POTASSIUM CHLORIDE, SODIUM LACTATE AND CALCIUM CHLORIDE: 600; 310; 30; 20 INJECTION, SOLUTION INTRAVENOUS at 05:07

## 2024-07-18 RX ADMIN — POLYETHYLENE GLYCOL 3350 17 G: 17 POWDER, FOR SOLUTION ORAL at 08:07

## 2024-07-18 RX ADMIN — AMLODIPINE BESYLATE 10 MG: 5 TABLET ORAL at 08:07

## 2024-07-18 RX ADMIN — ASPIRIN 81 MG: 81 TABLET, COATED ORAL at 08:07

## 2024-07-18 RX ADMIN — CLOPIDOGREL BISULFATE 75 MG: 75 TABLET ORAL at 08:07

## 2024-07-18 NOTE — DISCHARGE SUMMARY
[facility] Medicine  Discharge Summary      Patient Name: Luther Irwin Jr.  MRN: [id number]  [facility]: [id number]  Patient Class: IP- Inpatient  Admission Date: 7/12/2024  Hospital Length of Stay: 6 days  Discharge Date and Time:  07/18/2024 2:08 PM  Attending Physician: No att. providers found   Discharging Provider: Benny Roman MD  Primary Care Provider: Randee Marie MD    Primary Care Team: Networked reference to record PCT     HPI:   84-year-old  male with a past medical history significant for primary essential hypertension, non-insulin-dependent type 2 diabetes, BPH/prostate cancer presents to the ER with persistent left lower extremity weakness and uncoordination.  States that symptoms started on Tuesday.  Denies any falls or trauma to the hip.  Denies any pain.  States that he can not describe it but feels like his leg just isn't cooperating with what he wants to do.  No history of strokes in the past.  Denies any headaches, vision changes, speech problems, swallowing issues, sensory deficits.  States that he feels like he is going to fall when he walks.  Tends to lean towards his weaker side which is his left currently.    Denies missing any of his blood pressure medications or acute changes in his medication regimen.  Denies any changes in his diet.    Workup in the ER was noted for a CT head that showed a right frontal parietal lesion concerning for possible meningioma.  MRI of the brain and MRA of head and neck were ordered and noted:   1. Small foci of acute infarction within the parasagittal right parietal lobe near the vertex.   2. Generalized cerebral volume loss and moderate chronic microvascular ischemic disease.   3. MRA brain without high-grade focal stenosis or large vessel occlusion.   4. Small extra-axial lesion over the lateral aspect of the right frontal parietal region.  This may represent a small meningioma.  No mass effect.     1. Suspected moderate  stenosis at the right ICA origin.  Consider further evaluation with CTA and/or carotid ultrasound.   2. MRA neck with no evidence of additional high-grade stenosis or occlusion.       Started on aspirin 325 mg and Plavix 300 mg p.o. x1 by the ER,  given the timeframe of onset of his deficits he was not stroke activated given that he was outside the window.  There was no large vessel occlusion noted on the MRA.     Because this patient has a new stroke with deficits and requires PT/OT/ST, Neurology eval and possible rehab  , care in an alternative location isn't clinically appropriate for the reasons stated above.     We have been consulted for further management of his stroke as well as inpatient admission to the hospitalist telemetry service.         * No surgery found *      Hospital Course:   84-year-old  male with past medical history of hypertension, type 2 diabetes, BPH/prostate cancer who was admitted for left lower extremity weakness secondary to acute CVA of right parietal lobe.  Neurology was consulted.  Initiate On aspirin/Plavix/statin.  Allowed for permissive hypertension x 72 hours.  Not a candidate for tPA, out of window  MRI revealed small foci of acute infarction within the parasagittal right parietal lobe near the vertex.  MRA brain revealed right frontoparietal meningioma with no mass effect.  No large vessel occlusion.  Moderate stenosis at right ICA.  Carotid ultrasound with no evidence of stenosis not consistent with MRA. Echo with bubble study negative TSH WNL.  Troponin elevated likely type 2 demand mismatch. 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  Incidentally found left carotid bulb intraluminal projection likely concern for ulcerated plaque versus thrombus.  Vascular/neurology recommended patient to be discharged on aspirin/Eliquis.  Eliquis to continue for at least 3 months and until  repeat CTA head and neck outpatient to look for interval changes of the left carotid.  No plans for surgical intervention per vascular.  Patient's creatinine remained stable at baseline post CTA.  Patient is discharged home to follow up with Neurology/vascular/PCP outpatient.  Outpatient PT/OT set up.  Patient is also recommended to follow up with Cardiology outpatient for Holter monitoring.       Goals of Care Treatment Preferences:  Code Status: Full Code    Living Will: Yes              Consults:   Consults (From admission, onward)          Status Ordering Provider     Inpatient consult to Vascular Surgery  Once        Provider:  Chuckie Stern MD    Completed CARLOS ROCHA     Inpatient Consult Tele-Vascular Neurology  Once        Provider:  (Not yet assigned)    Completed CARLOS ROCHA     Inpatient consult to Registered Dietitian/Nutritionist  Once        Provider:  (Not yet assigned)    Completed KATINA TERESA     IP consult to case management/social work  Once        Provider:  (Not yet assigned)    Completed KATINA TERESA     Inpatient consult to Neurology  Once        Provider:  Ephraim Garcia MD    Completed KATINA TERESA            No new Assessment & Plan notes have been filed under this hospital service since the last note was generated.  Service: Hospital Medicine    Final Active Diagnoses:    Diagnosis Date Noted POA    PRINCIPAL PROBLEM:  Acute ischemic right GLORIA stroke [I63.521] 07/12/2024 Yes    Carotid artery stenosis [I65.29] 07/16/2024 Yes    Meningioma [D32.9] 07/14/2024 Yes    Stage 3b chronic kidney disease [N18.32] 07/13/2024 Yes    Elevated troponin [R79.89] 07/13/2024 Yes    Type 2 diabetes mellitus with stage 3b chronic kidney disease, without long-term current use of insulin [E11.22, N18.32] 04/02/2015 Yes    Benign prostatic hyperplasia without lower urinary tract symptoms [N40.0] 01/07/2015 Yes    Essential hypertension [I10]  10/23/2014 Yes      Problems Resolved During this Admission:       Discharged Condition: good    Disposition: Home or Self Care    Follow Up:   Follow-up Information       [facility]. Schedule an appointment as soon as possible for a visit in 1 week(s).    Why: Out-Patient OT/PT rehab  Contact information:  [address] [place]  [place] [address]  [phone]             [facility] - Cardiology Follow up.    Specialty: Cardiology  Contact information:  [address] [facility] [address]  [address]  [facility] [id number] [phone]  Additional information:  Please park in garage or Medical Office Bldg. surface lot and use Medical Ofc Bldg elevator. Check in at [facility] [address].             [facility] - Vascular Surgery. Schedule an appointment as soon as possible for a visit.    Specialty: Vascular Surgery  Contact information:  [address] [facility] [address]  [facility] [id number] [phone]  Additional information:  [address]                         Patient Instructions:      Ambulatory referral/consult to Neurology   Standing Status: Future   Referral Priority: Routine Referral Type: Consultation   Referral Reason: Specialty Services Required   Requested Specialty: Neurology   Number of Visits Requested: 1     Ambulatory referral/consult to Vascular Surgery   Standing Status: Future   Referral Priority: Routine Referral Type: Consultation   Referral Reason: Specialty Services Required   Requested Specialty: Vascular Surgery   Number of Visits Requested: 1     Ambulatory referral/consult to Physical/Occupational Therapy   Standing Status: Future   Referral Priority: Routine Referral Type: Physical Medicine   Referral Reason: Specialty Services Required   Number of Visits Requested: 1     Ambulatory referral/consult to Cardiology   Standing Status: Future   Referral Priority: Routine Referral Type: Consultation   Referral Reason: Specialty Services Required   Requested Specialty: Cardiology   Number  "of Visits Requested: 1     Diet Cardiac       Significant Diagnostic Studies: Labs: CMP   Recent Labs   Lab 07/17/24  0723 07/18/24  0423    137   K 3.7 3.7    105   CO2 22* 22*   * 135*   BUN 32* 31*   CREATININE 2.2* 2.1*   CALCIUM 9.1 8.8   ANIONGAP 11 10    and CBC No results for input(s): "WBC", "HGB", "HCT", "PLT" in the last 48 hours.    Pending Diagnostic Studies:       None           Medications:  Reconciled Home Medications:      Medication List        START taking these medications      ELIQUIS 5 mg Tab  Generic drug: apixaban  Take 1 tablet (5 mg total) by mouth 2 (two) times daily.     furosemide 40 MG tablet  Commonly known as: LASIX  Take 1 tablet (40 mg total) by mouth once daily.            CONTINUE taking these medications      amlodipine-valsartan  mg per tablet  Commonly known as: EXFORGE  Take 1 tablet by mouth once daily.     aspirin 81 MG Chew  Take 81 mg by mouth once daily.     cloNIDine 0.1 MG tablet  Commonly known as: CATAPRES  Take 1 tablet (0.1 mg total) by mouth 3 (three) times daily.     empagliflozin 10 mg tablet  Commonly known as: JARDIANCE  Take 1 tablet (10 mg total) by mouth once daily.     glipiZIDE 10 MG Tr24  Commonly known as: GLUCOTROL  Take 1 tablet (10 mg total) by mouth 2 (two) times daily.     tadalafiL 20 MG Tab  Commonly known as: CIALIS  Take 1 tablet (20 mg total) by mouth once daily.     tamsulosin 0.4 mg Cap  Commonly known as: FLOMAX  Take 1 capsule (0.4 mg total) by mouth once daily.            STOP taking these medications      hydroCHLOROthiazide 25 MG tablet  Commonly known as: HYDRODIURIL              Indwelling Lines/Drains at time of discharge:   Lines/Drains/Airways       None                   Time spent on the discharge of patient: 35 minutes         Benny Roman MD  Department of Hospital Medicine  [facility] - Telemetry  "

## 2024-07-18 NOTE — PLAN OF CARE
Problem: Adult Inpatient Plan of Care  Goal: Plan of Care Review  Outcome: Progressing  Goal: Patient-Specific Goal (Individualized)  Outcome: Progressing  Goal: Absence of Hospital-Acquired Illness or Injury  Outcome: Progressing  Goal: Optimal Comfort and Wellbeing  Outcome: Progressing  Goal: Readiness for Transition of Care  Outcome: Progressing     Problem: Stroke, Ischemic (Includes Transient Ischemic Attack)  Goal: Optimal Coping  Outcome: Progressing  Goal: Effective Bowel Elimination  Outcome: Progressing  Goal: Optimal Cerebral Tissue Perfusion  Outcome: Progressing  Goal: Optimal Cognitive Function  Outcome: Progressing  Goal: Improved Communication Skills  Outcome: Progressing  Goal: Optimal Functional Ability  Outcome: Progressing  Goal: Optimal Nutrition Intake  Outcome: Progressing  Goal: Effective Oxygenation and Ventilation  Outcome: Progressing  Goal: Improved Sensorimotor Function  Outcome: Progressing  Goal: Safe and Effective Swallow  Outcome: Progressing  Goal: Effective Urinary Elimination  Outcome: Progressing     Problem: Fall Injury Risk  Goal: Absence of Fall and Fall-Related Injury  Outcome: Progressing     Problem: Diabetes Comorbidity  Goal: Blood Glucose Level Within Targeted Range  Outcome: Progressing

## 2024-07-18 NOTE — NURSING
AVS virtually reviewed with patient in its entirety with emphasis on diet, medications, follow-up appointments and reasons to return to the ED or contact the [facility]. Patient also encouraged to utilize their patient portal. Ease and convenience of use reiterated. Education complete and patient voiced understanding. All questions answered. Discharge teaching complete.

## 2024-07-18 NOTE — NURSING
AVS provided to the patient. PIVs and telemetry removed. Discharge meds delivered to the bedside. All belongings in patient's possession. VN notified that patient is ready for teaching.

## 2024-07-18 NOTE — PLAN OF CARE
Case Management Final Discharge Note      Discharge Disposition: Home     New DME ordered / company name: Pt declined straight cane    Relevant SDOH / Transition of Care Barriers:  none    Primary Caretaker and contact information: Independent/son    Scheduled followup appointment: Primary care follow up scheduled, listed on AVS.     Referrals placed: PT/OT, vascular and cardiology, clinics to contact patient to schedule.     Transportation: Private vehicle/son      Patient and family educated on discharge services and updated on DC plan. Bedside RN notified, patient clear to discharge from Case Management Perspective.      07/18/24 1011   Final Note   Assessment Type Final Discharge Note   Anticipated Discharge Disposition Home   Hospital Resources/Appts/Education Provided Provided patient/caregiver with written discharge plan information;Appointments scheduled and added to AVS   Post-Acute Status   Discharge Delays None known at this time

## 2024-07-18 NOTE — TREATMENT PLAN
[facility] [place] - Telemetry  Neurology treatment plan note    Images were personally reviewed and interpreted:   MRI brain without contrast:  Acute infarct in the right parietal lobe parasagittal region near the vertex, extra-axial lesion noted in the right frontoparietal region likely related to small meningioma     MRA head and neck:  Moderate stenosis of the right ICA origin suspected. No intracranial HGS/LVO     Carotid ultrasound:  No hemodynamically significant stenosis noted in bilateral carotids     Echo done, results pending    CTA head and neck: Scattered atherosclerotic change throughout the cervical vasculature, with less than 50% stenosis at the carotid bifurcations.  Heterogeneous plaque at the left carotid bulb with small intraluminal projecting suggesting an ulceration or superimposed thrombus formation.  Multifocal moderate to severe stenoses throughout the intracranial vasculature as further detailed above, specifically including high-grade narrowing of the distal right GLORIA (distribution of recent infarction).  This is likely related to intracranial atherosclerosis, unless vasculitis or other vasculopathy suspected clinically.     Assessment and Plan:   84 y.o. year old male with past medical history of primary essential hypertension, non-insulin-dependent type 2 diabetes, BPH/prostate cancer presented to the ER on 07/12/2024 with left leg weakness.  MRI revealing right parasagittal parietal lobe infarct close to the vertex.  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  Incidentally found left carotid bulb intraluminal projection likely concern for ulcerated plaque versus thrombus.     Plan:   -continue aspirin 81 mg daily.  Discontinue Plavix.  - start on DOAC of choice and continue for at least 3 months and until repeat CTA is obtained.  - obtain outpatient CTA head and neck to look for interval changes of the left  carotid  - continue Lipitor 40 mg daily  -Consider out patient cardiac monitoring  -Goal Parameters for TIA/stroke: LDL<70 mg/dl, HbA1C: <7.0 %,  SBP<130/80 (discussed risk factor optimization in order to reduce the risk of future events with help of PCP)  -Diet and Exercise: Discussed aggressive lifestyle modification. Eat primarily plant-based foods, such as fruits and vegetables, whole grains, legumes (beans) and nuts. Discussed Mediterranean diet. Daily exercise (150 minutes per week).  -Provided education regarding future stroke identification: I went over the usual stroke warning signs and symptoms, and the need to activate EMS (call 911) as soon as the symptoms present including-sudden onset numbness or weakness of the face, arm, or leg; especially on one side of the body; sudden confusion, trouble speaking, or understanding; sudden trouble seeing in one or both eyes; sudden trouble walking; dizziness; loss of coordination.   - follow up as outpatient in Neurology Clinic    Demond Licona MD  Neurology  [facility]

## 2024-07-18 NOTE — PLAN OF CARE
Problem: Adult Inpatient Plan of Care  Goal: Plan of Care Review  Outcome: Met  Goal: Patient-Specific Goal (Individualized)  Outcome: Met  Goal: Absence of Hospital-Acquired Illness or Injury  Outcome: Met  Goal: Optimal Comfort and Wellbeing  Outcome: Met  Goal: Readiness for Transition of Care  Outcome: Met     Problem: Stroke, Ischemic (Includes Transient Ischemic Attack)  Goal: Optimal Coping  Outcome: Met  Goal: Effective Bowel Elimination  Outcome: Met  Goal: Optimal Cerebral Tissue Perfusion  Outcome: Met  Goal: Optimal Cognitive Function  Outcome: Met  Goal: Improved Communication Skills  Outcome: Met  Goal: Optimal Functional Ability  Outcome: Met  Goal: Optimal Nutrition Intake  Outcome: Met  Goal: Effective Oxygenation and Ventilation  Outcome: Met  Goal: Improved Sensorimotor Function  Outcome: Met  Goal: Safe and Effective Swallow  Outcome: Met  Goal: Effective Urinary Elimination  Outcome: Met     Problem: Fall Injury Risk  Goal: Absence of Fall and Fall-Related Injury  Outcome: Met     Problem: Diabetes Comorbidity  Goal: Blood Glucose Level Within Targeted Range  Outcome: Met

## 2024-07-18 NOTE — NURSING
[facility] [place]  Nurses Note -- 4 Eyes      7/18/2024       Skin assessed on: Q Shift      [x] No Pressure Injuries Present    []Prevention Measures Documented    [] Yes LDA  for Pressure Injury Previously documented     [] Yes New Pressure Injury Discovered   [] LDA for New Pressure Injury Added      Attending RN:  Bhumika Burnett RN     Second RN:  JENNIFER Maloney         PT Acute Evaluation:     Initial Evaluation           Pt seen on 8C nursing unit.                                                Frequency Comments: M,W,F    RECOMMENDATIONS FOR DISCHARGE:  Recommendation for Discharge: PT: Sub-acute nursing home (03/28/18 1410)                                                                                                                 Admitting complaint: Hepatic encephalopathy (CMS/HCC) [K72.90]  Transient alteration of awareness [R40.4]  Alcoholic cirrhosis of liver with ascites (CMS/HCC) [K70.31]                                          SUBJECTIVE: Patient's Personal Goal: Return Home (03/28/18 1410)  Subjective: Pt agreeable to therapy this session and to walking before her dinner comes (03/28/18 1410)  Subjective/Objective Comments: RN was notified of session and states pt is appropriate for PT this session. Pt was sat back in chair with chair alarm at end of session.  (03/28/18 1410)    OBJECTIVE:  Safety Measures: Alarms (03/28/18 1410)    RN reported Mota Fall Scale Score: 60    Co-morbidities:   Patient Active Problem List   Diagnosis   • COPD (chronic obstructive pulmonary disease) (CMS/HCC)   • Edema of both legs   • ALD (alcoholic liver disease) (CMS/HCC)   • Alcoholic cirrhosis of liver with ascites (CMS/HCC)   • Chronic kidney disease, stage III (moderate)   • Altered mental status   • Hepatic encephalopathy (CMS/HCC)   • Coagulopathy (CMS/HCC)   • Anemia   • Thrombocytopenia (CMS/HCC)       Clinical presentation: evolving clinical presentation with changing characteristics      ASSESSMENT:   Pt presents to SICU secondary to encephalopathy. Prior to this pt was independent with all ADL's and functional movement and was living with her aunt in a 1 story house. Pt has altered cognition at baseline and has been having issues with medical management. Social work has contacted her aunt as she is her POA and have agreed upon [facility] for a long term stay. Pt currently is at  a supervision level for all functional activities this session. No LOB/dizziness/SOB this session. Next session should focus on improving ambulation tolerance, reinforcing proper safety with all activities and assessing balance. Skilled therapy is indicated at this time to improve functional mobility and return pt to PLOF.     Other Therapeutic Intervention: Extra time was needed to chart review, discuss pt with RN, and educate pt. Pt was educated on POC, ltransferring, safety, and ambulating (03/28/18 1410)     EDUCATION:   On this date, the patient was educated on See above.    The response to education was: Verbalizes understanding and Needs reinforcement    PT Identified Barriers to Discharge: Cognition, Medical condition, Current functional mobility below baseline     PLAN:   Continue skilled PT, including the following Treatment/Interventions: Functional transfer training;Strengthening;Bed mobility;Gait training;Stairs retraining (03/28/18 1410)   Frequency Comments: M,W,F (03/28/18 1410)    Treatment Plan for Next Session: Next session should focus on improving pt's ambulation tolerance, assess pt's ability to perform stairs, and assess dynamic balance          RECOMMENDATIONS FOR DISCHARGE:  Recommendation for Discharge: PT: Sub-acute nursing home (03/28/18 1410)    PT/OT Mobility Equipment for Discharge: Continue to monitor (03/28/18 1410)  PT/OT ADL Equipment for Discharge: Continue to monitor (03/28/18 1410)    ICU Mobility Assesment (PERME):       Last 24 hours of Functional Data  Bed Mobility        Transfers  Transfers  Sit to Stand: Supervision (Supv) (03/28/18 1410)  Stand to Sit: Supervision (Supv) (03/28/18 1410)  Stand Pivot Transfers: Supervision (Supv) (03/28/18 1410)  Assistive Device/: 1 Person;Gait Belt (03/28/18 1410)  Transfer Comments 1: Pt able to transfer by pushing up off chair and standing up. Pt able to properly sequence movement without cueing (03/28/18 1410)       Gait  Gait  Gait Assistance: Supervision (Supv) (03/28/18 1410)  Assistive Device/: 1 Person;Gait Belt (03/28/18 1410)  Ambulation Distance (Feet): 250 Feet (03/28/18 1410)  Pattern: Within functional limits (03/28/18 1410)  Ambulation Surface: Tile (03/28/18 1410)  Gait Comments 1: Pt ambulated 250 ft at a slow and steady pace. Pt had no LOB, SOB, or dizziness this session. (03/28/18 1410),      Stairs          Neuromuscular Re-education       Balance  Balance  Sitting - Static: Independent (03/28/18 1410)  Sitting - Dynamic: Independent (03/28/18 1410)  Standing - Static: Independent (03/28/18 1410)  Standing - Dynamic (eyes open): Supervision (Supv) (03/28/18 1410)  Balance Comments #1: Pt able to perform all activities in sitting independently (03/28/18 1410)  Balance Comments #2: Pt was able to perform all standing activities for 6-7 min without sitting down fo (03/28/18 1410)    Wheelchair Mobility       Patient's Personal Goal: Return Home (03/28/18 1410)    Therapy Goals:    Goals  Short Term Goals to Be Reviewed On: 04/04/18 (03/28/18 1410)  Short Term Goals = Discharge Goals: Yes (03/28/18 1410)  Goal Agreement: Patient agrees with goals and treatment plan (03/28/18 1410)  Bed Mobility Discharge Goal: Pt able to perform all bed mobility independently (03/28/18 1410)  Transfer Discharge Goal: Pt to transfer from all surfaces independently (03/28/18 1410)  Ambulation Discharge Goal: Pt to ambulate 150 ft independently (03/28/18 1410)  Stairs Discharge Goal: Pt to perform 1 step modified independently (03/28/18 1410)        PT Time Spent: 40 minutes (03/28/18 1410)    See PT flowsheet for full details regarding the PT therapy provided.

## 2024-07-18 NOTE — PLAN OF CARE
Recommendations  Recommendation:   1. Change diet to 2400 calorie ADA diet.   2. Encourage intake at meals as tolerated.   3. Monitor weight/labs.   4. RD to follow to monitor po intake    Goals: Pt will tolerate diet with at least 50-75% intake at meals by RD follow up    Nutrition Goal Status: new  Communication of RD Recs: reviewed with RN

## 2024-07-18 NOTE — PLAN OF CARE
Problem: Adult Inpatient Plan of Care  Goal: Absence of Hospital-Acquired Illness or Injury  Intervention: Identify and Manage Fall Risk  Flowsheets (Taken 7/17/2024 1952)  Safety Promotion/Fall Prevention: assistive device/personal item within reach     Problem: Stroke, Ischemic (Includes Transient Ischemic Attack)  Goal: Optimal Coping  Intervention: Support Psychosocial Response to Stroke  Flowsheets (Taken 7/17/2024 1952)  Supportive Measures: active listening utilized  Goal: Optimal Cerebral Tissue Perfusion  Intervention: Protect and Optimize Cerebral Perfusion  Flowsheets (Taken 7/17/2024 1952)  Cerebral Perfusion Promotion: blood pressure monitored

## 2024-07-18 NOTE — PROGRESS NOTES
[facility] - Telemetry  Adult Nutrition  Progress Note    SUMMARY     Recommendations  Recommendation:   1. Change diet to 2400 calorie ADA diet.   2. Encourage intake at meals as tolerated.   3. Monitor weight/labs.   4. RD to follow to monitor po intake    Goals: Pt will tolerate diet with at least 50-75% intake at meals by RD follow up    Nutrition Goal Status: new  Communication of RD Recs: reviewed with RN    Assessment and Plan  No nutrition diagnosis at this time     Malnutrition Assessment  Weight Loss (Malnutrition):  (4% x 5 months)   Orbital Region (Subcutaneous Fat Loss): well nourished  Upper Arm Region (Subcutaneous Fat Loss): well nourished  Thoracic and Lumbar Region: well nourished   Orthodox Region (Muscle Loss): well nourished  Clavicle Bone Region (Muscle Loss): well nourished  Clavicle and Acromion Bone Region (Muscle Loss): well nourished  Scapular Bone Region (Muscle Loss): well nourished  Dorsal Hand (Muscle Loss): well nourished  Patellar Region (Muscle Loss): well nourished  Anterior Thigh Region (Muscle Loss): well nourished  Posterior Calf Region (Muscle Loss): well nourished     Reason for Assessment  Reason For Assessment: RD follow-up  Diagnosis:  (L leg weakness)  Relevant Medical History: HTN, DM, prostate cancer, hernia repair, spine surgery  General Information Comments: Pt currently on a diabetic diet 2000 kcals. Danyel-20/skin intact. Noted 75% meal intake. Noted vascular surgery recommending pre and post IV hydration. Vascular surgery also wanted to proceed with CTA despire pt's CKD. Noted K repleted. Noted 14 lb weight loss in 5 months. Pt has a good appetite and was eating well before hospital admit. Pt has been trying to intentionally lose weight though deduced portion size and exercise. Pt noted around a 17 lb weight loss prior to admit.  Nutrition Discharge Planning: Pt to d/c on diabetic/cardiac diet    Nutrition Risk Screen  Nutrition Risk Screen: no indicators  "present    Nutrition/Diet History  Patient Reported Diet/Restrictions/Preferences: diabetic diet, heart healthy  Food Preferences: LIONEL  Spiritual, Cultural Beliefs, Taoist Practices, Values that Affect Care: no  Factors Affecting Nutritional Intake: constipation    Nutrition Related Social Determinants of Health: SDOH: Adequate food in home environment    Anthropometrics  Temp: 97.9 °F (36.6 °C)  Height Method: Stated  Height: 6' 7" (200.7 cm)  Height (inches): 79 in  Weight Method: Bed Scale  Weight: 114 kg (251 lb 5.2 oz)  Weight (lb): 251.33 lb  Ideal Body Weight (IBW), Male: 220 lb  % Ideal Body Weight, Male (lb): 114.24 %  BMI (Calculated): 28.3  BMI Grade: 25 - 29.9 - overweight  Usual Body Weight (UBW), k.6 kg  % Usual Body Weight: 94.73  % Weight Change From Usual Weight: -5.47 %     Lab/Procedures/Meds  Pertinent Labs Reviewed: reviewed  Pertinent Labs Comments: CO2 22, BUN 31, Creatinine 2.1, GFR 30, Glucose 135  Pertinent Medications Reviewed: reviewed  Pertinent Medications Comments: aspirin, atorvastatin, tamsulosin    Estimated/Assessed Needs  Weight Used For Calorie Calculations: 114 kg (251 lb 5.2 oz)  Energy Calorie Requirements (kcal): 2870 kcals (25 kcals/kg)  Energy Need Method: Kcal/kg  Protein Requirements: 114g (1g/kg)  Weight Used For Protein Calculations: 114 kg (251 lb 5.2 oz)     Estimated Fluid Requirement Method: RDA Method  RDA Method (mL): 2870  CHO Requirement: 315g    Nutrition Prescription Ordered  Current Diet Order: Diabetic diet 2000 kcals    Evaluation of Received Nutrient/Fluid Intake  I/O:   Energy Calories Required: meeting needs  Protein Required: meeting needs  Fluid Required: meeting needs  Comments: LBM-24  Tolerance: tolerating  % Intake of Estimated Energy Needs: 75 - 100 %  % Meal Intake: 75 - 100 %    Nutrition Risk  Level of Risk/Frequency of Follow-up:  (1x/weekly)     Monitor and Evaluation  Food and Nutrient Intake: food and beverage " intake  Food and Nutrient Adminstration: diet order  Physical Activity and Function: nutrition-related ADLs and IADLs  Anthropometric Measurements: weight  Biochemical Data, Medical Tests and Procedures: electrolyte and renal panel  Nutrition-Focused Physical Findings: overall appearance     Nutrition Follow-Up  RD Follow-up?: Yes

## 2024-07-19 ENCOUNTER — PATIENT OUTREACH (OUTPATIENT)
Dept: ADMINISTRATIVE | Facility: CLINIC | Age: 84
End: 2024-07-19
Payer: MEDICARE

## 2024-07-19 NOTE — PROGRESS NOTES
C3 nurse spoke with Luther Irwin Jr. for a TCC post hospital discharge follow up call. The patient has a scheduled HOSFU appointment with Randee Marie MD on 07/30/2024 @ 7998.

## 2024-07-23 ENCOUNTER — PATIENT MESSAGE (OUTPATIENT)
Dept: FAMILY MEDICINE | Facility: CLINIC | Age: 84
End: 2024-07-23
Payer: MEDICARE

## 2024-07-24 NOTE — PROGRESS NOTES
See plan of care for physical therapy evaluation     Zahida Gupta, PT, DPT,   Board-Certified Clinical Specialist in Neurologic Physical Therapy   Certified Brain Injury Specialist

## 2024-07-25 ENCOUNTER — CLINICAL SUPPORT (OUTPATIENT)
Dept: REHABILITATION | Facility: HOSPITAL | Age: 84
End: 2024-07-25
Attending: STUDENT IN AN ORGANIZED HEALTH CARE EDUCATION/TRAINING PROGRAM
Payer: MEDICARE

## 2024-07-25 DIAGNOSIS — Z74.09 IMPAIRED FUNCTIONAL MOBILITY, BALANCE, GAIT, AND ENDURANCE: Primary | ICD-10-CM

## 2024-07-25 DIAGNOSIS — I63.9 CVA (CEREBRAL VASCULAR ACCIDENT): ICD-10-CM

## 2024-07-25 PROCEDURE — 97162 PT EVAL MOD COMPLEX 30 MIN: CPT | Mod: PN | Performed by: PHYSICAL THERAPIST

## 2024-07-25 PROCEDURE — 97530 THERAPEUTIC ACTIVITIES: CPT | Mod: PN | Performed by: PHYSICAL THERAPIST

## 2024-07-25 NOTE — PATIENT INSTRUCTIONS
"Tandem Stance        Right foot in front of left, heel touching toe both feet "straight ahead". Stand on Foot Triangle of Support with both feet. Balance in this position 30 seconds.  Do with left foot in front of right. Perform 2-3 times with each foot forward.   Copyright © I. All rights reserved.   ANKLE: Plantarflexion - Sitting        Sitting, place strap around foot. Push toes towards the floor. Keep foot straight. Repeat 30 times per set, 2 sets per day, 1 times per day  Copyright © [employer]. All rights reserved.   FLEXION: Standing - Stable (Active)        Stand, both feet flat. Lift right knee toward ceiling.   Complete 2 sets of 20 repetitions. Perform 1 sessions per day.  [email or web address]   Copyright © [employer]. All rights reserved.   Mini-Squats (Standing)        Stand with support. Bend knees slightly like your going to sit in a chair. Return to straight standing.   Repeat 10 times. Do 1 times a day.  Copyright © [employer]. All rights reserved.             .           "

## 2024-07-26 PROBLEM — Z74.09 IMPAIRED FUNCTIONAL MOBILITY, BALANCE, GAIT, AND ENDURANCE: Status: ACTIVE | Noted: 2024-07-26

## 2024-07-26 NOTE — PLAN OF CARE
[facility]  Physical Therapy Neurological Rehabilitation Initial Evaluation     Name: Luther Irwin Jr.  Clinic Number: [id number]     Therapy Diagnosis:        Encounter Diagnoses   Name Primary?    CVA (cerebral vascular accident)      Impaired functional mobility, balance, gait, and endurance Yes      Physician: Benny Roman*     Physician Orders: PT Eval and Treat   Medical Diagnosis from Referral:   Diagnosis   I63.9 (ICD-10-CM) - CVA (cerebral vascular accident)      Evaluation Date: 7/25/2024  Authorization Period Expiration: 7/18/2025  Plan of Care Expiration: 9/5/24  Visit # / Visits authorized: 1/ 1     PN due: 8/25/24     Time In: 0938  Time Out: 0959  Total Billable Time: 21 minutes     Precautions: Standard and Fall     Subjective   Date of onset: 7/12/24  History of current condition - Luther reports: I have a foot (left) that won't act right. I think this is caused by the stroke. Reports pain anterior left ankle, pain increases when raising onto ball of foot. CVA on 7/12/24. Patient had a 5 day hospital stay. PMHx: DM, HTN, left knee surgery, spine surgery (2007)     Medical History:        Past Medical History:   Diagnosis Date    Cancer      CVA (cerebral vascular accident) 7/12/2024    Diabetes mellitus, type 2      Diabetes with neurologic complications      Disorder of kidney and ureter      Hypertension      Late complications of amputation stump      Prostate cancer      Pseudophakia 01/05/2024         Surgical History:   Luther Irwin Jr.  has a past surgical history that includes Knee surgery; Hernia repair (07/1967); Vasectomy (1986); Spine surgery (10/2007); and Finger surgery (3/2014).     Medications:   Luther has a current medication list which includes the following prescription(s): amlodipine-valsartan, apixaban, aspirin, clonidine, empagliflozin, furosemide, glipizide, tadalafil, and tamsulosin.     Allergies:         Review of patient's allergies  indicates:   Allergen Reactions    Grass pollen-june grass standard Itching       Eye irritation         Imaging,   CTA, head/ neck, 7/17/24:   Impression:     Small focus of acute infarction in the paramedian right frontoparietal junction, unchanged from recent MRI.  Moderate chronic small vessel ischemic change with underlying cerebral volume loss.  No new major vascular distribution infarct or intracranial hemorrhage.     Scattered atherosclerotic change throughout the cervical vasculature, with less than 50% stenosis at the carotid bifurcations.  Heterogeneous plaque at the left carotid bulb with small intraluminal projecting suggesting an ulceration or superimposed thrombus formation.     Multifocal moderate to severe stenoses throughout the intracranial vasculature as further detailed above, specifically including high-grade narrowing of the distal right GLORIA (distribution of recent infarction).  This is likely related to intracranial atherosclerosis, unless vasculitis or other vasculopathy suspected clinically.     Unchanged small right frontoparietal extra-axial mass, presumed meningioma.     Electronically signed by:Manpreet Fowler MD  Date:                                            07/17/2024        MRI, Brain, 7/13/24:   Impression:  1. Small foci of acute infarction within the parasagittal right parietal lobe near the vertex.  2. Generalized cerebral volume loss and moderate chronic microvascular ischemic disease.  3. MRA brain without high-grade focal stenosis or large vessel occlusion.  4. Small extra-axial lesion over the lateral aspect of the right frontal parietal region.  This may represent a small meningioma.  No mass effect.     Electronically signed by:Cristin Rayo MD  Date:                                            07/13/2024     Prior Therapy: acute physical therapy only  Social History:  lives alone  Falls: denies   DME: Walker    Home Environment: 1 story home, no steps to enter   Exercise  Routine / History: weight training (upper extremity) every other day   Family Present at time of Eval: none   Occupation: not applicable   Prior Level of Function: walking unlimited distances independently, denied balance impairment, driving  Current Level of Function: uses RW to attend appointment today, no AD in the home. Reports furniture walking. Reports fear of falling.     Pain:  Current 2/10, worst 8/10, best 2/10   Location: left anterior ankle  Description: Tight  Aggravating Factors: standing on the ball of foot  Easing Factors: tylenol     Pts goals: be able to walk without walker     Objective      - Follows commands: yes   - Speech: no deficits      Mental status: alert, oriented to person, place, and time, normal mood, behavior, speech, dress, motor activity, and thought processes  Appearance: Casually dressed  Behavior:  calm and cooperative  Attention Span and Concentration:  Normal     Dominant hand:  right      Posture Alignment :forward head     Skin integrity:  Intact     Sensation:  Light Touch: Intact                      Proprioception:   not tested      Tone: 0 - No increase in muscle tone  Limbs/muscles affected: not applicable      Cranial Nerve Assessment:   Not tested      Visual/Auditory: denies changes      Coordination:   - fine motor: Grossly WFLs, no reports of impairment by pt    - UE coordination: finger to nose: Grossly WFLs, no reports of impairment by pt                                   Pronation/ supination: Grossly WFLs, no reports of impairment by pt   - LE coordination:  alternating toe taps: within functional limits                                 Heel to shin: within functional limits      ROM:   UPPER EXTREMITY--AROM/PROM  (R) UE: WFLs  (L) UE: WFLs            RANGE OF MOTION--LOWER EXTREMITIES  (R) LE Hip: equal bilaterally              Knee: equal bilaterally              Ankle: equal bilaterally     (L) LE: Hip: equal bilaterally              Knee: equal  bilaterally              Ankle: equal bilaterally     Strength: manual muscle test grades below   Upper Extremity Strength  Grossly WFLs, no reports of impairment by pt      Lower Extremity Strength    RLE LLE   Hip Flexion: 5/5 5/5   Hip Extension:  Not tested  Not tested    Hip Abduction: Not tested  Not tested    Hip Adduction: Not tested  Not tested    Knee Extension: 5/5 5/5   Knee Flexion: 5/5 5/5   Ankle Dorsiflexion: 5/5 5/5   Ankle Plantarflexion: 5/5 3-/5   Ankle Inversion: Not tested  5/5   Ankle Eversion: Not tested  5/5           Evaluation   Single Limb Stance R LE 4 sec  (<10 sec = HIGH FALL RISK)   Single Limb Stance L LE 2 sec  (<10 sec = HIGH FALL RISK)   5 times sit-stand 25 seconds      Tinetti 22/28      5 times sit<>stand Cutoff scores:  >12 sec= fall risk  PD: >16 seconds= fall risk  Vestibular/balance: 15 seconds over 65 years  CVA: 12 seconds     Tinetti Balance Assessment  Balance:  1. Sitting Balance 1              Leans/ slides in chair= 0              Steady= 1  2. Rises from chair 1              Unable without help= 0              Able, uses arms= 1              Able without use of arms= 2  3. Attempts to rise 2              Unable without help= 0              Able, >1 attmept required-= 1              Able, 1 attempt= 2  4. Immediate standing balance (1st 5 seconds) 2              Unsteady (swaggers, moves feet, trunk sway)= 0              Steady but uses walker or other support= 1              Narrow base of support without walker or support= 2  5. Nudged 2              Begins to fall= 0              Staggers, grabs, catches self= 1              Steady= 2  6. Standing balance 2              Unsteady= 0              Steady but wide TEAGAN or uses AD=1              Narrow TEAGAN without AD=2  7. Eyes closed 1              Unsteady= 0              Steady= 1  8. Turning 360 degrees 1              Discontinuous steps= 0              Continuous steps= 1              Unsteady= 0               Steady= 1  9. Sitting down 1              Unsafe= 0              Uses arms or not in a smooth motion= 1              Safe, smooth motion= 2  Balance score= 13/16  Gait:  1. Initiation 1              hesitates or multiple attempts to start= 0              No hesitancy= 1  2. Step length 2              Step to= 0              One foot passes= 1              reciprocal pattern= 2  3. Step height 2                         Neither foot clears floor= 0              One foot clears floor= 1              Both feet clear floor= 2  4. Step symmetry 1              Not symmetrical= 0              Appears symmetrical= 1  5. Step continuity 1              Not continuous= 0              Appears continuous= 1  6. Path 1              Marked deviation= 0              Mild/moderate deviation or uses A.D.= 1              Straight without A.D.= 2  7. Trunk 0              Marked sway or uses A.D.= 0              No sway, but flexes knees or back, spread arms out while walking= 1              No sway, no flexion, no use of UE, no use of A.D.= 2  8. Walking stance 1              Heels apart= 0              Heels almost touching while walking= 1  Gait score= 9/12  Total score= 22/28 , moderate fall risk     0-18= high fall risk  19-23= moderate fall risk  24-28= low fall risk                      Postural control:  MCTSIB:  1. Eyes Open/feet together/Firm: 30 seconds, no- minimal sway  2. Eyes Closed/feet together/Firm: 30 seconds, minimal assist   3. Eyes Open/feet together/Foam: 30 seconds, minimal-moderate sway  4. Eyes Closed/feet together/Foam: 3-5 sec seconds     Gait Assessment:   - AD used: RW/ none  - Assistance: modified independent  - Distance: limited community/ household  - Curb: not tested   - Ramp:  not tested   - Stairs: not tested         GAIT DEVIATIONS:  Gait component performance:   Narrow TEAGAN, shortened stride, slightly crouched (knee flexion) in stance  * Above impairments indicate decreased gait safety and increased  fall risk.        Impairments contributing to deviations: impaired motor control, impaired balance     Endurance Deficit: fair        Evaluation   Timed Up and Go 20s +16.75s +14 sec= 16.9 s  > 20 sec safe for independent transfers,     > 30 sec assist required for transfers   6 meter walk test Not tested    6 min walk test Not tested    Timed Up and Go fall risk:   Community dwelling older adults >13.5 sec   Chronic CVA >14 sec   Geriatric with h/o falls >15 sec   Frail elderly >32.6 sec    LE amputees >19 sec   PD >7.95- 11.5 sec   Hip OA >10 sec   Vestibular >11.1 sec      Functional Mobility (Bed mobility, transfers)  Bed mobility: Mod I  Supine to sit: Mod I  Sit to supine: Mod I  Rolling: Mod I  Transfers to bed: Mod I  Transfers to toilet: not tested   Sit to stand:  Mod I  Stand pivot:  Mod I  Car transfers: not tested   Wheelchair mobility: not tested   Floor transfers: not tested          CMS Impairment/Limitation/Restriction for FOTO Stroke Lower Extremity Survey     Therapist reviewed FOTO scores for Luther Dc Soto on 7/25/2024.   FOTO documents entered into [employer] - see Media section.     Limitation Score: 42%            TREATMENT   Treatment Time In: 1000  Treatment Time Out: 1015  Total Treatment time separate from Evaluation: 15 minutes     Luther participated in dynamic functional therapeutic activities to improve functional performance for 15  minutes, including:     Semi tandem stance x 30 sec- minimal sway  Tandem stance 2 x 30 sec     Sitting:   Left plantar flexion Blue thera band 30x     Parallel bar:   Marching 20x  Mini squat 10x        Home Exercises and Patient Education Provided     Education provided:   - role of physical therapy/ plan of care  - review of home exercise program      Written Home Exercises Provided: yes.  Exercises were reviewed and Luther was able to demonstrate them prior to the end of the session.  Luther demonstrated good  understanding of the education provided.     "  See EMR under Patient Instructions for exercises provided 7/25/2024.     Assessment   Luther is a 84 y.o. male referred to outpatient Physical Therapy with a medical diagnosis of CVA. Patient experienced a CVA on 7/12/24 resulting in left lower extremity impairment. Pt presents with decreased lower extremity strength and endurance via 5 times sit<>stand test. Test was modified to perform from ~20" mat due to patient's height. Patient is attempting to ambulate in the home with an assistive device but reports furniture walking. When ambulating without assistive device patient presents with the following impairments: Narrow TEAGAN, shortened stride, and slightly crouched in stance. These deviations are not as apparent when patient ambulates with rolling walker. Decreased speed of household mobility noted via Timed Up and Go. Impaired balance noted via Tinetti and MCTSIB. Patient was not able to complete condition 4 and increased sway was noted on condition 3. Per Tinetti patient is at a moderate risk for falling. Due to current impairments patient reports he is now having to steady self on furniture or walls in the home and is using a rolling walker outside of home. Patient was instructed in home exercise program to address balance and lower extremity strength. Patient was able to return demonstration. Patient can benefit from skilled physical therapy to return to prior level of function for mobility.       Pt prognosis is Good.   Pt will benefit from skilled outpatient Physical Therapy to address the deficits stated above and in the chart below, provide pt/family education, and to maximize pt's level of independence.      Plan of care discussed with patient: Yes  Pt's spiritual, cultural and educational needs considered and patient is agreeable to the plan of care and goals as stated below:      Anticipated Barriers for therapy: none     Medical Necessity is demonstrated by the following  History  Co-morbidities and " personal factors that may impact the plan of care Co-morbidities:   DM, HTN, left knee surgery, spine surgery     Personal Factors:   no deficits       high   Examination  Body Structures and Functions, activity limitations and participation restrictions that may impact the plan of care Body Regions:   lower extremities  upper extremities     Body Systems:    gross symmetry  ROM  strength  gross coordinated movement  balance  gait  transfers  transitions  motor control  edema  skin integrity     Participation Restrictions:   Required AD for ambulation outside of home  Unsteadiness when walking without AD  Impaired balance- fall risk  Impaired tolerance to daily mobility     Activity limitations:   Learning and applying knowledge  no deficits     General Tasks and Commands  no deficits     Communication  no deficits     Mobility  walking  driving (bike, car, motorcycle)     Self care  no deficits     Domestic Life  no deficits     Interactions/Relationships  no deficits     Life Areas  no deficits     Community and Social Life  community life  recreation and leisure             high   Clinical Presentation stable low   Decision Making/ Complexity Score: low      Goals:  Short Term Goals: 3 weeks   Patient will report compliance with home exercise program at least 2x/ week to improve speed of progress towards PLOF.   Patient will score >24/28 on Tinetti to demonstrate decreased fall risk from moderate to minimal.   Patient will demonstrate improved household mobility and decreased need for furniture walking by performing Timed Up and Go in 13 sec without assistive device.   Patient will demonstrate improved lower extremity strength and endurance by performing 5 times sit<>stand test in 20 sec.   Assess Functional Gait Assessment and set goals as needed.       Long Term Goals: 6 weeks   Patient will demonstrate improved lower extremity strength and endurance by performing 5 times sit<>stand test in 15 sec.  Patient  will demonstrate improved balance responses in various environments by performing narrow base of support, foam, eyes closed x 30 sec.   Patient will report no difficulty negotiating curbs to demonstrate a return to prior level of function and improve his ability to independently attend medical appointments.   Patient will deny left ankle pain to improve all mobility.      Plan   Plan of care Certification: 7/25/2024 to 9/5/24.     Outpatient Physical Therapy 2 times weekly for 6 weeks to include the following interventions: Manual Therapy, Moist Heat/ Ice, Neuromuscular Re-ed, Patient Education, Therapeutic Activities, and Therapeutic Exercise.      Zahida Gupta, PT, DPT,   Board-Certified Clinical Specialist in Neurologic Physical Therapy   Certified Brain Injury Specialist    7/25/2024

## 2024-07-29 ENCOUNTER — TELEPHONE (OUTPATIENT)
Dept: FAMILY MEDICINE | Facility: CLINIC | Age: 84
End: 2024-07-29
Payer: MEDICARE

## 2024-07-29 ENCOUNTER — CLINICAL SUPPORT (OUTPATIENT)
Dept: REHABILITATION | Facility: HOSPITAL | Age: 84
End: 2024-07-29
Payer: MEDICARE

## 2024-07-29 ENCOUNTER — PATIENT MESSAGE (OUTPATIENT)
Dept: FAMILY MEDICINE | Facility: CLINIC | Age: 84
End: 2024-07-29
Payer: MEDICARE

## 2024-07-29 DIAGNOSIS — Z74.09 IMPAIRED FUNCTIONAL MOBILITY, BALANCE, GAIT, AND ENDURANCE: Primary | ICD-10-CM

## 2024-07-29 PROCEDURE — 97530 THERAPEUTIC ACTIVITIES: CPT | Mod: PN | Performed by: PHYSICAL THERAPIST

## 2024-07-29 PROCEDURE — 97112 NEUROMUSCULAR REEDUCATION: CPT | Mod: PN | Performed by: PHYSICAL THERAPIST

## 2024-07-29 NOTE — PROGRESS NOTES
"  Physical Therapy Daily Treatment Note     Name: Luther Irwin Jr.  Clinic Number: [id number]    Therapy Diagnosis:   Encounter Diagnosis   Name Primary?    Impaired functional mobility, balance, gait, and endurance Yes     Physician: Benny Roman*    Visit Date: 7/29/2024    Physician Orders: PT Eval and Treat   Medical Diagnosis from Referral:   Diagnosis   I63.9 (ICD-10-CM) - CVA (cerebral vascular accident)      Evaluation Date: 7/25/2024  Authorization Period Expiration: 7/18/2025  Plan of Care Expiration: 9/5/24  Visit # / Visits authorized: 1/ 1     PN due: 8/25/24     Time In: 1031  Time Out: 1115  Total Billable Time: 44 minutes    Precautions: Standard and Fall    Subjective     Pt reports: no new reports .  He was compliant with home exercise program.  Response to previous treatment: no adverse effects reported  Functional change: ongoing    Pain: 0/10  Location: not applicable       Objective     Luther participated in dynamic functional therapeutic activities to improve functional performance for 21  minutes, including:     Recumbent stepper bilateral lower extremity only L5 x 5 mins for improved strength and endurance.     Parallel bar:   Side stepping Green thera band- 4 laps  Monster walks Green thera band- 4 laps  Short hurdles, step to, no upper extremity- 4 laps  Short hurdles, side stepping, intermittent upper extremity - 4 laps    Sit<>stand from 24" box, no upper extremity- 2 x 10      Luther participated in neuromuscular re-education activities to improve: Balance for 23 minutes. The following activities were included:    Functional Gait Assessment:   1. Gait on level surface =  1, 8.5s   (3) Normal: less than 5.5 sec, no A.D., no imbalance, normal gait pattern, deviates< 6in   (2) Mild impairment: 7-5.6 sec, uses A.D., mild gait deviations, or deviates 6-10 in   (1) Moderate impairment: > 7 sec, slow speed, imbalance, deviates 10-15 in.   (0) Severe impairment: needs assist, deviates " >15 in, reach/touch wall  2. Change in Gait Speed = 2   (3) Normal: smooth change w/o loss of balance or gait deviation, deviates < 6 in, significant difference between speeds   (2) Mild impairment: changes speed, but demonstrates mild gait deviations, deviates 6-10 in, OR no deviations but unable to significantly speed, OR uses A.D.   (1) Moderate impairment: minor changes to speed, OR changes speed w/ significant deviations, deviates 10-15 in, OR  Changes speed , but loses balance & recovers   (0) Severe impairment: cannot change speed, deviates >15 in, or loses balance & needs assist  3. Gait with horizontal head turns  = 2   (3) Normal: no change in gait, deviates <6 in   (2) Mild impairment: slight change in speed, deviates 6-10 in, OR uses A.D.   (1) Moderate impairment: moderate change in speed, deviates 10-15 in   (0) Severe impairment: severe disruption of gait, deviates >15in  4. Gait with vertical head turns = 1   (3) Normal: no change in gait, deviates <6 in   (2) Mild impairment: slight change in speed, deviates 6-10 in OR uses A.D.   (1) Moderate impairment: moderate change in speed, deviates 10-15 in   (0) Severe impairment: severe disruption of gait, deviates >15 in  5. Gait with pivot turns = 3   (3) Normal: performs safely in 3 sec, no LOB   (2) Mild impairment: performs in >3 sec & no LOB, OR turns safely & requires several steps to regain LOB   (1) Moderate impairment: turns slow, OR requires several small steps for balance following turn & stop   (0) Severe impairment: cannot turn safely, needs assist  6. Step over obstacle = 2   (3) Normal: steps over 2 stacked boxes w/o change in speed or LOB   (2) Mild impairment: able to step over 1 box w/o change in speed or LOB   (1) Moderate impairment: steps over 1 box but must slow down, may require VC   (0) Severe impairment: cannot perform w/o assist  7. Gait with Narrow TEAGAN = 0   (3) Normal: 10 steps no staggering   (2) Mild impairment: 7-9  steps   (1) Moderate impairment: 4-7 steps   (0) Severe impairment: < 4 steps or cannot perform w/o assist  8. Gait with eyes closed = 0   (3) Normal: < 7 sec, no A.D., no LOB, normal gait pattern, deviates <6 in   (2) Mild impairment: 7.1-9 sec, mild gait deviations, deviates 6-10 in   (1) Moderate impairment: > 9 sec, abnormal pattern, LOB, deviates 10-15 in   (0) Severe impairment: cannot perform w/o assist, LOB, deviates >15in  9. Ambulating Backwards = 2   (3) Normal: no A.D., no LOB, normal gait pattern, deviates <6in   (2) Mild impairment: uses A.D., slower speed, mild gait deviations, deviates 6-10 in   (1) Moderate impairment: slow speed, abnormal gait pattern, LOB, deviates 10-15 in   (0) Severe impairment: severe gait deviations or LOB, deviates >15in  10. Steps = 2   (3) Normal: alternating feet, no rail   (2) Mild Impairment: alternating feet, uses rail   (1) Moderate impairment: step-to, uses rail   (0) Severe impairment: cannot perform safely    Score 15/30     Score:   <22/30 fall risk   <20/30 fall risk in older adults   <18/30 fall risk in Parkinsons   Scores by Decade:                        Age    Score                        40-49  (24-30)                       50-59  (25-30)                       60-69  (20-30)                       70-79  (16-30)  KATE Matta. (2007). Reference Group Data for the Functional Gait Assessment. Physical Therapy ([id number], [id number].     Parallel bar:   Tandem stance 2 x 30 sec  Narrow base of support, foam, eyes open x 30 sec- minimal sway  Narrow base of support, foam, head turns 2 x 30 sec- minimal- moderate sway  Narrow base of support, foam, eyes closed 2 x 30 sec- moderate sway, CGA  Tandem gait, 1 upper extremity- 4 laps      Home Exercises Provided and Patient Education Provided     Education provided:   - continue with home exercise program     Written Home Exercises Provided: Patient instructed to cont prior HEP.  Exercises were reviewed and Luther was able  to demonstrate them prior to the end of the session.  Luther demonstrated good  understanding of the education provided.     See EMR under Patient Instructions for exercises provided prior visit.    Assessment   Luther tolerated first follow up physical therapy session well. Functional Gait Assessment was assessed to determine balance impairments while walking. Patient score 15/30 and goals set to address. Patient had difficulty walking with head turns, walking with eyes closed, stepping over obstacles, and ambulating in tandem. Poor balance and righting responses noted when balancing on foam with eyes closed. contact guard assistance provided to assist patient to regain balance. Patient can benefit from continued skilled physical therapy to return as close to prior level of function as possible.      Luther Is progressing well towards his goals.   Pt prognosis is Good.     Pt will continue to benefit from skilled outpatient physical therapy to address the deficits listed in the problem list box on initial evaluation, provide pt/family education and to maximize pt's level of independence in the home and community environment.     Pt's spiritual, cultural and educational needs considered and pt agreeable to plan of care and goals.     Anticipated barriers to physical therapy: none    Goals:   Short Term Goals: 3 weeks   Patient will report compliance with home exercise program at least 2x/ week to improve speed of progress towards PLOF. ongoing   Patient will score >24/28 on Tinetti to demonstrate decreased fall risk from moderate to minimal. ongoing   Patient will demonstrate improved household mobility and decreased need for furniture walking by performing Timed Up and Go in 13 sec without assistive device. ongoing   Patient will demonstrate improved lower extremity strength and endurance by performing 5 times sit<>stand test in 20 sec. ongoing   Assess Functional Gait Assessment and set goals as needed.  Met  7/29/24   Revised 7/29/24: patient will demonstrate improved balance and decreased fall risk by scoring 19/30 on Functional Gait Assessment.      Long Term Goals: 6 weeks   Patient will demonstrate improved lower extremity strength and endurance by performing 5 times sit<>stand test in 15 sec.ongoing   Patient will demonstrate improved balance responses in various environments by performing narrow base of support, foam, eyes closed x 30 sec. ongoing   Patient will report no difficulty negotiating curbs to demonstrate a return to prior level of function and improve his ability to independently attend medical appointments. ongoing   Patient will deny left ankle pain to improve all mobility. Met 7/29/24  New 7/29/24: patient will demonstrate improved balance and decreased fall risk by scoring 22/30 on Functional Gait Assessment.     Plan   Outpatient Physical Therapy 2 times weekly to include the following interventions: Manual Therapy, Moist Heat/ Ice, Neuromuscular Re-ed, Patient Education, Therapeutic Activities, and Therapeutic Exercise.      reduce height for sit<>stand     Zahida Gupta, PT, DPT,   Board-Certified Clinical Specialist in Neurologic Physical Therapy   Certified Brain Injury Specialist    7/29/2024

## 2024-07-29 NOTE — TELEPHONE ENCOUNTER
Called pt to reschedule appt on 7/30 due to  being out of the office. Patient didn't answer lvm and leaving portal message

## 2024-07-30 ENCOUNTER — OFFICE VISIT (OUTPATIENT)
Dept: FAMILY MEDICINE | Facility: CLINIC | Age: 84
End: 2024-07-30
Payer: MEDICARE

## 2024-07-30 VITALS
DIASTOLIC BLOOD PRESSURE: 72 MMHG | HEART RATE: 98 BPM | SYSTOLIC BLOOD PRESSURE: 162 MMHG | BODY MASS INDEX: 28.93 KG/M2 | TEMPERATURE: 98 F | HEIGHT: 78 IN | OXYGEN SATURATION: 58 % | RESPIRATION RATE: 18 BRPM | WEIGHT: 250 LBS

## 2024-07-30 DIAGNOSIS — N18.32 STAGE 3B CHRONIC KIDNEY DISEASE: ICD-10-CM

## 2024-07-30 DIAGNOSIS — Z09 HOSPITAL DISCHARGE FOLLOW-UP: Primary | ICD-10-CM

## 2024-07-30 DIAGNOSIS — I10 ESSENTIAL HYPERTENSION: ICD-10-CM

## 2024-07-30 DIAGNOSIS — N18.32 TYPE 2 DIABETES MELLITUS WITH STAGE 3B CHRONIC KIDNEY DISEASE, WITHOUT LONG-TERM CURRENT USE OF INSULIN: ICD-10-CM

## 2024-07-30 DIAGNOSIS — I69.30 SEQUELA, POST-STROKE: ICD-10-CM

## 2024-07-30 DIAGNOSIS — Z74.09 IMPAIRED FUNCTIONAL MOBILITY, BALANCE, GAIT, AND ENDURANCE: ICD-10-CM

## 2024-07-30 DIAGNOSIS — E11.22 TYPE 2 DIABETES MELLITUS WITH STAGE 3B CHRONIC KIDNEY DISEASE, WITHOUT LONG-TERM CURRENT USE OF INSULIN: ICD-10-CM

## 2024-07-30 PROCEDURE — 99999 PR PBB SHADOW E&M-EST. PATIENT-LVL V: CPT | Mod: PBBFAC,,,

## 2024-07-30 PROCEDURE — 3288F FALL RISK ASSESSMENT DOCD: CPT | Mod: CPTII,S$GLB,,

## 2024-07-30 PROCEDURE — 1126F AMNT PAIN NOTED NONE PRSNT: CPT | Mod: CPTII,S$GLB,,

## 2024-07-30 PROCEDURE — 1159F MED LIST DOCD IN RCRD: CPT | Mod: CPTII,S$GLB,,

## 2024-07-30 PROCEDURE — 1157F ADVNC CARE PLAN IN RCRD: CPT | Mod: CPTII,S$GLB,,

## 2024-07-30 PROCEDURE — 3078F DIAST BP <80 MM HG: CPT | Mod: CPTII,S$GLB,,

## 2024-07-30 PROCEDURE — 1111F DSCHRG MED/CURRENT MED MERGE: CPT | Mod: CPTII,S$GLB,,

## 2024-07-30 PROCEDURE — 1160F RVW MEDS BY RX/DR IN RCRD: CPT | Mod: CPTII,S$GLB,,

## 2024-07-30 PROCEDURE — 2023F DILAT RTA XM W/O RTNOPTHY: CPT | Mod: CPTII,S$GLB,,

## 2024-07-30 PROCEDURE — 1101F PT FALLS ASSESS-DOCD LE1/YR: CPT | Mod: CPTII,S$GLB,,

## 2024-07-30 PROCEDURE — 99214 OFFICE O/P EST MOD 30 MIN: CPT | Mod: S$GLB,,,

## 2024-07-30 PROCEDURE — 3077F SYST BP >= 140 MM HG: CPT | Mod: CPTII,S$GLB,,

## 2024-07-30 RX ORDER — HYDROCHLOROTHIAZIDE 25 MG/1
25 TABLET ORAL
COMMUNITY
Start: 2024-07-21

## 2024-07-30 NOTE — PROGRESS NOTES
HPI     Chief Complaint:  Chief Complaint   Patient presents with    Hospital Follow Up       Luther Irwin Jr. is a 84 y.o. male with multiple medical diagnoses as listed in the medical history and problem list that presents for  hospital follow up      Using a walker    Follow-up  The current episode started 1 to 4 weeks ago. Pertinent negatives include no abdominal pain, chest pain, chills, coughing, fever, headaches, nausea, numbness or visual change. Nothing aggravates the symptoms. He has tried nothing for the symptoms.      This visit for hospital discharge.  Patient was admitted due to stroke.   Patient has no new complaints today     Stroke deficit:   Did you follow up with Neurology:  Appointment was made   Vascular surgery:  appointment was made   Physical therapy- started and   pain starting to feel better   Cardiology-  no appointment scheduled    Patient was placed on a cardiac diet-  patient states that he is adjusting by adding in or vegetables and fruit     Home medication: all  medication reconcile,  patient is unsure,  denies taking a medication about a name of apixaban or Eliquis    Wanted to make sure that his clonidine  is to be taken twice a day   States that he did take home BP meds today.  Was stopped on hydrochlorothiazide.  States his blood pressure runs 150- 160/70 something.      The patient's son's have voiced her concerns with the patient needs more assistance at home after a stroke.  They have asked if someone can  come to his home for assessment      7/18/2024  hospital discharge:  I:   84-year-old  male with a past medical history significant for primary essential hypertension, non-insulin-dependent type 2 diabetes, BPH/prostate cancer presents to the ER with persistent left lower extremity weakness and uncoordination.  States that symptoms started on Tuesday.  Denies any falls or trauma to the hip.  Denies any pain.  States that he can not describe it but feels like  his leg just isn't cooperating with what he wants to do.  No history of strokes in the past.  Denies any headaches, vision changes, speech problems, swallowing issues, sensory deficits.  States that he feels like he is going to fall when he walks.  Tends to lean towards his weaker side which is his left currently.     Denies missing any of his blood pressure medications or acute changes in his medication regimen.  Denies any changes in his diet.     Workup in the ER was noted for a CT head that showed a right frontal parietal lesion concerning for possible meningioma.  MRI of the brain and MRA of head and neck were ordered and noted:   1. Small foci of acute infarction within the parasagittal right parietal lobe near the vertex.   2. Generalized cerebral volume loss and moderate chronic microvascular ischemic disease.   3. MRA brain without high-grade focal stenosis or large vessel occlusion.   4. Small extra-axial lesion over the lateral aspect of the right frontal parietal region.  This may represent a small meningioma.  No mass effect.      1. Suspected moderate stenosis at the right ICA origin.  Consider further evaluation with CTA and/or carotid ultrasound.   2. MRA neck with no evidence of additional high-grade stenosis or occlusion.         Started on aspirin 325 mg and Plavix 300 mg p.o. x1 by the ER,  given the timeframe of onset of his deficits he was not stroke activated given that he was outside the window.  There was no large vessel occlusion noted on the MRA.      Because this patient has a new stroke with deficits and requires PT/OT/ST, Neurology eval and possible rehab  , care in an alternative location isn't clinically appropriate for the reasons stated above.      We have been consulted for further management of his stroke as well as inpatient admission to the hospitalist telemetry service.           * No surgery found *       Hospital Course:   84-year-old  male with past medical  history of hypertension, type 2 diabetes, BPH/prostate cancer who was admitted for left lower extremity weakness secondary to acute CVA of right parietal lobe.  Neurology was consulted.  Initiate On aspirin/Plavix/statin.  Allowed for permissive hypertension x 72 hours.  Not a candidate for tPA, out of window  MRI revealed small foci of acute infarction within the parasagittal right parietal lobe near the vertex.  MRA brain revealed right frontoparietal meningioma with no mass effect.  No large vessel occlusion.  Moderate stenosis at right ICA.  Carotid ultrasound with no evidence of stenosis not consistent with MRA. Echo with bubble study negative TSH WNL.  Troponin elevated likely type 2 demand mismatch. 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  Incidentally found left carotid bulb intraluminal projection likely concern for ulcerated plaque versus thrombus.  Vascular/neurology recommended patient to be discharged on aspirin/Eliquis.  Eliquis to continue for at least 3 months and until repeat CTA head and neck outpatient to look for interval changes of the left carotid.  No plans for surgical intervention per vascular.  Patient's creatinine remained stable at baseline post CTA.  Patient is discharged home to follow up with Neurology/vascular/PCP outpatient.  Outpatient PT/OT set up.  Patient is also recommended to follow up with Cardiology outpatient for Holter monitoring.          CT head and neck 07/17/2024:  Narrative & Impression  EXAMINATION:  CTA HEAD AND NECK (XPD)     CLINICAL HISTORY:  Stroke/TIA, determine embolic source;ICA stenosis;     TECHNIQUE:  Axial CT images obtained throughout the region of the head before and after the administration of intravenous contrast.  CT angiogram was performed through the cervical and intracranial vasculature during the IV bolus administration of 75mL of Omnipaque 350.  Multiplanar MPR  and MIP reformats were performed.     COMPARISON:  None     FINDINGS:  Ventricles are stable in size.  Pattern of cerebral volume loss, without evidence of hydrocephalus.     Small focus of acute infarction in the posterior most aspect of the right superior frontal gyrus about the paracentral lobule as well as in the post-central gyrus, unchanged from recent MRI no significant mass effect or associated hemorrhage.  No new major vascular distribution infarct identified elsewhere.  Moderate chronic small vessel ischemic change throughout the supratentorial white matter.     Small extra-axial mass centered over the right frontoparietal junction laterally.  This again measures on the order of 0.6 cm maximal thickness without significant mass effect.  No new  extra-axial blood or fluid collections.     The cranium appears intact. Mastoid air cells and paranasal sinuses are essentially clear.     Degenerative change throughout the cervical spine.     CTA:     The aortic arch demonstratesmild calcification no significant stenosis at the major branch vessel origins.     The right common carotid artery exhibits mild scattered noncalcified plaque with minimal luminal encroachment..  Mild plaque with minimal luminal narrowing at the carotid bifurcation (less than 50% stenosis per NASCET.  Additional plaque with mild narrowing of distal carotid bulb.  Otherwise the cervical internal carotid arteries normal in caliber.  Mild atherosclerotic change throughout the cavernous and supraclinoid segments without significant narrowing.     Mild circumferential noncalcified plaque in the mid to distal left common carotid artery with minimal luminal narrowing.  Mild plaque at the carotid bifurcation with minimal luminal narrowing.  Heterogeneous plaque at the carotid bulb with small intraluminal projection suggesting ulceration or superimposed thrombus formation (i.e.  Series 5, image 323).  Mild atherosclerotic change throughout the  cavernous and supraclinoid segments without significant narrowing.     Moderate focal stenosis at the origin of the right vertebral artery.  Right vertebral artery otherwise normal in caliber.     The cervical left vertebral artery is normal in caliber.  Mild narrowing in the intracranial segment.     Basilar artery is within normal limits without focal abnormality.     M1 segment of the right MCA is normal in caliber.  However multifocal moderate to severe narrowing throughout the inferior division M2 segment.  Additional mild irregularity narrowing throughout the superior division.     M1 segment of the left MCA is normal in caliber.  However there is multifocal narrowing within M3 and distal branch vessels particularly in the inferior division.     Moderate to severe multifocal narrowing throughout the proximal PCAs bilaterally.     The proximal ACAs appear within normal limits.  However multifocal stenoses throughout distal branch vessels, particularly on the right.     Impression:     Small focus of acute infarction in the paramedian right frontoparietal junction, unchanged from recent MRI.     Moderate chronic small vessel ischemic change with underlying cerebral volume loss.     No new major vascular distribution infarct or intracranial hemorrhage.     Scattered atherosclerotic change throughout the cervical vasculature, with less than 50% stenosis at the carotid bifurcations.  Heterogeneous plaque at the left carotid bulb with small intraluminal projecting suggesting an ulceration or superimposed thrombus formation.     Multifocal moderate to severe stenoses throughout the intracranial vasculature as further detailed above, specifically including high-grade narrowing of the distal right GLORIA (distribution of recent infarction).  This is likely related to intracranial atherosclerosis, unless vasculitis or other vasculopathy suspected clinically.     Unchanged small right frontoparietal extra-axial mass,  presumed meningioma.     This report was flagged in Epic as abnormal.     Assessment & Plan       Medication list is up-to-date.  Patient re prescribed Eliquis to ensure he is taking it.   Patient states physical therapy has started.  Patient has a list of  future out patient  physical therapy appointments in place.  Appointment for Neurology and vascular surgery have been place.  Patient spoke to referral coordinator to place an appointment with Cardiology as referral was already place at discharge from hospital.   Referral placed for home health and outpatient management     Information provided on AVS      Problem List Items Addressed This Visit          Cardiac/Vascular    Essential hypertension   Patient states that he was taken off of  hydrochlorothiazide.  Will re-evaluate his blood pressure at follow up.  Patient adjusting to his new diet.       Renal/    Stage 3b chronic kidney disease   Last eGFR on 07/18/2024 was 30,  stable to his baseline.        Endocrine    Type 2 diabetes mellitus with stage 3b chronic kidney disease, without long-term current use of insulin   Patient continues to take his Jardiance and glipizide.        Other    Impaired functional mobility, balance, gait, and endurance    Hospital discharge follow-up - Primary    Relevant Medications    apixaban (ELIQUIS) 5 mg Tab    Other Relevant Orders    Ambulatory referral/consult to Home Health    Ambulatory referral/consult to Outpatient Case Management     Other Visit Diagnoses       Sequela, post-stroke        Relevant Orders    Ambulatory referral/consult to Outpatient Case Management  Ambulatory referral/consult to Home Health              --------------------------------------------      Health Maintenance:  Health Maintenance         Date Due Completion Date    RSV Vaccine (Age 60+ and Pregnant patients) (1 - 1-dose 60+ series) Never done ---    COVID-19 Vaccine (7 - 2023-24 season) 09/01/2023 5/8/2023    Influenza Vaccine (1)  "09/01/2024 11/10/2023    Eye Exam 01/05/2025 1/5/2024    Override on 6/10/2015: Declined (had an appoint last week )    Hemoglobin A1c 01/13/2025 7/13/2024    Lipid Panel 07/12/2025 7/12/2024    TETANUS VACCINE 01/02/2029 1/2/2019            Health maintenance reviewed    Follow Up:  Follow up if symptoms worsen or fail to improve.    Exam     Review of Systems:  (as noted above)  Review of Systems   Constitutional:  Negative for chills and fever.   Respiratory:  Negative for cough and shortness of breath.    Cardiovascular:  Negative for chest pain.   Gastrointestinal:  Negative for abdominal pain and nausea.   Genitourinary:  Negative for difficulty urinating.   Neurological:  Negative for dizziness, numbness and headaches.       Physical Exam:   Physical Exam  Constitutional:       General: He is not in acute distress.     Appearance: He is not ill-appearing, toxic-appearing or diaphoretic.   HENT:      Head: Normocephalic and atraumatic.   Cardiovascular:      Rate and Rhythm: Normal rate and regular rhythm.      Pulses: Normal pulses.      Heart sounds: Normal heart sounds. No murmur heard.  Pulmonary:      Effort: Pulmonary effort is normal. No respiratory distress.      Breath sounds: Normal breath sounds.   Musculoskeletal:      Right lower leg: No edema.      Left lower leg: No edema.   Neurological:      Mental Status: He is alert and oriented to person, place, and time.      Gait: Gait abnormal (using a walker).   Psychiatric:         Mood and Affect: Mood normal.       Vitals:    07/30/24 1132 07/30/24 1140   BP: (!) 164/72 (!) 162/72   BP Location: Left arm Right arm   Patient Position: Sitting    BP Method: Small (Manual)    Pulse: 98    Resp: 18    Temp: 97.5 °F (36.4 °C)    TempSrc: Oral    SpO2: (!) 58%    Weight: 113.4 kg (250 lb)    Height: 6' 7" (2.007 m)       Body mass index is 28.16 kg/m².        History     Past Medical History:  Past Medical History:   Diagnosis Date    Cancer  "    CVA (cerebral vascular accident) 2024    Diabetes mellitus, type 2     Diabetes with neurologic complications     Disorder of kidney and ureter     Hypertension     Late complications of amputation stump     Prostate cancer     Pseudophakia 2024       Past Surgical History:  Past Surgical History:   Procedure Laterality Date    FINGER SURGERY  3/2014    HERNIA REPAIR  1967    KNEE SURGERY      SPINE SURGERY  10/2007    VASECTOMY  1986       Social History:  Social History     Socioeconomic History    Marital status: Single    Number of children: 3    Highest education level: Bachelor's degree (e.g., BA, AB, BS)   Tobacco Use    Smoking status: Former     Current packs/day: 0.00     Average packs/day: 0.3 packs/day for 0.9 years (0.2 ttl pk-yrs)     Types: Cigarettes     Start date: 1960     Quit date: 1960     Years since quittin.7    Smokeless tobacco: Never   Substance and Sexual Activity    Alcohol use: Yes     Comment: special occacion    Drug use: No     Social Determinants of Health     Financial Resource Strain: Low Risk  (2024)    Overall Financial Resource Strain (CARDIA)     Difficulty of Paying Living Expenses: Not hard at all   Food Insecurity: No Food Insecurity (2024)    Hunger Vital Sign     Worried About Running Out of Food in the Last Year: Never true     Ran Out of Food in the Last Year: Never true   Transportation Needs: No Transportation Needs (2023)    PRAPARE - Transportation     Lack of Transportation (Medical): No     Lack of Transportation (Non-Medical): No   Physical Activity: Insufficiently Active (2024)    Exercise Vital Sign     Days of Exercise per Week: 1 day     Minutes of Exercise per Session: 10 min   Stress: No Stress Concern Present (2024)    [employer] - Occupational Stress Questionnaire     Feeling of Stress : Not at all   Housing Stability: Low Risk  (2023)    Housing Stability Vital Sign      Unable to Pay for Housing in the Last Year: No     Number of Places Lived in the Last Year: 1     Unstable Housing in the Last Year: No       Family History:  Family History   Problem Relation Name Age of Onset    Hypertension Mother      Hyperlipidemia Mother      Hypertension Father      Hyperlipidemia Father         Allergies and Medications: (updated and reviewed)  Review of patient's allergies indicates:   Allergen Reactions    Grass pollen-june grass standard Itching     Eye irritation     Current Outpatient Medications   Medication Sig Dispense Refill    amlodipine-valsartan (EXFORGE)  mg per tablet Take 1 tablet by mouth once daily. 90 tablet 3    aspirin 81 MG Chew Take 81 mg by mouth once daily.      cloNIDine (CATAPRES) 0.1 MG tablet Take 1 tablet (0.1 mg total) by mouth 3 (three) times daily. (Patient taking differently: Take 0.1 mg by mouth 2 (two) times daily.) 270 tablet 3    empagliflozin (JARDIANCE) 10 mg tablet Take 1 tablet (10 mg total) by mouth once daily. 30 tablet 11    furosemide (LASIX) 40 MG tablet Take 1 tablet (40 mg total) by mouth once daily. 30 tablet 2    glipiZIDE (GLUCOTROL) 10 MG TR24 Take 1 tablet (10 mg total) by mouth 2 (two) times daily. 180 tablet 3    tadalafiL (CIALIS) 20 MG Tab Take 1 tablet (20 mg total) by mouth once daily. (Patient taking differently: Take 20 mg by mouth as needed.) 30 tablet 11    tamsulosin (FLOMAX) 0.4 mg Cap Take 1 capsule (0.4 mg total) by mouth once daily. 90 capsule 3    apixaban (ELIQUIS) 5 mg Tab Take 1 tablet (5 mg total) by mouth 2 (two) times daily. 180 tablet 0    hydroCHLOROthiazide (HYDRODIURIL) 25 MG tablet Take 25 mg by mouth. (Patient not taking: Reported on 7/30/2024)       No current facility-administered medications for this visit.       Patient Care Team:  Randee Marie MD as PCP - General (Internal Medicine)  Kwan Baker OD (Optometry)  Rush Gee MA as Care Coordinator         - The patient is given  an After Visit Summary that lists all medications with directions, allergies, education, orders placed during this encounter and follow-up instructions.      - I have reviewed the patient's medical information including past medical, family, and social history sections including the medications and allergies.      - We discussed the patient's current medications.     This note was created by combination of typed  and MModal dictation.  Transcription errors may be present.  If there are any questions, please contact me.       Jody Miller PA-C

## 2024-07-30 NOTE — PROGRESS NOTES
Health Maintenance Due   Topic     RSV Vaccine (Age 60+ and Pregnant patients) (1 - 1-dose 60+ series) Not offered at this Facility    COVID-19 Vaccine (7 - 2023-24 season) Not offered at this Facility

## 2024-08-01 ENCOUNTER — HOME CARE VISIT (OUTPATIENT)
Dept: NEUROLOGY | Facility: HOSPITAL | Age: 84
End: 2024-08-01
Payer: MEDICARE

## 2024-08-06 ENCOUNTER — CLINICAL SUPPORT (OUTPATIENT)
Dept: REHABILITATION | Facility: HOSPITAL | Age: 84
End: 2024-08-06
Payer: MEDICARE

## 2024-08-06 DIAGNOSIS — Z74.09 IMPAIRED FUNCTIONAL MOBILITY, BALANCE, GAIT, AND ENDURANCE: Primary | ICD-10-CM

## 2024-08-06 PROCEDURE — 97530 THERAPEUTIC ACTIVITIES: CPT | Mod: PN,CQ

## 2024-08-06 PROCEDURE — 97112 NEUROMUSCULAR REEDUCATION: CPT | Mod: PN,CQ

## 2024-08-07 ENCOUNTER — PATIENT OUTREACH (OUTPATIENT)
Dept: ADMINISTRATIVE | Facility: OTHER | Age: 84
End: 2024-08-07
Payer: MEDICARE

## 2024-08-09 VITALS
DIASTOLIC BLOOD PRESSURE: 67 MMHG | OXYGEN SATURATION: 97 % | RESPIRATION RATE: 16 BRPM | SYSTOLIC BLOOD PRESSURE: 138 MMHG | HEART RATE: 62 BPM

## 2024-08-12 ENCOUNTER — INITIAL CONSULT (OUTPATIENT)
Dept: VASCULAR SURGERY | Facility: CLINIC | Age: 84
End: 2024-08-12
Payer: MEDICARE

## 2024-08-12 VITALS
DIASTOLIC BLOOD PRESSURE: 80 MMHG | HEIGHT: 78 IN | BODY MASS INDEX: 29.45 KG/M2 | WEIGHT: 254.5 LBS | SYSTOLIC BLOOD PRESSURE: 161 MMHG | HEART RATE: 55 BPM

## 2024-08-12 DIAGNOSIS — I65.23 BILATERAL CAROTID ARTERY STENOSIS: ICD-10-CM

## 2024-08-12 DIAGNOSIS — I65.23 BILATERAL CAROTID ARTERY STENOSIS: Primary | ICD-10-CM

## 2024-08-12 PROCEDURE — 1111F DSCHRG MED/CURRENT MED MERGE: CPT | Mod: CPTII,S$GLB,, | Performed by: SURGERY

## 2024-08-12 PROCEDURE — 99999 PR PBB SHADOW E&M-EST. PATIENT-LVL III: CPT | Mod: PBBFAC,,, | Performed by: SURGERY

## 2024-08-12 PROCEDURE — 99215 OFFICE O/P EST HI 40 MIN: CPT | Mod: S$GLB,,, | Performed by: SURGERY

## 2024-08-12 NOTE — PROGRESS NOTES
Chuckie Stern MD, PhD  [facility]   08/12/2024    HPI:  Luther Irwin Jr. is a 84 y.o. male with a history of carotid stenosis, is following up after consultation [facility]     84-year-old man with CKD, and diabetes, nonsmoker, who presents after episode of left leg weakness that started on Tuesday.  Patient weakness has resolved since being in the hospital.  He underwent stroke workup which revealed a parietal acute infarct in the right hemisphere.  Patient was previously on aspirin daily.  His since been started on Plavix.   CTA was performed which demonstrated no cervical right carotid stenosis, however there was some intracranial stenosis in the right hemisphere.  Incidentally the left internal carotid artery was noted to have a pedunculated plaque/thrombus.  Recommended anticoagulation for this asymptomatic lesion  and the patient was discharged on Eliquis and aspirin.     Patient was following up now.  He has some left leg residual weakness, but he says it has been slowly improving.  He is able to ambulate.  He uses a walker for assistance, and/or just to be safe as he says.  He has had no new symptoms, no unilateral vision changes or new weakness, slurred speech etc.  Patient Active Problem List   Diagnosis    Essential hypertension    Benign prostatic hyperplasia without lower urinary tract symptoms    Type 2 diabetes mellitus with stage 3b chronic kidney disease, without long-term current use of insulin    Diabetes with proteinuria    Hyperchylomicronemia    Prostate cancer    Acute ischemic right GLORIA stroke    Stage 3b chronic kidney disease    Elevated troponin    Meningioma    Carotid artery stenosis    Impaired functional mobility, balance, gait, and endurance    Hospital discharge follow-up     Past Medical History:   Diagnosis Date    Cancer     CVA (cerebral vascular accident) 7/12/2024    Diabetes mellitus, type 2     Diabetes with neurologic complications     Disorder of  kidney and ureter     Hypertension     Late complications of amputation stump     Prostate cancer     Pseudophakia 2024     Past Surgical History:   Procedure Laterality Date    FINGER SURGERY  3/2014    HERNIA REPAIR  1967    KNEE SURGERY      SPINE SURGERY  10/2007    VASECTOMY  1986     Family History   Problem Relation Name Age of Onset    Hypertension Mother      Hyperlipidemia Mother      Hypertension Father      Hyperlipidemia Father       Social History     Socioeconomic History    Marital status: Single    Number of children: 3    Highest education level: Bachelor's degree (e.g., BA, AB, BS)   Tobacco Use    Smoking status: Former     Current packs/day: 0.00     Average packs/day: 0.3 packs/day for 0.9 years (0.2 ttl pk-yrs)     Types: Cigarettes     Start date: 1960     Quit date: 1960     Years since quittin.7    Smokeless tobacco: Never   Substance and Sexual Activity    Alcohol use: Yes     Comment: special occacion    Drug use: No     Social Determinants of Health     Financial Resource Strain: Low Risk  (2024)    Overall Financial Resource Strain (CARDIA)     Difficulty of Paying Living Expenses: Not hard at all   Food Insecurity: No Food Insecurity (2024)    Hunger Vital Sign     Worried About Running Out of Food in the Last Year: Never true     Ran Out of Food in the Last Year: Never true   Transportation Needs: No Transportation Needs (2023)    PRAPARE - Transportation     Lack of Transportation (Medical): No     Lack of Transportation (Non-Medical): No   Physical Activity: Insufficiently Active (2024)    Exercise Vital Sign     Days of Exercise per Week: 1 day     Minutes of Exercise per Session: 10 min   Stress: No Stress Concern Present (2024)    [employer] - Occupational Stress Questionnaire     Feeling of Stress : Not at all   Housing Stability: Low Risk  (2023)    Housing Stability Vital Sign     Unable to Pay  "for Housing in the Last Year: No     Number of Places Lived in the Last Year: 1     Unstable Housing in the Last Year: No       Current Outpatient Medications:     amlodipine-valsartan (EXFORGE)  mg per tablet, Take 1 tablet by mouth once daily., Disp: 90 tablet, Rfl: 3    apixaban (ELIQUIS) 5 mg Tab, Take 1 tablet (5 mg total) by mouth 2 (two) times daily., Disp: 180 tablet, Rfl: 0    aspirin 81 MG Chew, Take 81 mg by mouth once daily., Disp: , Rfl:     cloNIDine (CATAPRES) 0.1 MG tablet, Take 1 tablet (0.1 mg total) by mouth 3 (three) times daily. (Patient taking differently: Take 0.1 mg by mouth 2 (two) times daily.), Disp: 270 tablet, Rfl: 3    empagliflozin (JARDIANCE) 10 mg tablet, Take 1 tablet (10 mg total) by mouth once daily., Disp: 30 tablet, Rfl: 11    furosemide (LASIX) 40 MG tablet, Take 1 tablet (40 mg total) by mouth once daily., Disp: 30 tablet, Rfl: 2    glipiZIDE (GLUCOTROL) 10 MG TR24, Take 1 tablet (10 mg total) by mouth 2 (two) times daily., Disp: 180 tablet, Rfl: 3    tadalafiL (CIALIS) 20 MG Tab, Take 1 tablet (20 mg total) by mouth once daily. (Patient taking differently: Take 20 mg by mouth as needed.), Disp: 30 tablet, Rfl: 11    tamsulosin (FLOMAX) 0.4 mg Cap, Take 1 capsule (0.4 mg total) by mouth once daily., Disp: 90 capsule, Rfl: 3    hydroCHLOROthiazide (HYDRODIURIL) 25 MG tablet, Take 25 mg by mouth. (Patient not taking: Reported on 7/30/2024), Disp: , Rfl:     REVIEW OF SYSTEMS:  ROS       VITALS:  Vitals:    08/12/24 1336   BP: (!) 161/80   BP Location: Left arm   Patient Position: Sitting   Pulse: (!) 55   Weight: 115.5 kg (254 lb 8.3 oz)   Height: 6' 7" (2.007 m)        PHYSICAL EXAM:   Physical Exam   Palpable radial pulses bilaterally      LAB RESULTS:  No results found for: "CBC"  Lab Results   Component Value Date    LABPROT 10.6 07/12/2024    INR 1.0 07/12/2024     Lab Results   Component Value Date     07/18/2024    K 3.7 07/18/2024     07/18/2024    CO2 " 22 (L) 07/18/2024     (H) 07/18/2024    BUN 31 (H) 07/18/2024    CREATININE 2.1 (H) 07/18/2024    CALCIUM 8.8 07/18/2024    ANIONGAP 10 07/18/2024    EGFRNONAA 34.5 (A) 03/02/2022     Lab Results   Component Value Date    WBC 8.85 07/15/2024    RBC 4.83 07/15/2024    HGB 13.1 (L) 07/15/2024    HCT 40.5 07/15/2024    MCV 84 07/15/2024    MCH 27.1 07/15/2024    MCHC 32.3 07/15/2024    RDW 12.0 07/15/2024     07/15/2024    MPV 12.0 07/15/2024    GRAN 6.1 07/15/2024    GRAN 68.6 07/15/2024    LYMPH 1.7 07/15/2024    LYMPH 19.2 07/15/2024    MONO 0.7 07/15/2024    MONO 7.8 07/15/2024    EOS 0.3 07/15/2024    BASO 0.08 07/15/2024    EOSINOPHIL 3.3 07/15/2024    BASOPHIL 0.9 07/15/2024    DIFFMETHOD Automated 07/15/2024     .  Lab Results   Component Value Date    HGBA1C 7.0 (H) 07/13/2024       IMAGING:  All pertinent imaging has been reviewed and interpreted independently.      IMP/PLAN:  Luther Irwin Jr. is a 84 y.o. male with a history of right parietal stroke with left leg weakness, that is slowly improving.  No cervical carotid stenosis on right.  Patient was incidentally noted to have pedunculated plaque/thrombus in his left internal carotid artery, asymptomatic.  I discussed with the patient and his son that given that the patient was having no left hemispheric symptoms, I recommended continuing medical management for the left ICA lesion.  Patient will be following up with Neurology later this week and would defer to them to refer for neuro interventionalist as indicated to treat right intracranial lesions    Plan:    Continue Eliquis and ASA  RTC in 4 months w repeat CTA head and neck to evaluate evolution of left ICA lesion  Defer to Neurology or neuro interventionalist for treatment of symptomatic right intracranial stenosis          I spent 45 minutes evaluating this patient and greater than 50% of the time was spent counseling, coordinator care and discussing the plan of care.  All questions were  answered and patient stated understanding with agreement with the above treatment plan.    Chuckie Stern MD, PhD  [facility]ascular [facility]

## 2024-08-15 ENCOUNTER — CLINICAL SUPPORT (OUTPATIENT)
Dept: REHABILITATION | Facility: HOSPITAL | Age: 84
End: 2024-08-15
Payer: MEDICARE

## 2024-08-15 DIAGNOSIS — Z74.09 IMPAIRED FUNCTIONAL MOBILITY, BALANCE, GAIT, AND ENDURANCE: Primary | ICD-10-CM

## 2024-08-15 DIAGNOSIS — I10 ESSENTIAL HYPERTENSION: ICD-10-CM

## 2024-08-15 PROCEDURE — 97530 THERAPEUTIC ACTIVITIES: CPT | Mod: PN

## 2024-08-15 PROCEDURE — 97112 NEUROMUSCULAR REEDUCATION: CPT | Mod: PN

## 2024-08-15 PROCEDURE — 97110 THERAPEUTIC EXERCISES: CPT | Mod: PN

## 2024-08-15 NOTE — TELEPHONE ENCOUNTER
No care due was identified.  [facility] Embedded Care Due Messages. Reference number: [id number].   8/15/2024 11:50:33 AM CDT

## 2024-08-15 NOTE — TELEPHONE ENCOUNTER
Last Office Visit Info:   The patient's last visit with Randee Marie MD was on Patient does not have a PCP or has not yet seen their PCP.    The patient's last visit in current department was on 7/30/2024.        Last CBC Results:   Lab Results   Component Value Date    WBC 8.85 07/15/2024    HGB 13.1 (L) 07/15/2024    HCT 40.5 07/15/2024     07/15/2024       Last CMP Results  Lab Results   Component Value Date     07/18/2024    K 3.7 07/18/2024     07/18/2024    CO2 22 (L) 07/18/2024    BUN 31 (H) 07/18/2024    CREATININE 2.1 (H) 07/18/2024    CALCIUM 8.8 07/18/2024    ALBUMIN 3.4 (L) 07/15/2024    AST 16 07/15/2024    ALT 12 07/15/2024       Last Lipids  Lab Results   Component Value Date    CHOL 168 07/12/2024    TRIG 214 (H) 07/12/2024    HDL 33 (L) 07/12/2024    LDLCALC 92.2 07/12/2024       Last A1C  Lab Results   Component Value Date    HGBA1C 7.0 (H) 07/13/2024       Last TSH  Lab Results   Component Value Date    TSH 1.778 07/12/2024             Current Med Refills  Medication List with Changes/Refills   Current Medications    AMLODIPINE-VALSARTAN (EXFORGE)  MG PER TABLET    Take 1 tablet by mouth once daily.       Start Date: 5/10/2024 End Date: --    APIXABAN (ELIQUIS) 5 MG TAB    Take 1 tablet (5 mg total) by mouth 2 (two) times daily.       Start Date: 7/30/2024 End Date: 10/28/2024    ASPIRIN 81 MG CHEW    Take 81 mg by mouth once daily.       Start Date: --        End Date: --    CLONIDINE (CATAPRES) 0.1 MG TABLET    Take 1 tablet (0.1 mg total) by mouth 3 (three) times daily.       Start Date: 5/10/2024 End Date: --    EMPAGLIFLOZIN (JARDIANCE) 10 MG TABLET    Take 1 tablet (10 mg total) by mouth once daily.       Start Date: 5/10/2024 End Date: --    FUROSEMIDE (LASIX) 40 MG TABLET    Take 1 tablet (40 mg total) by mouth once daily.       Start Date: 7/18/2024 End Date: 10/16/2024    GLIPIZIDE (GLUCOTROL) 10 MG TR24    Take 1 tablet (10 mg total) by mouth 2 (two) times  daily.       Start Date: 5/10/2024 End Date: --    HYDROCHLOROTHIAZIDE (HYDRODIURIL) 25 MG TABLET    Take 25 mg by mouth.       Start Date: 7/21/2024 End Date: --    TADALAFIL (CIALIS) 20 MG TAB    Take 1 tablet (20 mg total) by mouth once daily.       Start Date: 3/12/2024 End Date: 3/12/2025    TAMSULOSIN (FLOMAX) 0.4 MG CAP    Take 1 capsule (0.4 mg total) by mouth once daily.       Start Date: 5/10/2024 End Date: --       Order(s) placed per written order guidelines: none    Please advise.

## 2024-08-15 NOTE — PROGRESS NOTES
Physical Therapy Daily Treatment Note     Name: Luther Irwin Jr.  Clinic Number: [id number]    Therapy Diagnosis:   Encounter Diagnosis   Name Primary?    Impaired functional mobility, balance, gait, and endurance Yes       Physician: Benny Roman*    Visit Date: 8/15/2024    Physician Orders: PT Eval and Treat   Medical Diagnosis from Referral:   Diagnosis   I63.9 (ICD-10-CM) - CVA (cerebral vascular accident)      Evaluation Date: 7/25/2024  Authorization Period Expiration: 7/18/2025  Plan of Care Expiration: 9/5/24  Visit # / Visits authorized: 3/20     PN due: 8/25/24     Time In: 10:25  Time Out: 11:10  Total Billable Time: 45 minutes    Precautions: Standard and Fall    Subjective     Pt reports: No complaints  He was compliant with home exercise program.  Response to previous treatment: no adverse effects reported  Functional change: ongoing    Pain: 0/10  Location: not applicable       Objective     Luther participated in dynamic functional therapeutic activities to improve functional performance for 15 minutes, including:    Parallel bar:   X 20 Step up onto 4 in step with opposite high knee, 1 upper extremity assist, both sides     All with CGA:   40 feet x 2 walking with head turns, 1 loss of balance requires min assist to recover  40 feet x 4 walking with head nods   40 feet x 4 change in speed walking       Luther participated in neuromuscular re-education activities to improve: Balance for 20 minutes. The following activities were included:    Parallel bar:   Narrow base of support, foam, eyes open 2 x 30 sec- minimal sway  Narrow base of support, foam, head turns 2 x 30 sec- minimal- moderate sway  Narrow base of support, foam, eyes closed 2 x 30 sec- moderate sway, CGA  Tandem gait, 1 upper extremity- 4 laps  Backward walking with butt kick - 4 laps     X 10 Upper extremity forward reach holding 10lb kettle bell   X 10 upper extremity forward reach holding 10 lb kettle bell, standing on foam  pad with feet apart    X 30 sec split stance on bosu, each side, CGA  2 X 30 sec split stance on bosu , with head nods, each lower extremity, Occ touch down support, CGA   X 15 forward lunge onto soft side of bosu iwht 1 upper extremity assist , CGA     Time above includes seated rest breaks     Luther participated in therex to improve strength, range of motion for 10 minutes     Shuttle with 1 black resistance:  2 X 20 B lower extremity   X 10 left lower extremity     X 15 standing calf raises, 1 upper extremity assist     Home Exercises Provided and Patient Education Provided     Education provided:   - continue with home exercise program     Written Home Exercises Provided: Patient instructed to cont prior HEP.  Exercises were reviewed and Luther was able to demonstrate them prior to the end of the session.  Luther demonstrated good  understanding of the education provided.     See EMR under Patient Instructions for exercises provided prior visit.    Assessment   Mr. Sloan participated well in today's session.  Increased challenge of activity with dynamic gait without AD today, he had 1 loss of balance requires min assist to recover. Also progress static balance, with split stance on bosu with appropriate challenge.  He notes some fatigue post session. He will continue to benefit from skilled Physical Therapy to address impairments and maximize mobility.     Pt prognosis is Good.     Pt will continue to benefit from skilled outpatient physical therapy to address the deficits listed in the problem list box on initial evaluation, provide pt/family education and to maximize pt's level of independence in the home and community environment.     Pt's spiritual, cultural and educational needs considered and pt agreeable to plan of care and goals.     Anticipated barriers to physical therapy: none    Goals:   Short Term Goals: 3 weeks   Patient will report compliance with home exercise program at least 2x/ week to  improve speed of progress towards PLOF. ongoing   Patient will score >24/28 on Tinetti to demonstrate decreased fall risk from moderate to minimal. ongoing   Patient will demonstrate improved household mobility and decreased need for furniture walking by performing Timed Up and Go in 13 sec without assistive device. ongoing   Patient will demonstrate improved lower extremity strength and endurance by performing 5 times sit<>stand test in 20 sec. ongoing   Assess Functional Gait Assessment and set goals as needed.  Met 7/29/24   Revised 7/29/24: patient will demonstrate improved balance and decreased fall risk by scoring 19/30 on Functional Gait Assessment.      Long Term Goals: 6 weeks   Patient will demonstrate improved lower extremity strength and endurance by performing 5 times sit<>stand test in 15 sec.ongoing   Patient will demonstrate improved balance responses in various environments by performing narrow base of support, foam, eyes closed x 30 sec. ongoing   Patient will report no difficulty negotiating curbs to demonstrate a return to prior level of function and improve his ability to independently attend medical appointments. ongoing   Patient will deny left ankle pain to improve all mobility. Met 7/29/24  New 7/29/24: patient will demonstrate improved balance and decreased fall risk by scoring 22/30 on Functional Gait Assessment.     Plan   Outpatient Physical Therapy 2 times weekly to include the following interventions: Manual Therapy, Moist Heat/ Ice, Neuromuscular Re-ed, Patient Education, Therapeutic Activities, and Therapeutic Exercise.      reduce height for sit<>stand     Torrie Ambriz DPT  8/15/2024

## 2024-08-16 VITALS
OXYGEN SATURATION: 97 % | SYSTOLIC BLOOD PRESSURE: 138 MMHG | DIASTOLIC BLOOD PRESSURE: 67 MMHG | RESPIRATION RATE: 16 BRPM | HEART RATE: 62 BPM

## 2024-08-16 RX ORDER — CLONIDINE HYDROCHLORIDE 0.1 MG/1
0.1 TABLET ORAL 3 TIMES DAILY
Qty: 270 TABLET | Refills: 1 | Status: SHIPPED | OUTPATIENT
Start: 2024-08-16

## 2024-08-16 NOTE — PROGRESS NOTES
AAOx3, No H/A, No Dizziness, No Pain. Patient is smoking around the house and educated him on the smoking cessation program. Informed patient that if he experiences Stroke like symptoms to call 911 or go to the ED.

## 2024-08-16 NOTE — PROGRESS NOTES
AAOx3, No H/A, No Dizziness, No Pain.  Patient is still therapy and ambulates with walker.  He is smoking around the house and gave education on the smoking cessation program.  Informed the patient, if he experiences Stroke like symptoms to call 911 or go directly to the ED.

## 2024-08-19 ENCOUNTER — OFFICE VISIT (OUTPATIENT)
Dept: NEUROLOGY | Facility: CLINIC | Age: 84
End: 2024-08-19
Payer: MEDICARE

## 2024-08-19 VITALS
OXYGEN SATURATION: 97 % | HEIGHT: 78 IN | WEIGHT: 249.81 LBS | HEART RATE: 56 BPM | SYSTOLIC BLOOD PRESSURE: 150 MMHG | DIASTOLIC BLOOD PRESSURE: 76 MMHG | BODY MASS INDEX: 28.9 KG/M2

## 2024-08-19 DIAGNOSIS — I66.9 STENOSIS OF CEREBRAL ARTERY: ICD-10-CM

## 2024-08-19 DIAGNOSIS — E78.5 HYPERLIPIDEMIA, UNSPECIFIED HYPERLIPIDEMIA TYPE: ICD-10-CM

## 2024-08-19 DIAGNOSIS — Z86.73 H/O ISCHEMIC RIGHT ACA STROKE: ICD-10-CM

## 2024-08-19 DIAGNOSIS — N18.32 TYPE 2 DIABETES MELLITUS WITH STAGE 3B CHRONIC KIDNEY DISEASE, WITHOUT LONG-TERM CURRENT USE OF INSULIN: Primary | ICD-10-CM

## 2024-08-19 DIAGNOSIS — E11.22 TYPE 2 DIABETES MELLITUS WITH STAGE 3B CHRONIC KIDNEY DISEASE, WITHOUT LONG-TERM CURRENT USE OF INSULIN: Primary | ICD-10-CM

## 2024-08-19 DIAGNOSIS — I10 ESSENTIAL HYPERTENSION: ICD-10-CM

## 2024-08-19 DIAGNOSIS — Z74.09 IMPAIRED FUNCTIONAL MOBILITY, BALANCE, GAIT, AND ENDURANCE: ICD-10-CM

## 2024-08-19 PROCEDURE — 1157F ADVNC CARE PLAN IN RCRD: CPT | Mod: CPTII,S$GLB,,

## 2024-08-19 PROCEDURE — 99215 OFFICE O/P EST HI 40 MIN: CPT | Mod: S$GLB,,,

## 2024-08-19 PROCEDURE — 3288F FALL RISK ASSESSMENT DOCD: CPT | Mod: CPTII,S$GLB,,

## 2024-08-19 PROCEDURE — 1159F MED LIST DOCD IN RCRD: CPT | Mod: CPTII,S$GLB,,

## 2024-08-19 PROCEDURE — 3077F SYST BP >= 140 MM HG: CPT | Mod: CPTII,S$GLB,,

## 2024-08-19 PROCEDURE — 3078F DIAST BP <80 MM HG: CPT | Mod: CPTII,S$GLB,,

## 2024-08-19 PROCEDURE — 1160F RVW MEDS BY RX/DR IN RCRD: CPT | Mod: CPTII,S$GLB,,

## 2024-08-19 PROCEDURE — 1101F PT FALLS ASSESS-DOCD LE1/YR: CPT | Mod: CPTII,S$GLB,,

## 2024-08-19 PROCEDURE — G2211 COMPLEX E/M VISIT ADD ON: HCPCS | Mod: S$GLB,,,

## 2024-08-19 PROCEDURE — 99999 PR PBB SHADOW E&M-EST. PATIENT-LVL IV: CPT | Mod: PBBFAC,,,

## 2024-08-19 RX ORDER — ATORVASTATIN CALCIUM 40 MG/1
40 TABLET, FILM COATED ORAL DAILY
Qty: 30 TABLET | Refills: 2 | Status: SHIPPED | OUTPATIENT
Start: 2024-08-19 | End: 2024-11-17

## 2024-08-19 NOTE — PROGRESS NOTES
[facility] VISIT      SUBJECTIVE:    History for Present Illness: Luther Irwin Jr. is a 84 y.o.  male  with PMHx of hypertension, type 2 diabetes, BPH/prostate cancer.     Relevant HPI:  Patient presented to the emergency department on 07/12/2024 with persistent left lower extremity weakness and incoordination patient stated the symptoms started Tuesday prior to arriving to the ED at the time he denied any falls or trauma to upper or lower extremity and denied pain.  He denied history of strokes in the past headaches vision changes speech problems swallowing issues or sensory deficits.  Patient states that he leans to the left side while ambulating and states when he is walking he feels as though he is going to fall.  At the time patient reports compliance with medication regimen.  ER workup noted CT head right frontoparietal lesion concerning for possible meningioma which warranted further imaging workup MRI of brain and MRA of head and neck ordered noted small acute infarct parasagittal right parietal lobe with generalized cerebral volume loss and moderate chronic microvascular ischemia MRA brain without any focal high-grade stenosis or large vessel occlusion but positive for lateral aspect right frontoparietal region lesion confirmed may represent small meningioma with no mass effect, with suspected moderate stenosis at the right ICA origin.  MRA neck no evidence of additional high-grade stenosis or occlusion.  Aspirin high dose Plavix load x1 in ER no stroke activation due to patient being outside of the window TNK/tPA.  Given new stroke findings PT OT ST consulted neurology evaluation completed with possible rehab needs.  2D echo with bubble study negative.  CTA head and neck consistent with flow-limiting stenosis of the distal right GLORIA which is likely etiology for stroke incidental finding of left carotid bulb projection likely concern for ulcerated plaque versus thrombus.   Vascular neurology recommended patient to be discharged on aspirin/Eliquis to be continued for at least 3 months and repeat CTA head and neck outpatient to look for interval changes of left carotid.  No current plans for surgical intervention vascular.  Patient admitted to hospital for a 6 day stay.  Patient was discharged to home to follow up with Neurology/vascular/PCP/outpatient therapy.  Also recommended patient to follow up with Cardiology outpatient for Holter monitoring.    Interval history:    At the time of today's visit, the patient denies new or worsening focal neurologic symptoms concerning for new stroke or TIA. He is doing well overall with gradual improvements.  Patient is ambulatory with walker reports mild left leg weakness which can on occasion cause him to be unstable upon ambulation.  He is participating outpatient therapy PT once or twice a week.  Has followed up with vascular surgery patient was continue Eliquis and aspirin return to clinic 4 months to repeat CTA.  Patient was compliant with aspirin, Eliquis, antihypertensives and medications for diabetes.  Statin has not been started.  Patient has followed up with primary care provider, Cardiology consult scheduled.  Patient denies any tobacco use, alcohol or illicit drugs.  He is independent in major ADLs and IADLs and has resumed driving.  We will start statin on today and contact primary care provider.     Past Medical History:   Diagnosis Date    Cancer     CVA (cerebral vascular accident) 7/12/2024    Diabetes mellitus, type 2     Diabetes with neurologic complications     Disorder of kidney and ureter     Hypertension     Late complications of amputation stump     Prostate cancer     Pseudophakia 01/05/2024     Past Surgical History:   Procedure Laterality Date    FINGER SURGERY  3/2014    HERNIA REPAIR  07/1967    KNEE SURGERY      SPINE SURGERY  10/2007    VASECTOMY  1986     Family History   Problem Relation Name Age of Onset     "Hypertension Mother      Hyperlipidemia Mother      Hypertension Father      Hyperlipidemia Father          Current Outpatient Medications:     amlodipine-valsartan (EXFORGE)  mg per tablet, Take 1 tablet by mouth once daily., Disp: 90 tablet, Rfl: 3    apixaban (ELIQUIS) 5 mg Tab, Take 1 tablet (5 mg total) by mouth 2 (two) times daily., Disp: 180 tablet, Rfl: 0    aspirin 81 MG Chew, Take 81 mg by mouth once daily., Disp: , Rfl:     cloNIDine (CATAPRES) 0.1 MG tablet, TAKE 1 TABLET BY MOUTH THREE TIMES A DAY, Disp: 270 tablet, Rfl: 1    empagliflozin (JARDIANCE) 10 mg tablet, Take 1 tablet (10 mg total) by mouth once daily., Disp: 30 tablet, Rfl: 11    furosemide (LASIX) 40 MG tablet, Take 1 tablet (40 mg total) by mouth once daily., Disp: 30 tablet, Rfl: 2    glipiZIDE (GLUCOTROL) 10 MG TR24, Take 1 tablet (10 mg total) by mouth 2 (two) times daily., Disp: 180 tablet, Rfl: 3    tadalafiL (CIALIS) 20 MG Tab, Take 1 tablet (20 mg total) by mouth once daily. (Patient taking differently: Take 20 mg by mouth as needed.), Disp: 30 tablet, Rfl: 11    tamsulosin (FLOMAX) 0.4 mg Cap, Take 1 capsule (0.4 mg total) by mouth once daily., Disp: 90 capsule, Rfl: 3    hydroCHLOROthiazide (HYDRODIURIL) 25 MG tablet, Take 25 mg by mouth. (Patient not taking: Reported on 7/30/2024), Disp: , Rfl:      Review of Systems:  Review of systems is negative except for the symptoms mentioned in HPI    Physical exam:    BP (!) 150/76 (BP Location: Right arm, BP Method: Large (Automatic))   Pulse (!) 56   Ht 6' 7" (2.007 m)   Wt 113.3 kg (249 lb 12.5 oz)   SpO2 97%   BMI 28.14 kg/m²     General: Well-developed, well-groomed. No apparent distress  HENT: Normocephalic, atraumatic.    Cardiovascular: Regular rate and rhythm  Chest: No audible wheezes, stridor, ronchi appreciated.  Musculoskeletal: No peripheral edema     Neurologic Exam: The patient is awake, alert and oriented to person, place, time and situation. Attentive, " vigilant during exam. Language is fluent. Naming & repetition intact, +2-step commands.  Fund of knowledge is appropriate. Well organized thoughts.     Cranial nerves:   CN II: Visual fields are full to confrontation. Pupils are 4 mm and briskly reactive to light.  CN III, IV, VI: EOMI, no nystagmus, no ptosis  CN V: Facial sensation is intact in all 3 divisions bilaterally.  CN VII: Face is symmetric with normal eye closure and smile.  CN VIII: Hearing is normal bilaterally  CN IX, X: Palate elevates symmetrically. Phonation is normal.  CN XI: Head turning and shoulder shrug are intact  CN XII: Tongue is midline with normal movements and no atrophy.     Motor examination of all extremities demonstrates normal bulk and tone in all four limbs. There are no atrophy or fasciculations.        Left Right     Left Right   Deltoid 5/5 5/5   Hip Flexion 5/5 5/5   Biceps 5/5 5/5   Hip Extension 5/5 5/5   Triceps 5/5 5/5   Knee Flexion 5/5 5/5   Wrist Ext 5/5 5/5   Knee Extension 5/5 5/5   Finger Abd 5/5 5/5   Ankle dorsiflex 5/5 5/5           Ankle plantar flex 5/5 5/5     Sensory examination is normal light touch in BUE and BLE.  Romberg is positive.  Deep tendon reflexes No clonus. Negative Antony's     Gait: slow cautious tandem, steady with walker     Coordination: No dysmetria with finger-to-nose. Rapid alternating movements and fine finger movements are intact.      Relevant Labwork:  Recent Labs   Lab 07/12/24 2014 07/13/24  0412   Hemoglobin A1C  --  7.0 H   LDL Cholesterol 92.2  --    HDL 33 L  --    Triglycerides 214 H  --    Cholesterol 168  --        Diagnostic Results:  Imaging was independently reviewed by myself, along with the associated radiology report    Brain Imaging 7/13/24   Small foci of acute infarction within the parasagittal right parietal lobe near the vertex.  2. Generalized cerebral volume loss and moderate chronic microvascular ischemic disease.  3. MRA brain without high-grade focal stenosis  or large vessel occlusion.  4. Small extra-axial lesion over the lateral aspect of the right frontal parietal region.  This may represent a small meningioma.  No mass effect.    Vessel Imaging 7/13/24 MRA, 7/17/24 CTA   CTA head and neck  Small focus of acute infarction in the paramedian right frontoparietal junction, unchanged from recent MRI.  Moderate chronic small vessel ischemic change with underlying cerebral volume loss.  No new major vascular distribution infarct or intracranial hemorrhage.  Scattered atherosclerotic change throughout the cervical vasculature, with less than 50% stenosis at the carotid bifurcations.  Heterogeneous plaque at the left carotid bulb with small intraluminal projecting suggesting an ulceration or superimposed thrombus formation.  Multifocal moderate to severe stenoses throughout the intracranial vasculature as further detailed above, specifically including high-grade narrowing of the distal right GLORIA (distribution of recent infarction).  This is likely related to intracranial atherosclerosis, unless vasculitis or other vasculopathy suspected clinically.  Unchanged small right frontoparietal extra-axial mass, presumed meningiom    MRA  1. Suspected moderate stenosis at the right ICA origin.  Consider further evaluation with CTA and/or carotid ultrasound.  2. MRA neck with no evidence of additional high-grade stenosis or occlusion.    Cardiac Imaging 7/15/24  [facility] Echo bubble study  EF 40-45%, left atrium negative for intracardiac shunt, normal size normal wall motion    Assessment:  1. Type 2 diabetes mellitus with stage 3b chronic kidney disease, without long-term current use of insulin        2. Hyperlipidemia, unspecified hyperlipidemia type        3. Stenosis of cerebral artery        4. H/O ischemic right GLORIA stroke  Ambulatory referral/consult to Neurology      5. Essential hypertension        6. Impaired functional mobility, balance, gait, and endurance          Luther Irwin    is a 84 y.o.  male  seen today in clinic for follow-up assessment and recommendations. The following recommendations and plan were discussed in depth with the patient who voiced understanding and was in agreement.  Plan:  History of ischemic right GLORIA stroke  -Stroke etiology suspected GIRISH  --In-depth discussion with patient regarding diagnosis ,imaging findings  & stroke risk factors  -No current clinical indication for anticoagulation at this time.   -Plan for aggressive risk factor modification and maximum medical management  -- Antithrombotics/ Anticoagulation: Aspirin 81 mg daily with Eliquis 5 mg twice a day managed by vascular surgery   - Stroke Risk Factors:  Hypertension, hyperlipidemia, stenosis of cerebral artery, diabetes  - Lipid Management: Target is LDL < 70mg/dL.  Start atorvastatin 40mg daily. Monitoring for liver dysfunction and myopathy is suggested for statins. To be addressed by PCP, I will initiate patient statin dosage to be followed up in addressed with monitoring by primary care provider we will notify, educated on medication side effect profile in signs and symptoms requiring him to contact primary care provider/ED  -Hypertension: Long term goal is normotension w/ target BP of less than 130/80 mmHg.  Continue home medications and follow up with PCP for surveillance and chronic management  - Rehab efforts:  Outpatient physical therapy  - Diagnostics ordered/pending:  Holter monitor to be ordered by Cardiology appointment scheduled  -Referrals- place ambulatory consult to Neurosurgery in regards to patient's suspected meningioma on brain imaging for further evaluation and recommendations.    Patient/Family teaching provided during this visit  -Identifying the signs and symptoms of stroke; emergency action and ER attention  -Risk factor control  -Optimization for secondary stroke prevention including compliance with current medications   -We discussed the need to optimize lifestyle  choices   -heart healthy diet (Mediterranean,MIND  or dash) to include a variety of brain friendly foods to help optimize cognitive health and longevity  -increased cardiovascular exercise; goal of 150 minutes of moderate-intense per week  -Discussed that medication/lifestyle modifications are preventative,and nothing is absolute.     Patient to follow up with Dr. Jara in 4 months after repeat CTA head and neck by vascular surgery to further recommendations in regards to right intracranial stenosis.      Questions and concerns were sought and answered to the patient's stated verbal satisfaction. The patient verbalizes understanding and agreement with the above stated treatment plan.      Visit today included increased complexity associated with the care of the episodic problem stroke/hospital follow up addressed and managing the longitudinal care of the patient due to the serious and/or complex managed problem(s) neurological/vascular risk factors.    ENRIQUE Orantes  [facility]  Department of Neurology- [facility]     [phone]     Follow-up: Follow up w/ Dr. Jara after repeat CTA     Time spent on this encounter:  63 minutes. This includes face to face time and non-face to face time preparing to see the patient (eg, review of tests), obtaining and/or reviewing separately obtained history, documenting clinical information in the electronic or other health record, independently interpreting results and communicating results to the patient/family/caregiver, or care coordinator.      This note was created by combination of typed  and M-Modal dictation. Transcription and phonetic errors may be present.  If there are any questions, please contact me.

## 2024-08-20 ENCOUNTER — TELEPHONE (OUTPATIENT)
Dept: FAMILY MEDICINE | Facility: CLINIC | Age: 84
End: 2024-08-20
Payer: MEDICARE

## 2024-08-20 NOTE — TELEPHONE ENCOUNTER
----- Message from Jody Miller PA-C sent at 8/20/2024  3:47 PM CDT -----  Regarding: RE: Shared patient     Can please add this patient to my schedule or Dr. Marie schedule in the next 2.5-3 months?  Thank you.  ----- Message -----  From: Bouchra Das NP  Sent: 8/19/2024  12:41 PM CDT  To: Jody Miller PA-C  Subject: Shared patient                                   Hello,    I followed up with patient listed above post stroke hospitalization for aggressive medical management modifications in regards to patient's intracranial stenosis.  On today I started patient on statin high-intensity, atorvastatin 40 mg as this is required for stroke vascular aggressive medical management of intracranial findings.  Please follow up with the patient in regards to long-term surveillance and monitoring.  I gave patient 90 day supply allowing him time to make an appointment and follow up with you.    BROWN Light APRN, FNP-C  [facility]  Phone: [phone]  Fax: [phone]

## 2024-08-21 NOTE — PROGRESS NOTES
Physical Therapy Daily Treatment Note     Name: Luther Irwin Jr.  Clinic Number: [id number]    Therapy Diagnosis:   Encounter Diagnosis   Name Primary?    Impaired functional mobility, balance, gait, and endurance Yes       Physician: Benny Roman*    Visit Date: 8/22/2024    Physician Orders: PT Eval and Treat   Medical Diagnosis from Referral:   Diagnosis   I63.9 (ICD-10-CM) - CVA (cerebral vascular accident)      Evaluation Date: 7/25/2024  Authorization Period Expiration: 7/18/2025  Plan of Care Expiration: 9/5/24  Visit # / Visits authorized: 4/20     PN due: 8/25/24     Time In: 10:15  Time Out: 11:00  Total Billable Time: 45 minutes    Precautions: Standard and Fall    Subjective     Pt reports: No complaints.  I was a little tired after last session. Notes his MD changed his statin medication last visit.   He was compliant with home exercise program.  Response to previous treatment: felt tired  Functional change: ongoing    Pain: 0/10  Location: not applicable       Objective     Luther participated in dynamic functional therapeutic activities to improve functional performance for 20 minutes, including:    Parallel bar:   X 6 lengths stepping over hurdles, gradual decrease of upper extremity assist to no upper extremity   X 6 lengths side stepping over hurdles, no upper extremity assist       All with CGA:   40 feet x 2 walking with head turns, 1 loss of balance requires min assist to recover  40 feet x 4 walking with head nods   40 feet x 4 change in speed walking   40 feet x 4 walking with 10lb tidal tank    2 x 10 sit to stand with 10 lb tidal tank and upper extremity forward reach       Luther participated in neuromuscular re-education activities to improve: Balance for 25 minutes. The following activities were included:    Parallel bar:   Narrow base of support, foam, eyes open 2 x 30 sec- minimal sway  Narrow base of support, foam, head turns 2 x 30 sec- minimal sway  Narrow base of support,  foam, eyes closed 2 x 30 sec- moderate sway, CGA  Tandem gait, 1 upper extremity- 4 laps  Lower extremity braiding x 4 laps, 2 upper extremity assist     X 10 upper extremity forward reach holding 10 lb kettle bell, standing on foam pad with feet apart    20 feet x 2 walking lunge, min assist   20 feet x 2 back art walking , min assist, 1 loss of balance requires mod assist to correct    X 10 step up onto 4 in step with opposite lower extremity high knee, each lower extremity, 1 upper extremity assist   X 20 heel drop to heel lift, bilateral lower extremity     Time above includes seated rest breaks     Luther participated in therex to improve strength, range of motion for 0 minutes       Home Exercises Provided and Patient Education Provided     Education provided:   - continue with home exercise program     Written Home Exercises Provided: Patient instructed to cont prior HEP.  Exercises were reviewed and Luther was able to demonstrate them prior to the end of the session.  Luther demonstrated good  understanding of the education provided.     See EMR under Patient Instructions for exercises provided prior visit.    Assessment   Mr. Sloan participated well in today's session. He continues to be challenged by dynamic gait activity without rolling walker.  He had 1 loss of balance with backward walking and walking head turns, requires min assist to correct.  Demo's some dis coordinated movement with lower extremity braiding activity and notes that is when he loses his balance at home, with lower extremity cross over.  He will continue to benefit from skilled Physical Therapy to address impairments and maximize mobility.     Pt prognosis is Good.     Pt will continue to benefit from skilled outpatient physical therapy to address the deficits listed in the problem list box on initial evaluation, provide pt/family education and to maximize pt's level of independence in the home and community environment.     Pt's  spiritual, cultural and educational needs considered and pt agreeable to plan of care and goals.     Anticipated barriers to physical therapy: none    Goals:   Short Term Goals: 3 weeks   Patient will report compliance with home exercise program at least 2x/ week to improve speed of progress towards PLOF. ongoing   Patient will score >24/28 on Tinetti to demonstrate decreased fall risk from moderate to minimal. ongoing   Patient will demonstrate improved household mobility and decreased need for furniture walking by performing Timed Up and Go in 13 sec without assistive device. ongoing   Patient will demonstrate improved lower extremity strength and endurance by performing 5 times sit<>stand test in 20 sec. ongoing   Assess Functional Gait Assessment and set goals as needed.  Met 7/29/24   Revised 7/29/24: patient will demonstrate improved balance and decreased fall risk by scoring 19/30 on Functional Gait Assessment.      Long Term Goals: 6 weeks   Patient will demonstrate improved lower extremity strength and endurance by performing 5 times sit<>stand test in 15 sec.ongoing   Patient will demonstrate improved balance responses in various environments by performing narrow base of support, foam, eyes closed x 30 sec. ongoing   Patient will report no difficulty negotiating curbs to demonstrate a return to prior level of function and improve his ability to independently attend medical appointments. ongoing   Patient will deny left ankle pain to improve all mobility. Met 7/29/24  New 7/29/24: patient will demonstrate improved balance and decreased fall risk by scoring 22/30 on Functional Gait Assessment.     Plan   Outpatient Physical Therapy 2 times weekly to include the following interventions: Manual Therapy, Moist Heat/ Ice, Neuromuscular Re-ed, Patient Education, Therapeutic Activities, and Therapeutic Exercise.      reduce height for sit<>stand     Torrie Ambriz DPT  8/22/2024

## 2024-08-22 ENCOUNTER — CLINICAL SUPPORT (OUTPATIENT)
Dept: REHABILITATION | Facility: HOSPITAL | Age: 84
End: 2024-08-22
Payer: MEDICARE

## 2024-08-22 DIAGNOSIS — Z74.09 IMPAIRED FUNCTIONAL MOBILITY, BALANCE, GAIT, AND ENDURANCE: Primary | ICD-10-CM

## 2024-08-22 PROCEDURE — 97112 NEUROMUSCULAR REEDUCATION: CPT | Mod: PN

## 2024-08-22 PROCEDURE — 97530 THERAPEUTIC ACTIVITIES: CPT | Mod: PN

## 2024-08-24 NOTE — PROGRESS NOTES
Physical Therapy Daily Treatment Note     Name: Luther Irwin Jr.  Clinic Number: [id number]    Therapy Diagnosis:   No diagnosis found.      Physician: Benny Roman*    Visit Date: 8/27/2024    Physician Orders: PT Eval and Treat   Medical Diagnosis from Referral:   Diagnosis   I63.9 (ICD-10-CM) - CVA (cerebral vascular accident)      Evaluation Date: 7/25/2024  Authorization Period Expiration: 7/18/2025  Plan of Care Expiration: 9/5/24***  Visit # / Visits authorized: 5/20     PN due: 9/27/24     Time In: ***  Time Out: ***  Total Billable Time: *** minutes    Precautions: Standard and Fall    Subjective     Pt reports: No complaints.  I was a little tired after last session. Notes his MD changed his statin medication last visit.   He was compliant with home exercise program.  Response to previous treatment: felt tired  Functional change: ongoing    Pain: 0/10  Location: not applicable       Objective       Evaluation 8/27/24   Single Limb Stance R LE 4 sec  (<10 sec = HIGH FALL RISK)    Single Limb Stance L LE 2 sec  (<10 sec = HIGH FALL RISK)    5 times sit-stand 25 seconds       Tinetti 22/28    Functional Gait Assessment  15/30 (7/29/24)       5 times sit<>stand Cutoff scores:  >12 sec= fall risk  PD: >16 seconds= fall risk  Vestibular/balance: 15 seconds over 65 years  CVA: 12 seconds    Postural control:  MCTSIB:  1. Eyes Open/feet together/Firm: 30 seconds, no- minimal sway  2. Eyes Closed/feet together/Firm: 30 seconds, minimal assist   3. Eyes Open/feet together/Foam: 30 seconds, minimal-moderate sway  4. Eyes Closed/feet together/Foam: 3-5 sec seconds      Evaluation    Timed Up and Go 20s +16.75s +14 sec= 16.9 s  > 20 sec safe for independent transfers,     > 30 sec assist required for transfers   6 meter walk test Not tested    6 min walk test Not tested    Timed Up and Go fall risk:   Community dwelling older adults >13.5 sec   Chronic CVA >14 sec   Geriatric with h/o falls >15 sec   Frail  elderly >32.6 sec    LE amputees >19 sec   PD >7.95- 11.5 sec   Hip OA >10 sec   Vestibular >11.1 sec     Functional Gait Assessment:   1. Gait on level surface =  ***   (3) Normal: less than 5.5 sec, no A.D., no imbalance, normal gait pattern, deviates< 6in   (2) Mild impairment: 7-5.6 sec, uses A.D., mild gait deviations, or deviates 6-10 in   (1) Moderate impairment: > 7 sec, slow speed, imbalance, deviates 10-15 in.   (0) Severe impairment: needs assist, deviates >15 in, reach/touch wall  2. Change in Gait Speed = ***   (3) Normal: smooth change w/o loss of balance or gait deviation, deviates < 6 in, significant difference between speeds   (2) Mild impairment: changes speed, but demonstrates mild gait deviations, deviates 6-10 in, OR no deviations but unable to significantly speed, OR uses A.D.   (1) Moderate impairment: minor changes to speed, OR changes speed w/ significant deviations, deviates 10-15 in, OR  Changes speed , but loses balance & recovers   (0) Severe impairment: cannot change speed, deviates >15 in, or loses balance & needs assist  3. Gait with horizontal head turns  = ***   (3) Normal: no change in gait, deviates <6 in   (2) Mild impairment: slight change in speed, deviates 6-10 in, OR uses A.D.   (1) Moderate impairment: moderate change in speed, deviates 10-15 in   (0) Severe impairment: severe disruption of gait, deviates >15in  4. Gait with vertical head turns = ***   (3) Normal: no change in gait, deviates <6 in   (2) Mild impairment: slight change in speed, deviates 6-10 in OR uses A.D.   (1) Moderate impairment: moderate change in speed, deviates 10-15 in   (0) Severe impairment: severe disruption of gait, deviates >15 in  5. Gait with pivot turns = ***   (3) Normal: performs safely in 3 sec, no LOB   (2) Mild impairment: performs in >3 sec & no LOB, OR turns safely & requires several steps to regain LOB   (1) Moderate impairment: turns slow, OR requires several small steps for balance  following turn & stop   (0) Severe impairment: cannot turn safely, needs assist  6. Step over obstacle = ***   (3) Normal: steps over 2 stacked boxes w/o change in speed or LOB   (2) Mild impairment: able to step over 1 box w/o change in speed or LOB   (1) Moderate impairment: steps over 1 box but must slow down, may require VC   (0) Severe impairment: cannot perform w/o assist  7. Gait with Narrow TEAGAN = ***   (3) Normal: 10 steps no staggering   (2) Mild impairment: 7-9 steps   (1) Moderate impairment: 4-7 steps   (0) Severe impairment: < 4 steps or cannot perform w/o assist  8. Gait with eyes closed = ***   (3) Normal: < 7 sec, no A.D., no LOB, normal gait pattern, deviates <6 in   (2) Mild impairment: 7.1-9 sec, mild gait deviations, deviates 6-10 in   (1) Moderate impairment: > 9 sec, abnormal pattern, LOB, deviates 10-15 in   (0) Severe impairment: cannot perform w/o assist, LOB, deviates >15in  9. Ambulating Backwards = ***   (3) Normal: no A.D., no LOB, normal gait pattern, deviates <6in   (2) Mild impairment: uses A.D., slower speed, mild gait deviations, deviates 6-10 in   (1) Moderate impairment: slow speed, abnormal gait pattern, LOB, deviates 10-15 in   (0) Severe impairment: severe gait deviations or LOB, deviates >15in  10. Steps = ***   (3) Normal: alternating feet, no rail   (2) Mild Impairment: alternating feet, uses rail   (1) Moderate impairment: step-to, uses rail   (0) Severe impairment: cannot perform safely    Score ***/30     Score:   <22/30 fall risk   <20/30 fall risk in older adults   <18/30 fall risk in Parkinsons   Scores by Decade:                        Age    Score                        40-49  (24-30)                       50-59  (25-30)                       60-69  (20-30)                       70-79  (16-30)  HOMER Matta (2007). Reference Group Data for the Functional Gait Assessment. Physical Therapy (35)[id number], [id number].        Luther participated in dynamic functional therapeutic  activities to improve functional performance for *** minutes, including:    Parallel bar:   X 6 lengths stepping over hurdles, gradual decrease of upper extremity assist to no upper extremity   X 6 lengths side stepping over hurdles, no upper extremity assist       All with CGA:   40 feet x 2 walking with head turns, 1 loss of balance requires min assist to recover  40 feet x 4 walking with head nods   40 feet x 4 walking with 10lb tidal tank    2 x 10 sit to stand with 10 lb tidal tank and upper extremity forward reach       Luther participated in neuromuscular re-education activities to improve: Balance for *** minutes. The following activities were included:    Parallel bar:   Narrow base of support, foam, eyes open 2 x 30 sec- minimal sway  Narrow base of support, foam, head turns 2 x 30 sec- minimal sway  Narrow base of support, foam, eyes closed 2 x 30 sec- moderate sway, CGA  Tandem gait, 1 upper extremity- 4 laps  Lower extremity braiding x 4 laps, 2 upper extremity assist     X 10 upper extremity forward reach holding 10 lb kettle bell, standing on foam pad with feet apart      X 10 step up onto 4 in step with opposite lower extremity high knee, each lower extremity, 1 upper extremity assist   X 20 heel drop to heel lift, bilateral lower extremity     Time above includes seated rest breaks       Home Exercises Provided and Patient Education Provided     Education provided:   - continue with home exercise program     Written Home Exercises Provided: Patient instructed to cont prior HEP.  Exercises were reviewed and Luther was able to demonstrate them prior to the end of the session.  Luther demonstrated good  understanding of the education provided.     See EMR under Patient Instructions for exercises provided prior visit.    Assessment   Assessment period: ***     Pt prognosis is Good.     Pt will continue to benefit from skilled outpatient physical therapy to address the deficits listed in the problem list  box on initial evaluation, provide pt/family education and to maximize pt's level of independence in the home and community environment.     Pt's spiritual, cultural and educational needs considered and pt agreeable to plan of care and goals.     Anticipated barriers to physical therapy: none    Goals: ***  Short Term Goals: 3 weeks   Patient will report compliance with home exercise program at least 2x/ week to improve speed of progress towards PLOF. ongoing   Patient will score >24/28 on Tinetti to demonstrate decreased fall risk from moderate to minimal. ongoing   Patient will demonstrate improved household mobility and decreased need for furniture walking by performing Timed Up and Go in 13 sec without assistive device. ongoing   Patient will demonstrate improved lower extremity strength and endurance by performing 5 times sit<>stand test in 20 sec. ongoing   Assess Functional Gait Assessment and set goals as needed.  Met 7/29/24   Revised 7/29/24: patient will demonstrate improved balance and decreased fall risk by scoring 19/30 on Functional Gait Assessment.      Long Term Goals: 6 weeks   Patient will demonstrate improved lower extremity strength and endurance by performing 5 times sit<>stand test in 15 sec.ongoing   Patient will demonstrate improved balance responses in various environments by performing narrow base of support, foam, eyes closed x 30 sec. ongoing   Patient will report no difficulty negotiating curbs to demonstrate a return to prior level of function and improve his ability to independently attend medical appointments. ongoing   Patient will deny left ankle pain to improve all mobility. Met 7/29/24  New 7/29/24: patient will demonstrate improved balance and decreased fall risk by scoring 22/30 on Functional Gait Assessment.     Plan   Outpatient Physical Therapy 2 times weekly to include the following interventions: Manual Therapy, Moist Heat/ Ice, Neuromuscular Re-ed, Patient Education,  Therapeutic Activities, and Therapeutic Exercise.      reduce height for sit<>stand     Zahida Gupta, PT, DPT,   Board-Certified Clinical Specialist in Neurologic Physical Therapy   Certified Brain Injury Specialist    8/27/2024

## 2024-08-27 ENCOUNTER — CLINICAL SUPPORT (OUTPATIENT)
Dept: REHABILITATION | Facility: HOSPITAL | Age: 84
End: 2024-08-27
Payer: MEDICARE

## 2024-08-27 DIAGNOSIS — Z74.09 IMPAIRED FUNCTIONAL MOBILITY, BALANCE, GAIT, AND ENDURANCE: Primary | ICD-10-CM

## 2024-08-27 PROCEDURE — 97112 NEUROMUSCULAR REEDUCATION: CPT | Mod: KX,PN | Performed by: PHYSICAL THERAPIST

## 2024-08-27 PROCEDURE — 97530 THERAPEUTIC ACTIVITIES: CPT | Mod: KX,PN | Performed by: PHYSICAL THERAPIST

## 2024-08-27 NOTE — PROGRESS NOTES
See plan of care for physical therapy progress note and updated plan of care     Zahida Gupta, PT, DPT,   Board-Certified Clinical Specialist in Neurologic Physical Therapy   Certified Brain Injury Specialist

## 2024-08-29 ENCOUNTER — CLINICAL SUPPORT (OUTPATIENT)
Dept: REHABILITATION | Facility: HOSPITAL | Age: 84
End: 2024-08-29
Payer: MEDICARE

## 2024-08-29 DIAGNOSIS — Z74.09 IMPAIRED FUNCTIONAL MOBILITY, BALANCE, GAIT, AND ENDURANCE: Primary | ICD-10-CM

## 2024-08-29 PROCEDURE — 97530 THERAPEUTIC ACTIVITIES: CPT | Mod: PN

## 2024-08-29 PROCEDURE — 97112 NEUROMUSCULAR REEDUCATION: CPT | Mod: PN

## 2024-08-29 NOTE — PLAN OF CARE
Physical Therapy Daily Treatment Note/ Progress Note     Name: Luther Irwin Jr.  Clinic Number: [id number]    Therapy Diagnosis:   Encounter Diagnosis   Name Primary?    Impaired functional mobility, balance, gait, and endurance Yes       Physician: Benny Roman*    Visit Date: 8/27/2024    Physician Orders: PT Eval and Treat   Medical Diagnosis from Referral:   Diagnosis   I63.9 (ICD-10-CM) - CVA (cerebral vascular accident)      Evaluation Date: 7/25/2024  Authorization Period Expiration: 7/18/2025  Plan of Care Expiration: 9/5/24  Updated plan of care: 9/6/2024 to 10/4/24  Visit # / Visits authorized: 5/20     PN due: 9/27/24     Time In: 1115  Time Out: 1200   Total Billable Time: 30 minutes  Total treatment time: 45 minutes    Precautions: Standard and Fall    Subjective     Pt reports: No complaints.    He was compliant with home exercise program.  Response to previous treatment: felt tired  Functional change: ongoing    Pain: 0/10  Location: not applicable       Objective       Evaluation 8/27/24   Single Limb Stance R LE 4 sec  (<10 sec = HIGH FALL RISK) 6 sec, 5 sec, 8 sec   Single Limb Stance L LE 2 sec  (<10 sec = HIGH FALL RISK) 2 sec, 3 sec, 2-3 sec   5 times sit-stand 25 seconds    15 sec from chair, bilateral upper extremity    Tinetti 22/28 Not tested    Functional Gait Assessment  15/30 (7/29/24) 21/30      5 times sit<>stand Cutoff scores:  >12 sec= fall risk  PD: >16 seconds= fall risk  Vestibular/balance: 15 seconds over 65 years  CVA: 12 seconds       Evaluation  8/27/24   Timed Up and Go 20s +16.75s +14 sec= 16.9 s  > 20 sec safe for independent transfers,     > 30 sec assist required for transfers 12.9s w/o AD   6 meter walk test Not tested  6m in 6.5s= 0.92 m/s, no AD    6 min walk test Not tested  Not tested    Timed Up and Go w/o AD= 14.4s + 11.9s +12.3s=   Timed Up and Go fall risk:   Community dwelling older adults >13.5 sec   Chronic CVA >14 sec   Geriatric with h/o falls >15 sec    Frail elderly >32.6 sec    LE amputees >19 sec   PD >7.95- 11.5 sec   Hip OA >10 sec   Vestibular >11.1 sec     Functional Gait Assessment:   1. Gait on level surface =  2, 6.5s    (3) Normal: less than 5.5 sec, no A.D., no imbalance, normal gait pattern, deviates< 6in   (2) Mild impairment: 7-5.6 sec, uses A.D., mild gait deviations, or deviates 6-10 in   (1) Moderate impairment: > 7 sec, slow speed, imbalance, deviates 10-15 in.   (0) Severe impairment: needs assist, deviates >15 in, reach/touch wall  2. Change in Gait Speed = 3   (3) Normal: smooth change w/o loss of balance or gait deviation, deviates < 6 in, significant difference between speeds   (2) Mild impairment: changes speed, but demonstrates mild gait deviations, deviates 6-10 in, OR no deviations but unable to significantly speed, OR uses A.D.   (1) Moderate impairment: minor changes to speed, OR changes speed w/ significant deviations, deviates 10-15 in, OR  Changes speed , but loses balance & recovers   (0) Severe impairment: cannot change speed, deviates >15 in, or loses balance & needs assist  3. Gait with horizontal head turns  = 3   (3) Normal: no change in gait, deviates <6 in   (2) Mild impairment: slight change in speed, deviates 6-10 in, OR uses A.D.   (1) Moderate impairment: moderate change in speed, deviates 10-15 in   (0) Severe impairment: severe disruption of gait, deviates >15in  4. Gait with vertical head turns = 2   (3) Normal: no change in gait, deviates <6 in   (2) Mild impairment: slight change in speed, deviates 6-10 in OR uses A.D.   (1) Moderate impairment: moderate change in speed, deviates 10-15 in   (0) Severe impairment: severe disruption of gait, deviates >15 in  5. Gait with pivot turns = 3   (3) Normal: performs safely in 3 sec, no LOB   (2) Mild impairment: performs in >3 sec & no LOB, OR turns safely & requires several steps to regain LOB   (1) Moderate impairment: turns slow, OR requires several small steps for  balance following turn & stop   (0) Severe impairment: cannot turn safely, needs assist  6. Step over obstacle = 3   (3) Normal: steps over 2 stacked boxes w/o change in speed or LOB   (2) Mild impairment: able to step over 1 box w/o change in speed or LOB   (1) Moderate impairment: steps over 1 box but must slow down, may require VC   (0) Severe impairment: cannot perform w/o assist  7. Gait with Narrow TEAGAN = 0   (3) Normal: 10 steps no staggering   (2) Mild impairment: 7-9 steps   (1) Moderate impairment: 4-7 steps   (0) Severe impairment: < 4 steps or cannot perform w/o assist  8. Gait with eyes closed = 1   (3) Normal: < 7 sec, no A.D., no LOB, normal gait pattern, deviates <6 in   (2) Mild impairment: 7.1-9 sec, mild gait deviations, deviates 6-10 in   (1) Moderate impairment: > 9 sec, abnormal pattern, LOB, deviates 10-15 in   (0) Severe impairment: cannot perform w/o assist, LOB, deviates >15in  9. Ambulating Backwards = 2   (3) Normal: no A.D., no LOB, normal gait pattern, deviates <6in   (2) Mild impairment: uses A.D., slower speed, mild gait deviations, deviates 6-10 in   (1) Moderate impairment: slow speed, abnormal gait pattern, LOB, deviates 10-15 in   (0) Severe impairment: severe gait deviations or LOB, deviates >15in  10. Steps = 2   (3) Normal: alternating feet, no rail   (2) Mild Impairment: alternating feet, uses rail   (1) Moderate impairment: step-to, uses rail   (0) Severe impairment: cannot perform safely    Score 21/30     Score:   <22/30 fall risk   <20/30 fall risk in older adults   <18/30 fall risk in Parkinsons   Scores by Decade:                        Age    Score                        40-49  (24-30)                       50-59  (25-30)                       60-69  (20-30)                       70-79  (16-30)  HOMER Matta (2007). Reference Group Data for the Functional Gait Assessment. Physical Therapy ([id number], [id number].        Luther participated in dynamic functional therapeutic  "activities to improve functional performance for 20 minutes, including:    Timed Up and Go  5 times sit<>stand test    Parallel bar:   X 6 lengths stepping over hurdles, gradual decrease of upper extremity assist to no upper extremity   X 6 lengths side stepping over hurdles, no upper extremity assist     40 feet x 4 walking with 10lb tidal tank tube     2 x 10 sit to stand from 20" box with 10 lb tidal ball       Luther participated in neuromuscular re-education activities to improve: Balance for 25 minutes. The following activities were included:    Functional Gait Assessment     Parallel bar:   Narrow base of support, foam, head turns 2 x 30 sec  Tandem gait, 1 upper extremity support- 4 laps  Braided gait, bilateral upper extremity support- 2 laps  Tandem stance 2 x 30 sec- moderate sway       Time above includes seated rest breaks       Home Exercises Provided and Patient Education Provided     Education provided:   - continue with home exercise program     Written Home Exercises Provided: Patient instructed to cont prior HEP.  Exercises were reviewed and Luther was able to demonstrate them prior to the end of the session.  Luther demonstrated good  understanding of the education provided.     See EMR under Patient Instructions for exercises provided prior visit.    Assessment   Assessment period: 7/25/24 to 8/27/2024    Luther tolerates physical therapy sessions well and demonstrates good progress towards goals set. Goal met for Timed Up and Go without use of assistive device indicating improved safety with household mobility. Patient also met goal for 5 times sit<>stand test indicating improvement in lower extremity strength and activity tolerance. Improvement in balance noted via Functional Gait Assessment but patient scored just below the fall risk cut off. Patient demonstrates continued impairment in ability to maintain and ambulate in tandem demonstrating decreased accuracy of balance responses. He also " has some difficulty ambulating with vertical head turns, ambulating with eyes closed, stairs, and stepping backwards. Patient continues to use rolling walker to ambulate outside of home, denies assistive device use inside of home. Patient can benefit from continued skilled physical therapy to decrease fall risk and need for assistive device. Plan of care extended x 4 weeks to allow for continued progress towards goals.     Pt prognosis is Good.     Pt will continue to benefit from skilled outpatient physical therapy to address the deficits listed in the problem list box on initial evaluation, provide pt/family education and to maximize pt's level of independence in the home and community environment.     Pt's spiritual, cultural and educational needs considered and pt agreeable to plan of care and goals.     Anticipated barriers to physical therapy: none    Goals:   Status as of 8/29/24  Short Term Goals: 3 weeks   Patient will report compliance with home exercise program at least 2x/ week to improve speed of progress towards PLOF. ongoing   Patient will score >24/28 on Tinetti to demonstrate decreased fall risk from moderate to minimal. ongoing   Patient will demonstrate improved household mobility and decreased need for furniture walking by performing Timed Up and Go in 13 sec without assistive device. Met 8/27/24  Patient will demonstrate improved lower extremity strength and endurance by performing 5 times sit<>stand test in 20 sec. Met 8/27/24   Assess Functional Gait Assessment and set goals as needed.  Met 7/29/24   Revised 7/29/24: patient will demonstrate improved balance and decreased fall risk by scoring 19/30 on Functional Gait Assessment. Met 8/27/24     Long Term Goals: 6 weeks   Patient will demonstrate improved lower extremity strength and endurance by performing 5 times sit<>stand test in 15 sec.met 8/27/24   Patient will demonstrate improved balance responses in various environments by performing  narrow base of support, foam, eyes closed x 30 sec. ongoing   Patient will report no difficulty negotiating curbs to demonstrate a return to prior level of function and improve his ability to independently attend medical appointments. ongoing   Patient will deny left ankle pain to improve all mobility. Met 7/29/24  New 7/29/24: patient will demonstrate improved balance and decreased fall risk by scoring 22/30 on Functional Gait Assessment.  improved     Plan   Updated plan of care: 9/6/2024 to 10/4/24    Outpatient Physical Therapy 2 times weekly to include the following interventions: Manual Therapy, Moist Heat/ Ice, Neuromuscular Re-ed, Patient Education, Therapeutic Activities, and Therapeutic Exercise.      Tandem/ semi tandem balance, single leg stance, balance with eyes closed, challenged gait activities     Zahida Gupta, PT, DPT,   Board-Certified Clinical Specialist in Neurologic Physical Therapy   Certified Brain Injury Specialist    8/27/2024

## 2024-08-29 NOTE — PROGRESS NOTES
Physical Therapy Daily Treatment Note     Name: Luther Irwin Jr.  Clinic Number: [id number]    Therapy Diagnosis:   Encounter Diagnosis   Name Primary?    Impaired functional mobility, balance, gait, and endurance Yes       Physician: Benny Roman*    Visit Date: 8/29/2024    Physician Orders: PT Eval and Treat   Medical Diagnosis from Referral:   Diagnosis   I63.9 (ICD-10-CM) - CVA (cerebral vascular accident)      Evaluation Date: 7/25/2024  Authorization Period Expiration: 7/18/2025  Plan of Care Expiration:  9/5/24  Updated plan of care: 9/6/2024 to 10/4/24   Visit # / Visits authorized: 6/20     PN due: 9/27/24     Time In: 10:15  Time Out: 11:00  Total Billable Time: 45 minutes    Precautions: Standard and Fall    Subjective     Pt reports: He has been doing ok.   He was compliant with home exercise program.  Response to previous treatment: felt tired  Functional change: ongoing    Pain: 0/10  Location: not applicable       Objective         Evaluation    Timed Up and Go  12 sec   > 20 sec safe for independent transfers,     > 30 sec assist required for transfers       Luther participated in dynamic functional therapeutic activities to improve functional performance for 10 minutes, including:    All with tidal tank and CGA:   40 feet x 2 walking with head turns  40 feet x 4 walking with head nods   40 feet x 4 walking with tidal tank  40 feet x 1 high knee walk,  1 loss of balance requires min assist to recover        Luther participated in neuromuscular re-education activities to improve: Balance for 35 minutes. The following activities were included:    Parallel bar:   X 2- 5 sec single leg stance, bilateral lower extremity, occ touch down support    X 10 step up onto 4 in step with opposite lower extremity high knee, each lower extremity, 1 upper extremity assist   X 20 heel drop to heel lift, bilateral lower extremity     X 30 sec split stance on soft side of bosu, each lower extremity   X 30 sec  split stance with head turns on soft side of bosu  X 30 sec split stance with head nods on soft side of bosu  X 30 sec split stance with eyes closed on soft side of bosu, each lower extremity , occ touch down support   X 10 forward lunge onto bosu with trunk rotation to same side, each lower extremity, occ touch down support  X 10 step up onto soft side of bosu, each lower extremity, 1 upper extremity assist, 2 LOBs requires min/mod assist to recover    2 X 30 sec rocker board, A<>P mobility  2 X 30 sec rocker board, A<>P stability  2 X 30 sec rocker board, lateral mobility   2 X 30 sec rocker board, lateral stability     Prone:  X 10 hamstring curl with green theraband, bilateral   X 10 Left hamstring curl with lift, bilateral     Supine:  X 10 Bridge with lower extremity on green ball   X 10 Bridge with hamstring curl and lower extremity on green ball   X 30 sec each side, Figure 4 stretch bilateral   X 10 hook lying lumbar rotation     Time above includes seated rest breaks       Home Exercises Provided and Patient Education Provided     Education provided:   - continue with home exercise program     Written Home Exercises Provided: Patient instructed to cont prior HEP.  Exercises were reviewed and Luther was able to demonstrate them prior to the end of the session.  Luther demonstrated good  understanding of the education provided.     See EMR under Patient Instructions for exercises provided prior visit.    Assessment   Mr. Irwin participated well in today's session.  He continues to be able to tolerate increased dynamic gait challenge, and progressing to holding onto tidal tank.  Had most difficulty today with stepping up onto bosu. Had 2 loss of balances, requires min assist to correct and able to self correct with upper extremity assist on bar.  He remains appropriate for skilled Physical Therapy.     Pt prognosis is Good.     Pt will continue to benefit from skilled outpatient physical therapy to address the  deficits listed in the problem list box on initial evaluation, provide pt/family education and to maximize pt's level of independence in the home and community environment.     Pt's spiritual, cultural and educational needs considered and pt agreeable to plan of care and goals.     Anticipated barriers to physical therapy: none    Goals:   Status as of 8/29/24  Short Term Goals: 3 weeks   Patient will report compliance with home exercise program at least 2x/ week to improve speed of progress towards PLOF. ongoing   Patient will score >24/28 on Tinetti to demonstrate decreased fall risk from moderate to minimal. ongoing   Patient will demonstrate improved household mobility and decreased need for furniture walking by performing Timed Up and Go in 13 sec without assistive device. Met 8/27/24  Patient will demonstrate improved lower extremity strength and endurance by performing 5 times sit<>stand test in 20 sec. Met 8/27/24   Assess Functional Gait Assessment and set goals as needed.  Met 7/29/24              Revised 7/29/24: patient will demonstrate improved balance and decreased fall risk by scoring 19/30 on Functional Gait Assessment. Met 8/27/24     Long Term Goals: 6 weeks   Patient will demonstrate improved lower extremity strength and endurance by performing 5 times sit<>stand test in 15 sec.met 8/27/24   Patient will demonstrate improved balance responses in various environments by performing narrow base of support, foam, eyes closed x 30 sec. ongoing   Patient will report no difficulty negotiating curbs to demonstrate a return to prior level of function and improve his ability to independently attend medical appointments. ongoing   Patient will deny left ankle pain to improve all mobility. Met 7/29/24  New 7/29/24: patient will demonstrate improved balance and decreased fall risk by scoring 22/30 on Functional Gait Assessment.  improved      Plan   Updated plan of care: 9/6/2024 to 10/4/24   Outpatient  Physical Therapy 2 times weekly to include the following interventions: Manual Therapy, Moist Heat/ Ice, Neuromuscular Re-ed, Patient Education, Therapeutic Activities, and Therapeutic Exercise.      reduce height for sit<>stand     Torrie Ambriz PT, DPT,   Board-Certified Clinical Specialist in Neurologic Physical Therapy    8/29/2024

## 2024-09-03 ENCOUNTER — CLINICAL SUPPORT (OUTPATIENT)
Dept: REHABILITATION | Facility: HOSPITAL | Age: 84
End: 2024-09-03
Payer: MEDICARE

## 2024-09-03 DIAGNOSIS — Z74.09 IMPAIRED FUNCTIONAL MOBILITY, BALANCE, GAIT, AND ENDURANCE: Primary | ICD-10-CM

## 2024-09-03 PROCEDURE — 97530 THERAPEUTIC ACTIVITIES: CPT | Mod: KX,PN | Performed by: PHYSICAL THERAPIST

## 2024-09-03 PROCEDURE — 97112 NEUROMUSCULAR REEDUCATION: CPT | Mod: KX,PN | Performed by: PHYSICAL THERAPIST

## 2024-09-03 PROCEDURE — 97110 THERAPEUTIC EXERCISES: CPT | Mod: KX,PN | Performed by: PHYSICAL THERAPIST

## 2024-09-03 NOTE — PROGRESS NOTES
"  Physical Therapy Daily Treatment Note     Name: Luther Irwin Jr.  Clinic Number: [id number]    Therapy Diagnosis:   Encounter Diagnosis   Name Primary?    Impaired functional mobility, balance, gait, and endurance Yes         Physician: Benny Roman*    Visit Date: 9/3/2024    Physician Orders: PT Eval and Treat   Medical Diagnosis from Referral:   Diagnosis   I63.9 (ICD-10-CM) - CVA (cerebral vascular accident)      Evaluation Date: 7/25/2024  Authorization Period Expiration: 7/18/2025  Plan of Care Expiration:  9/5/24  Updated plan of care: 9/6/2024 to 10/4/24   Visit # / Visits authorized: 7/20     PN due: 9/27/24     Time In: 0932  Time Out: 1015  Total Billable Time: 43 minutes    Precautions: Standard and Fall    Subjective     Pt reports: I feel like I've gotten stronger, but balance feels the same  He was compliant with home exercise program.  Response to previous treatment: felt tired  Functional change: ongoing    Pain: 0/10  Location: not applicable       Objective     Luther participated in dynamic functional therapeutic activities to improve functional performance for 15 minutes, including:    Recumbent stepper bilateral lower extremity only L5 x 8 mins for improved strength and endurance.     Sit<>stand from 19" mat, no upper extremity- 2 x 10     All with tidal tank and CGA:   40 feet x 2 walking with head turns  40 feet x 4 walking with head nods   40 feet x 4 walking with tidal tank      Luther participated in neuromuscular re-education activities to improve: Balance for 18 minutes. The following activities were included:    Parallel bar:   Narrow base of support, foam, head turns x 30 sec- minimal- moderate sway  Narrow base of support, foam, eyes closed 2 x 30 sec- moderate sway, CGA  Stance, foam, 2 x 30 sec- moderate sway  Tandem gait, 1 upper extremity- 4 laps  2 X 30 sec split stance on soft side of bosu, each lower extremity   X 10 forward lunge onto bosu, intermittent upper extremity " support  Front/ back step over short rukhsana, intermittent touch on bar- 10x    Stance, firm, tidal tube overhead lift- 10x   - SBA     Luther received therapeutic exercises to develop strength and endurance for 10 minutes including:     Supine:  2 X 10 Bridge with lower extremity on green ball   Bridge with lower extremity lift on ball 10x   - assist to hold ball    Time above includes seated rest breaks       Home Exercises Provided and Patient Education Provided     Education provided:   - continue with home exercise program, encouraged increased participation in tandem stance.     Written Home Exercises Provided: Patient instructed to cont prior HEP.  Exercises were reviewed and Luther was able to demonstrate them prior to the end of the session.  Luther demonstrated good  understanding of the education provided.     See EMR under Patient Instructions for exercises provided prior visit.    Assessment   Mr. Irwin participated well in today's session. He reported a good challenge with bridging with lower extremity off of ball. Poor hip clearance noted with this exercise indicating decreased gluteal strength. Increased difficulty with sit<>stand from lower height noted. Patient reported a good challenge with all balance activities. Decreased use of hip strategy noted with balance challenges. Patient can benefit from continued skilled physical therapy.      Pt prognosis is Good.     Pt will continue to benefit from skilled outpatient physical therapy to address the deficits listed in the problem list box on initial evaluation, provide pt/family education and to maximize pt's level of independence in the home and community environment.     Pt's spiritual, cultural and educational needs considered and pt agreeable to plan of care and goals.     Anticipated barriers to physical therapy: none    Goals:   Status as of 8/29/24  Short Term Goals: 3 weeks   Patient will report compliance with home exercise program at least 2x/  week to improve speed of progress towards PLOF. ongoing   Patient will score >24/28 on Tinetti to demonstrate decreased fall risk from moderate to minimal. ongoing   Patient will demonstrate improved household mobility and decreased need for furniture walking by performing Timed Up and Go in 13 sec without assistive device. Met 8/27/24  Patient will demonstrate improved lower extremity strength and endurance by performing 5 times sit<>stand test in 20 sec. Met 8/27/24   Assess Functional Gait Assessment and set goals as needed.  Met 7/29/24              Revised 7/29/24: patient will demonstrate improved balance and decreased fall risk by scoring 19/30 on Functional Gait Assessment. Met 8/27/24     Long Term Goals: 6 weeks   Patient will demonstrate improved lower extremity strength and endurance by performing 5 times sit<>stand test in 15 sec.met 8/27/24   Patient will demonstrate improved balance responses in various environments by performing narrow base of support, foam, eyes closed x 30 sec. ongoing   Patient will report no difficulty negotiating curbs to demonstrate a return to prior level of function and improve his ability to independently attend medical appointments. ongoing   Patient will deny left ankle pain to improve all mobility. Met 7/29/24  New 7/29/24: patient will demonstrate improved balance and decreased fall risk by scoring 22/30 on Functional Gait Assessment.  improved      Plan     Outpatient Physical Therapy 2 times weekly to include the following interventions: Manual Therapy, Moist Heat/ Ice, Neuromuscular Re-ed, Patient Education, Therapeutic Activities, and Therapeutic Exercise.      Continue with balance challenges    Zahida Gupta, PT, DPT,   Board-Certified Clinical Specialist in Neurologic Physical Therapy    9/3/2024

## 2024-09-04 ENCOUNTER — OFFICE VISIT (OUTPATIENT)
Dept: CARDIOLOGY | Facility: CLINIC | Age: 84
End: 2024-09-04
Payer: MEDICARE

## 2024-09-04 VITALS
WEIGHT: 249.13 LBS | HEIGHT: 78 IN | BODY MASS INDEX: 28.82 KG/M2 | DIASTOLIC BLOOD PRESSURE: 65 MMHG | HEART RATE: 80 BPM | OXYGEN SATURATION: 97 % | SYSTOLIC BLOOD PRESSURE: 135 MMHG | RESPIRATION RATE: 18 BRPM

## 2024-09-04 DIAGNOSIS — I44.7 LBBB (LEFT BUNDLE BRANCH BLOCK): ICD-10-CM

## 2024-09-04 DIAGNOSIS — I12.9 HYPERTENSION ASSOCIATED WITH STAGE 3 CHRONIC KIDNEY DISEASE DUE TO TYPE 2 DIABETES MELLITUS: ICD-10-CM

## 2024-09-04 DIAGNOSIS — E11.22 TYPE 2 DIABETES MELLITUS WITH STAGE 3B CHRONIC KIDNEY DISEASE, WITHOUT LONG-TERM CURRENT USE OF INSULIN: ICD-10-CM

## 2024-09-04 DIAGNOSIS — E78.5 HYPERLIPIDEMIA ASSOCIATED WITH TYPE 2 DIABETES MELLITUS: ICD-10-CM

## 2024-09-04 DIAGNOSIS — I65.23 BILATERAL CAROTID ARTERY STENOSIS: ICD-10-CM

## 2024-09-04 DIAGNOSIS — I42.9 CARDIOMYOPATHY, UNSPECIFIED TYPE: ICD-10-CM

## 2024-09-04 DIAGNOSIS — N18.30 HYPERTENSION ASSOCIATED WITH STAGE 3 CHRONIC KIDNEY DISEASE DUE TO TYPE 2 DIABETES MELLITUS: ICD-10-CM

## 2024-09-04 DIAGNOSIS — N18.32 TYPE 2 DIABETES MELLITUS WITH STAGE 3B CHRONIC KIDNEY DISEASE, WITHOUT LONG-TERM CURRENT USE OF INSULIN: ICD-10-CM

## 2024-09-04 DIAGNOSIS — E11.69 HYPERLIPIDEMIA ASSOCIATED WITH TYPE 2 DIABETES MELLITUS: ICD-10-CM

## 2024-09-04 DIAGNOSIS — I63.89 CEREBROVASCULAR ACCIDENT (CVA) DUE TO OTHER MECHANISM: ICD-10-CM

## 2024-09-04 DIAGNOSIS — Z79.01 CHRONIC ANTICOAGULATION: ICD-10-CM

## 2024-09-04 DIAGNOSIS — E11.22 HYPERTENSION ASSOCIATED WITH STAGE 3 CHRONIC KIDNEY DISEASE DUE TO TYPE 2 DIABETES MELLITUS: ICD-10-CM

## 2024-09-04 DIAGNOSIS — R79.89 ELEVATED TROPONIN: Primary | ICD-10-CM

## 2024-09-04 DIAGNOSIS — I70.0 AORTIC ATHEROSCLEROSIS: ICD-10-CM

## 2024-09-04 PROCEDURE — 99999 PR PBB SHADOW E&M-EST. PATIENT-LVL V: CPT | Mod: PBBFAC,,, | Performed by: INTERNAL MEDICINE

## 2024-09-04 NOTE — PROGRESS NOTES
CARDIOLOGY CLINIC VISIT        HISTORY OF PRESENT ILLNESS:     09/04/2024: Luther Irwin presented with lower extremity weakness and uncoordination in July, 2024.  Blood pressure was 145/75.  CT of his head that showed a right frontal parietal lesion concerning for possible meningioma.  MRI of the brain and MRI of the head and neck showed asmall foci of acute infarction within the parasagittal right parietal lobe.  Started on aspirin and Plavix.  Was outside the window for stroke activation. Plavix subsequently changed to Apixaban.  He had an echocardiogram that showed an ejection fraction of 40-45%. EKG showed sinus bradycardia with first degree AVB. LBBB. Denies any history of coronary artery disease. Ambulates with walker. Residual LLE weakness.    CARDIOVASCULAR HISTORY:     Aortic atherosclerosis  Carotid disease  LBBB    PAST MEDICAL HISTORY:     Past Medical History:   Diagnosis Date    Cancer     CVA (cerebral vascular accident) 7/12/2024    Diabetes mellitus, type 2     Diabetes with neurologic complications     Disorder of kidney and ureter     Hypertension     Late complications of amputation stump     Prostate cancer     Pseudophakia 01/05/2024       PAST SURGICAL HISTORY:     Past Surgical History:   Procedure Laterality Date    FINGER SURGERY  3/2014    HERNIA REPAIR  07/1967    KNEE SURGERY      SPINE SURGERY  10/2007    VASECTOMY  1986       ALLERGIES AND MEDICATION:     Review of patient's allergies indicates:   Allergen Reactions    Grass pollen-june grass standard Itching     Eye irritation        Medication List            Accurate as of September 4, 2024  2:25 PM. If you have any questions, ask your nurse or doctor.                CONTINUE taking these medications      amlodipine-valsartan  mg per tablet  Commonly known as: EXFORGE  Take 1 tablet by mouth once daily.     apixaban 5 mg Tab  Commonly known as: ELIQUIS  Take 1 tablet (5 mg total) by mouth 2 (two) times daily.     aspirin 81 MG  Chew     atorvastatin 40 MG tablet  Commonly known as: LIPITOR  Take 1 tablet (40 mg total) by mouth once daily.     cloNIDine 0.1 MG tablet  Commonly known as: CATAPRES  TAKE 1 TABLET BY MOUTH THREE TIMES A DAY     empagliflozin 10 mg tablet  Commonly known as: JARDIANCE  Take 1 tablet (10 mg total) by mouth once daily.     furosemide 40 MG tablet  Commonly known as: LASIX  Take 1 tablet (40 mg total) by mouth once daily.     glipiZIDE 10 MG Tr24  Commonly known as: GLUCOTROL  Take 1 tablet (10 mg total) by mouth 2 (two) times daily.     hydroCHLOROthiazide 25 MG tablet  Commonly known as: HYDRODIURIL     tadalafiL 20 MG Tab  Commonly known as: CIALIS  Take 1 tablet (20 mg total) by mouth once daily.     tamsulosin 0.4 mg Cap  Commonly known as: FLOMAX  Take 1 capsule (0.4 mg total) by mouth once daily.              SOCIAL HISTORY:     Social History     Socioeconomic History    Marital status: Single    Number of children: 3    Highest education level: Bachelor's degree (e.g., BA, AB, BS)   Tobacco Use    Smoking status: Former     Current packs/day: 0.00     Average packs/day: 0.3 packs/day for 0.9 years (0.2 ttl pk-yrs)     Types: Cigarettes     Start date: 1960     Quit date: 1960     Years since quittin.8    Smokeless tobacco: Never   Substance and Sexual Activity    Alcohol use: Yes     Comment: special occacion    Drug use: No     Social Determinants of Health     Financial Resource Strain: Low Risk  (2024)    Overall Financial Resource Strain (CARDIA)     Difficulty of Paying Living Expenses: Not hard at all   Food Insecurity: No Food Insecurity (2024)    Hunger Vital Sign     Worried About Running Out of Food in the Last Year: Never true     Ran Out of Food in the Last Year: Never true   Transportation Needs: No Transportation Needs (2023)    PRAPARE - Transportation     Lack of Transportation (Medical): No     Lack of Transportation (Non-Medical): No   Physical Activity:  "Insufficiently Active (7/14/2024)    Exercise Vital Sign     Days of Exercise per Week: 1 day     Minutes of Exercise per Session: 10 min   Stress: No Stress Concern Present (7/14/2024)    [employer] [place] [employer] - Occupational Stress Questionnaire     Feeling of Stress : Not at all   Housing Stability: Low Risk  (12/30/2023)    Housing Stability Vital Sign     Unable to Pay for Housing in the Last Year: No     Number of Places Lived in the Last Year: 1     Unstable Housing in the Last Year: No       FAMILY HISTORY:     Family History   Problem Relation Name Age of Onset    Hypertension Mother      Hyperlipidemia Mother      Hypertension Father      Hyperlipidemia Father         REVIEW OF SYSTEMS:   Review of Systems   Constitutional:  Negative for chills, diaphoresis, fever, malaise/fatigue and weight loss.   HENT:  Negative for congestion, hearing loss, sinus pain, sore throat and tinnitus.    Eyes:  Negative for blurred vision, double vision, photophobia and pain.   Respiratory:  Negative for cough, hemoptysis, sputum production, shortness of breath, wheezing and stridor.    Cardiovascular:  Negative for chest pain, palpitations, orthopnea, claudication, leg swelling and PND.   Gastrointestinal:  Negative for abdominal pain, blood in stool, heartburn, melena, nausea and vomiting.   Musculoskeletal:  Negative for back pain, falls, joint pain, myalgias and neck pain.   Neurological:  Positive for focal weakness. Negative for dizziness, tingling, tremors, sensory change, speech change, seizures, loss of consciousness, weakness and headaches.   Endo/Heme/Allergies:  Does not bruise/bleed easily.   Psychiatric/Behavioral:  Negative for depression, memory loss and substance abuse. The patient is not nervous/anxious.        PHYSICAL EXAM:     Vitals:    09/04/24 1351   BP: 135/65   Pulse: 80   Resp: 18    Body mass index is 28.06 kg/m².  Weight: 113 kg (249 lb 1.9 oz)   Height: 6' 7" (200.7 cm) "     Physical Exam  Vitals reviewed.   Constitutional:       General: He is not in acute distress.     Appearance: Normal appearance. He is well-developed. He is not diaphoretic.   HENT:      Head: Normocephalic.   Eyes:      Extraocular Movements: Extraocular movements intact.   Neck:      Vascular: No carotid bruit or JVD.   Cardiovascular:      Rate and Rhythm: Normal rate and regular rhythm.      Pulses: Normal pulses.      Heart sounds: Normal heart sounds.   Pulmonary:      Effort: Pulmonary effort is normal.      Breath sounds: Normal breath sounds. No wheezing, rhonchi or rales.   Chest:      Chest wall: No tenderness.   Abdominal:      General: Bowel sounds are normal. There is no distension.      Palpations: Abdomen is soft.      Tenderness: There is no abdominal tenderness.   Musculoskeletal:      Right lower leg: No edema.      Left lower leg: No edema.   Skin:     General: Skin is warm and dry.      Coloration: Skin is not jaundiced or pale.      Findings: No erythema.   Neurological:      General: No focal deficit present.      Mental Status: He is alert and oriented to person, place, and time.      Motor: Weakness present.   Psychiatric:         Speech: Speech normal.         Behavior: Behavior normal.         Thought Content: Thought content normal.         DATA:   EKG: (personally reviewed tracing)    Results for orders placed or performed during the hospital encounter of 07/12/24   EKG 12-lead    Collection Time: 07/15/24  5:16 AM   Result Value Ref Range    QRS Duration 166 ms    [facility] QTC Calculation 499 ms    Narrative    Test Reason : R94.31,    Vent. Rate : 051 BPM     Atrial Rate : 051 BPM     P-R Int : 268 ms          QRS Dur : 166 ms      QT Int : 542 ms       P-R-T Axes : 079 112 -79 degrees     QTc Int : 499 ms    Sinus bradycardia with 1st degree A-V block  Right axis deviation  Left bundle branch block  Abnormal ECG  When compared with ECG of 12-JUL-2024 20:43,  Significant changes have  occurred  Confirmed by Tra HICKMAN, Mal BALTAZAR ([id number]) on 7/15/2024 9:57:42 PM    Referred By: [email or web address]   SELF           Confirmed By:Mal Dutta MD        Laboratory:  CBC:  Recent Labs   Lab 07/13/24  0412 07/14/24  0432 07/15/24  0532   WBC 9.18 9.11 8.85   Hemoglobin 12.4 L 12.8 L 13.1 L   Hematocrit 37.8 L 39.9 L 40.5   Platelets 235 246 239       CHEMISTRIES:  Recent Labs   Lab 03/02/22  0934 09/27/22  1038 07/12/24  2014 07/13/24  0411 07/15/24  0532 07/16/24  0723 07/17/24 0723 07/18/24  0423   Glucose 138 H   < > 162 H   < > 142 H 152 H 144 H 135 H   Sodium 139   < > 138   < > 138 137 137 137   Potassium 4.0   < > 3.4 L   < > 3.4 L 3.7 3.7 3.7   BUN 19   < > 36 H   < > 30 H 30 H 32 H 31 H   Creatinine 1.8 H   < > 2.9 H   < > 2.0 H 2.2 H 2.2 H 2.1 H   eGFR if  39.9 A  --   --   --   --   --   --   --    eGFR if non  34.5 A  --   --   --   --   --   --   --    Calcium 9.3   < > 9.3   < > 8.8 9.4 9.1 8.8   Magnesium  --   --  2.6  --  2.2  --   --   --     < > = values in this interval not displayed.       CARDIAC BIOMARKERS:  Recent Labs   Lab 07/12/24 2014 07/13/24  0011 07/13/24 0411   Troponin I 0.033 H 0.045 H  0.045 H 0.034 H       COAGS:  Recent Labs   Lab 07/12/24 2014   INR 1.0       LIPIDS/LFTS:  Recent Labs   Lab 09/27/22  1038 05/05/23  0811 11/10/23  0926 02/27/24  1146 07/12/24 2014 07/14/24  0431 07/15/24  0532   Cholesterol 169  --  161  --  168  --   --    Triglycerides 98  --  73  --  214 H  --   --    HDL 36 L  --  39 L  --  33 L  --   --    LDL Cholesterol 113.4  --  107.4  --  92.2  --   --    Non-HDL Cholesterol 133  --  122  --  135  --   --    AST 20   < > 21   < > 18 16 16   ALT 17   < > 18   < > 17 13 12    < > = values in this interval not displayed.       Hemoglobin A1C   Date Value Ref Range Status   07/13/2024 7.0 (H) 4.0 - 5.6 % Final     Comment:     ADA Screening Guidelines:  5.7-6.4%  Consistent with prediabetes  >or=6.5%  Consistent with  diabetes    High levels of fetal hemoglobin interfere with the HbA1C  assay. Heterozygous hemoglobin variants (HbS, HgC, etc)do  not significantly interfere with this assay.   However, presence of multiple variants may affect accuracy.     02/27/2024 7.4 (H) 4.0 - 5.6 % Final     Comment:     ADA Screening Guidelines:  5.7-6.4%  Consistent with prediabetes  >or=6.5%  Consistent with diabetes    High levels of fetal hemoglobin interfere with the HbA1C  assay. Heterozygous hemoglobin variants (HbS, HgC, etc)do  not significantly interfere with this assay.   However, presence of multiple variants may affect accuracy.     11/10/2023 7.4 (H) 4.0 - 5.6 % Final     Comment:     ADA Screening Guidelines:  5.7-6.4%  Consistent with prediabetes  >or=6.5%  Consistent with diabetes    High levels of fetal hemoglobin interfere with the HbA1C  assay. Heterozygous hemoglobin variants (HbS, HgC, etc)do  not significantly interfere with this assay.   However, presence of multiple variants may affect accuracy.          The ASCVD Risk score (Sneha DK, et al., 2019) failed to calculate for the following reasons:    The 2019 ASCVD risk score is only valid for ages 40 to 79    The patient has a prior MI or stroke diagnosis      Cardiovascular Testing:    Echo Saline Bubble? Yes    Result Date: 7/15/2024    Left Ventricle: The left ventricle is normal in size. Severely   increased wall thickness. There is concentric hypertrophy. There is mildly   reduced systolic function with a visually estimated ejection fraction of   40 - 45%. Grade I diastolic dysfunction.    Right Ventricle: Mild right ventricular enlargement.    Left Atrium: Agitated saline study of the atrial septum is negative   after vasalva maneuver, suggesting absence of intracardiac shunt at the   atrial level.    Pulmonary Artery: The estimated pulmonary artery systolic pressure is   10 mmHg.    IVC/SVC: Normal venous pressure at 3 mmHg.          No cardiac monitor results  found for the past 12 months         ASSESSMENT:     Elevated troponin  Cardiomyopathy  Stroke: report of arrhythmia with carotid ultrasound - likely due to LBBB  Hypertension  Hyperlipidemia  Diabetes  LBBB  Carotid stenosis  Aortic atherosclerosis  Chronic kidney disease    PLAN:     Elevated troponin/Cardiomyopathy/LBBB: Ischemic evaluation with Nuclear MPS  Hypertension: Continue current management.  Hyperlipidemia: Goal LDL < 70.  Return to clinic one month.           Fabricio Kang MD, MPH, FACC, [facility]

## 2024-09-06 ENCOUNTER — CLINICAL SUPPORT (OUTPATIENT)
Dept: REHABILITATION | Facility: HOSPITAL | Age: 84
End: 2024-09-06
Payer: MEDICARE

## 2024-09-06 DIAGNOSIS — Z74.09 IMPAIRED FUNCTIONAL MOBILITY, BALANCE, GAIT, AND ENDURANCE: Primary | ICD-10-CM

## 2024-09-06 PROCEDURE — 97110 THERAPEUTIC EXERCISES: CPT | Mod: KX,PN | Performed by: PHYSICAL THERAPIST

## 2024-09-06 PROCEDURE — 97530 THERAPEUTIC ACTIVITIES: CPT | Mod: KX,PN | Performed by: PHYSICAL THERAPIST

## 2024-09-06 PROCEDURE — 97112 NEUROMUSCULAR REEDUCATION: CPT | Mod: KX,PN | Performed by: PHYSICAL THERAPIST

## 2024-09-06 NOTE — PROGRESS NOTES
"  Physical Therapy Daily Treatment Note     Name: Luther Irwin Jr.  Clinic Number: [id number]    Therapy Diagnosis:   Encounter Diagnosis   Name Primary?    Impaired functional mobility, balance, gait, and endurance Yes       Physician: Benny Roman*    Visit Date: 9/6/2024    Physician Orders: PT Eval and Treat   Medical Diagnosis from Referral:   Diagnosis   I63.9 (ICD-10-CM) - CVA (cerebral vascular accident)      Evaluation Date: 7/25/2024  Authorization Period Expiration: 7/18/2025  Plan of Care Expiration:  9/5/24  Updated plan of care: 9/6/2024 to 10/4/24   Visit # / Visits authorized: 8/20- KX modifier      PN due: 9/27/24     Time In: 1035  Time Out: 1115  Total Billable Time:40 minutes    Precautions: Standard and Fall    Subjective     Pt reports: no new reports  He was compliant with home exercise program.  Response to previous treatment: felt tired  Functional change: ongoing    Pain: 0/10  Location: not applicable       Objective     Luther participated in dynamic functional therapeutic activities to improve functional performance for 13 minutes, including:    Treadmill at 2.0 mph, bilateral- 1 upper extremity support x 5 mins   - minimal verbal cues to improve left foot clearance    Sit<>stand from 19" mat, no upper extremity- 2 x 10       Luther participated in neuromuscular re-education activities to improve: Balance for 17 minutes. The following activities were included:    Parallel bar:   Narrow base of support, foam, head turns 2 x 30 sec- minimal- moderate sway  Narrow base of support, foam, eyes closed 2 x 30 sec- moderate sway, CGA  Stance, foam, 2 x 30 sec- moderate sway  Tandem gait, 1 upper extremity- 4 laps  2 X 30 sec split stance on soft side of bosu, each lower extremity   X 10 forward lunge onto bosu, intermittent upper extremity support  Front/ back step over short rukhsana, intermittent touch on bar- 10x      Luther received therapeutic exercises to develop strength and endurance " for 10 minutes including:     Supine:  2 X 10 Bridge with lower extremity on green ball   Bridge with lower extremity lift on ball 10x   - assist to hold ball    Time above includes seated rest breaks       Home Exercises Provided and Patient Education Provided     Education provided:   - continue with home exercise program, encouraged increased participation in tandem stance.     Written Home Exercises Provided: Patient instructed to cont prior HEP.  Exercises were reviewed and Luther was able to demonstrate them prior to the end of the session.  Luther demonstrated good  understanding of the education provided.     See EMR under Patient Instructions for exercises provided prior visit.    Assessment   Mr. Irwin tolerated physical therapy session well. Treadmill was initiated to improve activity tolerance and gait ability. Increased sway noted during balance activities, but this may be due to increased fatigue after treadmill use. Improved hip clearance noted during bridging indicating improved strength. Patient can benefit from continued skilled physical therapy.      Pt prognosis is Good.     Pt will continue to benefit from skilled outpatient physical therapy to address the deficits listed in the problem list box on initial evaluation, provide pt/family education and to maximize pt's level of independence in the home and community environment.     Pt's spiritual, cultural and educational needs considered and pt agreeable to plan of care and goals.     Anticipated barriers to physical therapy: none    Goals:   Status as of 8/29/24  Short Term Goals: 3 weeks   Patient will report compliance with home exercise program at least 2x/ week to improve speed of progress towards PLOF. ongoing   Patient will score >24/28 on Tinetti to demonstrate decreased fall risk from moderate to minimal. ongoing   Patient will demonstrate improved household mobility and decreased need for furniture walking by performing Timed Up and Go in  13 sec without assistive device. Met 8/27/24  Patient will demonstrate improved lower extremity strength and endurance by performing 5 times sit<>stand test in 20 sec. Met 8/27/24   Assess Functional Gait Assessment and set goals as needed.  Met 7/29/24              Revised 7/29/24: patient will demonstrate improved balance and decreased fall risk by scoring 19/30 on Functional Gait Assessment. Met 8/27/24     Long Term Goals: 6 weeks   Patient will demonstrate improved lower extremity strength and endurance by performing 5 times sit<>stand test in 15 sec.met 8/27/24   Patient will demonstrate improved balance responses in various environments by performing narrow base of support, foam, eyes closed x 30 sec. ongoing   Patient will report no difficulty negotiating curbs to demonstrate a return to prior level of function and improve his ability to independently attend medical appointments. ongoing   Patient will deny left ankle pain to improve all mobility. Met 7/29/24  New 7/29/24: patient will demonstrate improved balance and decreased fall risk by scoring 22/30 on Functional Gait Assessment.  improved      Plan     Outpatient Physical Therapy 2 times weekly to include the following interventions: Manual Therapy, Moist Heat/ Ice, Neuromuscular Re-ed, Patient Education, Therapeutic Activities, and Therapeutic Exercise.      Continue with balance challenges, continue with treadmill     Zahida Gupta, PT, DPT,   Board-Certified Clinical Specialist in Neurologic Physical Therapy    9/6/2024

## 2024-09-06 NOTE — TELEPHONE ENCOUNTER
Pt states he went to clinic on [facility]; the vaccines that he just received from [place] office was not written on the form; advised pt ok to bring form and we can fill in the vaccines   Session ID: [id number]  Language: Raul   ID: #[id number]   Name: Sukhjinder Esquivel

## 2024-09-12 ENCOUNTER — LAB VISIT (OUTPATIENT)
Dept: LAB | Facility: HOSPITAL | Age: 84
End: 2024-09-12
Attending: UROLOGY
Payer: MEDICARE

## 2024-09-12 DIAGNOSIS — C61 PROSTATE CANCER: ICD-10-CM

## 2024-09-12 LAB — COMPLEXED PSA SERPL-MCNC: 7.7 NG/ML (ref 0–4)

## 2024-09-12 PROCEDURE — 84153 ASSAY OF PSA TOTAL: CPT | Performed by: UROLOGY

## 2024-09-12 PROCEDURE — 36415 COLL VENOUS BLD VENIPUNCTURE: CPT | Performed by: UROLOGY

## 2024-09-16 ENCOUNTER — OFFICE VISIT (OUTPATIENT)
Dept: UROLOGY | Facility: CLINIC | Age: 84
End: 2024-09-16
Payer: MEDICARE

## 2024-09-16 VITALS — BODY MASS INDEX: 28.66 KG/M2 | WEIGHT: 254.44 LBS

## 2024-09-16 DIAGNOSIS — C61 PROSTATE CANCER: Primary | ICD-10-CM

## 2024-09-16 DIAGNOSIS — R35.0 URINARY FREQUENCY: ICD-10-CM

## 2024-09-16 DIAGNOSIS — N52.9 ERECTILE DYSFUNCTION, UNSPECIFIED ERECTILE DYSFUNCTION TYPE: ICD-10-CM

## 2024-09-16 PROCEDURE — 3288F FALL RISK ASSESSMENT DOCD: CPT | Mod: CPTII,S$GLB,, | Performed by: UROLOGY

## 2024-09-16 PROCEDURE — 1157F ADVNC CARE PLAN IN RCRD: CPT | Mod: CPTII,S$GLB,, | Performed by: UROLOGY

## 2024-09-16 PROCEDURE — 99214 OFFICE O/P EST MOD 30 MIN: CPT | Mod: S$GLB,,, | Performed by: UROLOGY

## 2024-09-16 PROCEDURE — 1159F MED LIST DOCD IN RCRD: CPT | Mod: CPTII,S$GLB,, | Performed by: UROLOGY

## 2024-09-16 PROCEDURE — 99999 PR PBB SHADOW E&M-EST. PATIENT-LVL III: CPT | Mod: PBBFAC,,, | Performed by: UROLOGY

## 2024-09-16 PROCEDURE — 1101F PT FALLS ASSESS-DOCD LE1/YR: CPT | Mod: CPTII,S$GLB,, | Performed by: UROLOGY

## 2024-09-16 PROCEDURE — 1126F AMNT PAIN NOTED NONE PRSNT: CPT | Mod: CPTII,S$GLB,, | Performed by: UROLOGY

## 2024-09-16 PROCEDURE — 1160F RVW MEDS BY RX/DR IN RCRD: CPT | Mod: CPTII,S$GLB,, | Performed by: UROLOGY

## 2024-09-16 RX ORDER — SILDENAFIL 100 MG/1
100 TABLET, FILM COATED ORAL DAILY PRN
Qty: 10 TABLET | Refills: 11 | Status: SHIPPED | OUTPATIENT
Start: 2024-09-16

## 2024-09-16 NOTE — PROGRESS NOTES
Subjective:       Patient ID: Luther Irwin Jr. is a 84 y.o. male The patient's last visit with me was on 3/12/2024    Chief Complaint:   No chief complaint on file.          Prostate Cancer  He patient reports frequency and nocturia three times a night. He denies weak stream. The patient states symptoms are of moderate severity. Onset of symptoms was several years ago and was gradual in onset.  He has a personal history and no family history of prostate cancer. He reports a history of no complicating symptoms. He denies flank pain, gross hematuria, kidney stones, and recurrent UTI.  He is currently taking Flomax.     He underwent a prostate needle biopsy on 11/3/2022.  His biopsy was indicated due to: Elevated PSA.  Afterwards he experienced: Gross Hematuria.  These symptoms have resolved.  His PSA prior to biopsy was 6.5.  His prostate size was 90 grams.  The ultrasound did not show a median lobe.  He currently does have erectile dysfunction.  His pathology showed: Prostate Cancer.     Very Low Risk Prostate Cancer  Elected for active surveillance    3/12/2024  He underwent a prostate needle biopsy on 2/27/2024.  His biopsy was indicated due to: Prostate cancer.  Afterwards he experienced: Gross Hematuria.  These symptoms have resolved.  His PSA prior to biopsy was 6.4  PSA Density 0.09. His prostate size was 71.26 grams.  The ultrasound did not show a median lobe.  He currently does have erectile dysfunction.  His pathology showed: benign prostate.     09/16/2024  He had a stroke in July.  He is now on Eliquis.  He feels okay, slight weakness in his right leg.    ACTIVE MEDICAL ISSUES:  Patient Active Problem List   Diagnosis    Essential hypertension    Benign prostatic hyperplasia without lower urinary tract symptoms    Type 2 diabetes mellitus with stage 3b chronic kidney disease, without long-term current use of insulin    Diabetes with proteinuria    Hyperlipidemia associated with type 2 diabetes mellitus     Prostate cancer    Acute ischemic right GLORIA stroke    Stage 3b chronic kidney disease    Elevated troponin    Meningioma    Carotid artery stenosis    Impaired functional mobility, balance, gait, and endurance    Hospital discharge follow-up    Aortic atherosclerosis    LBBB (left bundle branch block)    Hypertension associated with stage 3 chronic kidney disease due to type 2 diabetes mellitus       ALLERGIES AND MEDICATIONS: updated and reviewed.  Review of patient's allergies indicates:   Allergen Reactions    Grass pollen-june grass standard Itching     Eye irritation     Current Outpatient Medications   Medication Sig    amlodipine-valsartan (EXFORGE)  mg per tablet Take 1 tablet by mouth once daily.    apixaban (ELIQUIS) 5 mg Tab Take 1 tablet (5 mg total) by mouth 2 (two) times daily.    aspirin 81 MG Chew Take 81 mg by mouth once daily.    atorvastatin (LIPITOR) 40 MG tablet Take 1 tablet (40 mg total) by mouth once daily.    cloNIDine (CATAPRES) 0.1 MG tablet TAKE 1 TABLET BY MOUTH THREE TIMES A DAY    empagliflozin (JARDIANCE) 10 mg tablet Take 1 tablet (10 mg total) by mouth once daily.    furosemide (LASIX) 40 MG tablet Take 1 tablet (40 mg total) by mouth once daily.    glipiZIDE (GLUCOTROL) 10 MG TR24 Take 1 tablet (10 mg total) by mouth 2 (two) times daily.    hydroCHLOROthiazide (HYDRODIURIL) 25 MG tablet Take 25 mg by mouth. (Patient not taking: Reported on 7/30/2024)    tadalafiL (CIALIS) 20 MG Tab Take 1 tablet (20 mg total) by mouth once daily. (Patient not taking: Reported on 9/4/2024)    tamsulosin (FLOMAX) 0.4 mg Cap Take 1 capsule (0.4 mg total) by mouth once daily.     No current facility-administered medications for this visit.       Review of Systems   Constitutional:  Negative for chills and fever.   HENT:  Negative for congestion.    Respiratory:  Negative for chest tightness and shortness of breath.    Cardiovascular:  Negative for chest pain and palpitations.   Gastrointestinal:   Negative for abdominal pain, constipation, diarrhea, nausea and vomiting.   Genitourinary:  Negative for difficulty urinating, dysuria, flank pain, hematuria and urgency.   Musculoskeletal:  Negative for arthralgias.   Neurological:  Negative for dizziness.   Psychiatric/Behavioral:  Negative for confusion.        Objective:      There were no vitals filed for this visit.  Physical Exam  Vitals and nursing note reviewed.   Constitutional:       Appearance: He is well-developed.   HENT:      Head: Normocephalic.   Eyes:      Conjunctiva/sclera: Conjunctivae normal.   Neck:      Thyroid: No thyromegaly.      Trachea: No tracheal deviation.   Cardiovascular:      Rate and Rhythm: Normal rate.      Heart sounds: Normal heart sounds.   Pulmonary:      Effort: Pulmonary effort is normal. No respiratory distress.      Breath sounds: Normal breath sounds. No wheezing.   Abdominal:      General: Bowel sounds are normal.      Palpations: Abdomen is soft.      Tenderness: There is no abdominal tenderness. There is no rebound.      Hernia: No hernia is present.   Musculoskeletal:         General: No tenderness. Normal range of motion.      Cervical back: Normal range of motion and neck supple.   Lymphadenopathy:      Cervical: No cervical adenopathy.   Skin:     General: Skin is warm and dry.      Findings: No erythema or rash.   Neurological:      Mental Status: He is alert and oriented to person, place, and time.   Psychiatric:         Behavior: Behavior normal.         Thought Content: Thought content normal.         Judgment: Judgment normal.         Urine dipstick shows not done.  Micro exam: not done.            Component Ref Range & Units 4 d ago  (9/12/24) 10 mo ago  (11/10/23) 1 yr ago  (5/5/23) 1 yr ago  (9/27/22)   PSA Diagnostic 0.00 - 4.00 ng/mL 7.7 High  6.4 High  CM 7.6 High  CM 6.5 High  CM   Comment: The testing method is a chemiluminescent microparticle immunoassay  manufactured by [employer] and  performed on the   or  [employer] system. Values obtained with different assay manufacturers  for  methods may be different and cannot be used interchangeably.  PSA Expected levels:  Hormonal Therapy: <0.05 ng/ml  Prostatectomy: <0.01 ng/ml  Radiation Therapy: <1.00 ng/ml   Resulting Agency  [facility]              Narrative  Performed by: NIDHI  6 months      Specimen Collected: 09/12/24 08:56 CDT             Collected: 02/27/24 1320   Result status: Final   Resulting lab: [facility]   Value: 1. Prostate, left base lateral, needle biopsy:  Benign prostatic tissue    2. Prostate, left base medial, needle biopsy:  Benign prostatic tissue    3. Prostate, left mid lateral, needle biopsy:  Benign prostatic tissue    4. Prostate, left mid medial, needle biopsy:  Benign prostatic tissue with chronic inflammation    5. Prostate, left apex lateral, needle biopsy:  Benign prostatic tissue with chronic inflammation    6. Prostate, left apex medial, needle biopsy:  Benign prostatic tissue    7. Prostate, right base lateral, needle biopsy:  Benign prostatic tissue    8. Prostate, right base medial, needle biopsy:  Benign prostatic tissue with chronic inflammation    9. Prostate, right mid lateral, needle biopsy:  Benign prostatic tissue with chronic inflammation    10. Prostate, right mid medial, needle biopsy:  Benign prostatic tissue    11. Prostate, right apex lateral, needle biopsy:  Benign prostatic tissue with chronic inflammation    12. Prostate, right apex medial, needle biopsy:  Benign prostatic tissue   Comment: Interp By Bob Graham MD, Signed on 03/01/2024 at 10:29       Assessment:       No diagnosis found.        Plan:       1. Prostate cancer  Continue active surveillance  - Prostate Specific Antigen, Diagnostic; Future    2. Erectile dysfunction, unspecified erectile dysfunction type  Viagra    3. Urinary frequency  Flomax            No follow-ups on  file.

## 2024-09-17 ENCOUNTER — CLINICAL SUPPORT (OUTPATIENT)
Dept: REHABILITATION | Facility: HOSPITAL | Age: 84
End: 2024-09-17
Payer: MEDICARE

## 2024-09-17 DIAGNOSIS — Z74.09 IMPAIRED FUNCTIONAL MOBILITY, BALANCE, GAIT, AND ENDURANCE: Primary | ICD-10-CM

## 2024-09-17 PROCEDURE — 97112 NEUROMUSCULAR REEDUCATION: CPT | Mod: KX,PN | Performed by: PHYSICAL THERAPIST

## 2024-09-17 PROCEDURE — 97530 THERAPEUTIC ACTIVITIES: CPT | Mod: KX,PN | Performed by: PHYSICAL THERAPIST

## 2024-09-17 NOTE — PROGRESS NOTES
"  Physical Therapy Daily Treatment Note     Name: Luther Irwin Jr.  Clinic Number: [id number]    Therapy Diagnosis:   Encounter Diagnosis   Name Primary?    Impaired functional mobility, balance, gait, and endurance Yes         Physician: Benny Roman*    Visit Date: 9/17/2024    Physician Orders: PT Eval and Treat   Medical Diagnosis from Referral:   Diagnosis   I63.9 (ICD-10-CM) - CVA (cerebral vascular accident)      Evaluation Date: 7/25/2024  Authorization Period Expiration: 7/18/2025  Plan of Care Expiration:  9/5/24  Updated plan of care: 9/6/2024 to 10/4/24   Visit # / Visits authorized: 9/20- KX modifier      PN due: 9/27/24     Time In: 1411   Time Out: 1453  Total Billable Time:23 minutes  Total treatment time: 47 minutes    Precautions: Standard and Fall    Subjective     Pt reports: arrived without RW  He was compliant with home exercise program.  Response to previous treatment: felt tired  Functional change: ongoing    Pain: 0/10  Location: not applicable       Objective     Luther participated in dynamic functional therapeutic activities to improve functional performance for 14 minutes, including:    Treadmill at 2.5 mph, bilateral- 1 upper extremity support x 6 mins    Sit<>stand from 20" mat, no upper extremity- 2 x 10       Luther participated in neuromuscular re-education activities to improve: Balance for 23 minutes. The following activities were included:    Parallel bar:   Narrow base of support, foam, head turns 2 x 30 sec- minimal- moderate sway  Stance, foam, 2 x 30 sec- moderate sway  Tandem gait, 1 upper extremity- 4 laps  X 10 forward lunge onto bosu, intermittent upper extremity support  Front/ back step over short rukhsana, intermittent touch on bar- 10x      Luther received therapeutic exercises to develop strength and endurance for 10 minutes including:     Supine:  2 X 10 Bridge with lower extremity on green ball   Bridge with lower extremity lift on ball 10x   - assist to hold " ball    Time above includes seated rest breaks       Home Exercises Provided and Patient Education Provided     Education provided:   - continue with home exercise program, encouraged increased participation in tandem stance.     Written Home Exercises Provided: Patient instructed to cont prior HEP.  Exercises were reviewed and Luther was able to demonstrate them prior to the end of the session.  Luther demonstrated good  understanding of the education provided.     See EMR under Patient Instructions for exercises provided prior visit.    Assessment   Luther arrived without assistive device. Patient tolerated physical therapy session well. He tolerated increased speed and 1 additional minute of ambulation on treadmill. Instability noted on compliant surfaces. Patient can benefit from continued skilled physical therapy to return as close to prior level of function as possible.     Pt prognosis is Good.     Pt will continue to benefit from skilled outpatient physical therapy to address the deficits listed in the problem list box on initial evaluation, provide pt/family education and to maximize pt's level of independence in the home and community environment.     Pt's spiritual, cultural and educational needs considered and pt agreeable to plan of care and goals.     Anticipated barriers to physical therapy: none    Goals:   Status as of 8/29/24  Short Term Goals: 3 weeks   Patient will report compliance with home exercise program at least 2x/ week to improve speed of progress towards PLOF. ongoing   Patient will score >24/28 on Tinetti to demonstrate decreased fall risk from moderate to minimal. ongoing   Patient will demonstrate improved household mobility and decreased need for furniture walking by performing Timed Up and Go in 13 sec without assistive device. Met 8/27/24  Patient will demonstrate improved lower extremity strength and endurance by performing 5 times sit<>stand test in 20 sec. Met 8/27/24   Assess  Functional Gait Assessment and set goals as needed.  Met 7/29/24              Revised 7/29/24: patient will demonstrate improved balance and decreased fall risk by scoring 19/30 on Functional Gait Assessment. Met 8/27/24     Long Term Goals: 6 weeks   Patient will demonstrate improved lower extremity strength and endurance by performing 5 times sit<>stand test in 15 sec.met 8/27/24   Patient will demonstrate improved balance responses in various environments by performing narrow base of support, foam, eyes closed x 30 sec. ongoing   Patient will report no difficulty negotiating curbs to demonstrate a return to prior level of function and improve his ability to independently attend medical appointments. ongoing   Patient will deny left ankle pain to improve all mobility. Met 7/29/24  New 7/29/24: patient will demonstrate improved balance and decreased fall risk by scoring 22/30 on Functional Gait Assessment.  improved      Plan     Outpatient Physical Therapy 2 times weekly to include the following interventions: Manual Therapy, Moist Heat/ Ice, Neuromuscular Re-ed, Patient Education, Therapeutic Activities, and Therapeutic Exercise.      Continue with balance challenges, continue with treadmill     Zahida Gupta, PT, DPT,   Board-Certified Clinical Specialist in Neurologic Physical Therapy    9/17/2024

## 2024-09-19 ENCOUNTER — OFFICE VISIT (OUTPATIENT)
Dept: NEUROSURGERY | Facility: CLINIC | Age: 84
End: 2024-09-19
Payer: MEDICARE

## 2024-09-19 VITALS — HEART RATE: 62 BPM | SYSTOLIC BLOOD PRESSURE: 134 MMHG | DIASTOLIC BLOOD PRESSURE: 67 MMHG

## 2024-09-19 DIAGNOSIS — C41.2 CHORDOMA: ICD-10-CM

## 2024-09-19 DIAGNOSIS — Z86.73 H/O ISCHEMIC RIGHT ACA STROKE: ICD-10-CM

## 2024-09-19 DIAGNOSIS — D32.9 MENINGIOMA: Primary | ICD-10-CM

## 2024-09-19 PROCEDURE — 3288F FALL RISK ASSESSMENT DOCD: CPT | Mod: CPTII,S$GLB,, | Performed by: STUDENT IN AN ORGANIZED HEALTH CARE EDUCATION/TRAINING PROGRAM

## 2024-09-19 PROCEDURE — 99999 PR PBB SHADOW E&M-EST. PATIENT-LVL III: CPT | Mod: PBBFAC,,, | Performed by: STUDENT IN AN ORGANIZED HEALTH CARE EDUCATION/TRAINING PROGRAM

## 2024-09-19 PROCEDURE — 1157F ADVNC CARE PLAN IN RCRD: CPT | Mod: CPTII,S$GLB,, | Performed by: STUDENT IN AN ORGANIZED HEALTH CARE EDUCATION/TRAINING PROGRAM

## 2024-09-19 PROCEDURE — 3078F DIAST BP <80 MM HG: CPT | Mod: CPTII,S$GLB,, | Performed by: STUDENT IN AN ORGANIZED HEALTH CARE EDUCATION/TRAINING PROGRAM

## 2024-09-19 PROCEDURE — 3075F SYST BP GE 130 - 139MM HG: CPT | Mod: CPTII,S$GLB,, | Performed by: STUDENT IN AN ORGANIZED HEALTH CARE EDUCATION/TRAINING PROGRAM

## 2024-09-19 PROCEDURE — 1126F AMNT PAIN NOTED NONE PRSNT: CPT | Mod: CPTII,S$GLB,, | Performed by: STUDENT IN AN ORGANIZED HEALTH CARE EDUCATION/TRAINING PROGRAM

## 2024-09-19 PROCEDURE — 1101F PT FALLS ASSESS-DOCD LE1/YR: CPT | Mod: CPTII,S$GLB,, | Performed by: STUDENT IN AN ORGANIZED HEALTH CARE EDUCATION/TRAINING PROGRAM

## 2024-09-19 PROCEDURE — 99204 OFFICE O/P NEW MOD 45 MIN: CPT | Mod: S$GLB,,, | Performed by: STUDENT IN AN ORGANIZED HEALTH CARE EDUCATION/TRAINING PROGRAM

## 2024-09-19 PROCEDURE — 1159F MED LIST DOCD IN RCRD: CPT | Mod: CPTII,S$GLB,, | Performed by: STUDENT IN AN ORGANIZED HEALTH CARE EDUCATION/TRAINING PROGRAM

## 2024-09-19 NOTE — PROGRESS NOTES
Neurosurgery  History & Physical    SUBJECTIVE:     Chief Complaint: Incidentally found R parietal meningioma    History of Present Illness:  Luther Irwin Jr. Is an 83yo man with PMHx CVA w/mild residual LSW, DM, CKD, prostate CA referred by Vascular Neurology for incidental finding of right parietal meningioma. He denies headaches, seizures, or blurry vision. He underwent MRI brain without contrast prior to our visit. MRI demonstrates right parietal extra-axial lesion consistent with meningioma. No significant mass effect.     Review of patient's allergies indicates:   Allergen Reactions    Grass pollen-afshin grass standard Itching     Eye irritation       Current Outpatient Medications   Medication Sig Dispense Refill    amlodipine-valsartan (EXFORGE)  mg per tablet Take 1 tablet by mouth once daily. 90 tablet 3    apixaban (ELIQUIS) 5 mg Tab Take 1 tablet (5 mg total) by mouth 2 (two) times daily. 180 tablet 0    aspirin 81 MG Chew Take 81 mg by mouth once daily.      atorvastatin (LIPITOR) 40 MG tablet Take 1 tablet (40 mg total) by mouth once daily. 30 tablet 2    cloNIDine (CATAPRES) 0.1 MG tablet TAKE 1 TABLET BY MOUTH THREE TIMES A  tablet 1    empagliflozin (JARDIANCE) 10 mg tablet Take 1 tablet (10 mg total) by mouth once daily. 30 tablet 11    furosemide (LASIX) 40 MG tablet Take 1 tablet (40 mg total) by mouth once daily. 30 tablet 2    glipiZIDE (GLUCOTROL) 10 MG TR24 Take 1 tablet (10 mg total) by mouth 2 (two) times daily. 180 tablet 3    sildenafiL (VIAGRA) 100 MG tablet Take 1 tablet (100 mg total) by mouth daily as needed. 10 tablet 11    tamsulosin (FLOMAX) 0.4 mg Cap Take 1 capsule (0.4 mg total) by mouth once daily. 90 capsule 3    hydroCHLOROthiazide (HYDRODIURIL) 25 MG tablet Take 25 mg by mouth. (Patient not taking: Reported on 7/30/2024)      tadalafiL (CIALIS) 20 MG Tab Take 1 tablet (20 mg total) by mouth once daily. (Patient not taking: Reported on 9/4/2024) 30 tablet 11     No  current facility-administered medications for this visit.       Past Medical History:   Diagnosis Date    Cancer     CVA (cerebral vascular accident) 2024    Diabetes mellitus, type 2     Diabetes with neurologic complications     Disorder of kidney and ureter     Hypertension     Late complications of amputation stump     Prostate cancer     Pseudophakia 2024     Past Surgical History:   Procedure Laterality Date    FINGER SURGERY  3/2014    HERNIA REPAIR  1967    KNEE SURGERY      SPINE SURGERY  10/2007    VASECTOMY  1986     Family History       Problem Relation (Age of Onset)    Hyperlipidemia Mother, Father    Hypertension Mother, Father          Social History     Socioeconomic History    Marital status: Single    Number of children: 3    Highest education level: Bachelor's degree (e.g., BA, AB, BS)   Tobacco Use    Smoking status: Former     Current packs/day: 0.00     Average packs/day: 0.3 packs/day for 0.9 years (0.2 ttl pk-yrs)     Types: Cigarettes     Start date: 1960     Quit date: 1960     Years since quittin.8    Smokeless tobacco: Never   Substance and Sexual Activity    Alcohol use: Yes     Comment: special occacion    Drug use: No     Social Determinants of Health     Financial Resource Strain: Low Risk  (2024)    Overall Financial Resource Strain (CARDIA)     Difficulty of Paying Living Expenses: Not hard at all   Food Insecurity: No Food Insecurity (2024)    Hunger Vital Sign     Worried About Running Out of Food in the Last Year: Never true     Ran Out of Food in the Last Year: Never true   Transportation Needs: No Transportation Needs (2023)    PRAPARE - Transportation     Lack of Transportation (Medical): No     Lack of Transportation (Non-Medical): No   Physical Activity: Insufficiently Active (2024)    Exercise Vital Sign     Days of Exercise per Week: 1 day     Minutes of Exercise per Session: 10 min   Stress: No Stress Concern Present  (7/14/2024)    [employer] [place] [employer] - Occupational Stress Questionnaire     Feeling of Stress : Not at all   Housing Stability: Low Risk  (12/30/2023)    Housing Stability Vital Sign     Unable to Pay for Housing in the Last Year: No     Number of Places Lived in the Last Year: 1     Unstable Housing in the Last Year: No     OBJECTIVE:     Vital Signs  Pulse: 62  BP: 134/67  Pain Score: 0-No pain  There is no height or weight on file to calculate BMI.      Neurosurgery Physical Exam  General: well developed, well nourished, no distress.   Head: normocephalic, atraumatic  Mental Status: Awake, Alert, Oriented  Speech: Clear with content appropriate to conversation  Cranial nerves: face symmetric, CN II-XII grossly intact.   Eyes: pupils equal, round, reactive to light, EOMI.  Sensory: intact to light touch throughout    Motor Strength:Moves all extremities spontaneously with good tone.  Full strength upper and lower extremities. No abnormal movements seen.     Strength  Deltoids Triceps Biceps Wrist Extension Wrist Flexion Hand    Upper: R 5/5 5/5 5/5 5/5 5/5 5/5    L 5/5 5/5 5/5 5/5 5/5 5/5     Iliopsoas Quadriceps Knee  Flexion Tibialis  anterior Gastro- cnemius EHL   Lower: R 5/5 5/5 5/5 5/5 5/5 5/5    L 5/5 5/5 5/5 5/5 5/5 5/5     Pronator Drift: no drift noted  Finger-to-nose: Intact bilaterally    Gait steady with normal stride and arm-swing    Diagnostic Results:  MRI Brain Without Contrast 7/13/24:   - Right parietal extra-axial lesion consistent with meningioma. No significant mass effect.     I have personally reviewed the above referenced imaging.     ASSESSMENT/PLAN:     Luther Irwin Jr. Is an 85yo man with PMHx CVA w/mild residual LSW, DM, CKD, prostate CA referred by Vascular Neurology for incidental finding of right parietal meningioma. He is asymptomatic and neurologically intact on physical exam. He underwent MRI brain without contrast prior to our visit. MRI demonstrates right  parietal extra-axial lesion consistent with meningioma. No significant mass effect. I would like to obtain MRI Brain with contrast per protocol. As long as the contrasted study is without enhancement or any other features concerning for malignancy, we will plan for annual surveillance imaging in 1 year. This plan was discussed with Mr. Irwin who verbalized understanding and agreement. All questions were answered.         Natasha Sanchez PA-C  Department of Neurosurgery  [facility]

## 2024-09-20 ENCOUNTER — HOME CARE VISIT (OUTPATIENT)
Dept: NEUROLOGY | Facility: HOSPITAL | Age: 84
End: 2024-09-20
Payer: MEDICARE

## 2024-09-20 ENCOUNTER — TELEPHONE (OUTPATIENT)
Dept: FAMILY MEDICINE | Facility: CLINIC | Age: 84
End: 2024-09-20
Payer: MEDICARE

## 2024-09-23 ENCOUNTER — TELEPHONE (OUTPATIENT)
Dept: FAMILY MEDICINE | Facility: CLINIC | Age: 84
End: 2024-09-23
Payer: MEDICARE

## 2024-09-23 ENCOUNTER — TELEPHONE (OUTPATIENT)
Dept: NEUROSURGERY | Facility: CLINIC | Age: 84
End: 2024-09-23
Payer: MEDICARE

## 2024-09-23 VITALS
DIASTOLIC BLOOD PRESSURE: 60 MMHG | OXYGEN SATURATION: 98 % | RESPIRATION RATE: 16 BRPM | SYSTOLIC BLOOD PRESSURE: 130 MMHG | HEART RATE: 92 BPM

## 2024-09-23 NOTE — TELEPHONE ENCOUNTER
----- Message from Deuce Martinez sent at 9/23/2024  1:03 PM CDT -----  Type:  Patient Returning Call    Who Called:pt  Who Left Message for Patient:  Does the patient know what this is regarding?:later time  Would the patient rather a call back or a response via [facility]? call  Best Call Back Number: [phone]  Additional Information: pt states he doesn't think that he will have enough time to get to appt tomorrow. Pt states he would like a call back from office to get a later time. Thank you

## 2024-09-23 NOTE — TELEPHONE ENCOUNTER
Called pt 2x to schedule brain MRI that was ordered at last appointment with Natasha Sanchez. No answer so left VM and callback number for patient to get scheduled.

## 2024-09-23 NOTE — PROGRESS NOTES
AAOx3, Ambulates on own. No complaints of H/A, Dizziness, Nausea and no disrupted sleep. Patient is still in therapy and balance is a lot better.  He plans on going back to the gym as soon as therapy if finished. He does Calisthenics every other day outside.  He also tries to watch what he eats. No acute distress noted. Educated the patient that if he experiences Stroke like symptoms to call 911 or go straight to the ED.

## 2024-09-23 NOTE — PROGRESS NOTES
AAOx3, Ambulates on own. No complaints of H/A, Dizziness, Sleeps very well. Patient is still in therapy and his balance is a lot better. He is self sufficient and tries to watch what he eats.  Every other day he does a few Calasthenics and patient is planning on going back to the gym after he finishes PT.  No acute distress noted. Educated patient that if he experiences stroke like symptoms to call 911 or go directly to the ED.

## 2024-09-24 ENCOUNTER — HOSPITAL ENCOUNTER (OUTPATIENT)
Dept: RADIOLOGY | Facility: HOSPITAL | Age: 84
Discharge: HOME | End: 2024-09-24
Attending: INTERNAL MEDICINE
Payer: MEDICARE

## 2024-09-24 ENCOUNTER — OFFICE VISIT (OUTPATIENT)
Dept: FAMILY MEDICINE | Facility: CLINIC | Age: 84
End: 2024-09-24
Payer: MEDICARE

## 2024-09-24 ENCOUNTER — HOSPITAL ENCOUNTER (OUTPATIENT)
Dept: CARDIOLOGY | Facility: HOSPITAL | Age: 84
Discharge: HOME | End: 2024-09-24
Attending: INTERNAL MEDICINE
Payer: MEDICARE

## 2024-09-24 VITALS
HEART RATE: 70 BPM | SYSTOLIC BLOOD PRESSURE: 120 MMHG | BODY MASS INDEX: 29.28 KG/M2 | WEIGHT: 253.06 LBS | HEIGHT: 78 IN | OXYGEN SATURATION: 99 % | RESPIRATION RATE: 16 BRPM | DIASTOLIC BLOOD PRESSURE: 64 MMHG | TEMPERATURE: 98 F

## 2024-09-24 DIAGNOSIS — Z00.00 ENCOUNTER FOR MEDICARE ANNUAL WELLNESS EXAM: Primary | ICD-10-CM

## 2024-09-24 DIAGNOSIS — I70.0 AORTIC ATHEROSCLEROSIS: ICD-10-CM

## 2024-09-24 DIAGNOSIS — E11.22 TYPE 2 DIABETES MELLITUS WITH STAGE 3B CHRONIC KIDNEY DISEASE, WITHOUT LONG-TERM CURRENT USE OF INSULIN: ICD-10-CM

## 2024-09-24 DIAGNOSIS — R79.89 ELEVATED TROPONIN: ICD-10-CM

## 2024-09-24 DIAGNOSIS — N18.32 TYPE 2 DIABETES MELLITUS WITH STAGE 3B CHRONIC KIDNEY DISEASE, WITHOUT LONG-TERM CURRENT USE OF INSULIN: ICD-10-CM

## 2024-09-24 DIAGNOSIS — I12.9 HYPERTENSION ASSOCIATED WITH STAGE 3 CHRONIC KIDNEY DISEASE DUE TO TYPE 2 DIABETES MELLITUS: ICD-10-CM

## 2024-09-24 DIAGNOSIS — I10 ESSENTIAL HYPERTENSION: ICD-10-CM

## 2024-09-24 DIAGNOSIS — I44.7 LBBB (LEFT BUNDLE BRANCH BLOCK): ICD-10-CM

## 2024-09-24 DIAGNOSIS — Z71.89 ADVANCED DIRECTIVES, COUNSELING/DISCUSSION: ICD-10-CM

## 2024-09-24 DIAGNOSIS — E78.5 HYPERLIPIDEMIA ASSOCIATED WITH TYPE 2 DIABETES MELLITUS: ICD-10-CM

## 2024-09-24 DIAGNOSIS — E11.69 HYPERLIPIDEMIA ASSOCIATED WITH TYPE 2 DIABETES MELLITUS: ICD-10-CM

## 2024-09-24 DIAGNOSIS — N18.30 HYPERTENSION ASSOCIATED WITH STAGE 3 CHRONIC KIDNEY DISEASE DUE TO TYPE 2 DIABETES MELLITUS: ICD-10-CM

## 2024-09-24 DIAGNOSIS — E11.22 HYPERTENSION ASSOCIATED WITH STAGE 3 CHRONIC KIDNEY DISEASE DUE TO TYPE 2 DIABETES MELLITUS: ICD-10-CM

## 2024-09-24 LAB
CV STRESS BASE HR: 55 BPM
DIASTOLIC BLOOD PRESSURE: 72 MMHG
NUC REST EJECTION FRACTION: 37
OHS CV CPX 85 PERCENT MAX PREDICTED HEART RATE MALE: 116
OHS CV CPX MAX PREDICTED HEART RATE: 136
OHS CV CPX PATIENT IS FEMALE: 0
OHS CV CPX PATIENT IS MALE: 1
OHS CV CPX PEAK DIASTOLIC BLOOD PRESSURE: 72 MMHG
OHS CV CPX PEAK HEAR RATE: 70 BPM
OHS CV CPX PEAK RATE PRESSURE PRODUCT: 9660
OHS CV CPX PEAK SYSTOLIC BLOOD PRESSURE: 138 MMHG
OHS CV CPX PERCENT MAX PREDICTED HEART RATE ACHIEVED: 51
OHS CV CPX RATE PRESSURE PRODUCT PRESENTING: 7370
SYSTOLIC BLOOD PRESSURE: 134 MMHG

## 2024-09-24 PROCEDURE — 99999 PR PBB SHADOW E&M-EST. PATIENT-LVL V: CPT | Mod: PBBFAC,,,

## 2024-09-24 PROCEDURE — 93017 CV STRESS TEST TRACING ONLY: CPT

## 2024-09-24 PROCEDURE — 78452 HT MUSCLE IMAGE SPECT MULT: CPT | Mod: 26,,, | Performed by: INTERNAL MEDICINE

## 2024-09-24 PROCEDURE — 1157F ADVNC CARE PLAN IN RCRD: CPT | Mod: CPTII,S$GLB,,

## 2024-09-24 PROCEDURE — 93018 CV STRESS TEST I&R ONLY: CPT | Mod: ,,, | Performed by: INTERNAL MEDICINE

## 2024-09-24 PROCEDURE — 93016 CV STRESS TEST SUPVJ ONLY: CPT | Mod: ,,, | Performed by: INTERNAL MEDICINE

## 2024-09-24 PROCEDURE — 1101F PT FALLS ASSESS-DOCD LE1/YR: CPT | Mod: CPTII,S$GLB,,

## 2024-09-24 PROCEDURE — 1160F RVW MEDS BY RX/DR IN RCRD: CPT | Mod: CPTII,S$GLB,,

## 2024-09-24 PROCEDURE — G0439 PPPS, SUBSEQ VISIT: HCPCS | Mod: S$GLB,,,

## 2024-09-24 PROCEDURE — A9502 TC99M TETROFOSMIN: HCPCS | Performed by: INTERNAL MEDICINE

## 2024-09-24 PROCEDURE — 3078F DIAST BP <80 MM HG: CPT | Mod: CPTII,S$GLB,,

## 2024-09-24 PROCEDURE — 3288F FALL RISK ASSESSMENT DOCD: CPT | Mod: CPTII,S$GLB,,

## 2024-09-24 PROCEDURE — 78452 HT MUSCLE IMAGE SPECT MULT: CPT

## 2024-09-24 PROCEDURE — 63600175 PHARM REV CODE 636 W HCPCS: Performed by: INTERNAL MEDICINE

## 2024-09-24 PROCEDURE — 1170F FXNL STATUS ASSESSED: CPT | Mod: CPTII,S$GLB,,

## 2024-09-24 PROCEDURE — 3074F SYST BP LT 130 MM HG: CPT | Mod: CPTII,S$GLB,,

## 2024-09-24 PROCEDURE — 1159F MED LIST DOCD IN RCRD: CPT | Mod: CPTII,S$GLB,,

## 2024-09-24 PROCEDURE — 2023F DILAT RTA XM W/O RTNOPTHY: CPT | Mod: CPTII,S$GLB,,

## 2024-09-24 RX ORDER — REGADENOSON 0.08 MG/ML
0.4 INJECTION, SOLUTION INTRAVENOUS ONCE
Status: COMPLETED | OUTPATIENT
Start: 2024-09-24 | End: 2024-09-24

## 2024-09-24 RX ADMIN — REGADENOSON 0.4 MG: 0.08 INJECTION, SOLUTION INTRAVENOUS at 07:09

## 2024-09-24 RX ADMIN — TETROFOSMIN 30.4 MILLICURIE: 1.38 INJECTION, POWDER, LYOPHILIZED, FOR SOLUTION INTRAVENOUS at 07:09

## 2024-09-24 RX ADMIN — TETROFOSMIN 10.5 MILLICURIE: 1.38 INJECTION, POWDER, LYOPHILIZED, FOR SOLUTION INTRAVENOUS at 07:09

## 2024-09-24 NOTE — PROGRESS NOTES
Health Maintenance Due   Topic     RSV Vaccine (Age 60+ and Pregnant patients) (1 - 1-dose 60+ series) Not given at this facility       Influenza Vaccine (1) Will do covid and flu at pharmacy    COVID-19 Vaccine (7 - 2023-24 season) Not given at this facility

## 2024-09-24 NOTE — PROGRESS NOTES
HPI     Chief Complaint:  AWV    Luther Irwin Jr. is a 84 y.o. male with multiple medical diagnoses as listed in the medical history and problem list that presented for a Medicare AWV and comprehensive Health Risk Assessment today.      The following components were reviewed and updated:    Medical history  Family History  Social history  Allergies and Current Medications  Health Risk Assessment  Health Maintenance  Care Team     HPI   Patient has no complaints today   Patient will do both his flu and COVID vaccine at his local pharmacy      Assessment & Plan    Patient informed of his vaccines   Has future labs and an appointment with his PCP on 11/11/2024   Information provided on AVS    Diagnoses and health risks identified today and associated recommendations/orders:    Problem List Items Addressed This Visit          Cardiac/Vascular    Essential hypertension  Blood pressure is in normal range and stable today  Continue current medication regimen,  Exforge and hydrochlorothiazide   Advised to continue with low-sodium diet, regular cardiovascular exercises, portion control       Endocrine    Type 2 diabetes mellitus with stage 3b chronic kidney disease, without long-term current use of insulin  Limit refined carbohydrates (white pasta, bread, rice).  Replace butter with healthy fats such as grape seed or olive oil.   Use herbs and spices instead of salt to flavor foods.  Other vegetables such as  onions, bell pepper, garlic    Limit red meat and processed meats to no more than a few times a month.  Avoid sugary sodas,  fruit juices with added sugar, bakery goods, and sweets.  Eat fish and poultry at least twice a week.       Palliative Care    Advanced directives, counseling/discussion  I offered to discuss advanced care planning, including how to pick a person who would make decisions for you if you were unable to make them for yourself, called a health care power of , and what kind of decisions you  might make such as use of life sustaining treatments such as ventilators and tube feeding when faced with a life limiting illness recorded on a living will that they will need to know. (How you want to be cared for as you near the end of your natural life)     X Patient is interested in learning more about how to make advanced directives.  I provided them paperwork and offered to discuss this with them.       Other    Encounter for Medicare annual wellness exam - Primary  Counseled on age appropriate medical preventative services including age appropriate cancer screenings, age appropriate eye and dental exams, over all nutritional health, need for a consistent exercise regimen, and an over all push towards maintaining a vigorous and active lifestyle. Counseled on age appropriate vaccines and discussed upcoming health care needs based on age/gender. Discussed good sleep hygiene and stress management.            --------------------------------------------    ** See Completed Assessments for Annual Wellness Visit within the encounter summary.**    The following assessments were completed:  Living Situation  CAGE  Depression Screening  Timed Get Up and Go  Whisper Test  Cognitive Function Screening  Nutrition Screening  ADL Screening  PAQ Screening      Provided Luther Irwin Jr.  with a 5-10 year written screening schedule and personal prevention plan. Recommendations were developed using the USPSTF age appropriate recommendations. Education, counseling, and referrals were provided as needed. After Visit Summary printed and given to patient which includes a list of additional screenings\tests needed.    Review for Opioid Screening: Pt does not have Rx for Opioids     Review for Substance Use Disorders: Patient does not abuse use substances    Exam     Review of Systems:  (as noted above)  Review of Systems   Constitutional:  Negative for fever.   Eyes:  Negative for visual disturbance.   Respiratory:  Negative for  "shortness of breath.    Cardiovascular:  Negative for chest pain.   Gastrointestinal:  Negative for abdominal pain.   Genitourinary:  Negative for difficulty urinating.   Neurological:  Negative for dizziness and headaches.       Physical Exam:   Physical Exam  Constitutional:       General: He is not in acute distress.     Appearance: He is not ill-appearing, toxic-appearing or diaphoretic.   HENT:      Head: Normocephalic and atraumatic.   Cardiovascular:      Rate and Rhythm: Normal rate and regular rhythm.      Pulses: Normal pulses.      Heart sounds: Normal heart sounds.   Pulmonary:      Effort: Pulmonary effort is normal. No respiratory distress.      Breath sounds: Normal breath sounds.   Musculoskeletal:      Right lower leg: No edema.      Left lower leg: No edema.   Neurological:      Mental Status: He is alert and oriented to person, place, and time.   Psychiatric:         Mood and Affect: Mood normal.       Vitals:    09/24/24 1055   BP: 120/64   BP Location: Left arm   Patient Position: Sitting   BP Method: Large (Manual)   Pulse: 70   Resp: 16   Temp: 97.7 °F (36.5 °C)   TempSrc: Oral   SpO2: 99%   Weight: 114.8 kg (253 lb 1.4 oz)   Height: 6' 7" (2.007 m)      Body mass index is 28.51 kg/m².    Clock:              Health Maintenance:  Health Maintenance         Date Due Completion Date    RSV Vaccine (Age 60+ and Pregnant patients) (1 - 1-dose 60+ series) Never done ---    Influenza Vaccine (1) 09/01/2024 11/10/2023    COVID-19 Vaccine (7 - 2023-24 season) 09/01/2024 5/8/2023    Eye Exam 01/05/2025 1/5/2024    Override on 6/10/2015: Declined (had an appoint last week )    Hemoglobin A1c 01/13/2025 7/13/2024    Lipid Panel 07/12/2025 7/12/2024    TETANUS VACCINE 01/02/2029 1/2/2019            Health maintenance reviewed  patient states he will get his influenza and COVID vaccine at his local pharmacy.  Was informed that his RSV is also due.      Follow Up:  Follow up if symptoms worsen or " fail to improve.    History     Past Medical History:  Past Medical History:   Diagnosis Date    Cancer     CVA (cerebral vascular accident) 2024    Diabetes mellitus, type 2     Diabetes with neurologic complications     Disorder of kidney and ureter     Hypertension     Late complications of amputation stump     Prostate cancer     Pseudophakia 2024       Past Surgical History:  Past Surgical History:   Procedure Laterality Date    FINGER SURGERY  3/2014    HERNIA REPAIR  1967    KNEE SURGERY      SPINE SURGERY  10/2007    VASECTOMY  1986       Social History:  Social History     Socioeconomic History    Marital status: Single    Number of children: 3    Highest education level: Bachelor's degree (e.g., BA, AB, BS)   Tobacco Use    Smoking status: Former     Current packs/day: 0.00     Average packs/day: 0.3 packs/day for 0.9 years (0.2 ttl pk-yrs)     Types: Cigarettes     Start date: 1960     Quit date: 1960     Years since quittin.8    Smokeless tobacco: Never   Substance and Sexual Activity    Alcohol use: Yes     Comment: special occacion    Drug use: No     Social Determinants of Health     Financial Resource Strain: Low Risk  (2024)    Overall Financial Resource Strain (CARDIA)     Difficulty of Paying Living Expenses: Not hard at all   Food Insecurity: No Food Insecurity (2024)    Hunger Vital Sign     Worried About Running Out of Food in the Last Year: Never true     Ran Out of Food in the Last Year: Never true   Transportation Needs: No Transportation Needs (2024)    PRAPARE - Transportation     Lack of Transportation (Medical): No     Lack of Transportation (Non-Medical): No   Physical Activity: Insufficiently Active (2024)    Exercise Vital Sign     Days of Exercise per Week: 2 days     Minutes of Exercise per Session: 20 min   Stress: No Stress Concern Present (2024)    [employer] - Occupational Stress Questionnaire      Feeling of Stress : Not at all   Housing Stability: Low Risk  (9/24/2024)    Housing Stability Vital Sign     Unable to Pay for Housing in the Last Year: No     Homeless in the Last Year: No       Family History:  Family History   Problem Relation Name Age of Onset    Hypertension Mother      Hyperlipidemia Mother      Hypertension Father      Hyperlipidemia Father         Allergies and Medications: (updated and reviewed)  Review of patient's allergies indicates:   Allergen Reactions    Grass pollen-june grass standard Itching     Eye irritation     Current Outpatient Medications   Medication Sig Dispense Refill    amlodipine-valsartan (EXFORGE)  mg per tablet Take 1 tablet by mouth once daily. 90 tablet 3    apixaban (ELIQUIS) 5 mg Tab Take 1 tablet (5 mg total) by mouth 2 (two) times daily. 180 tablet 0    aspirin 81 MG Chew Take 81 mg by mouth once daily.      atorvastatin (LIPITOR) 40 MG tablet Take 1 tablet (40 mg total) by mouth once daily. 30 tablet 2    cloNIDine (CATAPRES) 0.1 MG tablet TAKE 1 TABLET BY MOUTH THREE TIMES A  tablet 1    empagliflozin (JARDIANCE) 10 mg tablet Take 1 tablet (10 mg total) by mouth once daily. 30 tablet 11    furosemide (LASIX) 40 MG tablet Take 1 tablet (40 mg total) by mouth once daily. 30 tablet 2    glipiZIDE (GLUCOTROL) 10 MG TR24 Take 1 tablet (10 mg total) by mouth 2 (two) times daily. 180 tablet 3    sildenafiL (VIAGRA) 100 MG tablet Take 1 tablet (100 mg total) by mouth daily as needed. 10 tablet 11    tadalafiL (CIALIS) 20 MG Tab Take 1 tablet (20 mg total) by mouth once daily. 30 tablet 11    tamsulosin (FLOMAX) 0.4 mg Cap Take 1 capsule (0.4 mg total) by mouth once daily. 90 capsule 3    hydroCHLOROthiazide (HYDRODIURIL) 25 MG tablet Take 25 mg by mouth. (Patient not taking: Reported on 7/30/2024)       No current facility-administered medications for this visit.           - The patient is given an After Visit Summary that lists all medications with  directions, allergies, education, orders placed during this encounter and follow-up instructions.      - I have reviewed the patient's medical information including past medical, family, and social history sections including the medications and allergies.      - We discussed the patient's current medications.     This note was created by combination of typed  and MModal dictation.  Transcription errors may be present.  If there are any questions, please contact me.     Jody Miller PA-C

## 2024-09-25 ENCOUNTER — CLINICAL SUPPORT (OUTPATIENT)
Dept: REHABILITATION | Facility: HOSPITAL | Age: 84
End: 2024-09-25
Payer: MEDICARE

## 2024-09-25 DIAGNOSIS — Z74.09 IMPAIRED FUNCTIONAL MOBILITY, BALANCE, GAIT, AND ENDURANCE: Primary | ICD-10-CM

## 2024-09-25 PROCEDURE — 97112 NEUROMUSCULAR REEDUCATION: CPT | Mod: KX,PN | Performed by: PHYSICAL THERAPIST

## 2024-09-25 PROCEDURE — 97530 THERAPEUTIC ACTIVITIES: CPT | Mod: KX,PN | Performed by: PHYSICAL THERAPIST

## 2024-09-25 NOTE — PATIENT INSTRUCTIONS
"Semi Tandem Standing        Heel of one foot against arch of other:   1.Look right and left 10 times.   2. Look up and down 10 times.   Repeat 2-3 times. Do 3 sessions per week.  https://[email or web address].[email or web address]   Copyright © [employer]. All rights reserved.   Tandem Stance        Right foot in front of left, heel touching toe both feet "straight ahead". Stand on Foot Triangle of Support with both feet. Balance in this position 30 seconds.  Do with left foot in front of right. Perform 2-3 times with each foot forward.   Copyright © [employer]. All rights reserved.   Heel Raise: Bilateral (Standing)        Hold on for support. Rise on balls of feet.  Repeat 10 times per set. Do 2 sets per session. Do 1 sessions per day.  https://[employer].[place].ReqSpot.com/38   Copyright © [employer]. All rights reserved.          "

## 2024-09-25 NOTE — PROGRESS NOTES
See plan of care, patient discharged from physical therapy    Zahida Gupta, PT, DPT,   Board-Certified Clinical Specialist in Neurologic Physical Therapy

## 2024-09-26 NOTE — PLAN OF CARE
Outpatient Therapy Discharge Summary     Name: Luther Irwin Jr.  Clinic Number: [id number]    Therapy Diagnosis:   Encounter Diagnosis   Name Primary?    Impaired functional mobility, balance, gait, and endurance Yes     Physician: Benny Roman*    Physician Orders: PT Eval and Treat   Medical Diagnosis from Referral:   Diagnosis   I63.9 (ICD-10-CM) - CVA (cerebral vascular accident)      Evaluation Date: 7/25/2024  Authorization Period Expiration: 7/18/2025  Plan of Care Expiration:  9/5/24  Updated plan of care: 9/6/2024 to 10/4/24   Visit # / Visits authorized: 10/20- KX modifier     Date of Last visit: 9/25/2024   Total Visits Received: 11    Time In:  0932  Time Out: 1010  Total Billable Time:38 minutes    Precautions: Standard and Fall    Subjective     Pt reports: arrived without RW, feeling a little wobbly this morning  He was compliant with home exercise program.  Response to previous treatment: felt tired  Functional change: ongoing    Pain: 0/10  Location: not applicable       CMS Impairment/Limitation/Restriction for FOTO Stroke Lower Extremity Survey    Therapist reviewed FOTO scores for Luther Irwin Jr. on 9/25/2024.   FOTO documents entered into [facility] - see Media section.    Limitation Score: 49%       OBJECTIVE     ROM:   UPPER EXTREMITY--AROM/PROM  Grossly WFLs, no reports of impairment by pt            RANGE OF MOTION--LOWER EXTREMITIES  (R) LE Hip: equal bilaterally   Knee: equal bilaterally   Ankle: equal bilaterally    (L) LE: Hip: equal bilaterally   Knee: equal bilaterally   Ankle: equal bilaterally    Strength: manual muscle test grades below   Upper Extremity Strength  Grossly WFLs, no reports of impairment by pt     Lower Extremity Strength    RLE evaluation  LLE evaluation  LLE discharge    Hip Flexion: 5/5 5/5 Not tested    Hip Extension:  Not tested  Not tested  Not tested    Hip Abduction: Not tested  Not tested  Not tested    Hip Adduction: Not tested  Not tested  Not tested     Knee Extension: 5/5 5/5 Not tested    Knee Flexion: 5/5 5/5 Not tested    Ankle Dorsiflexion: 5/5 5/5 Not tested    Ankle Plantarflexion: 5/5 3-/5 4+/5   Ankle Inversion: Not tested  5/5 Not tested    Ankle Eversion: Not tested  5/5 Not tested      Abdominal Strength: not tested        Evaluation  9/25/24   Single Limb Stance R LE 4 sec  (<10 sec = HIGH FALL RISK) 5 sec, 8 sec, 7 sec   Single Limb Stance L LE 2 sec  (<10 sec = HIGH FALL RISK) 5 sec, 3 sec, 3 sec   5 times sit-stand 25 seconds    16 sec from chair, bilateral upper extremity    Tinetti 22/28 26/28   Functional Gait Assessment  15/30 (7/29/24) 23/30      5 times sit<>stand Cutoff scores:  >12 sec= fall risk  PD: >16 seconds= fall risk  Vestibular/balance: 15 seconds over 65 years  CVA: 12 seconds     Postural control:  MCTSIB:  1. Eyes Open/feet together/Firm: 30 seconds, no- minimal sway  2. Eyes Closed/feet together/Firm: 30 seconds, minimal assist   3. Eyes Open/feet together/Foam: 30 seconds, minimal sway  4. Eyes Closed/feet together/Foam: 30 sec, moderate sway     Tinetti Balance Assessment  Balance:  1. Sitting Balance 1   Leans/ slides in chair= 0   Steady= 1  2. Rises from chair 1   Unable without help= 0   Able, uses arms= 1   Able without use of arms= 2  3. Attempts to rise 2   Unable without help= 0   Able, >1 attmept required-= 1   Able, 1 attempt= 2  4. Immediate standing balance (1st 5 seconds) 2   Unsteady (swaggers, moves feet, trunk sway)= 0   Steady but uses walker or other support= 1   Narrow base of support without walker or support= 2  5. Nudged 2   Begins to fall= 0   Staggers, grabs, catches self= 1   Steady= 2  6. Standing balance 2   Unsteady= 0   Steady but wide TEAGAN or uses AD=1   Narrow TEAGAN without AD=2  7. Eyes closed 1   Unsteady= 0   Steady= 1  8. Turning 360 degrees 2   Discontinuous steps= 0   Continuous steps= 1   Unsteady= 0   Steady= 1  9. Sitting down 1   Unsafe= 0   Uses arms or not in a smooth motion= 1   Safe,  smooth motion= 2  Balance score= 14/16  Gait:  1. Initiation 1   hesitates or multiple attempts to start= 0   No hesitancy= 1  2. Step length 2   Step to= 0   One foot passes= 1   reciprocal pattern= 2  3. Step height 2    Neither foot clears floor= 0   One foot clears floor= 1   Both feet clear floor= 2  4. Step symmetry 1   Not symmetrical= 0   Appears symmetrical= 1  5. Step continuity 1   Not continuous= 0   Appears continuous= 1  6. Path 2   Marked deviation= 0   Mild/moderate deviation or uses A.D.= 1   Straight without A.D.= 2  7. Trunk 2   Marked sway or uses A.D.= 0   No sway, but flexes knees or back, spread arms out while walking= 1   No sway, no flexion, no use of UE, no use of A.D.= 2  8. Walking stance 1   Heels apart= 0   Heels almost touching while walking= 1  Gait score= 12/12  Total score= 26/28 , low fall risk    0-18= high fall risk  19-23= moderate fall risk  24-28= low fall risk      Gait Assessment:   - AD used: none  - Assistance: modified independent- I   - Distance: limited community  - Curb: not tested   - Ramp:  Mod I        Evaluation  9/25   Timed Up and Go 20s +16.75s +14 sec= 16.9 s  > 20 sec safe for independent transfers,     > 30 sec assist required for transfers 10.3 sec   6 meter walk test Not tested  6m in 5.4s= 1.11m/s   6 min walk test Not tested  Not tested    Timed Up and Go w/o AD= 11s + 10.5s + 9.4s= 10.3s    Timed Up and Go fall risk:   Community dwelling older adults >13.5 sec   Chronic CVA >14 sec   Geriatric with h/o falls >15 sec   Frail elderly >32.6 sec    LE amputees >19 sec   PD >7.95- 11.5 sec   Hip OA >10 sec   Vestibular >11.1 sec        Normal Walking Speed:  Healthy Adults (m/s)    Self Selected Fast    Men Women Men Women   20's 1.39 1.41 2.53 2.47   30's 1.46 1.42 2.45 2.34   40's 1.46 1.39 2.46 2.12   50's 1.39 1.40 2.07 2.01   60's 1.36 1.30 1.93 1.77   70's 1.33 1.27 2.08 1.74       Functional Gait Assessment:   1. Gait on level surface =  3, 5.4s   (3)  Normal: less than 5.5 sec, no A.D., no imbalance, normal gait pattern, deviates< 6in   (2) Mild impairment: 7-5.6 sec, uses A.D., mild gait deviations, or deviates 6-10 in   (1) Moderate impairment: > 7 sec, slow speed, imbalance, deviates 10-15 in.   (0) Severe impairment: needs assist, deviates >15 in, reach/touch wall  2. Change in Gait Speed = 3   (3) Normal: smooth change w/o loss of balance or gait deviation, deviates < 6 in, significant difference between speeds   (2) Mild impairment: changes speed, but demonstrates mild gait deviations, deviates 6-10 in, OR no deviations but unable to significantly speed, OR uses A.D.   (1) Moderate impairment: minor changes to speed, OR changes speed w/ significant deviations, deviates 10-15 in, OR  Changes speed , but loses balance & recovers   (0) Severe impairment: cannot change speed, deviates >15 in, or loses balance & needs assist  3. Gait with horizontal head turns  = 3   (3) Normal: no change in gait, deviates <6 in   (2) Mild impairment: slight change in speed, deviates 6-10 in, OR uses A.D.   (1) Moderate impairment: moderate change in speed, deviates 10-15 in   (0) Severe impairment: severe disruption of gait, deviates >15in  4. Gait with vertical head turns = 3   (3) Normal: no change in gait, deviates <6 in   (2) Mild impairment: slight change in speed, deviates 6-10 in OR uses A.D.   (1) Moderate impairment: moderate change in speed, deviates 10-15 in   (0) Severe impairment: severe disruption of gait, deviates >15 in  5. Gait with pivot turns = 3   (3) Normal: performs safely in 3 sec, no LOB   (2) Mild impairment: performs in >3 sec & no LOB, OR turns safely & requires several steps to regain LOB   (1) Moderate impairment: turns slow, OR requires several small steps for balance following turn & stop   (0) Severe impairment: cannot turn safely, needs assist  6. Step over obstacle = 2   (3) Normal: steps over 2 stacked boxes w/o change in speed or LOB   (2)  Mild impairment: able to step over 1 box w/o change in speed or LOB   (1) Moderate impairment: steps over 1 box but must slow down, may require VC   (0) Severe impairment: cannot perform w/o assist  7. Gait with Narrow TEAGAN = 0   (3) Normal: 10 steps no staggering   (2) Mild impairment: 7-9 steps   (1) Moderate impairment: 4-7 steps   (0) Severe impairment: < 4 steps or cannot perform w/o assist  8. Gait with eyes closed = 2   (3) Normal: < 7 sec, no A.D., no LOB, normal gait pattern, deviates <6 in   (2) Mild impairment: 7.1-9 sec, mild gait deviations, deviates 6-10 in   (1) Moderate impairment: > 9 sec, abnormal pattern, LOB, deviates 10-15 in   (0) Severe impairment: cannot perform w/o assist, LOB, deviates >15in  9. Ambulating Backwards = 2   (3) Normal: no A.D., no LOB, normal gait pattern, deviates <6in   (2) Mild impairment: uses A.D., slower speed, mild gait deviations, deviates 6-10 in   (1) Moderate impairment: slow speed, abnormal gait pattern, LOB, deviates 10-15 in   (0) Severe impairment: severe gait deviations or LOB, deviates >15in  10. Steps = 2   (3) Normal: alternating feet, no rail   (2) Mild Impairment: alternating feet, uses rail   (1) Moderate impairment: step-to, uses rail   (0) Severe impairment: cannot perform safely    Score 23/30     Score:   <22/30 fall risk   <20/30 fall risk in older adults   <18/30 fall risk in Parkinsons   Scores by Decade:                        Age    Score                        40-49  (24-30)                       50-59  (25-30)                       60-69  (20-30)                       70-79  (16-30)  HOMER Matta (2007). Reference Group Data for the Functional Gait Assessment. Physical Therapy ([id number], [id number].       Functional Mobility (Bed mobility, transfers)  Bed mobility: I  Supine to sit: I  Sit to supine: I  Rolling: I  Transfers to bed: I  Transfers to toilet: Mod I  Sit to stand:  Mod I  Stand pivot:  Mod I  Car transfers: Mod I  Wheelchair mobility:  not applicable   Floor transfers: not tested       Luther participated in dynamic functional therapeutic activities to improve functional performance for 24 minutes, including:    Strength assessment  Timed Up and Go  5 times sit<>stand test    Discussed additions to home exercise program to improve remaining impairments:   Semi tandem with head turns  Tandem stance  Heel raises  Ankle circles  Ankle alphabet    Treadmill at 2.1- 2.3 mph, bilateral- 1 upper extremity support x 6 mins      Luther participated in neuromuscular re-education activities to improve: Balance for 14 minutes. The following activities were included:    Functional Gait Assessment  MCTSIB  single leg stance    Parallel bar:   Semi tandem, firm, head turns 2 x 30 sec- moderate sway, intermittent touch on bar  Tandem stance, firm 2 x 30 sec- moderate sway      Written Home Exercises Provided: yes.  Exercises were reviewed and Luther was able to demonstrate them prior to the end of the session.  Luther demonstrated good  understanding of the education provided.     See EMR under Patient Instructions for exercises provided 9/25/2024.   - patient instructed that he may return to gym, start with a light workout and progress self as tolerated      Assessment    84 year old male with diagnosis of CVA referred to [facility] received skilled outpatient PT 7/25/24 to 9/25/2024. Patient did very well with physical therapy meeting all goals set. Patient demonstrates improved standing and ambulatory balance. He no longer requires an assistive device for gait. Speed of mobility has significantly improved and patient is no longer considered at increased risk for falls. Patient has returned to driving. Patient continues to report mild difficulty with all mobility. Home exercise program was advanced to address remaining impairments such as imbalance due to decreased use of ankle strategy and left ankle motor control. Patient was instructed to  return to gym routine, just begin slowly with shorter sessions and lighter weight. Patient can progress as tolerated. Patient no longer requires skilled physical therapy as he is close to prior level of function for mobility.     Goals:   Short Term Goals:   Patient will report compliance with home exercise program at least 2x/ week to improve speed of progress towards PLOF. met   Patient will score >24/28 on Tinetti to demonstrate decreased fall risk from moderate to minimal. Met   Patient will demonstrate improved household mobility and decreased need for furniture walking by performing Timed Up and Go in 13 sec without assistive device. Met   Patient will demonstrate improved lower extremity strength and endurance by performing 5 times sit<>stand test in 20 sec. Met    Assess Functional Gait Assessment and set goals as needed.  Met               Revised 7/29/24: patient will demonstrate improved balance and decreased fall risk by scoring 19/30 on Functional Gait Assessment. Met      Long Term Goals:   Patient will demonstrate improved lower extremity strength and endurance by performing 5 times sit<>stand test in 15 sec.met    Patient will demonstrate improved balance responses in various environments by performing narrow base of support, foam, eyes closed x 30 sec. Met    Patient will report no difficulty negotiating curbs to demonstrate a return to prior level of function and improve his ability to independently attend medical appointments. improved  Patient will deny left ankle pain to improve all mobility. Met   New 7/29/24: patient will demonstrate improved balance and decreased fall risk by scoring 22/30 on Functional Gait Assessment.  Met     Discharge reason: Other:  no longer requires skilled physical therapy, should be able to progress with home exercise program and returning to gym    Plan   This patient is discharged from Physical Therapy     Zahida Gupta, PT, DPT,   Board-Certified Clinical  Specialist in Neurologic Physical Therapy    9/25/2024

## 2024-09-27 ENCOUNTER — EXTERNAL HOME HEALTH (OUTPATIENT)
Dept: HOME HEALTH SERVICES | Facility: HOSPITAL | Age: 84
End: 2024-09-27
Payer: MEDICARE

## 2024-10-10 ENCOUNTER — DOCUMENT SCAN (OUTPATIENT)
Dept: HOME HEALTH SERVICES | Facility: HOSPITAL | Age: 84
End: 2024-10-10
Payer: MEDICARE

## 2024-10-14 PROBLEM — I63.521 ACUTE ISCHEMIC RIGHT ACA STROKE: Status: RESOLVED | Noted: 2024-07-12 | Resolved: 2024-10-14

## 2024-10-16 ENCOUNTER — OFFICE VISIT (OUTPATIENT)
Dept: CARDIOLOGY | Facility: CLINIC | Age: 84
End: 2024-10-16
Payer: MEDICARE

## 2024-10-16 VITALS
WEIGHT: 252.44 LBS | BODY MASS INDEX: 29.21 KG/M2 | HEIGHT: 78 IN | RESPIRATION RATE: 18 BRPM | DIASTOLIC BLOOD PRESSURE: 70 MMHG | HEART RATE: 62 BPM | OXYGEN SATURATION: 98 % | SYSTOLIC BLOOD PRESSURE: 145 MMHG

## 2024-10-16 DIAGNOSIS — E11.22 HYPERTENSION ASSOCIATED WITH STAGE 3 CHRONIC KIDNEY DISEASE DUE TO TYPE 2 DIABETES MELLITUS: ICD-10-CM

## 2024-10-16 DIAGNOSIS — E78.5 HYPERLIPIDEMIA ASSOCIATED WITH TYPE 2 DIABETES MELLITUS: ICD-10-CM

## 2024-10-16 DIAGNOSIS — I70.0 AORTIC ATHEROSCLEROSIS: ICD-10-CM

## 2024-10-16 DIAGNOSIS — I65.23 BILATERAL CAROTID ARTERY STENOSIS: ICD-10-CM

## 2024-10-16 DIAGNOSIS — I44.7 LBBB (LEFT BUNDLE BRANCH BLOCK): ICD-10-CM

## 2024-10-16 DIAGNOSIS — E11.69 HYPERLIPIDEMIA ASSOCIATED WITH TYPE 2 DIABETES MELLITUS: ICD-10-CM

## 2024-10-16 DIAGNOSIS — N18.30 HYPERTENSION ASSOCIATED WITH STAGE 3 CHRONIC KIDNEY DISEASE DUE TO TYPE 2 DIABETES MELLITUS: ICD-10-CM

## 2024-10-16 DIAGNOSIS — I42.9 CARDIOMYOPATHY, UNSPECIFIED TYPE: Primary | ICD-10-CM

## 2024-10-16 DIAGNOSIS — I12.9 HYPERTENSION ASSOCIATED WITH STAGE 3 CHRONIC KIDNEY DISEASE DUE TO TYPE 2 DIABETES MELLITUS: ICD-10-CM

## 2024-10-16 LAB
OHS QRS DURATION: 166 MS
OHS QTC CALCULATION: 474 MS

## 2024-10-16 PROCEDURE — 3078F DIAST BP <80 MM HG: CPT | Mod: CPTII,S$GLB,, | Performed by: INTERNAL MEDICINE

## 2024-10-16 PROCEDURE — 1157F ADVNC CARE PLAN IN RCRD: CPT | Mod: CPTII,S$GLB,, | Performed by: INTERNAL MEDICINE

## 2024-10-16 PROCEDURE — 1101F PT FALLS ASSESS-DOCD LE1/YR: CPT | Mod: CPTII,S$GLB,, | Performed by: INTERNAL MEDICINE

## 2024-10-16 PROCEDURE — 99214 OFFICE O/P EST MOD 30 MIN: CPT | Mod: S$GLB,,, | Performed by: INTERNAL MEDICINE

## 2024-10-16 PROCEDURE — 93000 ELECTROCARDIOGRAM COMPLETE: CPT | Mod: S$GLB,,, | Performed by: INTERNAL MEDICINE

## 2024-10-16 PROCEDURE — 1159F MED LIST DOCD IN RCRD: CPT | Mod: CPTII,S$GLB,, | Performed by: INTERNAL MEDICINE

## 2024-10-16 PROCEDURE — 99999 PR PBB SHADOW E&M-EST. PATIENT-LVL IV: CPT | Mod: PBBFAC,,, | Performed by: INTERNAL MEDICINE

## 2024-10-16 PROCEDURE — 3077F SYST BP >= 140 MM HG: CPT | Mod: CPTII,S$GLB,, | Performed by: INTERNAL MEDICINE

## 2024-10-16 PROCEDURE — 3288F FALL RISK ASSESSMENT DOCD: CPT | Mod: CPTII,S$GLB,, | Performed by: INTERNAL MEDICINE

## 2024-10-16 PROCEDURE — 1160F RVW MEDS BY RX/DR IN RCRD: CPT | Mod: CPTII,S$GLB,, | Performed by: INTERNAL MEDICINE

## 2024-10-16 RX ORDER — FUROSEMIDE 40 MG/1
40 TABLET ORAL DAILY PRN
Qty: 30 TABLET | Refills: 2 | Status: SHIPPED | OUTPATIENT
Start: 2024-10-16 | End: 2025-01-14

## 2024-10-16 NOTE — PROGRESS NOTES
CARDIOLOGY CLINIC VISIT        HISTORY OF PRESENT ILLNESS:     09/04/2024: Luther Irwin presented with lower extremity weakness and uncoordination in July, 2024.  Blood pressure was 145/75.  CT of his head that showed a right frontal parietal lesion concerning for possible meningioma.  MRI of the brain and MRI of the head and neck showed asmall foci of acute infarction within the parasagittal right parietal lobe.  Started on aspirin and Plavix.  Was outside the window for stroke activation. Plavix subsequently changed to Apixaban.  He had an echocardiogram that showed an ejection fraction of 40-45%. EKG showed sinus bradycardia with first degree AVB. LBBB. Denies any history of coronary artery disease. Ambulates with walker. Residual LLE weakness.    10/16/2024: Nuclear stress reported as abnormal.  Moderate to severe intensity, medium size, fixed perfusion abnormality consistent with scar in the basal to mid inferior wall.  He feels good.  No chest pain.  No shortness of breath.  Has run out of Lasix.  States that it was basically going to the restroom all day long.  Made him feel uncomfortable.  EKG today shows sinus bradycardia with sinus arrhythmia, first-degree AV block left bundle branch block.  He has finished physical therapy.  States that he has primarily good days with regards to his ambulation.  He has a follow-up CTA in December followed by vascular surgery appointment.  He was originally placed on Eliquis by vascular.    CARDIOVASCULAR HISTORY:     Aortic atherosclerosis  Carotid disease  LBBB    PAST MEDICAL HISTORY:     Past Medical History:   Diagnosis Date    Cancer     CVA (cerebral vascular accident) 7/12/2024    Diabetes mellitus, type 2     Diabetes with neurologic complications     Disorder of kidney and ureter     Hypertension     Late complications of amputation stump     Prostate cancer     Pseudophakia 01/05/2024       PAST SURGICAL HISTORY:     Past Surgical History:   Procedure Laterality  Date    FINGER SURGERY  3/2014    HERNIA REPAIR  07/1967    KNEE SURGERY      SPINE SURGERY  10/2007    VASECTOMY  1986       ALLERGIES AND MEDICATION:     Review of patient's allergies indicates:   Allergen Reactions    Grass pollen-june grass standard Itching     Eye irritation        Medication List            Accurate as of October 16, 2024  1:43 PM. If you have any questions, ask your nurse or doctor.                CHANGE how you take these medications      furosemide 40 MG tablet  Commonly known as: LASIX  Take 1 tablet (40 mg total) by mouth daily as needed (Shortness of breath).  What changed:   when to take this  reasons to take this  Changed by: Fabricio Kang MD            CONTINUE taking these medications      amlodipine-valsartan  mg per tablet  Commonly known as: EXFORGE  Take 1 tablet by mouth once daily.     apixaban 5 mg Tab  Commonly known as: ELIQUIS  Take 1 tablet (5 mg total) by mouth 2 (two) times daily.     aspirin 81 MG Chew     atorvastatin 40 MG tablet  Commonly known as: LIPITOR  Take 1 tablet (40 mg total) by mouth once daily.     cloNIDine 0.1 MG tablet  Commonly known as: CATAPRES  TAKE 1 TABLET BY MOUTH THREE TIMES A DAY     empagliflozin 10 mg tablet  Commonly known as: JARDIANCE  Take 1 tablet (10 mg total) by mouth once daily.     glipiZIDE 10 MG Tr24  Commonly known as: GLUCOTROL  Take 1 tablet (10 mg total) by mouth 2 (two) times daily.     hydroCHLOROthiazide 25 MG tablet  Commonly known as: HYDRODIURIL     sildenafiL 100 MG tablet  Commonly known as: VIAGRA  Take 1 tablet (100 mg total) by mouth daily as needed.     tadalafiL 20 MG Tab  Commonly known as: CIALIS  Take 1 tablet (20 mg total) by mouth once daily.     tamsulosin 0.4 mg Cap  Commonly known as: FLOMAX  Take 1 capsule (0.4 mg total) by mouth once daily.               Where to Get Your Medications        These medications were sent to [facility]/pharm[facility] #[id number] - [place] - [address]  [address]  [facility] [address]      Phone: [phone]   furosemide 40 MG tablet         SOCIAL HISTORY:     Social History     Socioeconomic History    Marital status: Single    Number of children: 3    Highest education level: Bachelor's degree (e.g., BA, AB, BS)   Tobacco Use    Smoking status: Former     Current packs/day: 0.00     Average packs/day: 0.3 packs/day for 0.9 years (0.2 ttl pk-yrs)     Types: Cigarettes     Start date: 1960     Quit date: 1960     Years since quittin.9    Smokeless tobacco: Never   Substance and Sexual Activity    Alcohol use: Yes     Comment: special occacion    Drug use: No     Social Drivers of Health     Financial Resource Strain: Low Risk  (2024)    Overall Financial Resource Strain (CARDIA)     Difficulty of Paying Living Expenses: Not hard at all   Food Insecurity: No Food Insecurity (2024)    Hunger Vital Sign     Worried About Running Out of Food in the Last Year: Never true     Ran Out of Food in the Last Year: Never true   Transportation Needs: No Transportation Needs (2024)    PRAPARE - Transportation     Lack of Transportation (Medical): No     Lack of Transportation (Non-Medical): No   Physical Activity: Insufficiently Active (2024)    Exercise Vital Sign     Days of Exercise per Week: 2 days     Minutes of Exercise per Session: 20 min   Stress: No Stress Concern Present (2024)    [employer] - Occupational Stress Questionnaire     Feeling of Stress : Not at all   Housing Stability: Low Risk  (2024)    Housing Stability Vital Sign     Unable to Pay for Housing in the Last Year: No     Homeless in the Last Year: No       FAMILY HISTORY:     Family History   Problem Relation Name Age of Onset    Hypertension Mother      Hyperlipidemia Mother      Hypertension Father      Hyperlipidemia Father         REVIEW OF SYSTEMS:   Review of Systems   Constitutional:  Negative for chills,  "diaphoresis, fever, malaise/fatigue and weight loss.   HENT:  Negative for congestion, hearing loss, sinus pain, sore throat and tinnitus.    Eyes:  Negative for blurred vision, double vision, photophobia and pain.   Respiratory:  Negative for cough, hemoptysis, sputum production, shortness of breath, wheezing and stridor.    Cardiovascular:  Negative for chest pain, palpitations, orthopnea, claudication, leg swelling and PND.   Gastrointestinal:  Negative for abdominal pain, blood in stool, heartburn, melena, nausea and vomiting.   Musculoskeletal:  Negative for back pain, falls, joint pain, myalgias and neck pain.   Neurological:  Positive for focal weakness. Negative for dizziness, tingling, tremors, sensory change, speech change, seizures, loss of consciousness, weakness and headaches.   Endo/Heme/Allergies:  Does not bruise/bleed easily.   Psychiatric/Behavioral:  Negative for depression, memory loss and substance abuse. The patient is not nervous/anxious.        PHYSICAL EXAM:     Vitals:    10/16/24 1312   BP: (!) 145/70   Pulse: 62   Resp: 18      Body mass index is 28.44 kg/m².  Weight: 114.5 kg (252 lb 6.8 oz)   Height: 6' 7" (200.7 cm)     Physical Exam  Vitals reviewed.   Constitutional:       General: He is not in acute distress.     Appearance: Normal appearance. He is well-developed. He is not diaphoretic.   HENT:      Head: Normocephalic.   Eyes:      Extraocular Movements: Extraocular movements intact.   Neck:      Vascular: Carotid bruit present. No JVD.   Cardiovascular:      Rate and Rhythm: Normal rate and regular rhythm.      Pulses: Normal pulses.      Heart sounds: Normal heart sounds.   Pulmonary:      Effort: Pulmonary effort is normal.      Breath sounds: Normal breath sounds. No wheezing, rhonchi or rales.   Chest:      Chest wall: No tenderness.   Abdominal:      General: Bowel sounds are normal. There is no distension.      Palpations: Abdomen is soft.      Tenderness: There is no " abdominal tenderness.   Musculoskeletal:      Right lower leg: No edema.      Left lower leg: No edema.   Skin:     General: Skin is warm and dry.      Coloration: Skin is not jaundiced or pale.      Findings: No erythema.   Neurological:      General: No focal deficit present.      Mental Status: He is alert and oriented to person, place, and time.      Motor: Weakness present.   Psychiatric:         Speech: Speech normal.         Behavior: Behavior normal.         Thought Content: Thought content normal.         DATA:   EKG: (personally reviewed tracing)  10/16/2024-sinus bradycardia with sinus arrhythmia, first-degree AV block, left bundle branch block  Results for orders placed or performed during the hospital encounter of 07/12/24   EKG 12-lead    Collection Time: 07/15/24  5:16 AM   Result Value Ref Range    QRS Duration 166 ms    [facility] QTC Calculation 499 ms    Narrative    Test Reason : R94.31,    Vent. Rate : 051 BPM     Atrial Rate : 051 BPM     P-R Int : 268 ms          QRS Dur : 166 ms      QT Int : 542 ms       P-R-T Axes : 079 112 -79 degrees     QTc Int : 499 ms    Sinus bradycardia with 1st degree A-V block  Right axis deviation  Left bundle branch block  Abnormal ECG  When compared with ECG of 12-JUL-2024 20:43,  Significant changes have occurred  Confirmed by Tra HICKMAN, Mal BALTAZAR ([id number]) on 7/15/2024 9:57:42 PM    Referred By: [email or web address]   SELF           Confirmed By:Mal Dutta MD        Laboratory:  CBC:  Recent Labs   Lab 07/13/24  0412 07/14/24  0432 07/15/24  0532   WBC 9.18 9.11 8.85   Hemoglobin 12.4 L 12.8 L 13.1 L   Hematocrit 37.8 L 39.9 L 40.5   Platelets 235 246 239       CHEMISTRIES:  Recent Labs   Lab 03/02/22  0934 09/27/22  1038 07/12/24 2014 07/13/24  0411 07/15/24  0532 07/16/24  0723 07/17/24  0723 07/18/24  0423   Glucose 138 H   < > 162 H   < > 142 H 152 H 144 H 135 H   Sodium 139   < > 138   < > 138 137 137 137   Potassium 4.0   < > 3.4 L   < > 3.4 L 3.7 3.7 3.7   BUN 19   < > 36  H   < > 30 H 30 H 32 H 31 H   Creatinine 1.8 H   < > 2.9 H   < > 2.0 H 2.2 H 2.2 H 2.1 H   eGFR if  39.9 A  --   --   --   --   --   --   --    eGFR if non  34.5 A  --   --   --   --   --   --   --    Calcium 9.3   < > 9.3   < > 8.8 9.4 9.1 8.8   Magnesium  --   --  2.6  --  2.2  --   --   --     < > = values in this interval not displayed.       CARDIAC BIOMARKERS:  Recent Labs   Lab 07/12/24 2014 07/13/24  0011 07/13/24  0411   Troponin I 0.033 H 0.045 H  0.045 H 0.034 H       COAGS:  Recent Labs   Lab 07/12/24 2014   INR 1.0       LIPIDS/LFTS:  Recent Labs   Lab 09/27/22  1038 05/05/23  0811 11/10/23  0926 02/27/24  1146 07/12/24 2014 07/14/24  0431 07/15/24  0532   Cholesterol 169  --  161  --  168  --   --    Triglycerides 98  --  73  --  214 H  --   --    HDL 36 L  --  39 L  --  33 L  --   --    LDL Cholesterol 113.4  --  107.4  --  92.2  --   --    Non-HDL Cholesterol 133  --  122  --  135  --   --    AST 20   < > 21   < > 18 16 16   ALT 17   < > 18   < > 17 13 12    < > = values in this interval not displayed.       Hemoglobin A1C   Date Value Ref Range Status   07/13/2024 7.0 (H) 4.0 - 5.6 % Final     Comment:     ADA Screening Guidelines:  5.7-6.4%  Consistent with prediabetes  >or=6.5%  Consistent with diabetes    High levels of fetal hemoglobin interfere with the HbA1C  assay. Heterozygous hemoglobin variants (HbS, HgC, etc)do  not significantly interfere with this assay.   However, presence of multiple variants may affect accuracy.     02/27/2024 7.4 (H) 4.0 - 5.6 % Final     Comment:     ADA Screening Guidelines:  5.7-6.4%  Consistent with prediabetes  >or=6.5%  Consistent with diabetes    High levels of fetal hemoglobin interfere with the HbA1C  assay. Heterozygous hemoglobin variants (HbS, HgC, etc)do  not significantly interfere with this assay.   However, presence of multiple variants may affect accuracy.     11/10/2023 7.4 (H) 4.0 - 5.6 % Final     Comment:      ADA Screening Guidelines:  5.7-6.4%  Consistent with prediabetes  >or=6.5%  Consistent with diabetes    High levels of fetal hemoglobin interfere with the HbA1C  assay. Heterozygous hemoglobin variants (HbS, HgC, etc)do  not significantly interfere with this assay.   However, presence of multiple variants may affect accuracy.          The ASCVD Risk score (Sneha BUCHANAN, et al., 2019) failed to calculate for the following reasons:    The 2019 ASCVD risk score is only valid for ages 40 to 79    Risk score cannot be calculated because patient has a medical history suggesting prior/existing ASCVD      Cardiovascular Testing:    Nuclear stress 09/24/2024:      Abnormal myocardial perfusion scan.    There is a moderate to severe intensity, medium sized, fixed perfusion abnormality consistent with scar in the  basal to mid inferior wall(s).    There are no other significant perfusion abnormalities.    The gated perfusion images showed an ejection fraction of 37% at rest.    There is moderate global hypokinesis at rest and post-stress.    The ECG portion of the study is negative for ischemia.    The patient reported no chest pain during the stress test.    There were no arrhythmias during stress.    Echo Saline Bubble? Yes    Result Date: 7/15/2024    Left Ventricle: The left ventricle is normal in size. Severely   increased wall thickness. There is concentric hypertrophy. There is mildly   reduced systolic function with a visually estimated ejection fraction of   40 - 45%. Grade I diastolic dysfunction.    Right Ventricle: Mild right ventricular enlargement.    Left Atrium: Agitated saline study of the atrial septum is negative   after vasalva maneuver, suggesting absence of intracardiac shunt at the   atrial level.    Pulmonary Artery: The estimated pulmonary artery systolic pressure is   10 mmHg.    IVC/SVC: Normal venous pressure at 3 mmHg.          No cardiac monitor results found for the past 12 months          ASSESSMENT:     Elevated troponin  Cardiomyopathy  Stroke: report of arrhythmia with carotid ultrasound - likely due to LBBB  Hypertension  Hyperlipidemia  Diabetes  LBBB  Carotid stenosis  Aortic atherosclerosis  Chronic kidney disease    PLAN:     Elevated troponin/Cardiomyopathy/LBBB:  Nuclear stress showed score.  Optimize medical management for cardiomyopathy.  Recheck echocardiogram in 3 months.  If no improvement will change medicine regimen.  Recommend starting Entresto.  Addition of Toprol.  Discontinuation of clonidine as well as Exforge.  Can take Lasix p.r.n..  Hypertension:  Monitor.  Hyperlipidemia:  Continue current management.  Follow-up lipids.  Return to clinic 4 months.           Fabricio Kang MD, MPH, FACC, [facility]

## 2024-11-11 ENCOUNTER — OFFICE VISIT (OUTPATIENT)
Dept: FAMILY MEDICINE | Facility: CLINIC | Age: 84
End: 2024-11-11
Payer: MEDICARE

## 2024-11-11 ENCOUNTER — LAB VISIT (OUTPATIENT)
Dept: LAB | Facility: HOSPITAL | Age: 84
End: 2024-11-11
Attending: FAMILY MEDICINE
Payer: MEDICARE

## 2024-11-11 VITALS
BODY MASS INDEX: 29.59 KG/M2 | DIASTOLIC BLOOD PRESSURE: 66 MMHG | HEIGHT: 78 IN | SYSTOLIC BLOOD PRESSURE: 128 MMHG | WEIGHT: 255.75 LBS | HEART RATE: 61 BPM | OXYGEN SATURATION: 98 % | TEMPERATURE: 99 F

## 2024-11-11 DIAGNOSIS — Z86.73 H/O: CVA (CEREBROVASCULAR ACCIDENT): ICD-10-CM

## 2024-11-11 DIAGNOSIS — I10 ESSENTIAL HYPERTENSION: ICD-10-CM

## 2024-11-11 DIAGNOSIS — Z00.00 ANNUAL PHYSICAL EXAM: Primary | ICD-10-CM

## 2024-11-11 DIAGNOSIS — N52.9 ERECTILE DYSFUNCTION, UNSPECIFIED ERECTILE DYSFUNCTION TYPE: ICD-10-CM

## 2024-11-11 DIAGNOSIS — N18.32 TYPE 2 DIABETES MELLITUS WITH STAGE 3B CHRONIC KIDNEY DISEASE, WITHOUT LONG-TERM CURRENT USE OF INSULIN: ICD-10-CM

## 2024-11-11 DIAGNOSIS — E11.22 TYPE 2 DIABETES MELLITUS WITH STAGE 3B CHRONIC KIDNEY DISEASE, WITHOUT LONG-TERM CURRENT USE OF INSULIN: ICD-10-CM

## 2024-11-11 DIAGNOSIS — C61 PROSTATE CANCER: ICD-10-CM

## 2024-11-11 DIAGNOSIS — D32.9 MENINGIOMA: ICD-10-CM

## 2024-11-11 DIAGNOSIS — N40.0 BENIGN PROSTATIC HYPERPLASIA WITHOUT LOWER URINARY TRACT SYMPTOMS: ICD-10-CM

## 2024-11-11 DIAGNOSIS — I42.9 CARDIOMYOPATHY, UNSPECIFIED TYPE: ICD-10-CM

## 2024-11-11 PROBLEM — I12.9 HYPERTENSION ASSOCIATED WITH STAGE 3 CHRONIC KIDNEY DISEASE DUE TO TYPE 2 DIABETES MELLITUS: Status: RESOLVED | Noted: 2024-09-04 | Resolved: 2024-11-11

## 2024-11-11 PROBLEM — Z09 HOSPITAL DISCHARGE FOLLOW-UP: Status: RESOLVED | Noted: 2024-07-30 | Resolved: 2024-11-11

## 2024-11-11 PROBLEM — N18.30 HYPERTENSION ASSOCIATED WITH STAGE 3 CHRONIC KIDNEY DISEASE DUE TO TYPE 2 DIABETES MELLITUS: Status: RESOLVED | Noted: 2024-09-04 | Resolved: 2024-11-11

## 2024-11-11 PROBLEM — R79.89 ELEVATED TROPONIN: Status: RESOLVED | Noted: 2024-07-13 | Resolved: 2024-11-11

## 2024-11-11 LAB
ALBUMIN SERPL BCP-MCNC: 3.7 G/DL (ref 3.5–5.2)
ALP SERPL-CCNC: 150 U/L (ref 40–150)
ALT SERPL W/O P-5'-P-CCNC: 24 U/L (ref 10–44)
ANION GAP SERPL CALC-SCNC: 10 MMOL/L (ref 8–16)
AST SERPL-CCNC: 23 U/L (ref 10–40)
BASOPHILS # BLD AUTO: 0.12 K/UL (ref 0–0.2)
BASOPHILS NFR BLD: 1.5 % (ref 0–1.9)
BILIRUB SERPL-MCNC: 0.3 MG/DL (ref 0.1–1)
BUN SERPL-MCNC: 29 MG/DL (ref 8–23)
CALCIUM SERPL-MCNC: 8.9 MG/DL (ref 8.7–10.5)
CHLORIDE SERPL-SCNC: 111 MMOL/L (ref 95–110)
CHOLEST SERPL-MCNC: 92 MG/DL (ref 120–199)
CHOLEST/HDLC SERPL: 2.2 {RATIO} (ref 2–5)
CO2 SERPL-SCNC: 22 MMOL/L (ref 23–29)
CREAT SERPL-MCNC: 2.4 MG/DL (ref 0.5–1.4)
DIFFERENTIAL METHOD BLD: ABNORMAL
EOSINOPHIL # BLD AUTO: 0.3 K/UL (ref 0–0.5)
EOSINOPHIL NFR BLD: 3.5 % (ref 0–8)
ERYTHROCYTE [DISTWIDTH] IN BLOOD BY AUTOMATED COUNT: 14.1 % (ref 11.5–14.5)
EST. GFR  (NO RACE VARIABLE): 26 ML/MIN/1.73 M^2
ESTIMATED AVG GLUCOSE: 151 MG/DL (ref 68–131)
GLUCOSE SERPL-MCNC: 146 MG/DL (ref 70–110)
HBA1C MFR BLD: 6.9 % (ref 4–5.6)
HCT VFR BLD AUTO: 27.7 % (ref 40–54)
HDLC SERPL-MCNC: 42 MG/DL (ref 40–75)
HDLC SERPL: 45.7 % (ref 20–50)
HGB BLD-MCNC: 8.1 G/DL (ref 14–18)
IMM GRANULOCYTES # BLD AUTO: 0.02 K/UL (ref 0–0.04)
IMM GRANULOCYTES NFR BLD AUTO: 0.3 % (ref 0–0.5)
LDLC SERPL CALC-MCNC: 39.6 MG/DL (ref 63–159)
LYMPHOCYTES # BLD AUTO: 1.6 K/UL (ref 1–4.8)
LYMPHOCYTES NFR BLD: 20.6 % (ref 18–48)
MCH RBC QN AUTO: 24.4 PG (ref 27–31)
MCHC RBC AUTO-ENTMCNC: 29.2 G/DL (ref 32–36)
MCV RBC AUTO: 83 FL (ref 82–98)
MONOCYTES # BLD AUTO: 0.7 K/UL (ref 0.3–1)
MONOCYTES NFR BLD: 9.5 % (ref 4–15)
NEUTROPHILS # BLD AUTO: 5 K/UL (ref 1.8–7.7)
NEUTROPHILS NFR BLD: 64.6 % (ref 38–73)
NONHDLC SERPL-MCNC: 50 MG/DL
NRBC BLD-RTO: 0 /100 WBC
PLATELET # BLD AUTO: 296 K/UL (ref 150–450)
PMV BLD AUTO: 12.8 FL (ref 9.2–12.9)
POTASSIUM SERPL-SCNC: 4.1 MMOL/L (ref 3.5–5.1)
PROT SERPL-MCNC: 7 G/DL (ref 6–8.4)
RBC # BLD AUTO: 3.32 M/UL (ref 4.6–6.2)
SODIUM SERPL-SCNC: 143 MMOL/L (ref 136–145)
TRIGL SERPL-MCNC: 52 MG/DL (ref 30–150)
TSH SERPL DL<=0.005 MIU/L-ACNC: 0.88 UIU/ML (ref 0.4–4)
WBC # BLD AUTO: 7.8 K/UL (ref 3.9–12.7)

## 2024-11-11 PROCEDURE — 2023F DILAT RTA XM W/O RTNOPTHY: CPT | Mod: CPTII,S$GLB,, | Performed by: INTERNAL MEDICINE

## 2024-11-11 PROCEDURE — 3078F DIAST BP <80 MM HG: CPT | Mod: CPTII,S$GLB,, | Performed by: INTERNAL MEDICINE

## 2024-11-11 PROCEDURE — 1126F AMNT PAIN NOTED NONE PRSNT: CPT | Mod: CPTII,S$GLB,, | Performed by: INTERNAL MEDICINE

## 2024-11-11 PROCEDURE — 3074F SYST BP LT 130 MM HG: CPT | Mod: CPTII,S$GLB,, | Performed by: INTERNAL MEDICINE

## 2024-11-11 PROCEDURE — 1101F PT FALLS ASSESS-DOCD LE1/YR: CPT | Mod: CPTII,S$GLB,, | Performed by: INTERNAL MEDICINE

## 2024-11-11 PROCEDURE — 1160F RVW MEDS BY RX/DR IN RCRD: CPT | Mod: CPTII,S$GLB,, | Performed by: INTERNAL MEDICINE

## 2024-11-11 PROCEDURE — 83036 HEMOGLOBIN GLYCOSYLATED A1C: CPT | Performed by: FAMILY MEDICINE

## 2024-11-11 PROCEDURE — 85025 COMPLETE CBC W/AUTO DIFF WBC: CPT | Performed by: FAMILY MEDICINE

## 2024-11-11 PROCEDURE — 99999 PR PBB SHADOW E&M-EST. PATIENT-LVL III: CPT | Mod: PBBFAC,,, | Performed by: INTERNAL MEDICINE

## 2024-11-11 PROCEDURE — 84443 ASSAY THYROID STIM HORMONE: CPT | Performed by: FAMILY MEDICINE

## 2024-11-11 PROCEDURE — 36415 COLL VENOUS BLD VENIPUNCTURE: CPT | Mod: PO | Performed by: FAMILY MEDICINE

## 2024-11-11 PROCEDURE — 80053 COMPREHEN METABOLIC PANEL: CPT | Performed by: FAMILY MEDICINE

## 2024-11-11 PROCEDURE — 80061 LIPID PANEL: CPT | Performed by: FAMILY MEDICINE

## 2024-11-11 PROCEDURE — 3288F FALL RISK ASSESSMENT DOCD: CPT | Mod: CPTII,S$GLB,, | Performed by: INTERNAL MEDICINE

## 2024-11-11 PROCEDURE — 1159F MED LIST DOCD IN RCRD: CPT | Mod: CPTII,S$GLB,, | Performed by: INTERNAL MEDICINE

## 2024-11-11 PROCEDURE — 1157F ADVNC CARE PLAN IN RCRD: CPT | Mod: CPTII,S$GLB,, | Performed by: INTERNAL MEDICINE

## 2024-11-11 PROCEDURE — 99397 PER PM REEVAL EST PAT 65+ YR: CPT | Mod: S$GLB,,, | Performed by: INTERNAL MEDICINE

## 2024-11-11 RX ORDER — FUROSEMIDE 40 MG/1
40 TABLET ORAL DAILY
Start: 2024-11-11 | End: 2025-11-11

## 2024-11-11 RX ORDER — CLONIDINE HYDROCHLORIDE 0.1 MG/1
0.1 TABLET ORAL 2 TIMES DAILY
Start: 2024-11-11

## 2024-11-11 RX ORDER — TADALAFIL 20 MG/1
20 TABLET ORAL DAILY PRN
Start: 2024-11-11 | End: 2025-11-11

## 2024-11-11 NOTE — ASSESSMENT & PLAN NOTE
Last EF 35% (reducing)   Patient seeing cardiology     - Explained heart failure diagnosis and management to the patient.  - Educated on the importance of furosemide for fluid management in heart failure.  - Continued furosemide: 1 tablet daily in the morning.  - has scheduled follow-up echocardiogram.  - potential medication changes based on future echocardiogram results, as per Dr. Kang's (cardiologist) recommendations.

## 2024-11-11 NOTE — ASSESSMENT & PLAN NOTE
well-controlled at 89.    - Continued current blood pressure medication regimen: clonidine twice daily and amlodipine-valsartan combination.

## 2024-11-11 NOTE — PROGRESS NOTES
Health Maintenance Due   Topic     RSV Vaccine (Age 60+ and Pregnant patients) (1 - 1-dose 75+ series) Not offered at this office    Eye Exam  Consult pcp

## 2024-11-11 NOTE — ASSESSMENT & PLAN NOTE
Controlled for age (goal 7-8%)  Lab Results   Component Value Date    HGBA1C 7.0 (H) 07/13/2024     - Continued glipizide 10mg twice daily and Jardiance (empagliflozin) daily for diabetes management.  - Ordered labs to check A1C.

## 2024-11-11 NOTE — ASSESSMENT & PLAN NOTE
Reviewed patient's medical history, including prostate cancer in remission.  Noted ongoing monitoring by urologist for prostate cancer.

## 2024-11-11 NOTE — PROGRESS NOTES
"Chief Complaint: Follow-up      Luther Iwrin Jr.  is a 84 y.o. year old patient who presents today for     History of Present Illness    CHIEF COMPLAINT:  Luther presents today for an annual exam.    CARDIOVASCULAR:  He has a history of hypertension, stroke in December, and heart failure. He expresses surprise at his current low blood pressure, stating he has never had blood pressure that low in his life. He also reports a history of slow heartbeat since his youth, predating his current heart failure diagnosis.    GENITOURINARY:  He experiences frequent urination with large urine volumes, describing it as "ridiculous". He feels his bladder is not emptying fully, causing a persistent sensation of fullness. He also reports nocturia. The urinary flow is reported to be good. He denies experiencing leg swelling. He has a history of prostate cancer, currently in remission and being monitored without active treatment due to low risk.    ENDOCRINE:  He has a history of diabetes.    GASTROINTESTINAL:  He reports experiencing darker stools than usual, but denies having black, tarry stools or observing anastasia blood in his stool.    MEDICATIONS:  He takes Flomax daily; Cialis and Viagra as needed; Glipizide 10 mg twice daily; Furosemide every morning as needed; Jardiance (empagliflozin) daily; Clonidine twice daily; Amlodipine-valsartan combination; Atorvastatin; Aspirin daily; and Eliquis twice daily. He reports consistent adherence to his medication regimen.      ROS:  General: -fever, -chills, -fatigue, -weight gain, -weight loss  Eyes: -vision changes, -redness, -discharge  ENT: -ear pain, -nasal congestion, -sore throat  Cardiovascular: -chest pain, -palpitations, -lower extremity edema  Respiratory: -cough, -shortness of breath  Gastrointestinal: -abdominal pain, -nausea, -vomiting, -diarrhea, -constipation, -blood in stool, -melena  Genitourinary: -dysuria, -hematuria, +frequency, +nocturia  Musculoskeletal: -joint pain, " -muscle pain  Skin: -rash, -lesion  Neurological: -headache, -dizziness, -numbness, -tingling  Psychiatric: -anxiety, -depression, -sleep difficulty         Past Medical History:   Diagnosis Date    Cancer     CVA (cerebral vascular accident) 2024    Diabetes mellitus, type 2     Diabetes with neurologic complications     Disorder of kidney and ureter     Hypertension     Late complications of amputation stump     Prostate cancer     Pseudophakia 2024       Past Surgical History:   Procedure Laterality Date    FINGER SURGERY  3/2014    HERNIA REPAIR  1967    KNEE SURGERY      SPINE SURGERY  10/2007    VASECTOMY  1986        Family History   Problem Relation Name Age of Onset    Hypertension Mother      Hyperlipidemia Mother      Hypertension Father      Hyperlipidemia Father          Social History     Socioeconomic History    Marital status: Single    Number of children: 3    Highest education level: Bachelor's degree (e.g., BA, AB, BS)   Tobacco Use    Smoking status: Former     Current packs/day: 0.00     Average packs/day: 0.3 packs/day for 0.9 years (0.2 ttl pk-yrs)     Types: Cigarettes     Start date: 1960     Quit date: 1960     Years since quittin.9    Smokeless tobacco: Never   Substance and Sexual Activity    Alcohol use: Yes     Comment: special occacion    Drug use: No     Social Drivers of Health     Financial Resource Strain: Low Risk  (2024)    Overall Financial Resource Strain (CARDIA)     Difficulty of Paying Living Expenses: Not hard at all   Food Insecurity: No Food Insecurity (2024)    Hunger Vital Sign     Worried About Running Out of Food in the Last Year: Never true     Ran Out of Food in the Last Year: Never true   Transportation Needs: No Transportation Needs (2024)    PRAPARE - Transportation     Lack of Transportation (Medical): No     Lack of Transportation (Non-Medical): No   Physical Activity: Insufficiently Active (2024)    Exercise Vital  Sign     Days of Exercise per Week: 2 days     Minutes of Exercise per Session: 20 min   Stress: No Stress Concern Present (9/24/2024)    [employer] - Occupational Stress Questionnaire     Feeling of Stress : Not at all   Housing Stability: Low Risk  (9/24/2024)    Housing Stability Vital Sign     Unable to Pay for Housing in the Last Year: No     Homeless in the Last Year: No         Current Outpatient Medications:     amlodipine-valsartan (EXFORGE)  mg per tablet, Take 1 tablet by mouth once daily., Disp: 90 tablet, Rfl: 3    apixaban (ELIQUIS) 5 mg Tab, Take 1 tablet (5 mg total) by mouth 2 (two) times daily., Disp: 180 tablet, Rfl: 0    aspirin 81 MG Chew, Take 81 mg by mouth once daily., Disp: , Rfl:     atorvastatin (LIPITOR) 40 MG tablet, Take 1 tablet (40 mg total) by mouth once daily., Disp: 30 tablet, Rfl: 2    empagliflozin (JARDIANCE) 10 mg tablet, Take 1 tablet (10 mg total) by mouth once daily., Disp: 30 tablet, Rfl: 11    glipiZIDE (GLUCOTROL) 10 MG TR24, Take 1 tablet (10 mg total) by mouth 2 (two) times daily., Disp: 180 tablet, Rfl: 3    sildenafiL (VIAGRA) 100 MG tablet, Take 1 tablet (100 mg total) by mouth daily as needed., Disp: 10 tablet, Rfl: 11    tamsulosin (FLOMAX) 0.4 mg Cap, Take 1 capsule (0.4 mg total) by mouth once daily., Disp: 90 capsule, Rfl: 3    cloNIDine (CATAPRES) 0.1 MG tablet, Take 1 tablet (0.1 mg total) by mouth 2 (two) times daily., Disp: , Rfl:     furosemide (LASIX) 40 MG tablet, Take 1 tablet (40 mg total) by mouth once daily., Disp: , Rfl:     tadalafiL (CIALIS) 20 MG Tab, Take 1 tablet (20 mg total) by mouth daily as needed (ED)., Disp: , Rfl:            Objective:      Vitals:    11/11/24 0807   BP: 128/66   Pulse: 61   Temp: 98.8 °F (37.1 °C)       Physical Exam  Vitals and nursing note reviewed.   Constitutional:       Appearance: Normal appearance.   HENT:      Head: Normocephalic and atraumatic.   Cardiovascular:      Rate and  Rhythm: Normal rate and regular rhythm.      Heart sounds: Murmur (aortic loudest, systolic?) heard.   Pulmonary:      Effort: Pulmonary effort is normal.      Breath sounds: Normal breath sounds. No wheezing or rales.   Abdominal:      General: Bowel sounds are normal.      Palpations: Abdomen is soft.      Tenderness: There is no abdominal tenderness.   Musculoskeletal:      Right lower leg: No edema.      Left lower leg: No edema.   Skin:     General: Skin is warm and dry.   Neurological:      General: No focal deficit present.      Mental Status: He is alert and oriented to person, place, and time.   Psychiatric:         Mood and Affect: Mood normal.         Behavior: Behavior normal.          Assessment:       1. Annual physical exam    2. Erectile dysfunction, unspecified erectile dysfunction type    3. Essential hypertension    4. H/O: CVA (cerebrovascular accident)    5. Meningioma    6. H/O Prostate cancer    7. Cardiomyopathy, unspecified type    8. Benign prostatic hyperplasia without lower urinary tract symptoms    9. Type 2 diabetes mellitus with stage 3b chronic kidney disease, without long-term current use of insulin          Plan:   1. Annual physical exam  Assessment & Plan:  Discussed HCM with patient  - patient up to date with available vaccines. Discussed the importance of vaccines.   - annual labs ordered and other reviewed   - eye exam due in Jan 2025  - advised patient to follow up with ophthalmologist and dentist every year  - reviewed medical, surgical, family and social history        2. Erectile dysfunction, unspecified erectile dysfunction type  Assessment & Plan:  Stable   - cont cialis or viagra PRN    Orders:  -     tadalafiL (CIALIS) 20 MG Tab; Take 1 tablet (20 mg total) by mouth daily as needed (ED).    3. Essential hypertension  Assessment & Plan:  well-controlled at 128.    - Continued current blood pressure medication regimen: clonidine twice daily and amlodipine-valsartan  combination.    Orders:  -     cloNIDine (CATAPRES) 0.1 MG tablet; Take 1 tablet (0.1 mg total) by mouth 2 (two) times daily.    4. H/O: CVA (cerebrovascular accident)  Overview:  MRI Brain (2024): Small foci of acute infarction within the parasagittal right parietal lobe near the vertex.     Assessment & Plan:  Stable   Was switched from Plavix to Eliquis     - cont ASA, Eliquis and Lipitor      5. Meningioma  Overview:  MRI w/out contrast: right parietal extra-axial lesion consistent with meningioma     Assessment & Plan:  Following with Neuro surg who recommends MRI with contrast which is already scheduled      6. H/O Prostate cancer  Overview:  2022 had positive biopsies. Repeat biopsy was negative.     Assessment & Plan:  Reviewed patient's medical history, including prostate cancer in remission.  Noted ongoing monitoring by urologist for prostate cancer.      7. Cardiomyopathy, unspecified type  Assessment & Plan:  Last EF 35% (reducing)   Patient seeing cardiology     - Explained heart failure diagnosis and management to the patient.  - Educated on the importance of furosemide for fluid management in heart failure.  - Continued furosemide: 1 tablet daily in the morning.  - has scheduled follow-up echocardiogram.  - potential medication changes based on future echocardiogram results, as per Dr. Kang's (cardiologist) recommendations.        Orders:  -     furosemide (LASIX) 40 MG tablet; Take 1 tablet (40 mg total) by mouth once daily.    8. Benign prostatic hyperplasia without lower urinary tract symptoms  Assessment & Plan:  Stable, following urology   Nocturia worsening     - Recommend changing timing of Flomax to morning.  - Discussed management of nocturia in context of heart failure treatment.        9. Type 2 diabetes mellitus with stage 3b chronic kidney disease, without long-term current use of insulin  Assessment & Plan:  Controlled for age (goal 7-8%)  Lab Results   Component Value Date    HGBA1C  7.0 (H) 07/13/2024     - Continued glipizide 10mg twice daily and Jardiance (empagliflozin) daily for diabetes management.  - Ordered labs to check A1C.          FOLLOW UP:  Advised the patient to contact the office or send a message through the patient portal to request medication refills when needed.         Follow up in about 6 months (around 5/11/2025) for f/up.    This note was generated with the assistance of ambient listening technology. Verbal consent was obtained by the patient and accompanying visitor(s) for the recording of patient appointment to facilitate this note. I attest to having reviewed and edited the generated note for accuracy, though some syntax or spelling errors may persist. Please contact the author of this note for any clarification.

## 2024-11-11 NOTE — ASSESSMENT & PLAN NOTE
Discussed HCM with patient  - patient up to date with available vaccines. Discussed the importance of vaccines.   - annual labs ordered and other reviewed   - eye exam due in Jan 2025  - advised patient to follow up with ophthalmologist and dentist every year  - reviewed medical, surgical, family and social history     Pending has apt 9/2023

## 2024-11-11 NOTE — ASSESSMENT & PLAN NOTE
Stable, following urology   Nocturia worsening     - Recommend changing timing of Flomax to morning.  - Discussed management of nocturia in context of heart failure treatment.

## 2024-11-12 ENCOUNTER — HOME CARE VISIT (OUTPATIENT)
Dept: NEUROLOGY | Facility: HOSPITAL | Age: 84
End: 2024-11-12
Payer: MEDICARE

## 2024-11-13 ENCOUNTER — TELEPHONE (OUTPATIENT)
Dept: VASCULAR SURGERY | Facility: CLINIC | Age: 84
End: 2024-11-13
Payer: MEDICARE

## 2024-11-18 DIAGNOSIS — Z09 HOSPITAL DISCHARGE FOLLOW-UP: ICD-10-CM

## 2024-11-18 DIAGNOSIS — Z86.73 H/O: CVA (CEREBROVASCULAR ACCIDENT): Primary | ICD-10-CM

## 2024-11-18 DIAGNOSIS — I66.9 STENOSIS OF CEREBRAL ARTERY: ICD-10-CM

## 2024-11-18 DIAGNOSIS — E78.5 HYPERLIPIDEMIA, UNSPECIFIED HYPERLIPIDEMIA TYPE: ICD-10-CM

## 2024-11-18 RX ORDER — ATORVASTATIN CALCIUM 40 MG/1
40 TABLET, FILM COATED ORAL DAILY
Qty: 90 TABLET | Refills: 3 | Status: SHIPPED | OUTPATIENT
Start: 2024-11-18 | End: 2025-11-13

## 2024-11-18 NOTE — TELEPHONE ENCOUNTER
Unable to retrieve patient chart and identify care due.  [facility] Embedded Care Due Messages. Reference number: [id number].   11/18/2024 8:48:04 AM CST

## 2024-11-18 NOTE — TELEPHONE ENCOUNTER
----- Message from Joyce sent at 11/18/2024  8:32 AM CST -----  Regarding: refill  Name of caller: ignacia       What is the requesting detail: pt is requesting a refill for atorvastatin (LIPITOR) 40 MG tablet    apixaban (ELIQUIS) 5 mg Tab. States he is completely out and needs this filled today.Please advise       [facility]/pharmacy #[id number] - [place], [place] - [address]      Can the clinic reply by [facility]:       What number to call back: [phone]

## 2024-11-19 ENCOUNTER — PATIENT MESSAGE (OUTPATIENT)
Dept: ADMINISTRATIVE | Facility: HOSPITAL | Age: 84
End: 2024-11-19
Payer: MEDICARE

## 2024-11-19 ENCOUNTER — PATIENT OUTREACH (OUTPATIENT)
Dept: ADMINISTRATIVE | Facility: HOSPITAL | Age: 84
End: 2024-11-19
Payer: MEDICARE

## 2024-12-11 ENCOUNTER — HOSPITAL ENCOUNTER (OUTPATIENT)
Dept: RADIOLOGY | Facility: HOSPITAL | Age: 84
Discharge: HOME | End: 2024-12-11
Attending: SURGERY
Payer: MEDICARE

## 2024-12-11 DIAGNOSIS — I65.23 BILATERAL CAROTID ARTERY STENOSIS: ICD-10-CM

## 2024-12-11 DIAGNOSIS — I65.23 BILATERAL CAROTID ARTERY STENOSIS: Primary | ICD-10-CM

## 2025-01-06 ENCOUNTER — OFFICE VISIT (OUTPATIENT)
Dept: VASCULAR SURGERY | Facility: CLINIC | Age: 85
End: 2025-01-06
Payer: MEDICARE

## 2025-01-06 VITALS
HEART RATE: 58 BPM | HEIGHT: 78 IN | BODY MASS INDEX: 29.46 KG/M2 | DIASTOLIC BLOOD PRESSURE: 70 MMHG | SYSTOLIC BLOOD PRESSURE: 155 MMHG | WEIGHT: 254.63 LBS

## 2025-01-06 DIAGNOSIS — I65.23 BILATERAL CAROTID ARTERY STENOSIS: Primary | ICD-10-CM

## 2025-01-06 PROCEDURE — 1157F ADVNC CARE PLAN IN RCRD: CPT | Mod: CPTII,S$GLB,, | Performed by: SURGERY

## 2025-01-06 PROCEDURE — 3077F SYST BP >= 140 MM HG: CPT | Mod: CPTII,S$GLB,, | Performed by: SURGERY

## 2025-01-06 PROCEDURE — 1126F AMNT PAIN NOTED NONE PRSNT: CPT | Mod: CPTII,S$GLB,, | Performed by: SURGERY

## 2025-01-06 PROCEDURE — 99215 OFFICE O/P EST HI 40 MIN: CPT | Mod: S$GLB,,, | Performed by: SURGERY

## 2025-01-06 PROCEDURE — 1101F PT FALLS ASSESS-DOCD LE1/YR: CPT | Mod: CPTII,S$GLB,, | Performed by: SURGERY

## 2025-01-06 PROCEDURE — 3288F FALL RISK ASSESSMENT DOCD: CPT | Mod: CPTII,S$GLB,, | Performed by: SURGERY

## 2025-01-06 PROCEDURE — 99999 PR PBB SHADOW E&M-EST. PATIENT-LVL III: CPT | Mod: PBBFAC,,, | Performed by: SURGERY

## 2025-01-06 PROCEDURE — 1159F MED LIST DOCD IN RCRD: CPT | Mod: CPTII,S$GLB,, | Performed by: SURGERY

## 2025-01-06 PROCEDURE — 3078F DIAST BP <80 MM HG: CPT | Mod: CPTII,S$GLB,, | Performed by: SURGERY

## 2025-01-06 NOTE — PROGRESS NOTES
Chuckie Stern MD, PhD  [facility]   01/06/2025    HPI:  Luther Irwin Jr. is a 84 y.o. male with a history of carotid stenosis, here for follow up    84-year-old man with CKD, and diabetes, nonsmoker, who presents after episode of left leg weakness that started on Tuesday.  Patient weakness has resolved since being in the hospital.  He underwent stroke workup which revealed a parietal acute infarct in the right hemisphere.  Patient was previously on aspirin daily.  His since been started on Plavix.   CTA was performed which demonstrated no cervical right carotid stenosis, however there was some intracranial stenosis in the right hemisphere.  Incidentally the left internal carotid artery was noted to have a pedunculated plaque/thrombus.  Recommended anticoagulation for this asymptomatic lesion  and the patient was discharged on Eliquis and aspirin.      August 2024  Patient  following up  He has some left leg residual weakness, but he says it has been slowly improving.  He is able to ambulate.  He uses a walker for assistance, and/or just to be safe as he says.  He has had no new symptoms, no unilateral vision changes or new weakness, slurred speech etc.    January 6, 2025 patient is here for follow-up.  Unfortunately his repeat studies have not been done.  His CTA of his head and neck was canceled due to estimated GFR less than 30.  He remains asymptomatic.  He reports residual left leg clumsiness.  But he is able to walk independently without a walker.  He has never smoked.  He has no other neurologic concerns.  Reports compliance with Eliquis and baby aspirin and statin  Patient Active Problem List   Diagnosis    Essential hypertension    Benign prostatic hyperplasia without lower urinary tract symptoms    Type 2 diabetes mellitus with stage 3b chronic kidney disease, without long-term current use of insulin    Diabetes with proteinuria    H/O Prostate cancer    Stage 3b chronic kidney  disease    Meningioma    Carotid artery stenosis    Impaired functional mobility, balance, gait, and endurance    Aortic atherosclerosis    LBBB (left bundle branch block)    Chordoma    Advanced directives, counseling/discussion    H/O: CVA (cerebrovascular accident)    Annual physical exam    Cardiomyopathy    Erectile dysfunction     Past Medical History:   Diagnosis Date    Cancer     CVA (cerebral vascular accident) 2024    Diabetes mellitus, type 2     Diabetes with neurologic complications     Disorder of kidney and ureter     Hypertension     Late complications of amputation stump     Prostate cancer     Pseudophakia 2024     Past Surgical History:   Procedure Laterality Date    FINGER SURGERY  3/2014    HERNIA REPAIR  1967    KNEE SURGERY      SPINE SURGERY  10/2007    VASECTOMY  1986     Family History   Problem Relation Name Age of Onset    Hypertension Mother      Hyperlipidemia Mother      Hypertension Father      Hyperlipidemia Father       Social History     Socioeconomic History    Marital status: Single    Number of children: 3    Highest education level: Bachelor's degree (e.g., BA, AB, BS)   Tobacco Use    Smoking status: Former     Current packs/day: 0.00     Average packs/day: 0.3 packs/day for 0.9 years (0.2 ttl pk-yrs)     Types: Cigarettes     Start date: 1960     Quit date: 1960     Years since quittin.1    Smokeless tobacco: Never   Substance and Sexual Activity    Alcohol use: Yes     Comment: special occacion    Drug use: No     Social Drivers of Health     Financial Resource Strain: Low Risk  (2024)    Overall Financial Resource Strain (CARDIA)     Difficulty of Paying Living Expenses: Not hard at all   Food Insecurity: No Food Insecurity (2024)    Hunger Vital Sign     Worried About Running Out of Food in the Last Year: Never true     Ran Out of Food in the Last Year: Never true   Transportation Needs: No Transportation Needs (2024)    PRAPARE -  Transportation     Lack of Transportation (Medical): No     Lack of Transportation (Non-Medical): No   Physical Activity: Insufficiently Active (9/24/2024)    Exercise Vital Sign     Days of Exercise per Week: 2 days     Minutes of Exercise per Session: 20 min   Stress: No Stress Concern Present (9/24/2024)    [employer] [place] [employer] - Occupational Stress Questionnaire     Feeling of Stress : Not at all   Housing Stability: Low Risk  (9/24/2024)    Housing Stability Vital Sign     Unable to Pay for Housing in the Last Year: No     Homeless in the Last Year: No       Current Outpatient Medications:     amlodipine-valsartan (EXFORGE)  mg per tablet, Take 1 tablet by mouth once daily., Disp: 90 tablet, Rfl: 3    apixaban (ELIQUIS) 5 mg Tab, Take 1 tablet (5 mg total) by mouth 2 (two) times daily., Disp: 180 tablet, Rfl: 3    aspirin 81 MG Chew, Take 81 mg by mouth once daily., Disp: , Rfl:     atorvastatin (LIPITOR) 40 MG tablet, Take 1 tablet (40 mg total) by mouth once daily., Disp: 90 tablet, Rfl: 3    cloNIDine (CATAPRES) 0.1 MG tablet, Take 1 tablet (0.1 mg total) by mouth 2 (two) times daily., Disp: , Rfl:     empagliflozin (JARDIANCE) 10 mg tablet, Take 1 tablet (10 mg total) by mouth once daily., Disp: 30 tablet, Rfl: 11    furosemide (LASIX) 40 MG tablet, Take 1 tablet (40 mg total) by mouth once daily., Disp: , Rfl:     glipiZIDE (GLUCOTROL) 10 MG TR24, Take 1 tablet (10 mg total) by mouth 2 (two) times daily., Disp: 180 tablet, Rfl: 3    sildenafiL (VIAGRA) 100 MG tablet, Take 1 tablet (100 mg total) by mouth daily as needed., Disp: 10 tablet, Rfl: 11    tadalafiL (CIALIS) 20 MG Tab, Take 1 tablet (20 mg total) by mouth daily as needed (ED)., Disp: , Rfl:     tamsulosin (FLOMAX) 0.4 mg Cap, Take 1 capsule (0.4 mg total) by mouth once daily., Disp: 90 capsule, Rfl: 3    REVIEW OF SYSTEMS:  ROS       VITALS:  Vitals:    01/06/25 1304   BP: (!) 155/70   BP Location: Left arm   Patient  "Position: Sitting   Pulse: (!) 58   Weight: 115.5 kg (254 lb 10.1 oz)   Height: 6' 7" (2.007 m)        PHYSICAL EXAM:   Physical Exam         LAB RESULTS:  No results found for: "CBC"  Lab Results   Component Value Date    LABPROT 10.6 07/12/2024    INR 1.0 07/12/2024     Lab Results   Component Value Date     11/11/2024    K 4.1 11/11/2024     (H) 11/11/2024    CO2 22 (L) 11/11/2024     (H) 11/11/2024    BUN 29 (H) 11/11/2024    CREATININE 2.3 (H) 12/11/2024    CALCIUM 8.9 11/11/2024    ANIONGAP 10 11/11/2024    EGFRNONAA 34.5 (A) 03/02/2022     Lab Results   Component Value Date    WBC 7.80 11/11/2024    RBC 3.32 (L) 11/11/2024    HGB 8.1 (L) 11/11/2024    HCT 27.7 (L) 11/11/2024    MCV 83 11/11/2024    MCH 24.4 (L) 11/11/2024    MCHC 29.2 (L) 11/11/2024    RDW 14.1 11/11/2024     11/11/2024    MPV 12.8 11/11/2024    GRAN 5.0 11/11/2024    GRAN 64.6 11/11/2024    LYMPH 1.6 11/11/2024    LYMPH 20.6 11/11/2024    MONO 0.7 11/11/2024    MONO 9.5 11/11/2024    EOS 0.3 11/11/2024    BASO 0.12 11/11/2024    EOSINOPHIL 3.5 11/11/2024    BASOPHIL 1.5 11/11/2024    DIFFMETHOD Automated 11/11/2024     .  Lab Results   Component Value Date    HGBA1C 6.9 (H) 11/11/2024       IMAGING:  All pertinent imaging has been reviewed and interpreted independently.  I reviewed his CTA head and neck from July which demonstrates incidental left carotid luminal irregularity possible thrombus    IMP/PLAN:  Luther Dc Soto is a 84 y.o. male with a history of a right hemispheric stroke in July 2024 in setting of normal cervical carotid artery and significant intracranial disease.  CTA at that time incidentally noted a left pedunculated plaque/thrombus deemed to be asymptomatic.  Patient here for follow up but awaiting repeat imaging    Plan:    Obtain carotid duplex as scheduled for next Monday.  Follow up in 1-2 weeks to discuss results via virtual visit  Continue Eliquis and baby aspirin statin            I spent 50 " minutes evaluating this patient and greater than 50% of the time was spent counseling, coordinator care and discussing the plan of care.  All questions were answered and patient stated understanding with agreement with the above treatment plan.    Chuckie Stern MD, PhD  Vascular and Endovascular Surgery

## 2025-01-11 DIAGNOSIS — I42.9 CARDIOMYOPATHY, UNSPECIFIED TYPE: ICD-10-CM

## 2025-01-12 DIAGNOSIS — N18.32 TYPE 2 DIABETES MELLITUS WITH STAGE 3B CHRONIC KIDNEY DISEASE, WITHOUT LONG-TERM CURRENT USE OF INSULIN: ICD-10-CM

## 2025-01-12 DIAGNOSIS — E11.22 TYPE 2 DIABETES MELLITUS WITH STAGE 3B CHRONIC KIDNEY DISEASE, WITHOUT LONG-TERM CURRENT USE OF INSULIN: ICD-10-CM

## 2025-01-12 NOTE — TELEPHONE ENCOUNTER
Refill Routing Note   Medication(s) are not appropriate for processing by [facility] for the following reason(s):        Required labs abnormal  No active prescription written by provider    [facility] action(s):  Defer               Appointments  past 12m or future 3m with PCP    Date Provider   Last Visit   11/11/2024 Randee Marie MD   Next Visit   5/12/2025 Randee Marie MD   ED visits in past 90 days: 0        Note composed:2:41 PM 01/12/2025

## 2025-01-12 NOTE — TELEPHONE ENCOUNTER
No care due was identified.  [facility] Embedded Care Due Messages. Reference number: [id number].   1/12/2025 7:15:30 AM CST

## 2025-01-13 ENCOUNTER — PATIENT OUTREACH (OUTPATIENT)
Dept: ADMINISTRATIVE | Facility: HOSPITAL | Age: 85
End: 2025-01-13
Payer: MEDICARE

## 2025-01-13 ENCOUNTER — HOSPITAL ENCOUNTER (OUTPATIENT)
Dept: CARDIOLOGY | Facility: HOSPITAL | Age: 85
Discharge: HOME | End: 2025-01-13
Attending: SURGERY
Payer: MEDICARE

## 2025-01-13 DIAGNOSIS — I65.23 BILATERAL CAROTID ARTERY STENOSIS: ICD-10-CM

## 2025-01-13 PROCEDURE — 93880 EXTRACRANIAL BILAT STUDY: CPT | Mod: 26,,, | Performed by: SURGERY

## 2025-01-13 PROCEDURE — 93880 EXTRACRANIAL BILAT STUDY: CPT

## 2025-01-13 RX ORDER — GLIPIZIDE 10 MG/1
10 TABLET, FILM COATED, EXTENDED RELEASE ORAL 2 TIMES DAILY
Qty: 180 TABLET | Refills: 3 | Status: SHIPPED | OUTPATIENT
Start: 2025-01-13 | End: 2026-01-13

## 2025-01-13 RX ORDER — FUROSEMIDE 40 MG/1
TABLET ORAL
Qty: 90 TABLET | Refills: 3 | Status: SHIPPED | OUTPATIENT
Start: 2025-01-13

## 2025-01-15 VITALS
RESPIRATION RATE: 16 BRPM | SYSTOLIC BLOOD PRESSURE: 128 MMHG | DIASTOLIC BLOOD PRESSURE: 65 MMHG | OXYGEN SATURATION: 98 %

## 2025-01-15 NOTE — PROGRESS NOTES
AAOx3, Ambulates on own. No H/A, No Dizziness, Sleeping ok.  Patient is doing calisthenics weekly.  Finished PT, Good Family Support and no acute distress noted.  Educated patient that if he experiences stroke like symptoms to call 911 or go directly to the ED.

## 2025-01-16 ENCOUNTER — HOME CARE VISIT (OUTPATIENT)
Dept: NEUROLOGY | Facility: HOSPITAL | Age: 85
End: 2025-01-16
Payer: MEDICARE

## 2025-01-16 ENCOUNTER — HOSPITAL ENCOUNTER (OUTPATIENT)
Dept: CARDIOLOGY | Facility: HOSPITAL | Age: 85
Discharge: HOME | End: 2025-01-16
Attending: INTERNAL MEDICINE
Payer: MEDICARE

## 2025-01-16 VITALS
SYSTOLIC BLOOD PRESSURE: 168 MMHG | WEIGHT: 254.63 LBS | BODY MASS INDEX: 29.46 KG/M2 | HEART RATE: 58 BPM | HEIGHT: 78 IN | DIASTOLIC BLOOD PRESSURE: 58 MMHG

## 2025-01-16 DIAGNOSIS — I42.9 CARDIOMYOPATHY, UNSPECIFIED TYPE: ICD-10-CM

## 2025-01-16 LAB
ASCENDING AORTA: 3.35 CM
AV AREA BY CONTINUOUS VTI: 3.2 CM2
AV INDEX (PROSTH): 0.77
AV LVOT MEAN GRADIENT: 2 MMHG
AV LVOT PEAK GRADIENT: 3 MMHG
AV MEAN GRADIENT: 3 MMHG
AV PEAK GRADIENT: 6 MMHG
AV VALVE AREA BY VELOCITY RATIO: 3.1 CM²
AV VALVE AREA: 3.2 CM2
AV VELOCITY RATIO: 0.75
BSA FOR ECHO PROCEDURE: 2.54 M2
CV ECHO LV RWT: 0.32 CM
DOP CALC AO PEAK VEL: 1.2 M/S
DOP CALC AO VTI: 27.5 CM
DOP CALC LVOT AREA: 4.2 CM2
DOP CALC LVOT DIAMETER: 2.3 CM
DOP CALC LVOT PEAK VEL: 0.9 M/S
DOP CALC LVOT STROKE VOLUME: 87.6 CM3
DOP CALCLVOT PEAK VEL VTI: 21.1 CM
E WAVE DECELERATION TIME: 292 MS
E/A RATIO: 0.64
E/E' RATIO: 12 M/S
ECHO EF ESTIMATED: 60 %
ECHO LV POSTERIOR WALL: 1 CM (ref 0.6–1.1)
EJECTION FRACTION: 35 %
FRACTIONAL SHORTENING: 33.3 % (ref 28–44)
INTERVENTRICULAR SEPTUM: 1.9 CM (ref 0.6–1.1)
IVC DIAMETER: 0.79 CM
LA MAJOR: 6.3 CM
LA MINOR: 6 CM
LA WIDTH: 5.1 CM
LEFT ATRIUM SIZE: 4.9 CM
LEFT ATRIUM VOLUME INDEX MOD: 38 ML/M2
LEFT ATRIUM VOLUME INDEX: 52 ML/M2
LEFT ATRIUM VOLUME MOD: 95 ML
LEFT ATRIUM VOLUME: 131 CM3
LEFT INTERNAL DIMENSION IN SYSTOLE: 4.2 CM (ref 2.1–4)
LEFT VENTRICLE DIASTOLIC VOLUME INDEX: 78.92 ML/M2
LEFT VENTRICLE DIASTOLIC VOLUME: 198.87 ML
LEFT VENTRICLE MASS INDEX: 174.8 G/M2
LEFT VENTRICLE SYSTOLIC VOLUME INDEX: 31.7 ML/M2
LEFT VENTRICLE SYSTOLIC VOLUME: 79.86 ML
LEFT VENTRICULAR INTERNAL DIMENSION IN DIASTOLE: 6.3 CM (ref 3.5–6)
LEFT VENTRICULAR MASS: 440.4 G
LV LATERAL E/E' RATIO: 9.8
LV SEPTAL E/E' RATIO: 14.8
MV A" WAVE DURATION": 106.57 MS
MV PEAK A VEL: 0.92 M/S
MV PEAK E VEL: 0.59 M/S
OHS CV RV/LV RATIO: 0.81 CM
OHS LV EJECTION FRACTION SIMPSONS BIPLANE MOD: 33 %
PISA TR MAX VEL: 1.2 M/S
PULM VEIN A" WAVE DURATION": 106.57 MS
PULM VEIN S/D RATIO: 1.61
PULMONIC VEIN PEAK A VELOCITY: 0.2 M/S
PV PEAK D VEL: 0.31 M/S
PV PEAK S VEL: 0.5 M/S
RA MAJOR: 5.96 CM
RA PRESSURE ESTIMATED: 3 MMHG
RA WIDTH: 4.63 CM
RIGHT ATRIAL AREA: 23.9 CM2
RIGHT VENTRICLE DIASTOLIC BASEL DIMENSION: 5.1 CM
RV TB RVSP: 4 MMHG
RV TISSUE DOPPLER FREE WALL SYSTOLIC VELOCITY 1 (APICAL 4 CHAMBER VIEW): 14.74 CM/S
SINUS: 3.93 CM
STJ: 3.92 CM
TDI LATERAL: 0.06 M/S
TDI SEPTAL: 0.04 M/S
TDI: 0.05 M/S
TR MAX PG: 6 MMHG
TRICUSPID ANNULAR PLANE SYSTOLIC EXCURSION: 2.7 CM
TV PEAK GRADIENT: 6 MMHG
TV REST PULMONARY ARTERY PRESSURE: 9 MMHG
Z-SCORE OF LEFT VENTRICULAR DIMENSION IN END DIASTOLE: -8.46
Z-SCORE OF LEFT VENTRICULAR DIMENSION IN END SYSTOLE: -5.75

## 2025-01-16 PROCEDURE — 93306 TTE W/DOPPLER COMPLETE: CPT | Mod: 26,,, | Performed by: INTERNAL MEDICINE

## 2025-01-16 PROCEDURE — 93306 TTE W/DOPPLER COMPLETE: CPT

## 2025-01-22 ENCOUNTER — OFFICE VISIT (OUTPATIENT)
Dept: VASCULAR SURGERY | Facility: CLINIC | Age: 85
End: 2025-01-22
Payer: MEDICARE

## 2025-01-22 DIAGNOSIS — I65.23 BILATERAL CAROTID ARTERY STENOSIS: Primary | ICD-10-CM

## 2025-01-22 PROCEDURE — 1157F ADVNC CARE PLAN IN RCRD: CPT | Mod: CPTII,95,, | Performed by: SURGERY

## 2025-01-22 PROCEDURE — 98006 SYNCH AUDIO-VIDEO EST MOD 30: CPT | Mod: 95,,, | Performed by: SURGERY

## 2025-01-22 NOTE — PROGRESS NOTES
Visit type: Virtual    The patient location is: home    The chief complaint leading to consultation is:follow up regarding carotid stenosis.    Luther Irwin Jr. is a 84 y.o. male with a history of carotid stenosis, here for follow up     84-year-old man with CKD, and diabetes, nonsmoker, who presents after episode of left leg weakness that started on Tuesday.  Patient weakness has resolved since being in the hospital.  He underwent stroke workup which revealed a parietal acute infarct in the right hemisphere.  Patient was previously on aspirin daily.  His since been started on Plavix.   CTA was performed which demonstrated no cervical right carotid stenosis, however there was some intracranial stenosis in the right hemisphere.  Incidentally the left internal carotid artery was noted to have a pedunculated plaque/thrombus.  Recommended anticoagulation for this asymptomatic lesion  and the patient was discharged on Eliquis and aspirin.      August 2024  Patient  following up  He has some left leg residual weakness, but he says it has been slowly improving.  He is able to ambulate.  He uses a walker for assistance, and/or just to be safe as he says.  He has had no new symptoms, no unilateral vision changes or new weakness, slurred speech etc.     January 6, 2025 patient is here for follow-up.  Unfortunately his repeat studies have not been done.  His CTA of his head and neck was canceled due to estimated GFR less than 30.  He remains asymptomatic.  He reports residual left leg clumsiness.  But he is able to walk independently without a walker.  He has never smoked.  He has no other neurologic concerns.  Reports compliance with Eliquis and baby aspirin and statin    1/22/25 - virtual visit. No new symptoms.  I reviewed patient's carotid ultrasound in detail which shows no significant velocity elevations or evidence of stenosis.    Assessment and plan:  Asymptomatic carotid stenosis less than 50%.  No vascular  intervention recommended at this time.  Recommend patient continue apixaban and aspirin and follow up in 6 months with repeat carotid duplex.     Face to Face time with patient: 30 minutes of total time spent on the encounter, which includes face to face time and non-face to face time preparing to see the patient (eg, review of tests), Obtaining and/or reviewing separately obtained history, Documenting clinical information in the electronic or other health record, Independently interpreting results (not separately reported) and communicating results to the patient/family/caregiver, or Care coordination (not separately reported)

## 2025-01-27 VITALS
OXYGEN SATURATION: 98 % | DIASTOLIC BLOOD PRESSURE: 75 MMHG | RESPIRATION RATE: 17 BRPM | HEART RATE: 60 BPM | SYSTOLIC BLOOD PRESSURE: 145 MMHG

## 2025-01-27 LAB
LEFT CBA DIAS: 12 CM/S
LEFT CBA SYS: 94 CM/S
LEFT CCA DIST DIAS: 14 CM/S
LEFT CCA DIST SYS: 104 CM/S
LEFT CCA MID DIAS: 14 CM/S
LEFT CCA MID SYS: 110 CM/S
LEFT CCA PROX DIAS: 10 CM/S
LEFT CCA PROX SYS: 79 CM/S
LEFT ECA DIAS: 8 CM/S
LEFT ECA SYS: 110 CM/S
LEFT ICA DIST DIAS: 12 CM/S
LEFT ICA DIST SYS: 63 CM/S
LEFT ICA MID DIAS: 21 CM/S
LEFT ICA MID SYS: 78 CM/S
LEFT ICA PROX DIAS: 17 CM/S
LEFT ICA PROX SYS: 85 CM/S
LEFT VERTEBRAL DIAS: 14 CM/S
LEFT VERTEBRAL SYS: 52 CM/S
OHS CV CAROTID RIGHT ICA EDV HIGHEST: 22
OHS CV CAROTID ULTRASOUND LEFT ICA/CCA RATIO: 0.82
OHS CV CAROTID ULTRASOUND RIGHT ICA/CCA RATIO: 1.07
OHS CV PV CAROTID LEFT HIGHEST CCA: 110
OHS CV PV CAROTID LEFT HIGHEST ICA: 85
OHS CV PV CAROTID RIGHT HIGHEST CCA: 108
OHS CV PV CAROTID RIGHT HIGHEST ICA: 90
OHS CV US CAROTID LEFT HIGHEST EDV: 21
RIGHT CBA DIAS: 12 CM/S
RIGHT CBA SYS: 76 CM/S
RIGHT CCA DIST DIAS: 13 CM/S
RIGHT CCA DIST SYS: 84 CM/S
RIGHT CCA MID DIAS: 11 CM/S
RIGHT CCA MID SYS: 96 CM/S
RIGHT CCA PROX DIAS: 13 CM/S
RIGHT CCA PROX SYS: 108 CM/S
RIGHT ECA DIAS: 9 CM/S
RIGHT ECA SYS: 121 CM/S
RIGHT ICA DIST DIAS: 22 CM/S
RIGHT ICA DIST SYS: 78 CM/S
RIGHT ICA MID DIAS: 18 CM/S
RIGHT ICA MID SYS: 76 CM/S
RIGHT ICA PROX DIAS: 16 CM/S
RIGHT ICA PROX SYS: 90 CM/S
RIGHT VERTEBRAL DIAS: 13 CM/S
RIGHT VERTEBRAL SYS: 63 CM/S

## 2025-01-27 NOTE — PROGRESS NOTES
AAOx3, Ambulates on own.  No H/A, No Dizziness, No complaints of pain, No acute distress noted. Sleep pattern is good. Medication compliant.  Educated the patient that if she experiences stroke like symptoms to call 911 or go directly to the ED.

## 2025-02-14 DIAGNOSIS — N18.32 TYPE 2 DIABETES MELLITUS WITH STAGE 3B CHRONIC KIDNEY DISEASE, WITHOUT LONG-TERM CURRENT USE OF INSULIN: Primary | ICD-10-CM

## 2025-02-14 DIAGNOSIS — E11.22 TYPE 2 DIABETES MELLITUS WITH STAGE 3B CHRONIC KIDNEY DISEASE, WITHOUT LONG-TERM CURRENT USE OF INSULIN: Primary | ICD-10-CM

## 2025-02-14 NOTE — TELEPHONE ENCOUNTER
"----- Message from Dane sent at 2/14/2025  2:53 PM CST -----  Regarding: self  Type: RX Refill Request    Who called:self  Have you contacted your pharmacy:yes  Refill or New Rx:"new/refill"  RX name and strength: accucheck meter/guide    Preferred pharmacy and phone number:    [facility] #[id number] - [place] [id number] [facility] [address] [place] [address] [address]  [facility] [address]  Phone: [phone] Fax: [phone]            Ordering provider:NAGI Marie  Would the patient rather a call back or a response via [facility]:call  Best call back number:[phone]    Additional information:  "

## 2025-02-14 NOTE — TELEPHONE ENCOUNTER
Care Due:                  Date            Visit Type   Department     Provider  --------------------------------------------------------------------------------                                EP -                              PRIMARY      [facility]  Last Visit: 11-      CARE ([facility])   MEDICINE       Randee Marie  Next Visit: None Scheduled  None         None Found                                                            Last  Test          Frequency    Reason                     Performed    Due Date  --------------------------------------------------------------------------------    HBA1C.......  6 months...  empagliflozin, glipiZIDE.  11- 05-    [facility] Embedded Care Due Messages. Reference number: [id number].   2/14/2025 3:12:09 PM CST

## 2025-02-14 NOTE — TELEPHONE ENCOUNTER
Last Office Visit Info:   The patient's last visit with Randee Marie MD was on 11/11/2024.    The patient's last visit in current department was on 11/11/2024.        Last CBC Results:   Lab Results   Component Value Date    WBC 7.80 11/11/2024    HGB 8.1 (L) 11/11/2024    HCT 27.7 (L) 11/11/2024     11/11/2024       Last CMP Results  Lab Results   Component Value Date     11/11/2024    K 4.1 11/11/2024     (H) 11/11/2024    CO2 22 (L) 11/11/2024    BUN 29 (H) 11/11/2024    CREATININE 2.3 (H) 12/11/2024    CALCIUM 8.9 11/11/2024    ALBUMIN 3.7 11/11/2024    AST 23 11/11/2024    ALT 24 11/11/2024       Last Lipids  Lab Results   Component Value Date    CHOL 92 (L) 11/11/2024    TRIG 52 11/11/2024    HDL 42 11/11/2024    LDLCALC 39.6 (L) 11/11/2024       Last A1C  Lab Results   Component Value Date    HGBA1C 6.9 (H) 11/11/2024       Last TSH  Lab Results   Component Value Date    TSH 0.875 11/11/2024             Current Med Refills  Medication List with Changes/Refills   Current Medications    AMLODIPINE-VALSARTAN (EXFORGE)  MG PER TABLET    Take 1 tablet by mouth once daily.       Start Date: 5/10/2024 End Date: --    APIXABAN (ELIQUIS) 5 MG TAB    Take 1 tablet (5 mg total) by mouth 2 (two) times daily.       Start Date: 11/19/2024End Date: 11/19/2025    ASPIRIN 81 MG CHEW    Take 81 mg by mouth once daily.       Start Date: --        End Date: --    ATORVASTATIN (LIPITOR) 40 MG TABLET    Take 1 tablet (40 mg total) by mouth once daily.       Start Date: 11/18/2024End Date: 11/13/2025    CLONIDINE (CATAPRES) 0.1 MG TABLET    Take 1 tablet (0.1 mg total) by mouth 2 (two) times daily.       Start Date: 11/11/2024End Date: --    EMPAGLIFLOZIN (JARDIANCE) 10 MG TABLET    Take 1 tablet (10 mg total) by mouth once daily.       Start Date: 5/10/2024 End Date: --    FUROSEMIDE (LASIX) 40 MG TABLET    TAKE 1 TABLET (40 MG TOTAL) BY MOUTH DAILY AS NEEDED (SHORTNESS OF BREATH).       Start Date:  1/13/2025 End Date: --    GLIPIZIDE (GLUCOTROL) 10 MG TR24    Take 1 tablet (10 mg total) by mouth 2 (two) times daily.       Start Date: 1/13/2025 End Date: 1/13/2026    SILDENAFIL (VIAGRA) 100 MG TABLET    Take 1 tablet (100 mg total) by mouth daily as needed.       Start Date: 9/16/2024 End Date: --    TADALAFIL (CIALIS) 20 MG TAB    Take 1 tablet (20 mg total) by mouth daily as needed (ED).       Start Date: 11/11/2024End Date: 11/11/2025    TAMSULOSIN (FLOMAX) 0.4 MG CAP    Take 1 capsule (0.4 mg total) by mouth once daily.       Start Date: 5/10/2024 End Date: --       Order(s) placed per written order guidelines: none    Please advise.

## 2025-02-17 RX ORDER — DEXTROSE 4 G
1 TABLET,CHEWABLE ORAL
Qty: 1 EACH | Refills: 0 | Status: SHIPPED | OUTPATIENT
Start: 2025-02-17 | End: 2026-02-17

## 2025-02-20 DIAGNOSIS — E11.22 TYPE 2 DIABETES MELLITUS WITH STAGE 3B CHRONIC KIDNEY DISEASE, WITHOUT LONG-TERM CURRENT USE OF INSULIN: Primary | ICD-10-CM

## 2025-02-20 DIAGNOSIS — N18.32 TYPE 2 DIABETES MELLITUS WITH STAGE 3B CHRONIC KIDNEY DISEASE, WITHOUT LONG-TERM CURRENT USE OF INSULIN: Primary | ICD-10-CM

## 2025-02-20 NOTE — TELEPHONE ENCOUNTER
----- Message from Lisa Chang sent at 2/20/2025  2:10 PM CST -----  Regarding: Medication  Refill  Request  Reply in [facility]: Michellease refill the medication listed below. Please call the patient  [phone] (Y)Medication      Test Strips for the ( ACCU-CHEK GUIDE GLUCOSE METER )    Preferred Pharmacy:  [id number] [place] [address] Phone: [phone]: [phone]

## 2025-02-20 NOTE — TELEPHONE ENCOUNTER
No care due was identified.  [facility] Embedded Care Due Messages. Reference number: [id number].   2/20/2025 3:00:53 PM CST

## 2025-03-13 DIAGNOSIS — I10 ESSENTIAL HYPERTENSION: ICD-10-CM

## 2025-03-13 RX ORDER — AMLODIPINE AND VALSARTAN 10; 160 MG/1; MG/1
1 TABLET ORAL DAILY
Qty: 90 TABLET | Refills: 3 | Status: SHIPPED | OUTPATIENT
Start: 2025-03-13

## 2025-03-13 NOTE — TELEPHONE ENCOUNTER
No care due was identified.  [facility] Embedded Care Due Messages. Reference number: [id number].   3/13/2025 12:13:53 PM CDT

## 2025-03-18 ENCOUNTER — TELEPHONE (OUTPATIENT)
Dept: ADMINISTRATIVE | Facility: HOSPITAL | Age: 85
End: 2025-03-18
Payer: MEDICARE

## 2025-03-18 ENCOUNTER — PATIENT OUTREACH (OUTPATIENT)
Dept: ADMINISTRATIVE | Facility: HOSPITAL | Age: 85
End: 2025-03-18
Payer: MEDICARE

## 2025-03-18 VITALS
SYSTOLIC BLOOD PRESSURE: 175 MMHG | DIASTOLIC BLOOD PRESSURE: 84 MMHG | SYSTOLIC BLOOD PRESSURE: 175 MMHG | DIASTOLIC BLOOD PRESSURE: 84 MMHG

## 2025-03-18 NOTE — LETTER
"       AUTHORIZATION FOR RELEASE OF   CONFIDENTIAL INFORMATION        We are seeing Luther Irwin Jr., date of birth 1940, in the clinic at [facility]. Randee Marie MD is the patient's PCP. Luther Irwin Jr. has an outstanding lab/procedure at the time we reviewed his chart. In order to help keep his health information updated, he has authorized us to request the following medical record(s):                                               ( X )  EYE EXAM               Please fax records to [facility], Roy, Pooja, MD  [address], [place] [address] Fax number is [phone].    If you have any questions, please contact, Pastora- MA Care Coordinator at [phone].            Patient Name: Luther Irwin Jr.  : 1940  Patient Phone #: [phone]    [facility]                          REGISTRATION                         AUTHORIZATION Luther Irwin Jr.  MRN: [id number]  : 1940  Age: 83 y.o.  Sex: male       A. Consent for Examination and Treatment: I hereby authorize the providers and employees of [facility] ("[facility] to provide medical treatment/services which includes, but is not limited to, performing and administering tests and diagnostic procedures that are deemed necessary, including, but not limited to, imaging examinations, blood tests and other laboratory procedures as may be required by the hospital, clinic, or may be ordered by my physician(s) or persons working under the general and/or special instructions of my physician(s).  I understand and agree that this consent covers all authorized persons, including but not limited to residents, nurse practitioners, physicians' assistants, specialists, consultants and independently contracted physicians who are called upon by the physician in charge to carry out the diagnostic procedures and medical or surgical treatment.  I hereby authorize [facility] to retain or dispose of any specimens or " tissue, should there be such remaining from any test or procedure.  I hereby authorize and give consent for [facility] providers and employees to take photographs, images or videotapes of such diagnostic, surgical or treatment procedures of Patient as may be required by [facility] or as may be ordered by a physician. I further acknowledge and agree that [facility] may use cameras or other devices for patient monitoring  I am aware that the practice of medicine is not an exact science, and I acknowledge that no guarantees have been made to me as to the outcome of any tests, procedures or treatment.     B. Authorization for Release of Information: I understand that my insurance company and/or their agents may need information necessary to make determinations about payment/reimbursement. I hereby provide authorization to release to all insurance companies, their successors, assignees, other parties with whom they may have contracted, or others acting on their behalf, that are involved with payment for any hospital and/or clinic charges incurred by the patient, any information that they request and deem necessary for payment/reimbursement, and/or quality review. I further authorize the release of my health information to physicians or other health care practitioners on staff who are involved in my health care now and in the future, and to other health care providers, entities, or institutions for the purpose of my continued care and treatment, including referrals.     C. Medicare Patient's Certification and Authorization to Release Information and Payment Request: I certify that the information given by me in applying for payment under Title XVIII of the Social Security Act is correct. I authorize any patton of medical or other information about me to release to the Social Security Administration or its intermediaries or carriers, any information needed for this or a related Medicare claim. I request that payment of authorized  benefits be made on my behalf.  REGISTRATION AUTHORIZATION  Form No. 27581 (Rev. 12/05/2016) Page 1 of 3  [facility]   D. Assignment of Insurance Benefits: I hereby authorize all insurance companies, health plans, defined benefit plans, health insurers or any entity that is or may be responsible for payment of my medical expenses to pay all hospital and medical benefits now due, and to become due and payable to me under any hospital benefits, sick benefits, injury benefits or any other benefit for services rendered to me, including Major Medical Benefits, direct to [facility] and all independently contracted physicians. I assign any and all rights that I may have against any and all insurance companies, health plans, defined benefit plans, health insurers or any entity that is or may be responsible for payment of my medical expenses, including, but not limited to any right to appeal a denial of a claim, any right to bring any action, lawsuit, administrative proceeding, or other cause of action on my behalf. I specifically assign my right to pursue litigation against any and all insurance companies, health plans, defined benefit plans, health insurers or any entity that is or may be responsible for payment of my medical expenses based upon a refusal to pay charges.  E. Valuables: It is understood and agreed that [facility] is not liable for the damage to or loss of any money, jewelry, documents, dentures, eye glasses, hearing aids, prosthetics, or other property of value.     F. Computer Equipment: I understand and agree that should I choose to use computer equipment owned by [facility] or if I choose to access the Internet via [facility] network, I do so at my own risk. [facility] is not responsible for any damage to my computer equipment or to any damages of any type that might arise from my loss of equipment or data.     G. Acceptance of Financial Responsibility: I agree that in consideration of the services and  supplies that have been or will be furnished to the patient, I am hereby obligated to pay all charges made for or on the account of the patient according to the standard rates (in effect at the time the services and supplies are delivered) established by [facility], including its Patient Financial Assistance Policy to the extent it is applicable. I understand that I am responsible for all charges, or portions thereof, not covered by insurance or other sources. Patient refunds will be distributed only after balances at all [facility] facilities are paid.     H. Communication Authorization: I hereby authorize [facility] and its representatives, along with any billing service or  who may work on their behalf, to contact me on my cell phone and/or home phone using prerecorded messages, artificial voice messages, automatic telephone dialing devices or other computer assisted technology, or by electronic mail, text messaging, or by any other form of electronic communication. This includes, but is not limited to, appointment reminders, yearly physical exam reminders, preventive care reminders, patient campaigns, welcome calls, and calls about account balances on my account or any account on which I am listed as a guarantor. I understand I have the right to opt out of these communications at any time.     I.  Relationship Between Facility and Physician: I understand that some, but not all, providers furnishing services to the patient are not employees or agents of [facility]. The patient is under the care and supervision of his/her attending physician, and it is the responsibility of the facility and its nursing staff to carry out the instructions of such physicians. It is the responsibility of the patient's physician/designee to obtain the patient's informed consent, when required, for medical or surgical treatment, special diagnostic or therapeutic procedures, or hospital services rendered for the patient under the  special instructions of the physician/designee.  REGISTRATION AUTHORIZATION  Form No. 49864 (Rev. 12/05/2016) Page 2 of 3  [facility]  J.  Notice of Privacy Practices: I acknowledge I have received a copy of [facility]'s Notice of Privacy Practices.     K. Facility Directory: I have discussed with the organization my desire to be either included or excluded in the facility directory. I understand that if my choice is to opt-out of being identified in the facility directory that the facility will not provide any information about me such as my condition (e.g. fair, stable, etc.) or my location in the facility (eg room number, department).     L. LINKS: [facility] is a LINKS ([place] [employer] [facility] [employer]) participating facility. LINKS is a [facility]-sponsored confidential computer system that helps you and your doctor keep track of your and your child's immunization history. I acknowledge that I am allowing [facility] to share this information with LINKS.     M. TERM: This authorization is valid for this and subsequent care/treatment I receive at [facility] and will remain valid unless/until revoked in writing by me.     N. [facility]: As used in this document, [facility] means all [facility] affiliated entities including all health centers, surgery centers, clinics, and hospitals. It includes more specifically, the following entities: [facility], a not for profit [facility], and its subsidiaries and affiliates, including [facility], [facility], [facility], [facility], [facility], [facility], [facility], [facility]., [facility] d/b/a [facility], [facility] d/b/a [facility] [employer] as manager of Gianni Gamboa [facility], [facility], [employer], L[facility]C. d/b/a [facility], [facility]., [facility] and [facility] as manager of [facility].       Patient/Legal Guardian Signature  This signature was collected at 05/07/2024           _______________________________   Printed Name/Relationship to Patient                         [facility] complies with applicable Federal civil rights laws and does not discriminate on the basis of race, color, national origin, age, disability, or sex.  ATENCIÓN: si judela elyssa, tiene a cuba disposición servicios gratuitos de asistencia lingüística. Katia dsouza [phone].  CHÚ Ý: N?u b?n nói Ti?ng Vi?t, có các d?ch v? h? tr? ngôn ng? mi?n phí dành cho b?n. G?i s? [phone].  REGISTRATION AUTHORIZATION  Form No. 38167 (Rev. 12/05/2016) Page 3 of 3

## 2025-03-18 NOTE — PROGRESS NOTES
Population Health Chart Review & Patient Outreach Details      Additional [facility] Notes:        HM and immunization's reviewed and updated.  DUE FOR VISIT WITH EYE DOCTOR.  Efax BALDOMERO to [facility] eye [facility].   Need remote blood pressure. Recent reading 175/84 but was 15 minutes after taking medication. Patient will call back with readings. Place 2 weeks reminder to call patient if no update.             Updates Requested / Reviewed:      Updated Care Coordination Note, Care Everywhere, and Care Team Updated         Health Maintenance Topics Overdue:      VB Score: 1     Eye Exam  Uncontrolled BP    RSV Vaccine

## 2025-03-18 NOTE — TELEPHONE ENCOUNTER
Remote blood pressure 175/84 patient will recheck again two hours after taking medication. Readings are 15 minutes after taking medicine. Place 2 weeks reminder to check chart.

## 2025-03-21 ENCOUNTER — PATIENT MESSAGE (OUTPATIENT)
Dept: FAMILY MEDICINE | Facility: CLINIC | Age: 85
End: 2025-03-21
Payer: MEDICARE

## 2025-03-21 VITALS — SYSTOLIC BLOOD PRESSURE: 178 MMHG | DIASTOLIC BLOOD PRESSURE: 86 MMHG

## 2025-03-21 DIAGNOSIS — I10 ESSENTIAL HYPERTENSION: Primary | ICD-10-CM

## 2025-03-21 RX ORDER — HYDRALAZINE HYDROCHLORIDE 25 MG/1
25 TABLET, FILM COATED ORAL EVERY 12 HOURS
Qty: 60 TABLET | Refills: 0 | Status: SHIPPED | OUTPATIENT
Start: 2025-03-21 | End: 2025-04-20

## 2025-03-24 ENCOUNTER — TELEPHONE (OUTPATIENT)
Dept: FAMILY MEDICINE | Facility: CLINIC | Age: 85
End: 2025-03-24
Payer: MEDICARE

## 2025-03-24 DIAGNOSIS — N52.9 ERECTILE DYSFUNCTION, UNSPECIFIED ERECTILE DYSFUNCTION TYPE: ICD-10-CM

## 2025-03-24 RX ORDER — TADALAFIL 20 MG/1
20 TABLET ORAL
Qty: 30 TABLET | Refills: 0 | Status: SHIPPED | OUTPATIENT
Start: 2025-03-24

## 2025-03-25 ENCOUNTER — TELEPHONE (OUTPATIENT)
Dept: FAMILY MEDICINE | Facility: CLINIC | Age: 85
End: 2025-03-25
Payer: MEDICARE

## 2025-03-25 NOTE — TELEPHONE ENCOUNTER
----- Message from Néstor Barron sent at 3/25/2025 11:37 AM CDT -----  Type:  Sooner Appointment RequestPatient is requesting a sooner appointment.  Patient declined first available appointment listed as well as another facility and provider .  Patient will not accept being placed on the waitlist and is requesting a message be sent to doctor.Name of Caller:Pt When is the first available appointment?3/27Symptoms:Bp Would the patient rather a call back or a response via [facility]?Callback Best Call Back Number:Telephone Information:Mobile          [phone] Additional Information: Needs 2nd week of April due to he'll be out of town

## 2025-03-26 ENCOUNTER — PATIENT MESSAGE (OUTPATIENT)
Dept: FAMILY MEDICINE | Facility: CLINIC | Age: 85
End: 2025-03-26
Payer: MEDICARE

## 2025-03-26 DIAGNOSIS — I10 ESSENTIAL HYPERTENSION: ICD-10-CM

## 2025-03-26 RX ORDER — HYDRALAZINE HYDROCHLORIDE 25 MG/1
25 TABLET, FILM COATED ORAL EVERY 12 HOURS
Qty: 60 TABLET | Refills: 2 | Status: SHIPPED | OUTPATIENT
Start: 2025-03-26 | End: 2025-06-24

## 2025-03-26 NOTE — TELEPHONE ENCOUNTER
No care due was identified.  [facility] Embedded Care Due Messages. Reference number: [id number].   3/26/2025 7:41:20 AM CDT

## 2025-04-03 ENCOUNTER — TELEPHONE (OUTPATIENT)
Dept: ADMINISTRATIVE | Facility: HOSPITAL | Age: 85
End: 2025-04-03
Payer: MEDICARE

## 2025-04-03 ENCOUNTER — PATIENT OUTREACH (OUTPATIENT)
Dept: ADMINISTRATIVE | Facility: HOSPITAL | Age: 85
End: 2025-04-03
Payer: MEDICARE

## 2025-04-03 NOTE — TELEPHONE ENCOUNTER
Efax BALDOMERO to [facility] eye [facility] on holday on 3/18/2025 never receive records. Please help. Thank you!

## 2025-04-03 NOTE — LETTER
"       AUTHORIZATION FOR RELEASE OF   CONFIDENTIAL INFORMATION    Dear [facility],    We are seeing Luther Irwin Jr., date of birth 1940, in the clinic at [facility] MEDICINE. Randee Marie MD is the patient's PCP. Luther Irwin Jr. has an outstanding lab/procedure at the time we reviewed his chart. In order to help keep his health information updated, he has authorized us to request the following medical record(s):                                               ( X )  EYE EXAM                Please fax records to [facility], Roy, Pooja, MD,  at [phone] or email to [email or web address].           Patient Name: Luther Irwin Jr.  : 1940  Patient Phone #: [phone]                          [facility]                          REGISTRATION                         AUTHORIZATION Luther Irwin Jr.  MRN: [id number]  : 1940  Age: 83 y.o.  Sex: male       A. Consent for Examination and Treatment: I hereby authorize the providers and employees of [facility] ("[facility] to provide medical treatment/services which includes, but is not limited to, performing and administering tests and diagnostic procedures that are deemed necessary, including, but not limited to, imaging examinations, blood tests and other laboratory procedures as may be required by the hospital, clinic, or may be ordered by my physician(s) or persons working under the general and/or special instructions of my physician(s).  I understand and agree that this consent covers all authorized persons, including but not limited to residents, nurse practitioners, physicians' assistants, specialists, consultants and independently contracted physicians who are called upon by the physician in charge to carry out the diagnostic procedures and medical or surgical treatment.  I hereby authorize [facility] to retain or dispose of any specimens or tissue, should there be such remaining from any test or " procedure.  I hereby authorize and give consent for [facility] providers and employees to take photographs, images or videotapes of such diagnostic, surgical or treatment procedures of Patient as may be required by [facility] or as may be ordered by a physician. I further acknowledge and agree that [facility] may use cameras or other devices for patient monitoring  I am aware that the practice of medicine is not an exact science, and I acknowledge that no guarantees have been made to me as to the outcome of any tests, procedures or treatment.     B. Authorization for Release of Information: I understand that my insurance company and/or their agents may need information necessary to make determinations about payment/reimbursement. I hereby provide authorization to release to all insurance companies, their successors, assignees, other parties with whom they may have contracted, or others acting on their behalf, that are involved with payment for any hospital and/or clinic charges incurred by the patient, any information that they request and deem necessary for payment/reimbursement, and/or quality review. I further authorize the release of my health information to physicians or other health care practitioners on staff who are involved in my health care now and in the future, and to other health care providers, entities, or institutions for the purpose of my continued care and treatment, including referrals.     C. Medicare Patient's Certification and Authorization to Release Information and Payment Request: I certify that the information given by me in applying for payment under Title XVIII of the Social Security Act is correct. I authorize any patton of medical or other information about me to release to the Social Security Administration or its intermediaries or carriers, any information needed for this or a related Medicare claim. I request that payment of authorized benefits be made on my behalf.  REGISTRATION  AUTHORIZATION  Form No. 38470 (Rev. 12/05/2016) Page 1 of 3  [facility]   D. Assignment of Insurance Benefits: I hereby authorize all insurance companies, health plans, defined benefit plans, health insurers or any entity that is or may be responsible for payment of my medical expenses to pay all hospital and medical benefits now due, and to become due and payable to me under any hospital benefits, sick benefits, injury benefits or any other benefit for services rendered to me, including Major Medical Benefits, direct to [facility] and all independently contracted physicians. I assign any and all rights that I may have against any and all insurance companies, health plans, defined benefit plans, health insurers or any entity that is or may be responsible for payment of my medical expenses, including, but not limited to any right to appeal a denial of a claim, any right to bring any action, lawsuit, administrative proceeding, or other cause of action on my behalf. I specifically assign my right to pursue litigation against any and all insurance companies, health plans, defined benefit plans, health insurers or any entity that is or may be responsible for payment of my medical expenses based upon a refusal to pay charges.  E. Valuables: It is understood and agreed that [facility] is not liable for the damage to or loss of any money, jewelry, documents, dentures, eye glasses, hearing aids, prosthetics, or other property of value.     F. Computer Equipment: I understand and agree that should I choose to use computer equipment owned by [facility] or if I choose to access the Internet via [facility]'s network, I do so at my own risk. [facility] is not responsible for any damage to my computer equipment or to any damages of any type that might arise from my loss of equipment or data.     G. Acceptance of Financial Responsibility: I agree that in consideration of the services and supplies that have been or will be furnished to  the patient, I am hereby obligated to pay all charges made for or on the account of the patient according to the standard rates (in effect at the time the services and supplies are delivered) established by [facility], including its Patient Financial Assistance Policy to the extent it is applicable. I understand that I am responsible for all charges, or portions thereof, not covered by insurance or other sources. Patient refunds will be distributed only after balances at all [facility] facilities are paid.     H. Communication Authorization: I hereby authorize [facility] and its representatives, along with any billing service or  who may work on their behalf, to contact me on my cell phone and/or home phone using prerecorded messages, artificial voice messages, automatic telephone dialing devices or other computer assisted technology, or by electronic mail, text messaging, or by any other form of electronic communication. This includes, but is not limited to, appointment reminders, yearly physical exam reminders, preventive care reminders, patient campaigns, welcome calls, and calls about account balances on my account or any account on which I am listed as a guarantor. I understand I have the right to opt out of these communications at any time.     I.  Relationship Between Facility and Physician: I understand that some, but not all, providers furnishing services to the patient are not employees or agents of [facility]. The patient is under the care and supervision of his/her attending physician, and it is the responsibility of the facility and its nursing staff to carry out the instructions of such physicians. It is the responsibility of the patient's physician/designee to obtain the patient's informed consent, when required, for medical or surgical treatment, special diagnostic or therapeutic procedures, or hospital services rendered for the patient under the special instructions of the  physician/designee.  REGISTRATION AUTHORIZATION  Form No. 14506 (Rev. 12/05/2016) Page 2 of 3  [facility]  J.  Notice of Privacy Practices: I acknowledge I have received a copy of [facility]'s Notice of Privacy Practices.     K. Facility Directory: I have discussed with the organization my desire to be either included or excluded in the facility directory. I understand that if my choice is to opt-out of being identified in the facility directory that the facility will not provide any information about me such as my condition (e.g. fair, stable, etc.) or my location in the facility (eg room number, department).     L. LINKS: [facility] is a LINKS ([place] [employer] State[employer]) participating facility. LINKS is a [facility]-sponsored confidential computer system that helps you and your doctor keep track of your and your child's immunization history. I acknowledge that I am allowing [facility] to share this information with LINKS.     M. TERM: This authorization is valid for this and subsequent care/treatment I receive at [facility] and will remain valid unless/until revoked in writing by me.     N. [facility]: As used in this document, [facility] means all [facility] affiliated entities including all health centers, surgery centers, clinics, and hospitals. It includes more specifically, the following entities: [facility], a not for profit [facility], and its subsidiaries and affiliates, including [facility], [facility], [facility], [facility], [facility], [facility]C., [facility], [facility]., [facility], L[facility] d/b/a [facility], L.L.C. d/b/a [facility] [employer] [employer] as manager of Gianni [employer] [facility], [facility],  [place], [employer], L.L.C. d/b/a [facility], [facility], [facility]., [facility], L[facility]., and [facility] as manager of [facility].       Patient/Legal Guardian Signature  This signature was collected at 05/07/2024           _______________________________   Printed Name/Relationship to Patient                         [facility] complies with applicable Federal civil rights laws and does not discriminate on the basis of race, color, national origin, age, disability, or sex.  ATENCIÓN: si una bergeron, tiene a cuba disposición servicios gratuitos de asistencia lingüística. Katia dsouza [phone].  CHÚ Ý: N?u b?n nói Ti?ng Vi?t, có các d?ch v? h? tr? ngôn ng? mi?n phí dành cho b?n. G?i s? [phone].  REGISTRATION AUTHORIZATION  Form No. 81182 (Rev. 12/05/2016) Page 3 of 3

## 2025-04-03 NOTE — LETTER
"       AUTHORIZATION FOR RELEASE OF   CONFIDENTIAL INFORMATION    Dear [facility],    We are seeing Luther Irwin Jr., date of birth 1940, in the clinic at [facility] MEDICINE. Randee Marie MD is the patient's PCP. Luther Irwin Jr. has an outstanding lab/procedure at the time we reviewed his chart. In order to help keep his health information updated, he has authorized us to request the following medical record(s):                                                  ( X )  EYE EXAM              Please fax records to [facility], Roy, Pooja, MD,  at [phone] or email to [email or web address].       Patient Name: Luther Irwin Jr.  : 1940  Patient Phone #: [phone]                          [facility]                          REGISTRATION                         AUTHORIZATION Luther Irwin Jr.  MRN: [id number]  : 1940  Age: 83 y.o.  Sex: male       A. Consent for Examination and Treatment: I hereby authorize the providers and employees of [facility] ("[facility] to provide medical treatment/services which includes, but is not limited to, performing and administering tests and diagnostic procedures that are deemed necessary, including, but not limited to, imaging examinations, blood tests and other laboratory procedures as may be required by the hospital, clinic, or may be ordered by my physician(s) or persons working under the general and/or special instructions of my physician(s).  I understand and agree that this consent covers all authorized persons, including but not limited to residents, nurse practitioners, physicians' assistants, specialists, consultants and independently contracted physicians who are called upon by the physician in charge to carry out the diagnostic procedures and medical or surgical treatment.  I hereby authorize [facility] to retain or dispose of any specimens or tissue, should there be such remaining from any test or " procedure.  I hereby authorize and give consent for [facility] providers and employees to take photographs, images or videotapes of such diagnostic, surgical or treatment procedures of Patient as may be required by [facility] or as may be ordered by a physician. I further acknowledge and agree that [facility] may use cameras or other devices for patient monitoring  I am aware that the practice of medicine is not an exact science, and I acknowledge that no guarantees have been made to me as to the outcome of any tests, procedures or treatment.     B. Authorization for Release of Information: I understand that my insurance company and/or their agents may need information necessary to make determinations about payment/reimbursement. I hereby provide authorization to release to all insurance companies, their successors, assignees, other parties with whom they may have contracted, or others acting on their behalf, that are involved with payment for any hospital and/or clinic charges incurred by the patient, any information that they request and deem necessary for payment/reimbursement, and/or quality review. I further authorize the release of my health information to physicians or other health care practitioners on staff who are involved in my health care now and in the future, and to other health care providers, entities, or institutions for the purpose of my continued care and treatment, including referrals.     C. Medicare Patient's Certification and Authorization to Release Information and Payment Request: I certify that the information given by me in applying for payment under Title XVIII of the Social Security Act is correct. I authorize any patton of medical or other information about me to release to the Social Security Administration or its intermediaries or carriers, any information needed for this or a related Medicare claim. I request that payment of authorized benefits be made on my behalf.  REGISTRATION  AUTHORIZATION  Form No. 12591 (Rev. 12/05/2016) Page 1 of 3  [facility]   D. Assignment of Insurance Benefits: I hereby authorize all insurance companies, health plans, defined benefit plans, health insurers or any entity that is or may be responsible for payment of my medical expenses to pay all hospital and medical benefits now due, and to become due and payable to me under any hospital benefits, sick benefits, injury benefits or any other benefit for services rendered to me, including Major Medical Benefits, direct to [facility] and all independently contracted physicians. I assign any and all rights that I may have against any and all insurance companies, health plans, defined benefit plans, health insurers or any entity that is or may be responsible for payment of my medical expenses, including, but not limited to any right to appeal a denial of a claim, any right to bring any action, lawsuit, administrative proceeding, or other cause of action on my behalf. I specifically assign my right to pursue litigation against any and all insurance companies, health plans, defined benefit plans, health insurers or any entity that is or may be responsible for payment of my medical expenses based upon a refusal to pay charges.  E. Valuables: It is understood and agreed that [facility] is not liable for the damage to or loss of any money, jewelry, documents, dentures, eye glasses, hearing aids, prosthetics, or other property of value.     F. Computer Equipment: I understand and agree that should I choose to use computer equipment owned by [facility] or if I choose to access the Internet via [facility]'s network, I do so at my own risk. [facility] is not responsible for any damage to my computer equipment or to any damages of any type that might arise from my loss of equipment or data.     G. Acceptance of Financial Responsibility: I agree that in consideration of the services and supplies that have been or will be furnished to  the patient, I am hereby obligated to pay all charges made for or on the account of the patient according to the standard rates (in effect at the time the services and supplies are delivered) established by [facility], including its Patient Financial Assistance Policy to the extent it is applicable. I understand that I am responsible for all charges, or portions thereof, not covered by insurance or other sources. Patient refunds will be distributed only after balances at all [facility] facilities are paid.     H. Communication Authorization: I hereby authorize [facility] and its representatives, along with any billing service or  who may work on their behalf, to contact me on my cell phone and/or home phone using prerecorded messages, artificial voice messages, automatic telephone dialing devices or other computer assisted technology, or by electronic mail, text messaging, or by any other form of electronic communication. This includes, but is not limited to, appointment reminders, yearly physical exam reminders, preventive care reminders, patient campaigns, welcome calls, and calls about account balances on my account or any account on which I am listed as a guarantor. I understand I have the right to opt out of these communications at any time.     I.  Relationship Between Facility and Physician: I understand that some, but not all, providers furnishing services to the patient are not employees or agents of [facility]. The patient is under the care and supervision of his/her attending physician, and it is the responsibility of the facility and its nursing staff to carry out the instructions of such physicians. It is the responsibility of the patient's physician/designee to obtain the patient's informed consent, when required, for medical or surgical treatment, special diagnostic or therapeutic procedures, or hospital services rendered for the patient under the special instructions of the  physician/designee.  REGISTRATION AUTHORIZATION  Form No. 14304 (Rev. 12/05/2016) Page 2 of 3  [facility]  J.  Notice of Privacy Practices: I acknowledge I have received a copy of [facility]'s Notice of Privacy Practices.     K. Facility Directory: I have discussed with the organization my desire to be either included or excluded in the facility directory. I understand that if my choice is to opt-out of being identified in the facility directory that the facility will not provide any information about me such as my condition (e.g. fair, stable, etc.) or my location in the facility (eg room number, department).     L. LINKS: [facility] is a LINKS ([place] [employer] Statewide) participating facility. LINKS is a [facility]-sponsored confidential computer system that helps you and your doctor keep track of your and your child's immunization history. I acknowledge that I am allowing [facility] to share this information with LINKS.     M. TERM: This authorization is valid for this and subsequent care/treatment I receive at [facility] and will remain valid unless/until revoked in writing by me.     N. [facility]: As used in this document, [facility] means all [facility] affiliated entities including all health centers, surgery centers, clinics, and hospitals. It includes more specifically, the following entities: [facility], a not for profit [facility], and its subsidiaries and affiliates, including [facility], [facility], [facility], [facility], [facility], [facility], L[facility]C., [facility], L[facility]., [facility], [facility] d/b/a [facility], L.L.C. d/b/a [facility] RougeCo[facility] [employer] CompanySEBASTIAN as manager of Gianni [employer] [facility], [facility],  [place], [place] [employer], L.L.C. d/b/a [facility], [facility], L[facility]C., and [facility] [facility] as manager of [facility].       Patient/Legal Guardian Signature  This signature was collected at 05/07/2024           _______________________________   Printed Name/Relationship to Patient                         [facility] complies with applicable Federal civil rights laws and does not discriminate on the basis of race, color, national origin, age, disability, or sex.  ATENCIÓN: si una bergeron, tiene a cuba disposición servicios gratuitos de asistencia lingüística. Katia dsouza [phone].  CHÚ Ý: N?u b?n nói Ti?ng Vi?t, có các d?ch v? h? tr? ngôn ng? mi?n phí dành cho b?n. G?i s? [phone].  REGISTRATION AUTHORIZATION  Form No. 43349 (Rev. 12/05/2016) Page 3 of 3

## 2025-04-03 NOTE — PROGRESS NOTES
[facility]

## 2025-04-03 NOTE — PROGRESS NOTES
HM and immunization's reviewed and updated.  DUE FOR VISIT WITH EYE DOCTOR.  Efax BALDOMERO to [facility] eye care. Never receive records. Sent message to CMAT team to help get records.

## 2025-04-04 ENCOUNTER — OFFICE VISIT (OUTPATIENT)
Dept: FAMILY MEDICINE | Facility: CLINIC | Age: 85
End: 2025-04-04
Payer: MEDICARE

## 2025-04-04 VITALS
DIASTOLIC BLOOD PRESSURE: 70 MMHG | SYSTOLIC BLOOD PRESSURE: 160 MMHG | HEIGHT: 78 IN | WEIGHT: 256.63 LBS | RESPIRATION RATE: 18 BRPM | BODY MASS INDEX: 29.69 KG/M2 | HEART RATE: 66 BPM | OXYGEN SATURATION: 98 % | TEMPERATURE: 98 F

## 2025-04-04 DIAGNOSIS — Z86.73 H/O: CVA (CEREBROVASCULAR ACCIDENT): ICD-10-CM

## 2025-04-04 DIAGNOSIS — I10 ESSENTIAL HYPERTENSION: ICD-10-CM

## 2025-04-04 DIAGNOSIS — E11.22 TYPE 2 DIABETES MELLITUS WITH STAGE 3B CHRONIC KIDNEY DISEASE, WITHOUT LONG-TERM CURRENT USE OF INSULIN: ICD-10-CM

## 2025-04-04 DIAGNOSIS — N18.32 TYPE 2 DIABETES MELLITUS WITH STAGE 3B CHRONIC KIDNEY DISEASE, WITHOUT LONG-TERM CURRENT USE OF INSULIN: ICD-10-CM

## 2025-04-04 DIAGNOSIS — I11.0 HYPERTENSIVE HEART DISEASE WITH HEART FAILURE: Primary | ICD-10-CM

## 2025-04-04 DIAGNOSIS — N18.4 CKD (CHRONIC KIDNEY DISEASE), STAGE IV: ICD-10-CM

## 2025-04-04 PROBLEM — Z74.09 IMPAIRED FUNCTIONAL MOBILITY, BALANCE, GAIT, AND ENDURANCE: Status: RESOLVED | Noted: 2024-07-26 | Resolved: 2025-04-04

## 2025-04-04 PROBLEM — C41.2 CHORDOMA: Status: RESOLVED | Noted: 2024-09-19 | Resolved: 2025-04-04

## 2025-04-04 PROBLEM — Z00.00 ANNUAL PHYSICAL EXAM: Status: RESOLVED | Noted: 2024-11-11 | Resolved: 2025-04-04

## 2025-04-04 PROCEDURE — 99999 PR PBB SHADOW E&M-EST. PATIENT-LVL V: CPT | Mod: PBBFAC,,, | Performed by: INTERNAL MEDICINE

## 2025-04-04 NOTE — ASSESSMENT & PLAN NOTE
- EF declining based on recent echo (January 2025).  - Need to consult with Dr. Kang (cardiologist) regarding potential medication changes for heart failure management, including possible addition of beta-blocker or Entresto.  - Educated on heart failure, including reduced EF and potential symptoms (shortness of breath, fatigue, leg swelling).

## 2025-04-04 NOTE — PROGRESS NOTES
Health Maintenance Due   Topic     RSV Vaccine (Age 60+ and Pregnant patients) (1 - 1-dose 75+ series) Not offered at this facility    Diabetic Eye Exam  Pt has an eye doctor Dr. Clark at Dr. Jaison cao

## 2025-04-04 NOTE — PROGRESS NOTES
Chief Complaint: Hypertension and Follow-up      Luther Irwin Jr.  is a 84 y.o. year old patient who presents today for     History of Present Illness    CHIEF COMPLAINT:  Luther presents today for follow up of blood pressure management    HYPERTENSION:  He reports lifelong elevated blood pressure with typical readings of 150-160/80 for the past 40 years. Recent home readings show systolic pressures of 170-180, though he expresses uncertainty about the accuracy of his home blood pressure machine. He reports occasional nosebleeds.    MEDICAL HISTORY:  He has a history of chronic kidney disease, prostate cancer, and bilateral carotid artery stenosis. He experienced a stroke in July with no clear etiology identified despite extensive workup including MRI, EKG, and other diagnostic tests during week-long hospitalization. Heart failure was diagnosed in July 2024.    MEDICATIONS:  He is taking Amlodipine-Valsartan combination, Hydralazine twice daily, and Clonidine twice daily for blood pressure management. For diabetes management, he takes Glipizide and Jardiance. He is currently on Eliquis and Aspirin for anticoagulation, having been previously switched from Plavix to Eliquis.      ROS:  General: -fever, -chills, -fatigue, -weight gain, -weight loss  Eyes: -vision changes, -redness, -discharge  ENT: -ear pain, -nasal congestion, -sore throat, +nosebleeds  Cardiovascular: -chest pain, -palpitations, -lower extremity edema  Respiratory: -cough, -shortness of breath  Gastrointestinal: -abdominal pain, -nausea, -vomiting, -diarrhea, -constipation, -blood in stool  Genitourinary: -dysuria, -hematuria, -frequency, +excessive urination  Musculoskeletal: -joint pain, -muscle pain  Skin: -rash, -lesion  Neurological: -headache, -dizziness, -numbness, -tingling  Psychiatric: -anxiety, -depression, -sleep difficulty         Past Medical History:   Diagnosis Date    Cancer     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    CVA (cerebral vascular accident)  2024    Diabetes mellitus, type 2     Diabetes with neurologic complications     Disorder of kidney and ureter     Hypertension     Late complications of amputation stump     Prostate cancer     Pseudophakia 2024       Past Surgical History:   Procedure Laterality Date    FINGER SURGERY  3/2014    HERNIA REPAIR  1967    KNEE SURGERY      SPINE SURGERY  10/2007    VASECTOMY  1986        Family History   Problem Relation Name Age of Onset    Hypertension Mother      Hyperlipidemia Mother      Hypertension Father      Hyperlipidemia Father          Social History     Socioeconomic History    Marital status: Single    Number of children: 3    Highest education level: Bachelor's degree (e.g., BA, AB, BS)   Tobacco Use    Smoking status: Former     Current packs/day: 0.00     Average packs/day: 0.3 packs/day for 0.9 years (0.2 ttl pk-yrs)     Types: Cigarettes     Start date: 1960     Quit date: 1960     Years since quittin.3    Smokeless tobacco: Never   Substance and Sexual Activity    Alcohol use: Yes     Comment: special occacion    Drug use: No     Social Drivers of Health     Financial Resource Strain: Low Risk  (2024)    Overall Financial Resource Strain (CARDIA)     Difficulty of Paying Living Expenses: Not hard at all   Food Insecurity: No Food Insecurity (2024)    Hunger Vital Sign     Worried About Running Out of Food in the Last Year: Never true     Ran Out of Food in the Last Year: Never true   Transportation Needs: No Transportation Needs (2024)    PRAPARE - Transportation     Lack of Transportation (Medical): No     Lack of Transportation (Non-Medical): No   Physical Activity: Insufficiently Active (2024)    Exercise Vital Sign     Days of Exercise per Week: 2 days     Minutes of Exercise per Session: 20 min   Stress: No Stress Concern Present (2024)    Citizen of [place] [employer] - Occupational Stress Questionnaire     Feeling of Stress : Not  at all   Housing Stability: Unknown (9/24/2024)    Housing Stability Vital Sign     Unable to Pay for Housing in the Last Year: No     Homeless in the Last Year: No       Current Medications[1]           Objective:      Vitals:    04/04/25 1128   BP: (!) 160/70   Pulse:    Resp:    Temp:        Physical Exam  Constitutional:       Appearance: Normal appearance.   HENT:      Head: Normocephalic and atraumatic.   Cardiovascular:      Rate and Rhythm: Normal rate.   Musculoskeletal:         General: Normal range of motion.   Skin:     General: Skin is warm and dry.   Neurological:      General: No focal deficit present.      Mental Status: He is alert and oriented to person, place, and time.          Assessment:       1. Hypertensive heart disease with heart failure    2. CKD (chronic kidney disease), stage IV    3. Type 2 diabetes mellitus with stage 3b chronic kidney disease, without long-term current use of insulin    4. Essential hypertension    5. H/O: CVA (cerebrovascular accident)          Plan:   1. Hypertensive heart disease with heart failure  Assessment & Plan:  - EF declining based on recent echo (January 2025).  - Need to consult with Dr. Kang (cardiologist) regarding potential medication changes for heart failure management, including possible addition of beta-blocker or Entresto.  - Educated on heart failure, including reduced EF and potential symptoms (shortness of breath, fatigue, leg swelling).      2. CKD (chronic kidney disease), stage IV  Assessment & Plan:  - Chronic renal disease present, potentially related to HTN and/or history of prostate cancer.      3. Type 2 diabetes mellitus with stage 3b chronic kidney disease, without long-term current use of insulin  Assessment & Plan:  Controlled for age (goal 7-8%)  Lab Results   Component Value Date    HGBA1C 6.9 (H) 11/11/2024     - Continued glipizide 10mg twice daily and Jardiance (empagliflozin) daily for diabetes management.  - Ordered labs to  check A1C.        4. Essential hypertension  Assessment & Plan:  Uncontrolled   - cont exforge, hydralazine, clondine   - will reach out to cardiology who wanted to start metoprolol   - 3 w BP check   - 3m f/up      5. H/O: CVA (cerebrovascular accident)  Overview:  MRI Brain (2024): Small foci of acute infarction within the parasagittal right parietal lobe near the vertex.     Assessment & Plan:  Stable   Was switched from Plavix to Eliquis     - cont ASA, Eliquis and Lipitor           Assessment & Plan    ESSENTIAL (PRIMARY) HYPERTENSION:  - Chronic HTN with BP consistently above goal, likely primary/essential HTN.  - Current BP goal of 140/80 mmHg, acknowledging difficulty in reaching ideal 130/80 mmHg due to long-standing HTN.  - Explained primary vs. secondary HTN and genetic predisposition.  - Discussed diagnosis criteria for HTN (>120/80 in 2 consecutive readings 3 months apart).  - Considered adjustment of antihypertensive regimen, particularly evaluating role of clonidine.  - Bilateral carotid artery stenosis noted, likely related to long-standing HTN.  - Explained importance of BP control in preventing further complications like stroke and worsening heart failure.  - Instructed on proper BP measurement technique: feet flat on ground, machine at heart level, after taking medications.  - Luther to take BP measurements 1-2 hours after morning medications instead of before.  - Luther to bring new home BP machine to next appointment and record home BP readings for review at next visit.  - Continued hydralazine, amlodipine/valsartan combination, and clonidine.    PRESENCE OF ANTICOAGULANTS:  - Continued Eliquis and aspirin.    FOLLOW-UP ENCOUNTER:  - Ordered BP check with nurse in 3 weeks on 05/12 at 11:00 AM.  - Follow up on 06/27 at 8:20 AM as scheduled.         Total 40 mins spent on patient with more than 50% spent counseling      Follow up in about 3 months (around 7/4/2025).    This note was generated with  the assistance of ambient listening technology. Verbal consent was obtained by the patient and accompanying visitor(s) for the recording of patient appointment to facilitate this note. I attest to having reviewed and edited the generated note for accuracy, though some syntax or spelling errors may persist. Please contact the author of this note for any clarification.                  [1]   Current Outpatient Medications:     amlodipine-valsartan (EXFORGE)  mg per tablet, Take 1 tablet by mouth once daily., Disp: 90 tablet, Rfl: 3    apixaban (ELIQUIS) 5 mg Tab, Take 1 tablet (5 mg total) by mouth 2 (two) times daily., Disp: 180 tablet, Rfl: 3    aspirin 81 MG Chew, Take 81 mg by mouth once daily., Disp: , Rfl:     atorvastatin (LIPITOR) 40 MG tablet, Take 1 tablet (40 mg total) by mouth once daily., Disp: 90 tablet, Rfl: 3    blood sugar diagnostic (ACCU-CHEK GUIDE TEST STRIPS) Strp, Use to check blood sugar twice daily, Disp: 200 each, Rfl: 3    blood-glucose meter (ACCU-CHEK GUIDE GLUCOSE METER) Misc, 1 Device by Misc.(Non-Drug; Combo Route) route every 6 (six) months., Disp: 1 each, Rfl: 0    cloNIDine (CATAPRES) 0.1 MG tablet, Take 1 tablet (0.1 mg total) by mouth 2 (two) times daily., Disp: , Rfl:     empagliflozin (JARDIANCE) 10 mg tablet, Take 1 tablet (10 mg total) by mouth once daily., Disp: 30 tablet, Rfl: 11    glipiZIDE (GLUCOTROL) 10 MG TR24, Take 1 tablet (10 mg total) by mouth 2 (two) times daily., Disp: 180 tablet, Rfl: 3    hydrALAZINE (APRESOLINE) 25 MG tablet, Take 1 tablet (25 mg total) by mouth every 12 (twelve) hours., Disp: 60 tablet, Rfl: 2    sildenafiL (VIAGRA) 100 MG tablet, Take 1 tablet (100 mg total) by mouth daily as needed., Disp: 10 tablet, Rfl: 11    tadalafiL (CIALIS) 20 MG Tab, Take 1 tablet by mouth once daily, Disp: 30 tablet, Rfl: 0    tamsulosin (FLOMAX) 0.4 mg Cap, Take 1 capsule (0.4 mg total) by mouth once daily., Disp: 90 capsule, Rfl: 3

## 2025-04-04 NOTE — ASSESSMENT & PLAN NOTE
Uncontrolled   - cont exforge, hydralazine, clondine   - will reach out to cardiology who wanted to start metoprolol   - 3 w BP check   - 3m f/up

## 2025-04-04 NOTE — ASSESSMENT & PLAN NOTE
Controlled for age (goal 7-8%)  Lab Results   Component Value Date    HGBA1C 6.9 (H) 11/11/2024     - Continued glipizide 10mg twice daily and Jardiance (empagliflozin) daily for diabetes management.  - Ordered labs to check A1C.

## 2025-04-07 ENCOUNTER — PATIENT OUTREACH (OUTPATIENT)
Dept: ADMINISTRATIVE | Facility: HOSPITAL | Age: 85
End: 2025-04-07
Payer: MEDICARE

## 2025-04-07 ENCOUNTER — TELEPHONE (OUTPATIENT)
Dept: ADMINISTRATIVE | Facility: HOSPITAL | Age: 85
End: 2025-04-07
Payer: MEDICARE

## 2025-04-07 DIAGNOSIS — I10 ESSENTIAL HYPERTENSION: ICD-10-CM

## 2025-04-07 DIAGNOSIS — I11.0 HYPERTENSIVE HEART DISEASE WITH HEART FAILURE: Primary | ICD-10-CM

## 2025-04-07 DIAGNOSIS — I11.0 HYPERTENSIVE HEART DISEASE WITH HEART FAILURE: ICD-10-CM

## 2025-04-07 RX ORDER — HYDRALAZINE HYDROCHLORIDE 25 MG/1
25 TABLET, FILM COATED ORAL EVERY 12 HOURS
Qty: 60 TABLET | Refills: 2 | Status: SHIPPED | OUTPATIENT
Start: 2025-04-07 | End: 2025-07-06

## 2025-04-07 RX ORDER — METOPROLOL SUCCINATE 25 MG/1
25 TABLET, EXTENDED RELEASE ORAL 2 TIMES DAILY
Qty: 180 TABLET | Refills: 3 | Status: SHIPPED | OUTPATIENT
Start: 2025-04-07 | End: 2025-04-07 | Stop reason: SDUPTHER

## 2025-04-07 RX ORDER — SACUBITRIL AND VALSARTAN 24; 26 MG/1; MG/1
1 TABLET, FILM COATED ORAL 2 TIMES DAILY
Qty: 60 TABLET | Refills: 2 | Status: SHIPPED | OUTPATIENT
Start: 2025-04-07 | End: 2025-04-07 | Stop reason: SDUPTHER

## 2025-04-07 RX ORDER — SACUBITRIL AND VALSARTAN 24; 26 MG/1; MG/1
1 TABLET, FILM COATED ORAL 2 TIMES DAILY
Qty: 60 TABLET | Refills: 2 | Status: SHIPPED | OUTPATIENT
Start: 2025-04-07 | End: 2025-07-06

## 2025-04-07 RX ORDER — METOPROLOL SUCCINATE 25 MG/1
25 TABLET, EXTENDED RELEASE ORAL 2 TIMES DAILY
Qty: 180 TABLET | Refills: 3 | Status: SHIPPED | OUTPATIENT
Start: 2025-04-07 | End: 2026-04-07

## 2025-04-07 NOTE — TELEPHONE ENCOUNTER
----- Message from Randee Marie MD sent at 4/4/2025 11:46 AM CDT -----  Pt went to see Dr. May at [facility], he said early March this year. Ok to call.

## 2025-04-07 NOTE — TELEPHONE ENCOUNTER
----- Message from Randee Marie MD sent at 4/7/2025  1:29 PM CDT -----  Please call patient and let him know that I heard back from Dr. Kang who agreed with my plan. I have sent over entresto to replace amlodipine-valsartan. Also to replace clonidine with metoprolol twice a day. Let me know if there are any high costs. BP check on 4/25. Only switch meds once he's picked up the new ones. Thanks

## 2025-04-07 NOTE — PROGRESS NOTES
E[facility] BALDOM[facility] PCP sent message done in march. Place in chart.

## 2025-04-07 NOTE — TELEPHONE ENCOUNTER
Pt informed of below; verbalized understanding; states all of his medications need to go to [facility], he states [facility] is too expensive

## 2025-04-07 NOTE — TELEPHONE ENCOUNTER
Care Due:                  Date            Visit Type   Department     Provider  --------------------------------------------------------------------------------                                EP -                              PRIMARY      [facility]  Last Visit: 04-      CARE ([facility])   MEDICINE       Randee Marie                              EP -                              PRIMARY      [facility]  Next Visit: 06-      CARE ([facility])   MEDICINE       Randee  Frank                                                            Last  Test          Frequency    Reason                     Performed    Due Date  --------------------------------------------------------------------------------    eGFR........  12 months..  empagliflozin,             Not Found    Overdue                             sacubitriL-valsartan.....    [employer] Embedded Care Due Messages. Reference number: [id number].   4/07/2025 2:33:47 PM CDT

## 2025-04-20 DIAGNOSIS — N52.9 ERECTILE DYSFUNCTION, UNSPECIFIED ERECTILE DYSFUNCTION TYPE: ICD-10-CM

## 2025-04-21 RX ORDER — TADALAFIL 20 MG/1
20 TABLET ORAL
Qty: 30 TABLET | Refills: 0 | Status: SHIPPED | OUTPATIENT
Start: 2025-04-21

## 2025-04-25 ENCOUNTER — CLINICAL SUPPORT (OUTPATIENT)
Dept: FAMILY MEDICINE | Facility: CLINIC | Age: 85
End: 2025-04-25
Payer: MEDICARE

## 2025-04-25 VITALS — DIASTOLIC BLOOD PRESSURE: 80 MMHG | SYSTOLIC BLOOD PRESSURE: 138 MMHG

## 2025-04-25 DIAGNOSIS — I10 ESSENTIAL HYPERTENSION: Primary | ICD-10-CM

## 2025-04-25 PROCEDURE — 99999 PR PBB SHADOW E&M-EST. PATIENT-LVL I: CPT | Mod: PBBFAC,,,

## 2025-04-25 NOTE — PROGRESS NOTES
Luther Irwin Jr. 84 y.o. male is here today for Blood Pressure check.   History of HTN yes.    Review of patient's allergies indicates:   Allergen Reactions    Grass pollen-june grass standard Itching     Eye irritation     Creatinine   Date Value Ref Range Status   12/11/2024 2.3 (H) 0.5 - 1.4 mg/dL Final     Sodium   Date Value Ref Range Status   11/11/2024 143 136 - 145 mmol/L Final     Potassium   Date Value Ref Range Status   11/11/2024 4.1 3.5 - 5.1 mmol/L Final   ]  Patient verifies taking blood pressure medications on a regular basis at the same time of the day.   Current Medications[1]  Does patient have record of home blood pressure readings yes. Readings have been averaging 180/90's (machine not accurate).   Last dose of blood pressure medication was taken at this morning.  Patient is asymptomatic.   Complains of no complaints.    Vitals:    04/25/25 1103 04/25/25 1118   BP: (!) 154/86 138/80   BP Location: Right arm Right arm   Patient Position: Sitting Sitting         Dr. Marie informed of nurse visit.   Pt will go and buy another bp machine since his is old and not accurate         [1]   Current Outpatient Medications:     apixaban (ELIQUIS) 5 mg Tab, Take 1 tablet (5 mg total) by mouth 2 (two) times daily., Disp: 180 tablet, Rfl: 3    aspirin 81 MG Chew, Take 81 mg by mouth once daily., Disp: , Rfl:     atorvastatin (LIPITOR) 40 MG tablet, Take 1 tablet (40 mg total) by mouth once daily., Disp: 90 tablet, Rfl: 3    blood sugar diagnostic (ACCU-CHEK GUIDE TEST STRIPS) Strp, Use to check blood sugar twice daily, Disp: 200 each, Rfl: 3    blood-glucose meter (ACCU-CHEK GUIDE GLUCOSE METER) Misc, 1 Device by Misc.(Non-Drug; Combo Route) route every 6 (six) months., Disp: 1 each, Rfl: 0    empagliflozin (JARDIANCE) 10 mg tablet, Take 1 tablet (10 mg total) by mouth once daily., Disp: 30 tablet, Rfl: 11    glipiZIDE (GLUCOTROL) 10 MG TR24, Take 1 tablet (10 mg total) by mouth 2 (two) times daily., Disp: 180  tablet, Rfl: 3    hydrALAZINE (APRESOLINE) 25 MG tablet, Take 1 tablet (25 mg total) by mouth every 12 (twelve) hours., Disp: 60 tablet, Rfl: 2    metoprolol succinate (TOPROL-XL) 25 MG 24 hr tablet, Take 1 tablet (25 mg total) by mouth 2 (two) times daily., Disp: 180 tablet, Rfl: 3    sacubitriL-valsartan (ENTRESTO) 24-26 mg per tablet, Take 1 tablet by mouth 2 (two) times daily., Disp: 60 tablet, Rfl: 2    sildenafiL (VIAGRA) 100 MG tablet, Take 1 tablet (100 mg total) by mouth daily as needed., Disp: 10 tablet, Rfl: 11    tadalafiL (CIALIS) 20 MG Tab, Take 1 tablet by mouth once daily, Disp: 30 tablet, Rfl: 0    tamsulosin (FLOMAX) 0.4 mg Cap, Take 1 capsule (0.4 mg total) by mouth once daily., Disp: 90 capsule, Rfl: 3

## 2025-05-06 DIAGNOSIS — E11.22 TYPE 2 DIABETES MELLITUS WITH STAGE 3B CHRONIC KIDNEY DISEASE, WITHOUT LONG-TERM CURRENT USE OF INSULIN: ICD-10-CM

## 2025-05-06 DIAGNOSIS — N18.32 TYPE 2 DIABETES MELLITUS WITH STAGE 3B CHRONIC KIDNEY DISEASE, WITHOUT LONG-TERM CURRENT USE OF INSULIN: ICD-10-CM

## 2025-05-06 RX ORDER — EMPAGLIFLOZIN 10 MG/1
10 TABLET, FILM COATED ORAL
Qty: 30 TABLET | Refills: 11 | Status: SHIPPED | OUTPATIENT
Start: 2025-05-06

## 2025-05-06 NOTE — TELEPHONE ENCOUNTER
Care Due:                  Date            Visit Type   Department     Provider  --------------------------------------------------------------------------------                                EP -                              PRIMARY      [facility]  Last Visit: 04-      CARE ([facility])   MEDICINE       Randee Marie                              EP -                              PRIMARY      [facility]  Next Visit: 06-      CARE ([facility])   MEDICINE       Randeemillie Marie                                                            Last  Test          Frequency    Reason                     Performed    Due Date  --------------------------------------------------------------------------------    HBA1C.......  6 months...  empagliflozin, glipiZIDE.  11- 05-    [facility] Embedded Care Due Messages. Reference number: [id number].   5/06/2025 8:29:07 AM CDT

## 2025-05-09 DIAGNOSIS — I10 ESSENTIAL HYPERTENSION: ICD-10-CM

## 2025-05-09 RX ORDER — HYDRALAZINE HYDROCHLORIDE 25 MG/1
25 TABLET, FILM COATED ORAL EVERY 12 HOURS
Qty: 180 TABLET | Refills: 3 | Status: SHIPPED | OUTPATIENT
Start: 2025-05-09 | End: 2026-05-09

## 2025-05-09 NOTE — TELEPHONE ENCOUNTER
No care due was identified.  [facility] Embedded Care Due Messages. Reference number: [id number].   5/09/2025 1:50:04 PM CDT

## 2025-05-15 ENCOUNTER — PATIENT OUTREACH (OUTPATIENT)
Dept: ADMINISTRATIVE | Facility: HOSPITAL | Age: 85
End: 2025-05-15
Payer: MEDICARE

## 2025-05-15 DIAGNOSIS — C61 PROSTATE CANCER: ICD-10-CM

## 2025-05-15 DIAGNOSIS — E11.22 TYPE 2 DIABETES MELLITUS WITH STAGE 3B CHRONIC KIDNEY DISEASE, WITHOUT LONG-TERM CURRENT USE OF INSULIN: Primary | ICD-10-CM

## 2025-05-15 DIAGNOSIS — N18.32 TYPE 2 DIABETES MELLITUS WITH STAGE 3B CHRONIC KIDNEY DISEASE, WITHOUT LONG-TERM CURRENT USE OF INSULIN: Primary | ICD-10-CM

## 2025-05-15 RX ORDER — TAMSULOSIN HYDROCHLORIDE 0.4 MG/1
1 CAPSULE ORAL
Qty: 90 CAPSULE | Refills: 3 | Status: SHIPPED | OUTPATIENT
Start: 2025-05-15

## 2025-05-15 NOTE — PROGRESS NOTES
"HM and immunization's reviewed and updated.    Health Maintenance Due   Topic Date Due    RSV Vaccine (Age 60+ and Pregnant patients) (1 - 1-dose 75+ series) Never done    Hemoglobin A1c  05/11/2025       MA PAYOR GAP REPORT: Kidney Health Evaluation for patients with diabetes. COMPLIANT EGFR DONE 12/11/2024, UACR NOTED "Patient qualifies for a frailty exclusion," PER EPIC.  DUE FOR A1C PRIOR TO NEXT VISIT. ORDERS PLACED. SCHEDULED FOR TOMORROW.  "

## 2025-05-15 NOTE — TELEPHONE ENCOUNTER
Refill Routing Note   Medication(s) are not appropriate for processing by [facility] for the following reason(s):        Outside of protocol  Prostate Cancer is on problem list    [facility] action(s):  Route               Appointments  past 12m or future 3m with PCP    Date Provider   Last Visit   4/4/2025 Randee Marie MD   Next Visit   6/27/2025 Randee Marie MD   ED visits in past 90 days: 0        Note composed:4:16 AM 05/15/2025

## 2025-05-15 NOTE — TELEPHONE ENCOUNTER
No care due was identified.  [facility] Embedded Care Due Messages. Reference number: [id number].   5/15/2025 12:02:07 AM CDT

## 2025-05-16 ENCOUNTER — LAB VISIT (OUTPATIENT)
Dept: LAB | Facility: HOSPITAL | Age: 85
End: 2025-05-16
Attending: INTERNAL MEDICINE
Payer: MEDICARE

## 2025-05-16 DIAGNOSIS — E11.22 TYPE 2 DIABETES MELLITUS WITH STAGE 3B CHRONIC KIDNEY DISEASE, WITHOUT LONG-TERM CURRENT USE OF INSULIN: ICD-10-CM

## 2025-05-16 DIAGNOSIS — N18.32 TYPE 2 DIABETES MELLITUS WITH STAGE 3B CHRONIC KIDNEY DISEASE, WITHOUT LONG-TERM CURRENT USE OF INSULIN: ICD-10-CM

## 2025-05-16 LAB
EAG (OHS): 151 MG/DL (ref 68–131)
HBA1C MFR BLD: 6.9 % (ref 4–5.6)

## 2025-05-16 PROCEDURE — 36415 COLL VENOUS BLD VENIPUNCTURE: CPT | Mod: PO

## 2025-05-16 PROCEDURE — 83036 HEMOGLOBIN GLYCOSYLATED A1C: CPT

## 2025-05-16 NOTE — TELEPHONE ENCOUNTER
No care due was identified.  [facility] Embedded Care Due Messages. Reference number: [id number].   5/16/2025 1:20:43 PM CDT

## 2025-05-16 NOTE — TELEPHONE ENCOUNTER
Refill Decision Note   Luther Dc  is requesting a refill authorization.  Brief Assessment and Rationale for Refill:  Approve     Medication Therapy Plan:        Comments:     Note composed:5:12 PM 05/16/2025

## 2025-05-19 ENCOUNTER — RESULTS FOLLOW-UP (OUTPATIENT)
Dept: FAMILY MEDICINE | Facility: CLINIC | Age: 85
End: 2025-05-19

## 2025-06-27 ENCOUNTER — OFFICE VISIT (OUTPATIENT)
Dept: FAMILY MEDICINE | Facility: CLINIC | Age: 85
End: 2025-06-27
Payer: MEDICARE

## 2025-06-27 VITALS
RESPIRATION RATE: 18 BRPM | OXYGEN SATURATION: 98 % | BODY MASS INDEX: 29.74 KG/M2 | HEIGHT: 78 IN | TEMPERATURE: 98 F | SYSTOLIC BLOOD PRESSURE: 140 MMHG | WEIGHT: 257.06 LBS | DIASTOLIC BLOOD PRESSURE: 70 MMHG | HEART RATE: 58 BPM

## 2025-06-27 DIAGNOSIS — E11.22 TYPE 2 DIABETES MELLITUS WITH STAGE 3B CHRONIC KIDNEY DISEASE, WITHOUT LONG-TERM CURRENT USE OF INSULIN: ICD-10-CM

## 2025-06-27 DIAGNOSIS — C61 PROSTATE CANCER: ICD-10-CM

## 2025-06-27 DIAGNOSIS — I10 ESSENTIAL HYPERTENSION: ICD-10-CM

## 2025-06-27 DIAGNOSIS — N18.32 TYPE 2 DIABETES MELLITUS WITH STAGE 3B CHRONIC KIDNEY DISEASE, WITHOUT LONG-TERM CURRENT USE OF INSULIN: ICD-10-CM

## 2025-06-27 DIAGNOSIS — Z79.01 ANTICOAGULANT LONG-TERM USE: ICD-10-CM

## 2025-06-27 DIAGNOSIS — I50.22 CHRONIC HEART FAILURE WITH REDUCED EJECTION FRACTION (HFREF, <= 40%): Primary | ICD-10-CM

## 2025-06-27 PROCEDURE — 99999 PR PBB SHADOW E&M-EST. PATIENT-LVL IV: CPT | Mod: PBBFAC,,, | Performed by: INTERNAL MEDICINE

## 2025-06-27 NOTE — PROGRESS NOTES
Health Maintenance Due   Topic     RSV Vaccine (Age 60+ and Pregnant patients) (1 - 1-dose 75+ series) Not offered at this clinic

## 2025-06-27 NOTE — ASSESSMENT & PLAN NOTE
- Monitored anticoagulation therapy with Eliquis and aspirin.  - Reviewed current medication regimen for appropriate anticoagulation management.

## 2025-06-27 NOTE — ASSESSMENT & PLAN NOTE
- Monitored antihypertensive regimen including Metoprolol, Hydralazine, and Entresto (which contains Valsartan).  - Instructed patient to check blood pressure at home 3 times weekly (Monday, Wednesday, Friday).  - [facility] digital blood pressure monitor [facility] [employer] [employer] [facility] (patient to decide whether to pursue).  - Scheduled follow up in 3 weeks for blood pressure check if readings remain elevated at today's visit.

## 2025-06-27 NOTE — ASSESSMENT & PLAN NOTE
- Evaluated A1C, which is stable and less than 7%, considered acceptable for patient's age.  - Monitored oral hypoglycemic regimen including Glipizide twice daily and Jardiance.  - Ordered comprehensive labs including A1C to be completed prior to next annual visit, with repeat testing in 5 months during annual exam.

## 2025-06-27 NOTE — ASSESSMENT & PLAN NOTE
- Monitored heart failure management regimen including Entresto (Sacubitril/Valsartan), Metoprolol, and Hydralazine.  - Confirmed recent medication change from Amlodipine and Valsartan to Entresto per Dr. Kang's recommendation.  - Evaluated cardiac function, noting weak pumping ability while acknowledging that bradycardia is not concerning.  - Explained Entresto's mechanism of action in treating heart failure, including its effects on cardiac pressure and blood pressure.  - Discussed importance of blood pressure control in managing heart failure and preventing further cardiac strain.  - Clarified the difference between heart rate and cardiac contractility.  - Ordered echocardiogram  to evaluate medication effectiveness; patient to contact office when results are available.

## 2025-06-27 NOTE — ASSESSMENT & PLAN NOTE
- Monitored patient's history of prostate cancer with PSA levels showing an increase from 6% to 7.7% last year, though levels have been generally stable over time with a previous biopsy showing benign results.  - Ordered PSA testing to be completed in November.  - Discussed potential actions if levels increase, including referral to a urologist, obtaining imaging, or performing a biopsy, based on the patient's preferences.

## 2025-06-27 NOTE — PROGRESS NOTES
"Chief Complaint: Follow-up (6 month follow up )      Luther Irwin Jr.  is a 85 y.o. year old patient who presents today for     History of Present Illness    CHIEF COMPLAINT:  Luther presents today for follow up    CURRENT MEDICATIONS:  He takes Eliquis, Aspirin, Glipizide BID, Hydralazine BID, Jardiance, Metoprolol BID, and Entresto BID. He is adherent to all medications and understands recent medication changes, including Entresto replacing Amlodipine and Valsartan, and Metoprolol replacing Clonidine.    CARDIOVASCULAR:  He has a history of slow heartbeat since childhood, which he reports as his baseline and does not cause distress or interfere with daily activities. He denies shortness of breath and leg swelling.    NEUROLOGICAL:  He reports difficulty walking, describing it as "iffy" since experiencing a stroke.    GENITOURINARY:  He has a history of prostate evaluation with previous benign biopsy. PSA levels increased to 7.7% last year. Last saw urologist Dr. Green in September for continued PSA monitoring. Given his age of 85, he expresses limited interest in pursuing further aggressive testing or treatment for prostate concerns.    DIABETES:  A1C remains stable at less than 7%.      ROS:  General: -fever, -chills, -fatigue, -weight gain, -weight loss  Eyes: -vision changes, -redness, -discharge  ENT: -ear pain, -nasal congestion, -sore throat  Cardiovascular: -chest pain, -palpitations, -lower extremity edema, +feelings of slow heart rate  Respiratory: -cough, -shortness of breath  Gastrointestinal: -abdominal pain, -nausea, -vomiting, -diarrhea, -constipation, -blood in stool  Genitourinary: -dysuria, -hematuria, -frequency  Musculoskeletal: -joint pain, -muscle pain, +difficulty walking  Skin: -rash, -lesion  Neurological: -headache, -dizziness, -numbness, -tingling  Psychiatric: -anxiety, -depression, -sleep difficulty         Past Medical History:   Diagnosis Date    Cancer     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    CVA " (cerebral vascular accident) 2024    Diabetes mellitus, type 2     Diabetes with neurologic complications     Disorder of kidney and ureter     Hypertension     Late complications of amputation stump     Prostate cancer     Pseudophakia 2024       Past Surgical History:   Procedure Laterality Date    FINGER SURGERY  3/2014    HERNIA REPAIR  1967    KNEE SURGERY      SPINE SURGERY  10/2007    VASECTOMY  1986        Family History   Problem Relation Name Age of Onset    Hypertension Mother      Hyperlipidemia Mother      Hypertension Father      Hyperlipidemia Father          Social History     Socioeconomic History    Marital status: Single    Number of children: 3    Highest education level: Bachelor's degree (e.g., BA, AB, BS)   Tobacco Use    Smoking status: Former     Current packs/day: 0.00     Average packs/day: 0.3 packs/day for 0.9 years (0.2 ttl pk-yrs)     Types: Cigarettes     Start date: 1960     Quit date: 1960     Years since quittin.6    Smokeless tobacco: Never   Substance and Sexual Activity    Alcohol use: Yes     Comment: special occacion    Drug use: No     Social Drivers of Health     Financial Resource Strain: Low Risk  (2024)    Overall Financial Resource Strain (CARDIA)     Difficulty of Paying Living Expenses: Not hard at all   Food Insecurity: No Food Insecurity (2024)    Hunger Vital Sign     Worried About Running Out of Food in the Last Year: Never true     Ran Out of Food in the Last Year: Never true   Transportation Needs: No Transportation Needs (2024)    PRAPARE - Transportation     Lack of Transportation (Medical): No     Lack of Transportation (Non-Medical): No   Physical Activity: Insufficiently Active (2024)    Exercise Vital Sign     Days of Exercise per Week: 2 days     Minutes of Exercise per Session: 20 min   Stress: No Stress Concern Present (2024)    [employer] - Occupational Stress Questionnaire      Feeling of Stress : Not at all   Housing Stability: Unknown (9/24/2024)    Housing Stability Vital Sign     Unable to Pay for Housing in the Last Year: No     Homeless in the Last Year: No       Current Medications[1]           Objective:      Vitals:    06/27/25 0853   BP: (!) 140/70   Pulse:    Resp:    Temp:        Physical Exam  Constitutional:       Appearance: Normal appearance.   HENT:      Head: Normocephalic and atraumatic.   Cardiovascular:      Rate and Rhythm: Normal rate.   Musculoskeletal:         General: Normal range of motion.   Skin:     General: Skin is warm and dry.   Neurological:      General: No focal deficit present.      Mental Status: He is alert and oriented to person, place, and time.          Assessment:       1. Chronic heart failure with reduced ejection fraction (HFrEF, <= 40%)    2. Essential hypertension    3. Type 2 diabetes mellitus with stage 3b chronic kidney disease, without long-term current use of insulin    4. H/O Prostate cancer    5. Anticoagulant long-term use          Plan:   1. Chronic heart failure with reduced ejection fraction (HFrEF, <= 40%)  Assessment & Plan:  - Monitored heart failure management regimen including Entresto (Sacubitril/Valsartan), Metoprolol, and Hydralazine.  - Confirmed recent medication change from Amlodipine and Valsartan to Entresto per Dr. Kang's recommendation.  - Evaluated cardiac function, noting weak pumping ability while acknowledging that bradycardia is not concerning.  - Explained Entresto's mechanism of action in treating heart failure, including its effects on cardiac pressure and blood pressure.  - Discussed importance of blood pressure control in managing heart failure and preventing further cardiac strain.  - Clarified the difference between heart rate and cardiac contractility.  - Ordered echocardiogram  to evaluate medication effectiveness; patient to contact office when results are available.    Orders:  -     Echo Saline  Bubble? No; Future    2. Essential hypertension  Assessment & Plan:  - Monitored antihypertensive regimen including Metoprolol, Hydralazine, and Entresto (which contains Valsartan).  - Instructed patient to check blood pressure at home 3 times weekly (Monday, Wednesday, Friday).  - Placed order for digital blood pressure monitor through [facility]'s digital medicine program (patient to decide whether to pursue).  - Scheduled follow up in 3 weeks for blood pressure check if readings remain elevated at today's visit.    Orders:  -     Hypertension Digital Medicine (HDMP) Enrollment Order  -     Lipid Panel; Future; Expected date: 06/27/2025  -     TSH; Future; Expected date: 06/27/2025  -     Comprehensive Metabolic Panel; Future; Expected date: 06/27/2025  -     CBC Auto Differential; Future; Expected date: 06/27/2025    3. Type 2 diabetes mellitus with stage 3b chronic kidney disease, without long-term current use of insulin  Assessment & Plan:  - Evaluated A1C, which is stable and less than 7%, considered acceptable for patient's age.  - Monitored oral hypoglycemic regimen including Glipizide twice daily and Jardiance.  - Ordered comprehensive labs including A1C to be completed prior to next annual visit, with repeat testing in 5 months during annual exam.    Orders:  -     Hemoglobin A1C; Future; Expected date: 06/27/2025    4. H/O Prostate cancer  Overview:  2022 had positive biopsies. Repeat biopsy was negative.     Assessment & Plan:  - Monitored patient's history of prostate cancer with PSA levels showing an increase from 6% to 7.7% last year, though levels have been generally stable over time with a previous biopsy showing benign results.  - Ordered PSA testing to be completed in November.  - Discussed potential actions if levels increase, including referral to a urologist, obtaining imaging, or performing a biopsy, based on the patient's preferences.    Orders:  -     Prostate Specific Antigen, Diagnostic;  Future; Expected date: 06/27/2025    5. Anticoagulant long-term use  Assessment & Plan:  - Monitored anticoagulation therapy with Eliquis and aspirin.  - Reviewed current medication regimen for appropriate anticoagulation management.         ## BRADYCARDIA:  - Evaluated patient's slow heartbeat, which has been present since childhood, noting it is asymptomatic and considered normal for this patient.  - Confirmed recent medication change from Clonidine to Metoprolol per Dr. Kang's recommendation.    ## ELEVATED PSA:  - Ordered PSA testing as part of comprehensive labs to be completed prior to next annual visit.    ## GENERAL FOLLOW-UP AND MONITORING:  - Reviewed current medication regimen, including cholesterol medication, hydralazine, Metoprolol, and Entresto.  - Ordered comprehensive lab work including lipid panel, thyroid, kidney, and liver function tests to be completed prior to next annual visit.  - Luther to complete fasting lab   work before annual follow up in approximately 5 months.         Follow up in about 5 months (around 11/27/2025) for Annual.    This note was generated with the assistance of ambient listening technology. Verbal consent was obtained by the patient and accompanying visitor(s) for the recording of patient appointment to facilitate this note. I attest to having reviewed and edited the generated note for accuracy, though some syntax or spelling errors may persist. Please contact the author of this note for any clarification.                  [1]   Current Outpatient Medications:     apixaban (ELIQUIS) 5 mg Tab, Take 1 tablet (5 mg total) by mouth 2 (two) times daily., Disp: 180 tablet, Rfl: 3    aspirin 81 MG Chew, Take 81 mg by mouth once daily., Disp: , Rfl:     atorvastatin (LIPITOR) 40 MG tablet, Take 1 tablet (40 mg total) by mouth once daily., Disp: 90 tablet, Rfl: 3    blood sugar diagnostic (ACCU-CHEK GUIDE TEST STRIPS) Strp, Use to check blood sugar twice daily, Disp: 200 each,  Rfl: 3    blood-glucose meter (ACCU-CHEK GUIDE GLUCOSE METER) Misc, 1 Device by Misc.(Non-Drug; Combo Route) route every 6 (six) months., Disp: 1 each, Rfl: 0    glipiZIDE (GLUCOTROL) 10 MG TR24, Take 1 tablet (10 mg total) by mouth 2 (two) times daily., Disp: 180 tablet, Rfl: 3    hydrALAZINE (APRESOLINE) 25 MG tablet, Take 1 tablet (25 mg total) by mouth every 12 (twelve) hours., Disp: 180 tablet, Rfl: 3    JARDIANCE 10 mg tablet, TAKE 1 TABLET BY MOUTH EVERY DAY, Disp: 30 tablet, Rfl: 11    metoprolol succinate (TOPROL-XL) 25 MG 24 hr tablet, Take 1 tablet (25 mg total) by mouth 2 (two) times daily., Disp: 180 tablet, Rfl: 3    sacubitriL-valsartan (ENTRESTO) 24-26 mg per tablet, Take 1 tablet by mouth 2 (two) times daily., Disp: 60 tablet, Rfl: 2    sildenafiL (VIAGRA) 100 MG tablet, Take 1 tablet (100 mg total) by mouth daily as needed., Disp: 10 tablet, Rfl: 11    tadalafiL (CIALIS) 20 MG Tab, Take 1 tablet by mouth once daily, Disp: 30 tablet, Rfl: 0    tamsulosin (FLOMAX) 0.4 mg Cap, TAKE 1 CAPSULE BY MOUTH EVERY DAY, Disp: 90 capsule, Rfl: 3

## 2025-07-08 DIAGNOSIS — I10 ESSENTIAL HYPERTENSION: ICD-10-CM

## 2025-07-08 DIAGNOSIS — I11.0 HYPERTENSIVE HEART DISEASE WITH HEART FAILURE: ICD-10-CM

## 2025-07-08 DIAGNOSIS — N52.9 ERECTILE DYSFUNCTION, UNSPECIFIED ERECTILE DYSFUNCTION TYPE: ICD-10-CM

## 2025-07-08 RX ORDER — SACUBITRIL AND VALSARTAN 24; 26 MG/1; MG/1
1 TABLET, FILM COATED ORAL 2 TIMES DAILY
Qty: 60 TABLET | Refills: 2 | Status: SHIPPED | OUTPATIENT
Start: 2025-07-08 | End: 2025-10-06

## 2025-07-08 RX ORDER — SILDENAFIL 100 MG/1
100 TABLET, FILM COATED ORAL DAILY PRN
Qty: 10 TABLET | Refills: 11 | Status: SHIPPED | OUTPATIENT
Start: 2025-07-08

## 2025-07-08 NOTE — TELEPHONE ENCOUNTER
No care due was identified.  [facility] Embedded Care Due Messages. Reference number: [id number].   7/08/2025 10:28:01 AM CDT

## 2025-07-18 ENCOUNTER — CLINICAL SUPPORT (OUTPATIENT)
Dept: FAMILY MEDICINE | Facility: CLINIC | Age: 85
End: 2025-07-18
Payer: MEDICARE

## 2025-07-18 VITALS — HEART RATE: 57 BPM | SYSTOLIC BLOOD PRESSURE: 130 MMHG | DIASTOLIC BLOOD PRESSURE: 76 MMHG

## 2025-07-18 DIAGNOSIS — I10 ESSENTIAL HYPERTENSION: Primary | ICD-10-CM

## 2025-07-18 PROCEDURE — 99999 PR PBB SHADOW E&M-EST. PATIENT-LVL I: CPT | Mod: PBBFAC,,,

## 2025-07-18 NOTE — PROGRESS NOTES
Luther Irwin Jr. 85 y.o. male is here today for Blood Pressure check.   History of HTN yes.    Review of patient's allergies indicates:   Allergen Reactions    Grass pollen-june grass standard Itching     Eye irritation     Creatinine   Date Value Ref Range Status   12/11/2024 2.3 (H) 0.5 - 1.4 mg/dL Final     Sodium   Date Value Ref Range Status   11/11/2024 143 136 - 145 mmol/L Final     Potassium   Date Value Ref Range Status   11/11/2024 4.1 3.5 - 5.1 mmol/L Final   ]  Patient verifies taking blood pressure medications on a regular basis at the same time of the day.   Current Medications[1]  Does patient have record of home blood pressure readings no. Readings have been averaging states always higher at home than when he comes here.   Last dose of blood pressure medication was taken at this morning.  Patient is asymptomatic.   Complains of no complaints.    Vitals:    07/18/25 0942   BP: 130/76   BP Location: Right arm   Patient Position: Sitting   Pulse: (!) 57         Dr. Marie informed of nurse visit.            [1]   Current Outpatient Medications:     apixaban (ELIQUIS) 5 mg Tab, Take 1 tablet (5 mg total) by mouth 2 (two) times daily., Disp: 180 tablet, Rfl: 3    aspirin 81 MG Chew, Take 81 mg by mouth once daily., Disp: , Rfl:     atorvastatin (LIPITOR) 40 MG tablet, Take 1 tablet (40 mg total) by mouth once daily., Disp: 90 tablet, Rfl: 3    blood sugar diagnostic (ACCU-CHEK GUIDE TEST STRIPS) Strp, Use to check blood sugar twice daily, Disp: 200 each, Rfl: 3    blood-glucose meter (ACCU-CHEK GUIDE GLUCOSE METER) Misc, 1 Device by Misc.(Non-Drug; Combo Route) route every 6 (six) months., Disp: 1 each, Rfl: 0    glipiZIDE (GLUCOTROL) 10 MG TR24, Take 1 tablet (10 mg total) by mouth 2 (two) times daily., Disp: 180 tablet, Rfl: 3    hydrALAZINE (APRESOLINE) 25 MG tablet, Take 1 tablet (25 mg total) by mouth every 12 (twelve) hours., Disp: 180 tablet, Rfl: 3    JARDIANCE 10 mg tablet, TAKE 1 TABLET BY MOUTH  EVERY DAY, Disp: 30 tablet, Rfl: 11    metoprolol succinate (TOPROL-XL) 25 MG 24 hr tablet, Take 1 tablet (25 mg total) by mouth 2 (two) times daily., Disp: 180 tablet, Rfl: 3    sacubitriL-valsartan (ENTRESTO) 24-26 mg per tablet, Take 1 tablet by mouth 2 (two) times daily., Disp: 60 tablet, Rfl: 2    sildenafiL (VIAGRA) 100 MG tablet, Take 1 tablet (100 mg total) by mouth daily as needed., Disp: 10 tablet, Rfl: 11    tadalafiL (CIALIS) 20 MG Tab, Take 1 tablet by mouth once daily, Disp: 30 tablet, Rfl: 0    tamsulosin (FLOMAX) 0.4 mg Cap, TAKE 1 CAPSULE BY MOUTH EVERY DAY, Disp: 90 capsule, Rfl: 3

## 2025-07-24 ENCOUNTER — HOSPITAL ENCOUNTER (OUTPATIENT)
Dept: CARDIOLOGY | Facility: HOSPITAL | Age: 85
Discharge: HOME | End: 2025-07-24
Attending: INTERNAL MEDICINE
Payer: MEDICARE

## 2025-07-24 VITALS — WEIGHT: 257.06 LBS | BODY MASS INDEX: 29.74 KG/M2 | HEIGHT: 78 IN

## 2025-07-24 DIAGNOSIS — I50.22 CHRONIC HEART FAILURE WITH REDUCED EJECTION FRACTION (HFREF, <= 40%): ICD-10-CM

## 2025-07-24 LAB
AORTIC SIZE INDEX (SOV): 1.6 CM/M2
AORTIC SIZE INDEX: 1.3 CM/M2
ASCENDING AORTA: 3.3 CM
AV INDEX (PROSTH): 0.72
AV MEAN GRADIENT: 4 MMHG
AV PEAK GRADIENT: 8 MMHG
AV VALVE AREA BY VELOCITY RATIO: 3 CM²
AV VALVE AREA: 3 CM²
AV VELOCITY RATIO: 0.71
BSA FOR ECHO PROCEDURE: 2.55 M2
CV ECHO LV RWT: 0.42 CM
DOP CALC AO PEAK VEL: 1.4 M/S
DOP CALC AO VTI: 34.3 CM
DOP CALC LVOT AREA: 4.2 CM2
DOP CALC LVOT DIAMETER: 2.3 CM
DOP CALC LVOT PEAK VEL: 1 M/S
DOP CALC LVOT STROKE VOLUME: 102.6 CM3
DOP CALCLVOT PEAK VEL VTI: 24.7 CM
E WAVE DECELERATION TIME: 324 MSEC
E/A RATIO: 0.71
E/E' RATIO: 19 M/S
ECHO LV POSTERIOR WALL: 1.3 CM (ref 0.6–1.1)
FRACTIONAL SHORTENING: 21 % (ref 28–44)
INTERVENTRICULAR SEPTUM: 1.3 CM (ref 0.6–1.1)
IVC DIAMETER: 1.46 CM
IVRT: 117 MSEC
LA MAJOR: 7 CM
LA MINOR: 7.2 CM
LEFT ATRIUM SIZE: 5.1 CM
LEFT INTERNAL DIMENSION IN SYSTOLE: 4.9 CM (ref 2.1–4)
LEFT VENTRICLE DIASTOLIC VOLUME INDEX: 76.77 ML/M2
LEFT VENTRICLE DIASTOLIC VOLUME: 195 ML
LEFT VENTRICLE MASS INDEX: 145.4 G/M2
LEFT VENTRICLE SYSTOLIC VOLUME INDEX: 45.3 ML/M2
LEFT VENTRICLE SYSTOLIC VOLUME: 115 ML
LEFT VENTRICULAR INTERNAL DIMENSION IN DIASTOLE: 6.2 CM (ref 3.5–6)
LEFT VENTRICULAR MASS: 369.3 G
LV LATERAL E/E' RATIO: 15.2 M/S
LV SEPTAL E/E' RATIO: 25.3 M/S
LVED V (TEICH): 194.58 ML
LVES V (TEICH): 115.07 ML
LVOT MG: 2.16 MMHG
LVOT MV: 0.69 CM/S
Lab: 1.6 CM/M
Lab: 2 CM/M
MV PEAK A VEL: 1.07 M/S
MV PEAK E VEL: 0.76 M/S
MV STENOSIS PRESSURE HALF TIME: 94.04 MS
MV VALVE AREA P 1/2 METHOD: 2.34 CM2
OHS CV CPX PATIENT HEIGHT IN: 79
OHS CV RV/LV RATIO: 0.63 CM
PISA TR MAX VEL: 2.8 M/S
PULM VEIN S/D RATIO: 1.5
PV PEAK D VEL: 0.44 M/S
PV PEAK GRADIENT: 5 MMHG
PV PEAK S VEL: 0.66 M/S
PV PEAK VELOCITY: 1.1 M/S
RA MAJOR: 6.02 CM
RA PRESSURE ESTIMATED: 3 MMHG
RIGHT VENTRICLE DIASTOLIC BASEL DIMENSION: 3.9 CM
RIGHT VENTRICULAR END-DIASTOLIC DIMENSION: 4.09 CM
RV TB RVSP: 6 MMHG
RV TISSUE DOPPLER FREE WALL SYSTOLIC VELOCITY 1 (APICAL 4 CHAMBER VIEW): 13.94 CM/S
SINUS: 4.1 CM
STJ: 3.7 CM
TDI LATERAL: 0.05 M/S
TDI SEPTAL: 0.03 M/S
TDI: 0.04 M/S
TR MAX PG: 32 MMHG
TRICUSPID ANNULAR PLANE SYSTOLIC EXCURSION: 3.3 CM
TV PEAK GRADIENT: 2 MMHG
TV REST PULMONARY ARTERY PRESSURE: 34 MMHG
Z-SCORE OF LEFT VENTRICULAR DIMENSION IN END DIASTOLE: -9.17
Z-SCORE OF LEFT VENTRICULAR DIMENSION IN END SYSTOLE: -4.95

## 2025-07-24 PROCEDURE — 93306 TTE W/DOPPLER COMPLETE: CPT

## 2025-07-24 PROCEDURE — 93306 TTE W/DOPPLER COMPLETE: CPT | Mod: 26,,, | Performed by: INTERNAL MEDICINE

## 2025-08-03 DIAGNOSIS — I10 ESSENTIAL HYPERTENSION: ICD-10-CM

## 2025-08-03 RX ORDER — HYDRALAZINE HYDROCHLORIDE 25 MG/1
25 TABLET, FILM COATED ORAL EVERY 12 HOURS
Qty: 180 TABLET | Refills: 3 | Status: CANCELLED | OUTPATIENT
Start: 2025-08-03 | End: 2026-08-03

## 2025-08-03 NOTE — TELEPHONE ENCOUNTER
No care due was identified.  [facility] Embedded Care Due Messages. Reference number: [id number].   8/03/2025 2:04:41 PM CDT

## 2025-08-04 ENCOUNTER — OFFICE VISIT (OUTPATIENT)
Dept: CARDIOLOGY | Facility: CLINIC | Age: 85
End: 2025-08-04
Payer: MEDICARE

## 2025-08-04 VITALS
SYSTOLIC BLOOD PRESSURE: 137 MMHG | BODY MASS INDEX: 30.06 KG/M2 | WEIGHT: 259.81 LBS | OXYGEN SATURATION: 96 % | HEART RATE: 68 BPM | HEIGHT: 78 IN | DIASTOLIC BLOOD PRESSURE: 72 MMHG | RESPIRATION RATE: 19 BRPM

## 2025-08-04 DIAGNOSIS — I10 ESSENTIAL HYPERTENSION: ICD-10-CM

## 2025-08-04 DIAGNOSIS — E11.69 HYPERLIPIDEMIA ASSOCIATED WITH TYPE 2 DIABETES MELLITUS: ICD-10-CM

## 2025-08-04 DIAGNOSIS — I65.23 BILATERAL CAROTID ARTERY STENOSIS: ICD-10-CM

## 2025-08-04 DIAGNOSIS — N18.32 TYPE 2 DIABETES MELLITUS WITH STAGE 3B CHRONIC KIDNEY DISEASE, WITHOUT LONG-TERM CURRENT USE OF INSULIN: ICD-10-CM

## 2025-08-04 DIAGNOSIS — N18.4 CKD (CHRONIC KIDNEY DISEASE), STAGE IV: ICD-10-CM

## 2025-08-04 DIAGNOSIS — I44.7 LBBB (LEFT BUNDLE BRANCH BLOCK): ICD-10-CM

## 2025-08-04 DIAGNOSIS — E11.22 TYPE 2 DIABETES MELLITUS WITH STAGE 3B CHRONIC KIDNEY DISEASE, WITHOUT LONG-TERM CURRENT USE OF INSULIN: ICD-10-CM

## 2025-08-04 DIAGNOSIS — I50.22 CHRONIC HEART FAILURE WITH REDUCED EJECTION FRACTION (HFREF, <= 40%): Primary | ICD-10-CM

## 2025-08-04 DIAGNOSIS — Z86.73 H/O: CVA (CEREBROVASCULAR ACCIDENT): ICD-10-CM

## 2025-08-04 DIAGNOSIS — Z79.01 ANTICOAGULANT LONG-TERM USE: ICD-10-CM

## 2025-08-04 DIAGNOSIS — I70.0 AORTIC ATHEROSCLEROSIS: ICD-10-CM

## 2025-08-04 DIAGNOSIS — E78.5 HYPERLIPIDEMIA ASSOCIATED WITH TYPE 2 DIABETES MELLITUS: ICD-10-CM

## 2025-08-04 PROCEDURE — 1160F RVW MEDS BY RX/DR IN RCRD: CPT | Mod: CPTII,S$GLB,, | Performed by: INTERNAL MEDICINE

## 2025-08-04 PROCEDURE — 1159F MED LIST DOCD IN RCRD: CPT | Mod: CPTII,S$GLB,, | Performed by: INTERNAL MEDICINE

## 2025-08-04 PROCEDURE — 99214 OFFICE O/P EST MOD 30 MIN: CPT | Mod: S$GLB,,, | Performed by: INTERNAL MEDICINE

## 2025-08-04 PROCEDURE — 3075F SYST BP GE 130 - 139MM HG: CPT | Mod: CPTII,S$GLB,, | Performed by: INTERNAL MEDICINE

## 2025-08-04 PROCEDURE — 1101F PT FALLS ASSESS-DOCD LE1/YR: CPT | Mod: CPTII,S$GLB,, | Performed by: INTERNAL MEDICINE

## 2025-08-04 PROCEDURE — 3078F DIAST BP <80 MM HG: CPT | Mod: CPTII,S$GLB,, | Performed by: INTERNAL MEDICINE

## 2025-08-04 PROCEDURE — 3288F FALL RISK ASSESSMENT DOCD: CPT | Mod: CPTII,S$GLB,, | Performed by: INTERNAL MEDICINE

## 2025-08-04 PROCEDURE — 1157F ADVNC CARE PLAN IN RCRD: CPT | Mod: CPTII,S$GLB,, | Performed by: INTERNAL MEDICINE

## 2025-08-04 PROCEDURE — 1126F AMNT PAIN NOTED NONE PRSNT: CPT | Mod: CPTII,S$GLB,, | Performed by: INTERNAL MEDICINE

## 2025-08-04 PROCEDURE — 99999 PR PBB SHADOW E&M-EST. PATIENT-LVL V: CPT | Mod: PBBFAC,,, | Performed by: INTERNAL MEDICINE

## 2025-08-04 NOTE — PROGRESS NOTES
CARDIOLOGY CLINIC VISIT        HISTORY OF PRESENT ILLNESS:     09/04/2024: Luther Irwin presented with lower extremity weakness and uncoordination in July, 2024.  Blood pressure was 145/75.  CT of his head that showed a right frontal parietal lesion concerning for possible meningioma.  MRI of the brain and MRI of the head and neck showed asmall foci of acute infarction within the parasagittal right parietal lobe.  Started on aspirin and Plavix.  Was outside the window for stroke activation. Plavix subsequently changed to Apixaban.  He had an echocardiogram that showed an ejection fraction of 40-45%. EKG showed sinus bradycardia with first degree AVB. LBBB. Denies any history of coronary artery disease. Ambulates with walker. Residual LLE weakness.    10/16/2024: Nuclear stress reported as abnormal.  Moderate to severe intensity, medium size, fixed perfusion abnormality consistent with scar in the basal to mid inferior wall.  He feels good.  No chest pain.  No shortness of breath.  Has run out of Lasix.  States that it was basically going to the restroom all day long.  Made him feel uncomfortable.  EKG today shows sinus bradycardia with sinus arrhythmia, first-degree AV block left bundle branch block.  He has finished physical therapy.  States that he has primarily good days with regards to his ambulation.  He has a follow-up CTA in December followed by vascular surgery appointment.  He was originally placed on Eliquis by vascular.    08/04/2025:  Recent echocardiogram showed ejection fraction of 35%.  Mild right ventricular enlargement.  Biatrial enlargement left greater than right.  Mild Mitral and tricuspid regurgitation.  Mild pulmonary hypertension.  Prior echocardiogram showed ejection fraction of 35-40%.    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    CVA (cerebral vascular  accident) 07/12/2024    Diabetes mellitus, type 2     Diabetes with neurologic complications     Disorder of kidney and ureter     Hypertension     Late complications of amputation stump     Prostate cancer     Pseudophakia 01/05/2024       PAST SURGICAL HISTORY:     Past Surgical History:   Procedure Laterality Date    FINGER SURGERY  3/2014    HERNIA REPAIR  07/1967    KNEE SURGERY      SPINE SURGERY  10/2007    VASECTOMY  1986       ALLERGIES AND MEDICATION:     Review of patient's allergies indicates:   Allergen Reactions    Grass pollen-june grass standard Itching     Eye irritation        Medication List            Accurate as of August 4, 2025  1:58 PM. If you have any questions, ask your nurse or doctor.                CONTINUE taking these medications      apixaban 5 mg Tab  Commonly known as: ELIQUIS  Take 1 tablet (5 mg total) by mouth 2 (two) times daily.     aspirin 81 MG Chew     atorvastatin 40 MG tablet  Commonly known as: LIPITOR  Take 1 tablet (40 mg total) by mouth once daily.     blood sugar diagnostic Strp  Commonly known as: ACCU-CHEK GUIDE TEST STRIPS  Use to check blood sugar twice daily     blood-glucose meter Misc  Commonly known as: ACCU-CHEK GUIDE GLUCOSE METER  1 Device by Misc.(Non-Drug; Combo Route) route every 6 (six) months.     ENTRESTO 24-26 mg per tablet  Generic drug: sacubitriL-valsartan  Take 1 tablet by mouth 2 (two) times daily.     glipiZIDE 10 MG Tr24  Commonly known as: GLUCOTROL  Take 1 tablet (10 mg total) by mouth 2 (two) times daily.     hydrALAZINE 25 MG tablet  Commonly known as: APRESOLINE  Take 1 tablet (25 mg total) by mouth every 12 (twelve) hours.     JARDIANCE 10 mg tablet  Generic drug: empagliflozin  TAKE 1 TABLET BY MOUTH EVERY DAY     metoprolol succinate 25 MG 24 hr tablet  Commonly known as: TOPROL-XL  Take 1 tablet (25 mg total) by mouth 2 (two) times daily.     sildenafiL 100 MG tablet  Commonly known as: VIAGRA  Take 1 tablet (100 mg total) by mouth  daily as needed.     tadalafiL 20 MG Tab  Commonly known as: CIALIS  Take 1 tablet by mouth once daily     tamsulosin 0.4 mg Cap  Commonly known as: FLOMAX  TAKE 1 CAPSULE BY MOUTH EVERY DAY              SOCIAL HISTORY:     Social History     Socioeconomic History    Marital status: Single    Number of children: 3    Highest education level: Bachelor's degree (e.g., BA, AB, BS)   Tobacco Use    Smoking status: Former     Current packs/day: 0.00     Average packs/day: 0.3 packs/day for 0.9 years (0.2 ttl pk-yrs)     Types: Cigarettes     Start date: 1960     Quit date: 1960     Years since quittin.7    Smokeless tobacco: Never   Substance and Sexual Activity    Alcohol use: Yes     Comment: special occacion    Drug use: No     Social Drivers of Health     Financial Resource Strain: Low Risk  (2024)    Overall Financial Resource Strain (CARDIA)     Difficulty of Paying Living Expenses: Not hard at all   Food Insecurity: No Food Insecurity (2024)    Hunger Vital Sign     Worried About Running Out of Food in the Last Year: Never true     Ran Out of Food in the Last Year: Never true   Transportation Needs: No Transportation Needs (2024)    PRAPARE - Transportation     Lack of Transportation (Medical): No     Lack of Transportation (Non-Medical): No   Physical Activity: Insufficiently Active (2024)    Exercise Vital Sign     Days of Exercise per Week: 2 days     Minutes of Exercise per Session: 20 min   Stress: No Stress Concern Present (2024)    [employer] - Occupational Stress Questionnaire     Feeling of Stress : Not at all   Housing Stability: Unknown (2024)    Housing Stability Vital Sign     Unable to Pay for Housing in the Last Year: No     Homeless in the Last Year: No       FAMILY HISTORY:     Family History   Problem Relation Name Age of Onset    Hypertension Mother      Hyperlipidemia Mother      Hypertension Father      Hyperlipidemia  "Father         REVIEW OF SYSTEMS:   Review of Systems   Constitutional:  Negative for chills, diaphoresis, fever, malaise/fatigue and weight loss.   HENT:  Negative for congestion, hearing loss, sinus pain, sore throat and tinnitus.    Eyes:  Negative for blurred vision, double vision, photophobia and pain.   Respiratory:  Negative for cough, hemoptysis, sputum production, shortness of breath, wheezing and stridor.    Cardiovascular:  Negative for chest pain, palpitations, orthopnea, claudication, leg swelling and PND.   Gastrointestinal:  Negative for abdominal pain, blood in stool, heartburn, melena, nausea and vomiting.   Musculoskeletal:  Negative for back pain, falls, joint pain, myalgias and neck pain.   Neurological:  Positive for focal weakness. Negative for dizziness, tingling, tremors, sensory change, speech change, seizures, loss of consciousness, weakness and headaches.   Endo/Heme/Allergies:  Does not bruise/bleed easily.   Psychiatric/Behavioral:  Negative for depression, memory loss and substance abuse. The patient is not nervous/anxious.        PHYSICAL EXAM:     Vitals:    08/04/25 1329   BP: 137/72   Pulse: 68   Resp: 19        Body mass index is 30.02 kg/m².  Weight: 117.8 kg (259 lb 13 oz)   Height: 6' 6" (198.1 cm)     Physical Exam  Vitals reviewed.   Constitutional:       General: He is not in acute distress.     Appearance: Normal appearance. He is well-developed. He is not diaphoretic.   HENT:      Head: Normocephalic.   Eyes:      Extraocular Movements: Extraocular movements intact.   Neck:      Vascular: Carotid bruit present. No JVD.   Cardiovascular:      Rate and Rhythm: Normal rate and regular rhythm.      Pulses: Normal pulses.      Heart sounds: Normal heart sounds.   Pulmonary:      Effort: Pulmonary effort is normal.      Breath sounds: Normal breath sounds. No wheezing, rhonchi or rales.   Chest:      Chest wall: No tenderness.   Abdominal:      General: Bowel sounds are normal. " There is no distension.      Palpations: Abdomen is soft.      Tenderness: There is no abdominal tenderness.   Musculoskeletal:      Right lower leg: No edema.      Left lower leg: No edema.   Skin:     General: Skin is warm and dry.      Coloration: Skin is not jaundiced or pale.      Findings: No erythema.   Neurological:      General: No focal deficit present.      Mental Status: He is alert and oriented to person, place, and time.      Motor: No weakness.   Psychiatric:         Speech: Speech normal.         Behavior: Behavior normal.         Thought Content: Thought content normal.         DATA:   EKG: (personally reviewed tracing)  08/04/2025-sinus bradycardi with first degree AVB, LBBB  10/16/2024-sinus bradycardia with sinus arrhythmia, first-degree AV block, left bundle branch block  Results for orders placed or performed in visit on 10/16/24   IN OFFICE EKG 12-LEAD (to [email or web address])    Collection Time: 10/16/24 12:24 PM   Result Value Ref Range    QRS Duration 166 ms    [facility] QTC Calculation 474 ms    Narrative    Test Reason : I42.9,    Vent. Rate : 056 BPM     Atrial Rate : 056 BPM     P-R Int : 290 ms          QRS Dur : 166 ms      QT Int : 492 ms       P-R-T Axes : 066 041 230 degrees     QTc Int : 474 ms    Sinus bradycardia with sinus arrhythmia with 1st degree A-V block  Left bundle branch block  Abnormal ECG  When compared with ECG of 24-SEP-2024 07:54,  Previous ECG has undetermined rhythm, needs review  Confirmed by Mal Dutta MD ([id number]) on 10/16/2024 7:30:49 PM    Referred By:             Confirmed By:Mal Dutta MD        Laboratory:  CBC:  Recent Labs   Lab 07/14/24  0432 07/15/24  0532 11/11/24  0858   WBC 9.11 8.85 7.80   Hemoglobin 12.8 L 13.1 L 8.1 L   Hematocrit 39.9 L 40.5 27.7 L   Platelets 246 239 296       CHEMISTRIES:  Recent Labs   Lab 07/12/24 2014 07/13/24  0411 07/15/24  0532 07/16/24  0723 07/17/24  0723 07/18/24  0423 11/11/24  0858 12/11/24  1000   Glucose 162 H   < > 142 H   < >  144 H 135 H 146 H  --    Sodium 138   < > 138   < > 137 137 143  --    Potassium 3.4 L   < > 3.4 L   < > 3.7 3.7 4.1  --    BUN 36 H   < > 30 H   < > 32 H 31 H 29 H  --    Creatinine 2.9 H   < > 2.0 H   < > 2.2 H 2.1 H 2.4 H 2.3 H   Calcium 9.3   < > 8.8   < > 9.1 8.8 8.9  --    Magnesium 2.6  --  2.2  --   --   --   --   --     < > = values in this interval not displayed.       CARDIAC BIOMARKERS:  Recent Labs   Lab 07/12/24 2014 07/13/24  0011 07/13/24  0411   Troponin I 0.033 H 0.045 H  0.045 H 0.034 H       COAGS:  Recent Labs   Lab 07/12/24 2014   INR 1.0       LIPIDS/LFTS:  Recent Labs   Lab 11/10/23  0926 02/27/24  1146 07/12/24 2014 07/14/24  0431 07/15/24  0532 11/11/24  0858   Cholesterol 161  --  168  --   --  92 L   Triglycerides 73  --  214 H  --   --  52   HDL 39 L  --  33 L  --   --  42   LDL Cholesterol 107.4  --  92.2  --   --  39.6 L   Non-HDL Cholesterol 122  --  135  --   --  50   AST 21   < > 18 16 16 23   ALT 18   < > 17 13 12 24    < > = values in this interval not displayed.       Hemoglobin A1C   Date Value Ref Range Status   11/11/2024 6.9 (H) 4.0 - 5.6 % Final     Comment:     ADA Screening Guidelines:  5.7-6.4%  Consistent with prediabetes  >or=6.5%  Consistent with diabetes    High levels of fetal hemoglobin interfere with the HbA1C  assay. Heterozygous hemoglobin variants (HbS, HgC, etc)do  not significantly interfere with this assay.   However, presence of multiple variants may affect accuracy.     07/13/2024 7.0 (H) 4.0 - 5.6 % Final     Comment:     ADA Screening Guidelines:  5.7-6.4%  Consistent with prediabetes  >or=6.5%  Consistent with diabetes    High levels of fetal hemoglobin interfere with the HbA1C  assay. Heterozygous hemoglobin variants (HbS, HgC, etc)do  not significantly interfere with this assay.   However, presence of multiple variants may affect accuracy.     02/27/2024 7.4 (H) 4.0 - 5.6 % Final     Comment:     ADA Screening Guidelines:  5.7-6.4%  Consistent with  prediabetes  >or=6.5%  Consistent with diabetes    High levels of fetal hemoglobin interfere with the HbA1C  assay. Heterozygous hemoglobin variants (HbS, HgC, etc)do  not significantly interfere with this assay.   However, presence of multiple variants may affect accuracy.       Hemoglobin A1c   Date Value Ref Range Status   05/16/2025 6.9 (H) 4.0 - 5.6 % Final     Comment:     ADA Screening Guidelines:  5.7-6.4%  Consistent with prediabetes  >=6.5%  Consistent with diabetes    High levels of fetal hemoglobin interfere with the HbA1C  assay. Heterozygous hemoglobin variants (HbS, HgC, etc)do  not significantly interfere with this assay.   However, presence of multiple variants may affect accuracy.        The ASCVD Risk score (Sneha BUCHANAN, et al., 2019) failed to calculate for the following reasons:    The 2019 ASCVD risk score is only valid for ages 40 to 79    Risk score cannot be calculated because patient has a medical history suggesting prior/existing ASCVD      Cardiovascular Testing:    Nuclear stress 09/24/2024:      Abnormal myocardial perfusion scan.    There is a moderate to severe intensity, medium sized, fixed perfusion abnormality consistent with scar in the  basal to mid inferior wall(s).    There are no other significant perfusion abnormalities.    The gated perfusion images showed an ejection fraction of 37% at rest.    There is moderate global hypokinesis at rest and post-stress.    The ECG portion of the study is negative for ischemia.    The patient reported no chest pain during the stress test.    There were no arrhythmias during stress.    Echo Saline Bubble? No  Result Date: 7/24/2025    Left Ventricle: The left ventricle is mildly dilated. Mildly increased   wall thickness. There is mild concentric hypertrophy. There is moderately   reduced systolic function with a visually estimated ejection fraction of   35%. Grade I diastolic dysfunction.    Right Ventricle: The right ventricle is mildly  dilated measuring 3.9   cm. Systolic function is normal.    Left Atrium: The left atrium is moderately dilated.    Right Atrium: The right atrium is mildly dilated.    Mitral Valve: There is mild regurgitation.    Tricuspid Valve: There is mild regurgitation.    Aorta: The aortic root is mildly dilated measuring 4.1 cm. The proximal   ascending aorta  with an ASI of 1.3 cm/m2.    Pulmonary Artery: The estimated pulmonary artery systolic pressure is   34 mmHg.    IVC/SVC: Normal venous pressure at 3 mmHg.        Echo  Result Date: 1/16/2025    Left Ventricle: The left ventricle is mildly dilated. Moderately   increased ventricular mass. Normal wall thickness. There is moderate   eccentric hypertrophy. Moderate global hypokinesis and regional wall   motion abnormalities present. See diagram for wall motion findings. Septal   motion is abnormal. There is moderately reduced systolic function with a   visually estimated ejection fraction of 35 - 40%. Ejection fraction is   approximately 35%. Quantitated ejection fraction is 33%. Grade I diastolic   dysfunction.    Right Ventricle: Normal right ventricular cavity size. Wall thickness   is normal. Systolic function is normal.    Left Atrium: Left atrium is severely dilated.    Right Atrium: Right atrium is moderately dilated.    Aortic Valve: There is mild aortic valve sclerosis.    Mitral Valve: There is mild regurgitation.    Aorta: Aortic root is normal in size measuring 3.93 cm. Aortic root at   ST junction is mildly dilated measuring 3.92 cm. Ascending aorta is normal   measuring 3.35 cm.    Pulmonary Artery: The estimated pulmonary artery systolic pressure is 9   mmHg which is probably under-measured.    IVC/SVC: Normal venous pressure at 3 mmHg.           No cardiac monitor results found for the past 12 months         ASSESSMENT:     Cardiomyopathy  Stroke  Hypertension  Hyperlipidemia: LDL 40 atorvastatin 40  Diabetes: A1c 6.9  LBBB  Carotid stenosis  Aortic  atherosclerosis  Chronic kidney disease    PLAN:     Cardiomyopathy: scar on nuclear. Presumed ischemic. Continue current management. EF 35%. QRS 160ms. Refer to EP for CRT evaluation.  Hypertension:  Continue current management.  Hyperlipidemia:  Continue current management.    Return to clinic 3 months.           Fabricio Kang MD, MPH, FACC, [facility]

## 2025-08-05 ENCOUNTER — PATIENT MESSAGE (OUTPATIENT)
Dept: FAMILY MEDICINE | Facility: CLINIC | Age: 85
End: 2025-08-05
Payer: MEDICARE

## 2025-08-18 DIAGNOSIS — N18.32 TYPE 2 DIABETES MELLITUS WITH STAGE 3B CHRONIC KIDNEY DISEASE, WITHOUT LONG-TERM CURRENT USE OF INSULIN: ICD-10-CM

## 2025-08-18 DIAGNOSIS — E11.22 TYPE 2 DIABETES MELLITUS WITH STAGE 3B CHRONIC KIDNEY DISEASE, WITHOUT LONG-TERM CURRENT USE OF INSULIN: ICD-10-CM

## 2025-08-20 ENCOUNTER — PATIENT OUTREACH (OUTPATIENT)
Dept: ADMINISTRATIVE | Facility: HOSPITAL | Age: 85
End: 2025-08-20
Payer: MEDICARE

## 2025-08-20 DIAGNOSIS — E11.22 TYPE 2 DIABETES MELLITUS WITH STAGE 3B CHRONIC KIDNEY DISEASE, WITHOUT LONG-TERM CURRENT USE OF INSULIN: Primary | ICD-10-CM

## 2025-08-20 DIAGNOSIS — N18.32 TYPE 2 DIABETES MELLITUS WITH STAGE 3B CHRONIC KIDNEY DISEASE, WITHOUT LONG-TERM CURRENT USE OF INSULIN: Primary | ICD-10-CM

## 2025-08-26 ENCOUNTER — OFFICE VISIT (OUTPATIENT)
Dept: FAMILY MEDICINE | Facility: CLINIC | Age: 85
End: 2025-08-26
Payer: MEDICARE

## 2025-08-26 ENCOUNTER — HOSPITAL ENCOUNTER (OUTPATIENT)
Dept: RADIOLOGY | Facility: HOSPITAL | Age: 85
Discharge: HOME | End: 2025-08-26
Payer: MEDICARE

## 2025-08-26 VITALS
DIASTOLIC BLOOD PRESSURE: 84 MMHG | WEIGHT: 259.94 LBS | HEART RATE: 65 BPM | TEMPERATURE: 98 F | RESPIRATION RATE: 16 BRPM | OXYGEN SATURATION: 96 % | HEIGHT: 78 IN | BODY MASS INDEX: 30.08 KG/M2 | SYSTOLIC BLOOD PRESSURE: 164 MMHG

## 2025-08-26 DIAGNOSIS — I50.22 CHRONIC HEART FAILURE WITH REDUCED EJECTION FRACTION (HFREF, <= 40%): ICD-10-CM

## 2025-08-26 DIAGNOSIS — N18.32 TYPE 2 DIABETES MELLITUS WITH STAGE 3B CHRONIC KIDNEY DISEASE, WITHOUT LONG-TERM CURRENT USE OF INSULIN: ICD-10-CM

## 2025-08-26 DIAGNOSIS — Z09 FOLLOW-UP EXAM: Primary | ICD-10-CM

## 2025-08-26 DIAGNOSIS — R26.9 ABNORMALITY OF GAIT AND MOBILITY: ICD-10-CM

## 2025-08-26 DIAGNOSIS — E11.22 TYPE 2 DIABETES MELLITUS WITH STAGE 3B CHRONIC KIDNEY DISEASE, WITHOUT LONG-TERM CURRENT USE OF INSULIN: ICD-10-CM

## 2025-08-26 DIAGNOSIS — I10 ESSENTIAL HYPERTENSION: ICD-10-CM

## 2025-08-26 DIAGNOSIS — N18.4 CKD (CHRONIC KIDNEY DISEASE), STAGE IV: ICD-10-CM

## 2025-08-26 PROCEDURE — 73590 X-RAY EXAM OF LOWER LEG: CPT | Mod: 26,LT,, | Performed by: RADIOLOGY

## 2025-08-26 PROCEDURE — 3077F SYST BP >= 140 MM HG: CPT | Mod: CPTII,S$GLB,,

## 2025-08-26 PROCEDURE — 1157F ADVNC CARE PLAN IN RCRD: CPT | Mod: CPTII,S$GLB,,

## 2025-08-26 PROCEDURE — 73630 X-RAY EXAM OF FOOT: CPT | Mod: TC,LT

## 2025-08-26 PROCEDURE — 73590 X-RAY EXAM OF LOWER LEG: CPT | Mod: TC,LT

## 2025-08-26 PROCEDURE — 1160F RVW MEDS BY RX/DR IN RCRD: CPT | Mod: CPTII,S$GLB,,

## 2025-08-26 PROCEDURE — 3288F FALL RISK ASSESSMENT DOCD: CPT | Mod: CPTII,S$GLB,,

## 2025-08-26 PROCEDURE — 2023F DILAT RTA XM W/O RTNOPTHY: CPT | Mod: CPTII,S$GLB,,

## 2025-08-26 PROCEDURE — G2211 COMPLEX E/M VISIT ADD ON: HCPCS | Mod: S$GLB,,,

## 2025-08-26 PROCEDURE — 3079F DIAST BP 80-89 MM HG: CPT | Mod: CPTII,S$GLB,,

## 2025-08-26 PROCEDURE — 99214 OFFICE O/P EST MOD 30 MIN: CPT | Mod: S$GLB,,,

## 2025-08-26 PROCEDURE — 73562 X-RAY EXAM OF KNEE 3: CPT | Mod: TC,LT

## 2025-08-26 PROCEDURE — 1159F MED LIST DOCD IN RCRD: CPT | Mod: CPTII,S$GLB,,

## 2025-08-26 PROCEDURE — 1101F PT FALLS ASSESS-DOCD LE1/YR: CPT | Mod: CPTII,S$GLB,,

## 2025-08-26 PROCEDURE — 99999 PR PBB SHADOW E&M-EST. PATIENT-LVL IV: CPT | Mod: PBBFAC,,,

## 2025-08-26 PROCEDURE — 73562 X-RAY EXAM OF KNEE 3: CPT | Mod: 26,LT,, | Performed by: RADIOLOGY

## 2025-08-26 PROCEDURE — 73630 X-RAY EXAM OF FOOT: CPT | Mod: 26,LT,, | Performed by: RADIOLOGY

## 2025-09-01 PROBLEM — Z09 FOLLOW-UP EXAM: Status: RESOLVED | Noted: 2025-08-26 | Resolved: 2025-09-01

## 2025-09-02 ENCOUNTER — OFFICE VISIT (OUTPATIENT)
Dept: CARDIOLOGY | Facility: CLINIC | Age: 85
End: 2025-09-02
Payer: MEDICARE

## 2025-09-02 VITALS
SYSTOLIC BLOOD PRESSURE: 164 MMHG | DIASTOLIC BLOOD PRESSURE: 80 MMHG | HEART RATE: 60 BPM | WEIGHT: 262 LBS | OXYGEN SATURATION: 96 % | HEIGHT: 77 IN | BODY MASS INDEX: 30.94 KG/M2

## 2025-09-02 DIAGNOSIS — E78.5 HYPERLIPIDEMIA ASSOCIATED WITH TYPE 2 DIABETES MELLITUS: ICD-10-CM

## 2025-09-02 DIAGNOSIS — I66.9 STENOSIS OF CEREBRAL ARTERY: ICD-10-CM

## 2025-09-02 DIAGNOSIS — Z79.01 ANTICOAGULANT LONG-TERM USE: ICD-10-CM

## 2025-09-02 DIAGNOSIS — E11.22 TYPE 2 DIABETES MELLITUS WITH STAGE 3B CHRONIC KIDNEY DISEASE, WITHOUT LONG-TERM CURRENT USE OF INSULIN: ICD-10-CM

## 2025-09-02 DIAGNOSIS — I65.23 BILATERAL CAROTID ARTERY STENOSIS: ICD-10-CM

## 2025-09-02 DIAGNOSIS — I70.0 AORTIC ATHEROSCLEROSIS: ICD-10-CM

## 2025-09-02 DIAGNOSIS — I11.0 HYPERTENSIVE HEART DISEASE WITH HEART FAILURE: ICD-10-CM

## 2025-09-02 DIAGNOSIS — I50.22 CHRONIC HEART FAILURE WITH REDUCED EJECTION FRACTION (HFREF, <= 40%): Primary | ICD-10-CM

## 2025-09-02 DIAGNOSIS — E11.69 HYPERLIPIDEMIA ASSOCIATED WITH TYPE 2 DIABETES MELLITUS: ICD-10-CM

## 2025-09-02 DIAGNOSIS — I44.7 LBBB (LEFT BUNDLE BRANCH BLOCK): ICD-10-CM

## 2025-09-02 DIAGNOSIS — Z86.73 H/O: CVA (CEREBROVASCULAR ACCIDENT): ICD-10-CM

## 2025-09-02 DIAGNOSIS — I10 ESSENTIAL HYPERTENSION: ICD-10-CM

## 2025-09-02 DIAGNOSIS — N18.32 TYPE 2 DIABETES MELLITUS WITH STAGE 3B CHRONIC KIDNEY DISEASE, WITHOUT LONG-TERM CURRENT USE OF INSULIN: ICD-10-CM

## 2025-09-02 DIAGNOSIS — E78.5 HYPERLIPIDEMIA, UNSPECIFIED HYPERLIPIDEMIA TYPE: ICD-10-CM

## 2025-09-02 PROCEDURE — 3079F DIAST BP 80-89 MM HG: CPT | Mod: CPTII,S$GLB,, | Performed by: INTERNAL MEDICINE

## 2025-09-02 PROCEDURE — 3288F FALL RISK ASSESSMENT DOCD: CPT | Mod: CPTII,S$GLB,, | Performed by: INTERNAL MEDICINE

## 2025-09-02 PROCEDURE — 1126F AMNT PAIN NOTED NONE PRSNT: CPT | Mod: CPTII,S$GLB,, | Performed by: INTERNAL MEDICINE

## 2025-09-02 PROCEDURE — 1159F MED LIST DOCD IN RCRD: CPT | Mod: CPTII,S$GLB,, | Performed by: INTERNAL MEDICINE

## 2025-09-02 PROCEDURE — 3077F SYST BP >= 140 MM HG: CPT | Mod: CPTII,S$GLB,, | Performed by: INTERNAL MEDICINE

## 2025-09-02 PROCEDURE — 1160F RVW MEDS BY RX/DR IN RCRD: CPT | Mod: CPTII,S$GLB,, | Performed by: INTERNAL MEDICINE

## 2025-09-02 PROCEDURE — 1157F ADVNC CARE PLAN IN RCRD: CPT | Mod: CPTII,S$GLB,, | Performed by: INTERNAL MEDICINE

## 2025-09-02 PROCEDURE — G2211 COMPLEX E/M VISIT ADD ON: HCPCS | Mod: S$GLB,,, | Performed by: INTERNAL MEDICINE

## 2025-09-02 PROCEDURE — 99999 PR PBB SHADOW E&M-EST. PATIENT-LVL V: CPT | Mod: PBBFAC,,, | Performed by: INTERNAL MEDICINE

## 2025-09-02 PROCEDURE — 1101F PT FALLS ASSESS-DOCD LE1/YR: CPT | Mod: CPTII,S$GLB,, | Performed by: INTERNAL MEDICINE

## 2025-09-02 PROCEDURE — 99214 OFFICE O/P EST MOD 30 MIN: CPT | Mod: S$GLB,,, | Performed by: INTERNAL MEDICINE

## 2025-09-02 RX ORDER — SACUBITRIL AND VALSARTAN 49; 51 MG/1; MG/1
1 TABLET ORAL 2 TIMES DAILY
Qty: 180 TABLET | Refills: 3 | Status: SHIPPED | OUTPATIENT
Start: 2025-09-02 | End: 2026-09-02

## 2025-09-02 RX ORDER — ATORVASTATIN CALCIUM 40 MG/1
40 TABLET, FILM COATED ORAL DAILY
Qty: 90 TABLET | Refills: 3 | Status: SHIPPED | OUTPATIENT
Start: 2025-09-02 | End: 2026-08-28

## 2025-09-05 DIAGNOSIS — N18.32 TYPE 2 DIABETES MELLITUS WITH STAGE 3B CHRONIC KIDNEY DISEASE, WITHOUT LONG-TERM CURRENT USE OF INSULIN: ICD-10-CM

## 2025-09-05 DIAGNOSIS — E11.22 TYPE 2 DIABETES MELLITUS WITH STAGE 3B CHRONIC KIDNEY DISEASE, WITHOUT LONG-TERM CURRENT USE OF INSULIN: ICD-10-CM

## 2025-09-05 RX ORDER — DEXTROSE 4 G
TABLET,CHEWABLE ORAL
Qty: 1 EACH | Refills: 0 | Status: SHIPPED | OUTPATIENT
Start: 2025-09-05